# Patient Record
Sex: FEMALE | Race: WHITE | NOT HISPANIC OR LATINO | Employment: OTHER | URBAN - METROPOLITAN AREA
[De-identification: names, ages, dates, MRNs, and addresses within clinical notes are randomized per-mention and may not be internally consistent; named-entity substitution may affect disease eponyms.]

---

## 2017-01-03 ENCOUNTER — APPOINTMENT (OUTPATIENT)
Dept: GENERAL RADIOLOGY | Facility: HOSPITAL | Age: 82
End: 2017-01-03
Attending: FAMILY MEDICINE

## 2017-01-03 PROCEDURE — 71010 HC CHEST PA OR AP: CPT

## 2017-01-18 ENCOUNTER — OFFICE VISIT (OUTPATIENT)
Dept: FAMILY MEDICINE CLINIC | Facility: CLINIC | Age: 82
End: 2017-01-18

## 2017-01-18 VITALS
SYSTOLIC BLOOD PRESSURE: 158 MMHG | DIASTOLIC BLOOD PRESSURE: 60 MMHG | HEIGHT: 63 IN | WEIGHT: 130.2 LBS | BODY MASS INDEX: 23.07 KG/M2 | TEMPERATURE: 97 F

## 2017-01-18 DIAGNOSIS — J18.9 PNEUMONIA OF RIGHT LOWER LOBE DUE TO INFECTIOUS ORGANISM: Primary | ICD-10-CM

## 2017-01-18 DIAGNOSIS — I10 ESSENTIAL HYPERTENSION: ICD-10-CM

## 2017-01-18 PROCEDURE — 99213 OFFICE O/P EST LOW 20 MIN: CPT | Performed by: FAMILY MEDICINE

## 2017-01-18 RX ORDER — CHLORAL HYDRATE 500 MG
1000 CAPSULE ORAL
COMMUNITY
End: 2019-10-24 | Stop reason: HOSPADM

## 2017-01-18 RX ORDER — AMLODIPINE BESYLATE 10 MG/1
10 TABLET ORAL DAILY
Qty: 30 TABLET | Refills: 11 | Status: SHIPPED | OUTPATIENT
Start: 2017-01-18 | End: 2017-12-04 | Stop reason: SDUPTHER

## 2017-01-18 NOTE — PROGRESS NOTES
Chief Complaint and HPI    I have reviewed the patient's medical history in detail and updated the computerized patient record.    Subjective: Gita Wharton is a 81 y.o. female presents   here today for     Hospital follow up (went to [facility] with pneumonia from 12/30/16/-1/5/17) and Hypertension (meds ok)  feels good,breathing well    Review of Systems   Constitutional: Negative for chills, fatigue, fever and unexpected weight change.   HENT: Negative for ear pain, hearing loss, sinus pressure, sore throat and tinnitus.    Eyes: Negative for pain, discharge and redness.   Respiratory: Negative for cough, shortness of breath and wheezing.    Cardiovascular: Negative for chest pain, palpitations and leg swelling.   Gastrointestinal: Negative for abdominal pain, constipation, diarrhea and nausea.   Endocrine: Negative for cold intolerance and heat intolerance.   Genitourinary: Negative for difficulty urinating, flank pain and urgency.   Musculoskeletal: Negative for back pain, joint swelling and myalgias.   Skin: Negative for rash and wound.   Allergic/Immunologic: Negative for environmental allergies and food allergies.   Neurological: Negative for dizziness, seizures, numbness and headaches.   Hematological: Negative for adenopathy. Does not bruise/bleed easily.   Psychiatric/Behavioral: Negative for decreased concentration, dysphoric mood and sleep disturbance. The patient is not nervous/anxious.    All other systems reviewed and are negative.      Physical Exam   Constitutional: She is oriented to person, place, and time. She appears well-developed and well-nourished.   Cardiovascular: Normal rate, regular rhythm, normal heart sounds and intact distal pulses.    Pulmonary/Chest: Effort normal and breath sounds normal.   Neurological: She is alert and oriented to person, place, and time.   Nursing note and vitals reviewed.      Procedures    Assessment:   Diagnosis Plan   1. Pneumonia of right lower lobe due  to infectious organism     2. Essential hypertension         Plan:  No orders of the defined types were placed in this encounter.      Requested Prescriptions     Signed Prescriptions Disp Refills   • amLODIPine (NORVASC) 10 MG tablet 30 tablet 11     Sig: Take 1 tablet by mouth Daily.       All tests and consults since last visit reviewed with patient    Return in about 8 weeks (around 3/15/2017).

## 2017-01-18 NOTE — MR AVS SNAPSHOT
Gita Wharton   1/18/2017 12:15 PM   Office Visit    Dept Phone:  [phone]   Encounter #:  [id number]    Provider:  Alexis Wiggins MD   Department:  [facility]                Your Full Care Plan              Today's Medication Changes          These changes are accurate as of: 1/18/17  1:51 PM.  If you have any questions, ask your nurse or doctor.               Medication(s)that have changed:     amLODIPine 10 MG tablet   Commonly known as:  NORVASC   Take 1 tablet by mouth Daily.   What changed:  See the new instructions.   Changed by:  Alexis Wiggins MD         Stop taking medication(s)listed here:     cefdinir 300 MG capsule   Commonly known as:  OMNICEF   Stopped by:  Alexis Wiggins MD           diltiaZEM  MG 24 hr capsule   Commonly known as:  CARDIZEM CD   Stopped by:  Alexis Wiggins MD           guaiFENesin 600 MG 12 hr tablet   Commonly known as:  MUCINEX   Stopped by:  Alexis Wiggins MD                Where to Get Your Medications      You can get these medications from any pharmacy     Bring a paper prescription for each of these medications     amLODIPine 10 MG tablet                  Your Updated Medication List          This list is accurate as of: 1/18/17  1:51 PM.  Always use your most recent med list.                amLODIPine 10 MG tablet   Commonly known as:  NORVASC   Take 1 tablet by mouth Daily.       clopidogrel 75 MG tablet   Commonly known as:  PLAVIX   TAKE ONE TABLET BY MOUTH DAILY       fish oil 1000 MG capsule capsule       losartan-hydrochlorothiazide 100-12.5 MG per tablet   Commonly known as:  HYZAAR   TAKE ONE TABLET BY MOUTH EVERY MORNING       lovastatin 20 MG tablet   Commonly known as:  MEVACOR   TAKE ONE TABLET BY MOUTH DAILY       MULTI VITAMIN DAILY PO       potassium chloride 10 MEQ CR tablet   Commonly known as:  K-DUR               You Were Diagnosed With        Codes  Comments    Pneumonia of right lower lobe due to infectious organism    -  Primary ICD-10-CM: J18.9  ICD-9-CM: 483.8     Essential hypertension     ICD-10-CM: I10  ICD-9-CM: 401.9       Instructions     None    Patient Instructions History      Upcoming Appointments     Visit Type Date Time Department    HOSPITAL FOLLOW UP 2017 12:15 PM [facility]    FOLLOW UP 2017 11:00 AM [facility]    OFFICE VISIT 2017 11:00 AM [facility]      [employer] Signup     [facility] allows you to send messages to your doctor, view your test results, renew your prescriptions, schedule appointments, and more. To sign up, go to [employer] and click on the Sign Up Now link in the New User? box. Enter your [facility] Activation Code exactly as it appears below along with the last four digits of your Social Security Number and your Date of Birth () to complete the sign-up process. If you do not sign up before the expiration date, you must request a new code.    [facility] Activation Code: [email or web address]  Expires: 2017 10:38 AM    If you have questions, you can email [email or web address] or call [phone] to talk to our [facility] staff. Remember, [facility] is NOT to be used for urgent needs. For medical emergencies, dial 911.               Other Info from Your Visit           Your Appointments     2017 11:00 AM EDT   Follow Up with Alexis Wiggins MD   [facility] (--)    [address] [address] [phone]           Arrive 15 minutes prior to appointment.            May 08, 2017 11:00 AM EDT   Office Visit with Alexis Wiggins MD   [facility] (--)    [address] [place] [id number] [phone]           Arrive 15 minutes prior to appointment.              Allergies     No Known Allergies      Reason for Visit     Hospital follow up went  "to [facility] with pneumonia from 12/30/16/-1/5/17    Hypertension meds ok      Vital Signs     Blood Pressure Temperature Height Weight Body Mass Index Smoking Status    158/60 (BP Location: Left arm, Patient Position: Sitting) 97 °F (36.1 °C) (Oral) 63\" (160 cm) 130 lb 3.2 oz (59.1 kg) 23.06 kg/m2 Never Smoker      Problems and Diagnoses Noted     High blood pressure    Pneumonia of right lower lobe due to infectious organism        "

## 2017-01-30 RX ORDER — POTASSIUM CHLORIDE 750 MG/1
CAPSULE, EXTENDED RELEASE ORAL
Qty: 180 CAPSULE | Refills: 1 | Status: SHIPPED | OUTPATIENT
Start: 2017-01-30 | End: 2017-09-07

## 2017-02-13 RX ORDER — LOVASTATIN 20 MG/1
TABLET ORAL
Qty: 90 TABLET | Refills: 0 | Status: SHIPPED | OUTPATIENT
Start: 2017-02-13 | End: 2017-09-07 | Stop reason: SDUPTHER

## 2017-05-17 RX ORDER — LOSARTAN POTASSIUM AND HYDROCHLOROTHIAZIDE 12.5; 1 MG/1; MG/1
1 TABLET ORAL DAILY
Qty: 90 TABLET | Refills: 1 | Status: SHIPPED | OUTPATIENT
Start: 2017-05-17 | End: 2017-11-12 | Stop reason: SDUPTHER

## 2017-05-17 RX ORDER — LOVASTATIN 20 MG/1
TABLET ORAL
Qty: 90 TABLET | Refills: 0 | Status: SHIPPED | OUTPATIENT
Start: 2017-05-17 | End: 2017-09-07 | Stop reason: SDUPTHER

## 2017-05-31 RX ORDER — CLOPIDOGREL BISULFATE 75 MG/1
75 TABLET ORAL DAILY
Qty: 90 TABLET | Refills: 0 | Status: SHIPPED | OUTPATIENT
Start: 2017-05-31 | End: 2017-08-20 | Stop reason: SDUPTHER

## 2017-08-04 DIAGNOSIS — I10 ESSENTIAL HYPERTENSION: Primary | ICD-10-CM

## 2017-08-04 DIAGNOSIS — E78.5 HYPERLIPIDEMIA, UNSPECIFIED HYPERLIPIDEMIA TYPE: ICD-10-CM

## 2017-08-04 DIAGNOSIS — Z79.899 ENCOUNTER FOR LONG-TERM (CURRENT) USE OF MEDICATIONS: ICD-10-CM

## 2017-08-05 ENCOUNTER — RESULTS ENCOUNTER (OUTPATIENT)
Dept: FAMILY MEDICINE CLINIC | Facility: CLINIC | Age: 82
End: 2017-08-05

## 2017-08-05 DIAGNOSIS — I10 ESSENTIAL HYPERTENSION: ICD-10-CM

## 2017-08-05 DIAGNOSIS — E78.5 HYPERLIPIDEMIA, UNSPECIFIED HYPERLIPIDEMIA TYPE: ICD-10-CM

## 2017-08-05 DIAGNOSIS — Z79.899 ENCOUNTER FOR LONG-TERM (CURRENT) USE OF MEDICATIONS: ICD-10-CM

## 2017-08-06 LAB
ALBUMIN SERPL-MCNC: 4.4 G/DL (ref 3.5–4.7)
ALBUMIN/GLOB SERPL: 1.6 {RATIO} (ref 1.2–2.2)
ALP SERPL-CCNC: 77 IU/L (ref 39–117)
ALT SERPL-CCNC: 9 IU/L (ref 0–32)
AST SERPL-CCNC: 19 IU/L (ref 0–40)
BASOPHILS # BLD AUTO: 0 X10E3/UL (ref 0–0.2)
BASOPHILS NFR BLD AUTO: 0 %
BILIRUB SERPL-MCNC: 0.4 MG/DL (ref 0–1.2)
BUN SERPL-MCNC: 12 MG/DL (ref 8–27)
BUN/CREAT SERPL: 12 (ref 12–28)
CALCIUM SERPL-MCNC: 9.6 MG/DL (ref 8.7–10.3)
CHLORIDE SERPL-SCNC: 100 MMOL/L (ref 96–106)
CHOLEST SERPL-MCNC: 199 MG/DL (ref 100–199)
CO2 SERPL-SCNC: 23 MMOL/L (ref 18–29)
CREAT SERPL-MCNC: 1 MG/DL (ref 0.57–1)
EOSINOPHIL # BLD AUTO: 0.2 X10E3/UL (ref 0–0.4)
EOSINOPHIL NFR BLD AUTO: 3 %
ERYTHROCYTE [DISTWIDTH] IN BLOOD BY AUTOMATED COUNT: 14.8 % (ref 12.3–15.4)
FT4I SERPL CALC-MCNC: 2.5 (ref 1.2–4.9)
GLOBULIN SER CALC-MCNC: 2.8 G/DL (ref 1.5–4.5)
GLUCOSE SERPL-MCNC: 109 MG/DL (ref 65–99)
HCT VFR BLD AUTO: 41.2 % (ref 34–46.6)
HDLC SERPL-MCNC: 61 MG/DL
HGB BLD-MCNC: 13.4 G/DL (ref 11.1–15.9)
IMM GRANULOCYTES # BLD: 0 X10E3/UL (ref 0–0.1)
IMM GRANULOCYTES NFR BLD: 0 %
LDLC SERPL CALC-MCNC: 111 MG/DL (ref 0–99)
LDLC/HDLC SERPL: 1.8 RATIO UNITS (ref 0–3.2)
LYMPHOCYTES # BLD AUTO: 1.1 X10E3/UL (ref 0.7–3.1)
LYMPHOCYTES NFR BLD AUTO: 14 %
MCH RBC QN AUTO: 27.6 PG (ref 26.6–33)
MCHC RBC AUTO-ENTMCNC: 32.5 G/DL (ref 31.5–35.7)
MCV RBC AUTO: 85 FL (ref 79–97)
MONOCYTES # BLD AUTO: 0.8 X10E3/UL (ref 0.1–0.9)
MONOCYTES NFR BLD AUTO: 11 %
NEUTROPHILS # BLD AUTO: 5.5 X10E3/UL (ref 1.4–7)
NEUTROPHILS NFR BLD AUTO: 72 %
PLATELET # BLD AUTO: 193 X10E3/UL (ref 150–379)
POTASSIUM SERPL-SCNC: 5.4 MMOL/L (ref 3.5–5.2)
PROT SERPL-MCNC: 7.2 G/DL (ref 6–8.5)
RBC # BLD AUTO: 4.85 X10E6/UL (ref 3.77–5.28)
SODIUM SERPL-SCNC: 139 MMOL/L (ref 134–144)
T3RU NFR SERPL: 29 % (ref 24–39)
T4 SERPL-MCNC: 8.6 UG/DL (ref 4.5–12)
TRIGL SERPL-MCNC: 135 MG/DL (ref 0–149)
TSH SERPL DL<=0.005 MIU/L-ACNC: 2.69 UIU/ML (ref 0.45–4.5)
VLDLC SERPL CALC-MCNC: 27 MG/DL (ref 5–40)
WBC # BLD AUTO: 7.7 X10E3/UL (ref 3.4–10.8)

## 2017-08-21 RX ORDER — LOVASTATIN 20 MG/1
TABLET ORAL
Qty: 90 TABLET | Refills: 0 | Status: SHIPPED | OUTPATIENT
Start: 2017-08-21 | End: 2017-09-07 | Stop reason: SDUPTHER

## 2017-08-21 RX ORDER — CLOPIDOGREL BISULFATE 75 MG/1
TABLET ORAL
Qty: 90 TABLET | Refills: 0 | Status: SHIPPED | OUTPATIENT
Start: 2017-08-21 | End: 2017-11-22 | Stop reason: SDUPTHER

## 2017-09-07 ENCOUNTER — OFFICE VISIT (OUTPATIENT)
Dept: FAMILY MEDICINE CLINIC | Facility: CLINIC | Age: 82
End: 2017-09-07

## 2017-09-07 VITALS
OXYGEN SATURATION: 97 % | HEIGHT: 63 IN | HEART RATE: 103 BPM | TEMPERATURE: 98.4 F | DIASTOLIC BLOOD PRESSURE: 70 MMHG | SYSTOLIC BLOOD PRESSURE: 204 MMHG | BODY MASS INDEX: 25.23 KG/M2 | WEIGHT: 142.4 LBS

## 2017-09-07 DIAGNOSIS — I10 ESSENTIAL HYPERTENSION: Primary | ICD-10-CM

## 2017-09-07 DIAGNOSIS — J30.1 SEASONAL ALLERGIC RHINITIS DUE TO POLLEN: ICD-10-CM

## 2017-09-07 DIAGNOSIS — E78.2 MIXED HYPERLIPIDEMIA: ICD-10-CM

## 2017-09-07 PROCEDURE — 99213 OFFICE O/P EST LOW 20 MIN: CPT | Performed by: NURSE PRACTITIONER

## 2017-09-07 NOTE — PROGRESS NOTES
Subjective   Gita Wharton is a 81 y.o. female presents for routine follow up for hypertension. States has white coat syndrome and blood pressure is elevated today. Denies any chest pain, headaches, lower extremity edema. States blood pressure is always 140/80's or less at home.     Hypertension   This is a chronic problem. The current episode started more than 1 year ago. The problem is unchanged. The problem is uncontrolled. Pertinent negatives include no anxiety, blurred vision, chest pain, headaches, malaise/fatigue, neck pain, orthopnea, palpitations, peripheral edema, PND, shortness of breath or sweats. There are no associated agents to hypertension. Risk factors for coronary artery disease include dyslipidemia. Past treatments include calcium channel blockers and angiotensin blockers. The current treatment provides moderate improvement. There are no compliance problems.         The following portions of the patient's history were reviewed and updated as appropriate: allergies, current medications, past family history, past medical history, past social history, past surgical history and problem list.    Review of Systems   Constitutional: Negative.  Negative for malaise/fatigue.   HENT: Negative.    Eyes: Negative.  Negative for blurred vision.   Respiratory: Negative.  Negative for shortness of breath.    Cardiovascular: Negative.  Negative for chest pain, palpitations, orthopnea and PND.   Gastrointestinal: Negative.    Endocrine: Negative.    Genitourinary: Negative.    Musculoskeletal: Negative.  Negative for neck pain.   Skin: Negative.    Allergic/Immunologic: Negative.    Neurological: Negative.  Negative for headaches.   Hematological: Negative.    Psychiatric/Behavioral: Negative.        Objective   Physical Exam   Constitutional: She is oriented to person, place, and time. She appears well-developed and well-nourished.   HENT:   Head: Normocephalic and atraumatic.   Eyes: Pupils are equal,  round, and reactive to light.   Neck: Neck supple. No JVD present.   Cardiovascular: Normal rate, regular rhythm and normal heart sounds.  Exam reveals no gallop and no friction rub.    No murmur heard.  Pulmonary/Chest: Effort normal and breath sounds normal. No respiratory distress. She has no wheezes. She has no rales.   Abdominal: Soft. Bowel sounds are normal. She exhibits no distension. There is no tenderness.   Neurological: She is alert and oriented to person, place, and time.   Skin: Skin is warm and dry.   Psychiatric: She has a normal mood and affect.   Vitals reviewed.      Assessment/Plan   Gita was seen today for hypertension.    Diagnoses and all orders for this visit:    Essential hypertension    Seasonal allergic rhinitis due to pollen    Mixed hyperlipidemia

## 2017-09-18 RX ORDER — POTASSIUM CHLORIDE 750 MG/1
CAPSULE, EXTENDED RELEASE ORAL
Qty: 180 CAPSULE | Refills: 0 | Status: SHIPPED | OUTPATIENT
Start: 2017-09-18 | End: 2018-01-10 | Stop reason: SDUPTHER

## 2017-11-13 RX ORDER — LOVASTATIN 20 MG/1
TABLET ORAL
Qty: 90 TABLET | Refills: 0 | Status: SHIPPED | OUTPATIENT
Start: 2017-11-13 | End: 2018-02-15 | Stop reason: SDUPTHER

## 2017-11-13 RX ORDER — LOSARTAN POTASSIUM AND HYDROCHLOROTHIAZIDE 12.5; 1 MG/1; MG/1
TABLET ORAL
Qty: 90 TABLET | Refills: 0 | Status: SHIPPED | OUTPATIENT
Start: 2017-11-13 | End: 2018-02-12 | Stop reason: SDUPTHER

## 2017-11-22 RX ORDER — CLOPIDOGREL BISULFATE 75 MG/1
TABLET ORAL
Qty: 90 TABLET | Refills: 0 | Status: SHIPPED | OUTPATIENT
Start: 2017-11-22 | End: 2018-02-14 | Stop reason: SDUPTHER

## 2017-12-05 RX ORDER — AMLODIPINE BESYLATE 10 MG/1
TABLET ORAL
Qty: 90 TABLET | Refills: 10 | Status: SHIPPED | OUTPATIENT
Start: 2017-12-05 | End: 2018-04-10 | Stop reason: SDUPTHER

## 2018-01-10 RX ORDER — POTASSIUM CHLORIDE 750 MG/1
CAPSULE, EXTENDED RELEASE ORAL
Qty: 180 CAPSULE | Refills: 0 | Status: SHIPPED | OUTPATIENT
Start: 2018-01-10 | End: 2018-05-02 | Stop reason: SDUPTHER

## 2018-02-12 RX ORDER — LOSARTAN POTASSIUM AND HYDROCHLOROTHIAZIDE 12.5; 1 MG/1; MG/1
TABLET ORAL
Qty: 90 TABLET | Refills: 0 | Status: SHIPPED | OUTPATIENT
Start: 2018-02-12 | End: 2018-04-10 | Stop reason: SDUPTHER

## 2018-02-14 RX ORDER — CLOPIDOGREL BISULFATE 75 MG/1
TABLET ORAL
Qty: 90 TABLET | Refills: 0 | Status: SHIPPED | OUTPATIENT
Start: 2018-02-14 | End: 2018-04-10 | Stop reason: SDUPTHER

## 2018-02-15 RX ORDER — LOVASTATIN 20 MG/1
20 TABLET ORAL NIGHTLY
Qty: 90 TABLET | Refills: 0 | Status: SHIPPED | OUTPATIENT
Start: 2018-02-15 | End: 2018-04-10

## 2018-04-10 ENCOUNTER — OFFICE VISIT (OUTPATIENT)
Dept: INTERNAL MEDICINE | Facility: CLINIC | Age: 83
End: 2018-04-10

## 2018-04-10 VITALS
OXYGEN SATURATION: 98 % | WEIGHT: 148 LBS | HEART RATE: 86 BPM | TEMPERATURE: 97.1 F | HEIGHT: 63 IN | BODY MASS INDEX: 26.22 KG/M2 | SYSTOLIC BLOOD PRESSURE: 162 MMHG | DIASTOLIC BLOOD PRESSURE: 66 MMHG | RESPIRATION RATE: 16 BRPM

## 2018-04-10 DIAGNOSIS — E78.2 MIXED HYPERLIPIDEMIA: ICD-10-CM

## 2018-04-10 DIAGNOSIS — I10 ESSENTIAL HYPERTENSION: Primary | ICD-10-CM

## 2018-04-10 DIAGNOSIS — J30.1 ACUTE SEASONAL ALLERGIC RHINITIS DUE TO POLLEN: ICD-10-CM

## 2018-04-10 PROCEDURE — 99213 OFFICE O/P EST LOW 20 MIN: CPT | Performed by: FAMILY MEDICINE

## 2018-04-10 RX ORDER — CLOPIDOGREL BISULFATE 75 MG/1
75 TABLET ORAL DAILY
Qty: 90 TABLET | Refills: 3 | Status: SHIPPED | OUTPATIENT
Start: 2018-04-10 | End: 2018-07-14 | Stop reason: HOSPADM

## 2018-04-10 RX ORDER — LOSARTAN POTASSIUM AND HYDROCHLOROTHIAZIDE 12.5; 1 MG/1; MG/1
1 TABLET ORAL DAILY
Qty: 90 TABLET | Refills: 3 | Status: SHIPPED | OUTPATIENT
Start: 2018-04-10 | End: 2018-05-01

## 2018-04-10 RX ORDER — ATORVASTATIN CALCIUM 10 MG/1
10 TABLET, FILM COATED ORAL DAILY
Qty: 30 TABLET | Refills: 5 | Status: SHIPPED | OUTPATIENT
Start: 2018-04-10 | End: 2018-05-01

## 2018-04-10 RX ORDER — AZELASTINE 1 MG/ML
2 SPRAY, METERED NASAL 2 TIMES DAILY
Qty: 1 EACH | Refills: 12 | Status: SHIPPED | OUTPATIENT
Start: 2018-04-10 | End: 2019-06-27

## 2018-04-10 RX ORDER — AMLODIPINE BESYLATE 10 MG/1
10 TABLET ORAL DAILY
Qty: 90 TABLET | Refills: 3 | Status: SHIPPED | OUTPATIENT
Start: 2018-04-10 | End: 2019-07-11 | Stop reason: SDUPTHER

## 2018-04-10 NOTE — PROGRESS NOTES
CC:lipids,HTN    Subjective.../HPI  Patient present today with 1) Gita has a history of chronic hypertension and has been well controlled on current medications.  Patient reports has had hypertension for 17 years. She is tolerating medications without side effect. She reports no vision changes, headaches or lightheadedness. She is requesting refills of medications  2) Gita has a history of chronic hypertension and has been well controlled on current medications.  Patient reports has had hypertension for 17 years. She is tolerating medications without side effect. She reports no vision changes, headaches or lightheadedness. She is requesting refills of medications  I have reviewed the patient's medical history in detail and updated the computerized patient record.    Past Medical History:   Diagnosis Date   • Hyperlipidemia    • Hypertension    • Stroke        Past Surgical History:   Procedure Laterality Date   • HYSTERECTOMY     • JOINT REPLACEMENT     • THYROIDECTOMY         No family history on file.    Social History     Social History   • Marital status:      Spouse name: N/A   • Number of children: N/A   • Years of education: N/A     Occupational History   • Not on file.     Social History Main Topics   • Smoking status: Never Smoker   • Smokeless tobacco: Not on file   • Alcohol use Yes   • Drug use: No   • Sexual activity: Defer     Other Topics Concern   • Not on file     Social History Narrative   • No narrative on file         There is no immunization history on file for this patient.    Review of Systems:   Review of Systems   Constitutional: Negative.    HENT: Negative.    Eyes: Negative.    Respiratory: Negative.    Cardiovascular: Negative.    Gastrointestinal: Negative.    Endocrine: Negative.    Genitourinary: Negative.    Musculoskeletal: Negative.    Skin: Negative.    Allergic/Immunologic: Negative.    Neurological: Negative.    Hematological: Negative.    Psychiatric/Behavioral:  "Negative.          Physical Exam   Constitutional: She is oriented to person, place, and time. She appears well-developed and well-nourished.   Cardiovascular: Normal rate, regular rhythm, normal heart sounds and intact distal pulses.    Pulmonary/Chest: Effort normal and breath sounds normal.   Neurological: She is alert and oriented to person, place, and time.   Psychiatric: She has a normal mood and affect. Her behavior is normal.   Vitals reviewed.        Vital Signs     Vitals:    04/10/18 1305   BP: 162/66   BP Location: Left arm   Patient Position: Sitting   Cuff Size: Adult   Pulse: 86   Resp: 16   Temp: 97.1 °F (36.2 °C)   TempSrc: Tympanic   SpO2: 98%   Weight: 67.1 kg (148 lb)   Height: 160 cm (63\")          Results Review:      REVIEWED AND DISCUSSED CLINICAL RESULTS WITH PATIENT      Requested Prescriptions     Signed Prescriptions Disp Refills   • metoprolol tartrate (LOPRESSOR) 25 MG tablet 60 tablet 5     Sig: Take 1 tablet by mouth Every 12 (Twelve) Hours.   • atorvastatin (LIPITOR) 10 MG tablet 30 tablet 5     Sig: Take 1 tablet by mouth Daily.   • azelastine (ASTELIN) 0.1 % nasal spray 1 each 12     Si sprays into each nostril 2 (Two) Times a Day. Use in each nostril as directed   • losartan-hydrochlorothiazide (HYZAAR) 100-12.5 MG per tablet 90 tablet 3     Sig: Take 1 tablet by mouth Daily.   • amLODIPine (NORVASC) 10 MG tablet 90 tablet 3     Sig: Take 1 tablet by mouth Daily.   • clopidogrel (PLAVIX) 75 MG tablet 90 tablet 3     Sig: Take 1 tablet by mouth Daily.         Current Outpatient Prescriptions:   •  amLODIPine (NORVASC) 10 MG tablet, Take 1 tablet by mouth Daily., Disp: 90 tablet, Rfl: 3  •  clopidogrel (PLAVIX) 75 MG tablet, Take 1 tablet by mouth Daily., Disp: 90 tablet, Rfl: 3  •  losartan-hydrochlorothiazide (HYZAAR) 100-12.5 MG per tablet, Take 1 tablet by mouth Daily., Disp: 90 tablet, Rfl: 3  •  Multiple Vitamin (MULTI VITAMIN DAILY PO), Take  by mouth., Disp: , Rfl:   •  " Omega-3 Fatty Acids (FISH OIL) 1000 MG capsule capsule, Take 1,000 mg by mouth Daily With Breakfast., Disp: , Rfl:   •  potassium chloride (K-DUR) 10 MEQ CR tablet, Take 10 mEq by mouth 2 (Two) Times a Day., Disp: , Rfl:   •  potassium chloride (MICRO-K) 10 MEQ CR capsule, TAKE TWO CAPSULES BY MOUTH DAILY, Disp: 180 capsule, Rfl: 0  •  atorvastatin (LIPITOR) 10 MG tablet, Take 1 tablet by mouth Daily., Disp: 30 tablet, Rfl: 5  •  azelastine (ASTELIN) 0.1 % nasal spray, 2 sprays into each nostril 2 (Two) Times a Day. Use in each nostril as directed, Disp: 1 each, Rfl: 12  •  metoprolol tartrate (LOPRESSOR) 25 MG tablet, Take 1 tablet by mouth Every 12 (Twelve) Hours., Disp: 60 tablet, Rfl: 5    Procedures          Diagnoses and all orders for this visit:    Essential hypertension  -     metoprolol tartrate (LOPRESSOR) 25 MG tablet; Take 1 tablet by mouth Every 12 (Twelve) Hours.  -     losartan-hydrochlorothiazide (HYZAAR) 100-12.5 MG per tablet; Take 1 tablet by mouth Daily.  -     amLODIPine (NORVASC) 10 MG tablet; Take 1 tablet by mouth Daily.    Mixed hyperlipidemia  -     atorvastatin (LIPITOR) 10 MG tablet; Take 1 tablet by mouth Daily.  -     Comprehensive Metabolic Panel; Future  -     Lipid Panel With LDL / HDL Ratio; Future    Acute seasonal allergic rhinitis due to pollen  -     azelastine (ASTELIN) 0.1 % nasal spray; 2 sprays into each nostril 2 (Two) Times a Day. Use in each nostril as directed    Other orders  -     clopidogrel (PLAVIX) 75 MG tablet; Take 1 tablet by mouth Daily.         Return in about 6 weeks (around 5/22/2018) for Recheck.    Alexis Wiggins M.D  04/10/18  1:54 PM

## 2018-04-15 ENCOUNTER — RESULTS ENCOUNTER (OUTPATIENT)
Dept: INTERNAL MEDICINE | Facility: CLINIC | Age: 83
End: 2018-04-15

## 2018-04-15 DIAGNOSIS — E78.2 MIXED HYPERLIPIDEMIA: ICD-10-CM

## 2018-04-18 LAB
ALBUMIN SERPL-MCNC: 3.8 G/DL (ref 3.5–5.2)
ALBUMIN/GLOB SERPL: 1.5 G/DL
ALP SERPL-CCNC: 59 U/L (ref 39–117)
ALT SERPL-CCNC: 11 U/L (ref 1–33)
AST SERPL-CCNC: 15 U/L (ref 1–32)
BILIRUB SERPL-MCNC: 0.3 MG/DL (ref 0.1–1.2)
BUN SERPL-MCNC: 19 MG/DL (ref 8–23)
BUN/CREAT SERPL: 16.2 (ref 7–25)
CALCIUM SERPL-MCNC: 8.4 MG/DL (ref 8.6–10.5)
CHLORIDE SERPL-SCNC: 105 MMOL/L (ref 98–107)
CHOLEST SERPL-MCNC: 183 MG/DL (ref 0–200)
CO2 SERPL-SCNC: 21 MMOL/L (ref 22–29)
CREAT SERPL-MCNC: 1.17 MG/DL (ref 0.57–1)
GFR SERPLBLD CREATININE-BSD FMLA CKD-EPI: 44 ML/MIN/1.73
GFR SERPLBLD CREATININE-BSD FMLA CKD-EPI: 54 ML/MIN/1.73
GLOBULIN SER CALC-MCNC: 2.5 GM/DL
GLUCOSE SERPL-MCNC: 85 MG/DL (ref 65–99)
HDLC SERPL-MCNC: 41 MG/DL (ref 40–60)
LDLC SERPL CALC-MCNC: 114 MG/DL (ref 0–100)
LDLC/HDLC SERPL: 2.78 {RATIO}
POTASSIUM SERPL-SCNC: 5.6 MMOL/L (ref 3.5–5.2)
PROT SERPL-MCNC: 6.3 G/DL (ref 6–8.5)
SODIUM SERPL-SCNC: 139 MMOL/L (ref 136–145)
TRIGL SERPL-MCNC: 141 MG/DL (ref 0–150)
VLDLC SERPL CALC-MCNC: 28.2 MG/DL (ref 5–40)

## 2018-05-01 ENCOUNTER — OFFICE VISIT (OUTPATIENT)
Dept: INTERNAL MEDICINE | Facility: CLINIC | Age: 83
End: 2018-05-01

## 2018-05-01 VITALS
HEART RATE: 56 BPM | WEIGHT: 146.8 LBS | OXYGEN SATURATION: 95 % | HEIGHT: 63 IN | BODY MASS INDEX: 26.01 KG/M2 | TEMPERATURE: 98.4 F | SYSTOLIC BLOOD PRESSURE: 188 MMHG | DIASTOLIC BLOOD PRESSURE: 63 MMHG

## 2018-05-01 DIAGNOSIS — I10 ESSENTIAL HYPERTENSION: Primary | ICD-10-CM

## 2018-05-01 DIAGNOSIS — E78.2 MIXED HYPERLIPIDEMIA: ICD-10-CM

## 2018-05-01 PROCEDURE — 99213 OFFICE O/P EST LOW 20 MIN: CPT | Performed by: FAMILY MEDICINE

## 2018-05-01 RX ORDER — ALISKIREN 300 MG/1
300 TABLET, FILM COATED ORAL DAILY
Qty: 90 TABLET | Refills: 3 | Status: SHIPPED | OUTPATIENT
Start: 2018-05-01 | End: 2018-05-30

## 2018-05-01 RX ORDER — ATORVASTATIN CALCIUM 20 MG/1
20 TABLET, FILM COATED ORAL DAILY
Qty: 90 TABLET | Refills: 3 | Status: SHIPPED | OUTPATIENT
Start: 2018-05-01 | End: 2019-04-25 | Stop reason: SDUPTHER

## 2018-05-01 RX ORDER — INDAPAMIDE 2.5 MG/1
2.5 TABLET, FILM COATED ORAL EVERY MORNING
Qty: 30 TABLET | Refills: 5 | Status: SHIPPED | OUTPATIENT
Start: 2018-05-01 | End: 2019-04-17 | Stop reason: HOSPADM

## 2018-05-01 NOTE — PROGRESS NOTES
CC:1) HBP 2) lipids    Subjective.../HPI  Patient present today with1) .Gita has a history of chronic hypertension and has been well controlled on current medications.  Patient reports has had hypertension for 18 years. She is tolerating medications without side effect. She reports no vision changes, headaches or lightheadedness. She is requesting refills of medications  2) Gita has a history of chronic hyperlipidemia and has been well controlled on current medications.  Patient reports has had hyperlipidemia for 20 years. She is tolerating medications without side effect.  Hyperlipidemia labs will be reviewed today.      I have reviewed the patient's medical history in detail and updated the computerized patient record.    Past Medical History:   Diagnosis Date   • Hyperlipidemia    • Hypertension    • Stroke        Past Surgical History:   Procedure Laterality Date   • HYSTERECTOMY     • JOINT REPLACEMENT     • THYROIDECTOMY         No family history on file.    Social History     Social History   • Marital status:      Spouse name: N/A   • Number of children: N/A   • Years of education: N/A     Occupational History   • Not on file.     Social History Main Topics   • Smoking status: Never Smoker   • Smokeless tobacco: Not on file   • Alcohol use Yes   • Drug use: No   • Sexual activity: Defer     Other Topics Concern   • Not on file     Social History Narrative   • No narrative on file         There is no immunization history on file for this patient.    Review of Systems:   Review of Systems   Constitutional: Negative.    HENT: Negative.    Eyes: Negative.    Respiratory: Negative.    Cardiovascular: Negative.    Gastrointestinal: Negative.    Endocrine: Negative.    Genitourinary: Negative.    Musculoskeletal: Negative.    Skin: Negative.    Allergic/Immunologic: Negative.    Neurological: Negative.    Hematological: Negative.    Psychiatric/Behavioral: Negative.    All other systems reviewed and  "are negative.        Physical Exam   Constitutional: She is oriented to person, place, and time. She appears well-developed and well-nourished.   Cardiovascular: Normal rate, regular rhythm and normal heart sounds.    Pulmonary/Chest: Effort normal and breath sounds normal.   Musculoskeletal: She exhibits no edema.   Neurological: She is alert and oriented to person, place, and time.   Psychiatric: She has a normal mood and affect. Her behavior is normal.   Vitals reviewed.        Vital Signs     Vitals:    05/01/18 0948   BP: (!) 188/63   BP Location: Left arm   Patient Position: Sitting   Cuff Size: Adult   Pulse: 56   Temp: 98.4 °F (36.9 °C)   TempSrc: Oral   SpO2: 95%   Weight: 66.6 kg (146 lb 12.8 oz)   Height: 160 cm (62.99\")          Results Review:      REVIEWED AND DISCUSSED CLINICAL RESULTS WITH PATIENT      Requested Prescriptions      No prescriptions requested or ordered in this encounter         Current Outpatient Prescriptions:   •  amLODIPine (NORVASC) 10 MG tablet, Take 1 tablet by mouth Daily., Disp: 90 tablet, Rfl: 3  •  atorvastatin (LIPITOR) 10 MG tablet, Take 1 tablet by mouth Daily., Disp: 30 tablet, Rfl: 5  •  azelastine (ASTELIN) 0.1 % nasal spray, 2 sprays into each nostril 2 (Two) Times a Day. Use in each nostril as directed, Disp: 1 each, Rfl: 12  •  clopidogrel (PLAVIX) 75 MG tablet, Take 1 tablet by mouth Daily., Disp: 90 tablet, Rfl: 3  •  losartan-hydrochlorothiazide (HYZAAR) 100-12.5 MG per tablet, Take 1 tablet by mouth Daily., Disp: 90 tablet, Rfl: 3  •  metoprolol tartrate (LOPRESSOR) 25 MG tablet, Take 1 tablet by mouth Every 12 (Twelve) Hours., Disp: 60 tablet, Rfl: 5  •  Multiple Vitamin (MULTI VITAMIN DAILY PO), Take  by mouth., Disp: , Rfl:   •  Omega-3 Fatty Acids (FISH OIL) 1000 MG capsule capsule, Take 1,000 mg by mouth Daily With Breakfast., Disp: , Rfl:   •  potassium chloride (K-DUR) 10 MEQ CR tablet, Take 10 mEq by mouth 2 (Two) Times a Day., Disp: , Rfl:   •  " potassium chloride (MICRO-K) 10 MEQ CR capsule, TAKE TWO CAPSULES BY MOUTH DAILY, Disp: 180 capsule, Rfl: 0    Procedures          There are no diagnoses linked to this encounter.     No Follow-up on file.    Alexis Wiggins M.D  05/01/18  10:08 AM

## 2018-05-02 RX ORDER — POTASSIUM CHLORIDE 750 MG/1
CAPSULE, EXTENDED RELEASE ORAL
Qty: 180 CAPSULE | Refills: 0 | Status: SHIPPED | OUTPATIENT
Start: 2018-05-02 | End: 2018-07-12

## 2018-05-06 ENCOUNTER — RESULTS ENCOUNTER (OUTPATIENT)
Dept: INTERNAL MEDICINE | Facility: CLINIC | Age: 83
End: 2018-05-06

## 2018-05-06 DIAGNOSIS — E78.2 MIXED HYPERLIPIDEMIA: ICD-10-CM

## 2018-05-25 ENCOUNTER — TELEPHONE (OUTPATIENT)
Dept: INTERNAL MEDICINE | Facility: CLINIC | Age: 83
End: 2018-05-25

## 2018-05-30 RX ORDER — LOSARTAN POTASSIUM 100 MG/1
100 TABLET ORAL DAILY
Qty: 30 TABLET | Refills: 5 | Status: SHIPPED | OUTPATIENT
Start: 2018-05-30 | End: 2019-01-16 | Stop reason: SDUPTHER

## 2018-06-20 ENCOUNTER — LAB (OUTPATIENT)
Dept: INTERNAL MEDICINE | Facility: CLINIC | Age: 83
End: 2018-06-20

## 2018-06-20 LAB
ALBUMIN SERPL-MCNC: 3.8 G/DL (ref 3.5–5.2)
ALBUMIN/GLOB SERPL: 1.5 G/DL
ALP SERPL-CCNC: 78 U/L (ref 39–117)
ALT SERPL-CCNC: 10 U/L (ref 1–33)
AST SERPL-CCNC: 16 U/L (ref 1–32)
BILIRUB SERPL-MCNC: 0.4 MG/DL (ref 0.1–1.2)
BUN SERPL-MCNC: 8 MG/DL (ref 8–23)
BUN/CREAT SERPL: 6.2 (ref 7–25)
CALCIUM SERPL-MCNC: 8.6 MG/DL (ref 8.6–10.5)
CHLORIDE SERPL-SCNC: 105 MMOL/L (ref 98–107)
CHOLEST SERPL-MCNC: 149 MG/DL (ref 0–200)
CO2 SERPL-SCNC: 22.8 MMOL/L (ref 22–29)
CREAT SERPL-MCNC: 1.29 MG/DL (ref 0.57–1)
GFR SERPLBLD CREATININE-BSD FMLA CKD-EPI: 40 ML/MIN/1.73
GFR SERPLBLD CREATININE-BSD FMLA CKD-EPI: 48 ML/MIN/1.73
GLOBULIN SER CALC-MCNC: 2.5 GM/DL
GLUCOSE SERPL-MCNC: 98 MG/DL (ref 65–99)
HDLC SERPL-MCNC: 45 MG/DL (ref 40–60)
LDLC SERPL CALC-MCNC: 72 MG/DL (ref 0–100)
LDLC/HDLC SERPL: 1.6 {RATIO}
POTASSIUM SERPL-SCNC: 4.7 MMOL/L (ref 3.5–5.2)
PROT SERPL-MCNC: 6.3 G/DL (ref 6–8.5)
SODIUM SERPL-SCNC: 143 MMOL/L (ref 136–145)
TRIGL SERPL-MCNC: 159 MG/DL (ref 0–150)
VLDLC SERPL CALC-MCNC: 31.8 MG/DL (ref 5–40)

## 2018-06-26 ENCOUNTER — OFFICE VISIT (OUTPATIENT)
Dept: INTERNAL MEDICINE | Facility: CLINIC | Age: 83
End: 2018-06-26

## 2018-06-26 VITALS
DIASTOLIC BLOOD PRESSURE: 54 MMHG | HEART RATE: 70 BPM | OXYGEN SATURATION: 95 % | TEMPERATURE: 98.1 F | WEIGHT: 140 LBS | HEIGHT: 63 IN | BODY MASS INDEX: 24.8 KG/M2 | SYSTOLIC BLOOD PRESSURE: 170 MMHG

## 2018-06-26 DIAGNOSIS — E78.2 MIXED HYPERLIPIDEMIA: ICD-10-CM

## 2018-06-26 DIAGNOSIS — I10 ESSENTIAL HYPERTENSION: Primary | ICD-10-CM

## 2018-06-26 PROCEDURE — 99213 OFFICE O/P EST LOW 20 MIN: CPT | Performed by: FAMILY MEDICINE

## 2018-06-26 NOTE — PROGRESS NOTES
CC:lipids,HTN    Subjective.../HPI  Patient present today with1) HTN  Gita has a history of chronic hypertension and has been well controlled on current medications.  Patient reports has had hypertension for 20 years. She is tolerating medications without side effect. She reports no vision changes, headaches or lightheadedness. She is requesting refills of medications  2) lipids- Gita has a history of chronic hyperlipidemia and has been well controlled on current medications.  Patient reports has had hyperlipidemia for 8 months. She is tolerating medications without side effect.  Hyperlipidemia labs will be reviewed today.      I have reviewed the patient's medical history in detail and updated the computerized patient record.    Past Medical History:   Diagnosis Date   • Hyperlipidemia    • Hypertension    • Stroke        Past Surgical History:   Procedure Laterality Date   • HYSTERECTOMY     • JOINT REPLACEMENT     • THYROIDECTOMY         No family history on file.    Social History     Social History   • Marital status:      Spouse name: N/A   • Number of children: N/A   • Years of education: N/A     Occupational History   • Not on file.     Social History Main Topics   • Smoking status: Never Smoker   • Smokeless tobacco: Not on file   • Alcohol use Yes   • Drug use: No   • Sexual activity: Defer     Other Topics Concern   • Not on file     Social History Narrative   • No narrative on file         There is no immunization history on file for this patient.    Review of Systems:   Review of Systems   Constitutional: Negative.    HENT: Negative.    Eyes: Negative.    Respiratory: Negative.    Cardiovascular: Negative.    Gastrointestinal: Negative.    Endocrine: Negative.    Genitourinary: Negative.    Musculoskeletal: Negative.    Skin: Negative.    Allergic/Immunologic: Negative.    Neurological: Negative.    Hematological: Negative.    Psychiatric/Behavioral: Negative.          Physical Exam  "  Constitutional: She is oriented to person, place, and time. She appears well-developed and well-nourished.   Cardiovascular: Normal rate, regular rhythm and normal heart sounds.    Pulmonary/Chest: Effort normal and breath sounds normal.   Neurological: She is alert and oriented to person, place, and time.   Psychiatric: She has a normal mood and affect. Her behavior is normal.         Vital Signs     Vitals:    06/26/18 1232   BP: 170/54   BP Location: Right arm   Patient Position: Sitting   Cuff Size: Adult   Pulse: 70   Temp: 98.1 °F (36.7 °C)   TempSrc: Oral   SpO2: 95%   Weight: 63.5 kg (140 lb)   Height: 160 cm (62.99\")          Results Review:      REVIEWED AND DISCUSSED CLINICAL RESULTS WITH PATIENT      Requested Prescriptions      No prescriptions requested or ordered in this encounter         Current Outpatient Prescriptions:   •  amLODIPine (NORVASC) 10 MG tablet, Take 1 tablet by mouth Daily., Disp: 90 tablet, Rfl: 3  •  atorvastatin (LIPITOR) 20 MG tablet, Take 1 tablet by mouth Daily., Disp: 90 tablet, Rfl: 3  •  azelastine (ASTELIN) 0.1 % nasal spray, 2 sprays into each nostril 2 (Two) Times a Day. Use in each nostril as directed, Disp: 1 each, Rfl: 12  •  clopidogrel (PLAVIX) 75 MG tablet, Take 1 tablet by mouth Daily., Disp: 90 tablet, Rfl: 3  •  indapamide (LOZOL) 2.5 MG tablet, Take 1 tablet by mouth Every Morning., Disp: 30 tablet, Rfl: 5  •  losartan (COZAAR) 100 MG tablet, Take 1 tablet by mouth Daily., Disp: 30 tablet, Rfl: 5  •  metoprolol tartrate (LOPRESSOR) 25 MG tablet, Take 1 tablet by mouth Every 12 (Twelve) Hours., Disp: 60 tablet, Rfl: 5  •  Multiple Vitamin (MULTI VITAMIN DAILY PO), Take  by mouth., Disp: , Rfl:   •  Omega-3 Fatty Acids (FISH OIL) 1000 MG capsule capsule, Take 1,000 mg by mouth Daily With Breakfast., Disp: , Rfl:   •  potassium chloride (MICRO-K) 10 MEQ CR capsule, TAKE TWO CAPSULES BY MOUTH DAILY, Disp: 180 capsule, Rfl: 0    Procedures          Diagnoses and all " orders for this visit:    Essential hypertension    Mixed hyperlipidemia         Return in about 3 months (around 9/26/2018) for Recheck.    Alexis Wiggins M.D  06/26/18  1:03 PM

## 2018-07-12 ENCOUNTER — APPOINTMENT (OUTPATIENT)
Dept: GENERAL RADIOLOGY | Facility: HOSPITAL | Age: 83
End: 2018-07-12

## 2018-07-12 ENCOUNTER — APPOINTMENT (OUTPATIENT)
Dept: CT IMAGING | Facility: HOSPITAL | Age: 83
End: 2018-07-12

## 2018-07-12 ENCOUNTER — HOSPITAL ENCOUNTER (INPATIENT)
Facility: HOSPITAL | Age: 83
LOS: 2 days | Discharge: HOME | End: 2018-07-14
Attending: EMERGENCY MEDICINE | Admitting: INTERNAL MEDICINE

## 2018-07-12 DIAGNOSIS — S63.502A SPRAIN OF LEFT WRIST, INITIAL ENCOUNTER: ICD-10-CM

## 2018-07-12 DIAGNOSIS — S01.81XA LACERATION OF FOREHEAD, INITIAL ENCOUNTER: Primary | ICD-10-CM

## 2018-07-12 DIAGNOSIS — S06.5XAA SUBDURAL HEMATOMA (HCC): ICD-10-CM

## 2018-07-12 PROBLEM — S06.5X0A TRAUMATIC SUBDURAL HEMORRHAGE WITHOUT LOSS OF CONSCIOUSNESS (HCC): Status: ACTIVE | Noted: 2018-07-12
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
## 2018-07-12 LAB
ALBUMIN SERPL-MCNC: 4.6 G/DL (ref 3.5–5.2)
ALBUMIN/GLOB SERPL: 1.4 G/DL
ALP SERPL-CCNC: 99 U/L (ref 39–117)
ALT SERPL W P-5'-P-CCNC: 8 U/L (ref 1–33)
ANION GAP SERPL CALCULATED.3IONS-SCNC: 14.3 MMOL/L
AST SERPL-CCNC: 12 U/L (ref 1–32)
BASOPHILS # BLD AUTO: 0.01 10*3/MM3 (ref 0–0.2)
BASOPHILS NFR BLD AUTO: 0.1 % (ref 0–1.5)
BILIRUB SERPL-MCNC: 0.4 MG/DL (ref 0.1–1.2)
BUN BLD-MCNC: 13 MG/DL (ref 8–23)
BUN/CREAT SERPL: 11.4 (ref 7–25)
CALCIUM SPEC-SCNC: 8.8 MG/DL (ref 8.6–10.5)
CHLORIDE SERPL-SCNC: 98 MMOL/L (ref 98–107)
CO2 SERPL-SCNC: 19.7 MMOL/L (ref 22–29)
CREAT BLD-MCNC: 1.14 MG/DL (ref 0.57–1)
DEPRECATED RDW RBC AUTO: 43.1 FL (ref 37–54)
EOSINOPHIL # BLD AUTO: 0.04 10*3/MM3 (ref 0–0.7)
EOSINOPHIL NFR BLD AUTO: 0.3 % (ref 0.3–6.2)
ERYTHROCYTE [DISTWIDTH] IN BLOOD BY AUTOMATED COUNT: 13.7 % (ref 11.7–13)
GFR SERPL CREATININE-BSD FRML MDRD: 46 ML/MIN/1.73
GLOBULIN UR ELPH-MCNC: 3.3 GM/DL
GLUCOSE BLD-MCNC: 126 MG/DL (ref 65–99)
GLUCOSE BLDC GLUCOMTR-MCNC: 118 MG/DL (ref 70–130)
HCT VFR BLD AUTO: 44 % (ref 35.6–45.5)
HGB BLD-MCNC: 14.2 G/DL (ref 11.9–15.5)
IMM GRANULOCYTES # BLD: 0.03 10*3/MM3 (ref 0–0.03)
IMM GRANULOCYTES NFR BLD: 0.3 % (ref 0–0.5)
INR PPP: 0.95 (ref 0.9–1.1)
LYMPHOCYTES # BLD AUTO: 1.04 10*3/MM3 (ref 0.9–4.8)
LYMPHOCYTES NFR BLD AUTO: 8.8 % (ref 19.6–45.3)
MCH RBC QN AUTO: 28 PG (ref 26.9–32)
MCHC RBC AUTO-ENTMCNC: 32.3 G/DL (ref 32.4–36.3)
MCV RBC AUTO: 86.6 FL (ref 80.5–98.2)
MONOCYTES # BLD AUTO: 0.86 10*3/MM3 (ref 0.2–1.2)
MONOCYTES NFR BLD AUTO: 7.3 % (ref 5–12)
NEUTROPHILS # BLD AUTO: 9.82 10*3/MM3 (ref 1.9–8.1)
NEUTROPHILS NFR BLD AUTO: 83.2 % (ref 42.7–76)
PLATELET # BLD AUTO: 208 10*3/MM3 (ref 140–500)
PMV BLD AUTO: 12.4 FL (ref 6–12)
POTASSIUM BLD-SCNC: 4.3 MMOL/L (ref 3.5–5.2)
PROT SERPL-MCNC: 7.9 G/DL (ref 6–8.5)
PROTHROMBIN TIME: 12.5 SECONDS (ref 11.7–14.2)
RBC # BLD AUTO: 5.08 10*6/MM3 (ref 3.9–5.2)
SODIUM BLD-SCNC: 132 MMOL/L (ref 136–145)
WBC NRBC COR # BLD: 11.8 10*3/MM3 (ref 4.5–10.7)

## 2018-07-12 PROCEDURE — 90715 TDAP VACCINE 7 YRS/> IM: CPT | Performed by: EMERGENCY MEDICINE

## 2018-07-12 PROCEDURE — 70450 CT HEAD/BRAIN W/O DYE: CPT

## 2018-07-12 PROCEDURE — 85025 COMPLETE CBC W/AUTO DIFF WBC: CPT | Performed by: EMERGENCY MEDICINE

## 2018-07-12 PROCEDURE — 80053 COMPREHEN METABOLIC PANEL: CPT | Performed by: EMERGENCY MEDICINE

## 2018-07-12 PROCEDURE — 82962 GLUCOSE BLOOD TEST: CPT

## 2018-07-12 PROCEDURE — 25010000002 TDAP 5-2.5-18.5 LF-MCG/0.5 SUSPENSION: Performed by: EMERGENCY MEDICINE

## 2018-07-12 PROCEDURE — 84100 ASSAY OF PHOSPHORUS: CPT | Performed by: INTERNAL MEDICINE

## 2018-07-12 PROCEDURE — 99285 EMERGENCY DEPT VISIT HI MDM: CPT

## 2018-07-12 PROCEDURE — 90471 IMMUNIZATION ADMIN: CPT | Performed by: EMERGENCY MEDICINE

## 2018-07-12 PROCEDURE — 85610 PROTHROMBIN TIME: CPT | Performed by: EMERGENCY MEDICINE

## 2018-07-12 PROCEDURE — 99221 1ST HOSP IP/OBS SF/LOW 40: CPT | Performed by: NURSE PRACTITIONER

## 2018-07-12 PROCEDURE — 83735 ASSAY OF MAGNESIUM: CPT | Performed by: INTERNAL MEDICINE

## 2018-07-12 PROCEDURE — 0HQ1XZZ REPAIR FACE SKIN, EXTERNAL APPROACH: ICD-10-PCS | Performed by: NURSE PRACTITIONER

## 2018-07-12 PROCEDURE — 0HQEXZZ REPAIR LEFT LOWER ARM SKIN, EXTERNAL APPROACH: ICD-10-PCS | Performed by: EMERGENCY MEDICINE

## 2018-07-12 PROCEDURE — 73110 X-RAY EXAM OF WRIST: CPT

## 2018-07-12 RX ORDER — ACETAMINOPHEN 325 MG/1
650 TABLET ORAL EVERY 4 HOURS PRN
Status: DISCONTINUED | OUTPATIENT
Start: 2018-07-12 | End: 2018-07-14 | Stop reason: HOSPADM

## 2018-07-12 RX ORDER — LABETALOL HYDROCHLORIDE 5 MG/ML
10 INJECTION, SOLUTION INTRAVENOUS EVERY 4 HOURS PRN
Status: DISCONTINUED | OUTPATIENT
Start: 2018-07-12 | End: 2018-07-14 | Stop reason: HOSPADM

## 2018-07-12 RX ORDER — MULTIVITAMIN/IRON/FOLIC ACID 18MG-0.4MG
1 TABLET ORAL DAILY
COMMUNITY
End: 2021-02-08 | Stop reason: HOSPADM

## 2018-07-12 RX ORDER — IPRATROPIUM BROMIDE AND ALBUTEROL SULFATE 2.5; .5 MG/3ML; MG/3ML
3 SOLUTION RESPIRATORY (INHALATION) EVERY 6 HOURS PRN
Status: DISCONTINUED | OUTPATIENT
Start: 2018-07-12 | End: 2018-07-14 | Stop reason: HOSPADM

## 2018-07-12 RX ORDER — LABETALOL HYDROCHLORIDE 5 MG/ML
20 INJECTION, SOLUTION INTRAVENOUS ONCE
Status: COMPLETED | OUTPATIENT
Start: 2018-07-12 | End: 2018-07-12

## 2018-07-12 RX ORDER — SODIUM CHLORIDE 0.9 % (FLUSH) 0.9 %
1-10 SYRINGE (ML) INJECTION AS NEEDED
Status: DISCONTINUED | OUTPATIENT
Start: 2018-07-12 | End: 2018-07-14 | Stop reason: HOSPADM

## 2018-07-12 RX ORDER — POTASSIUM CHLORIDE 750 MG/1
20 TABLET, FILM COATED, EXTENDED RELEASE ORAL DAILY
COMMUNITY
End: 2018-07-28 | Stop reason: HOSPADM

## 2018-07-12 RX ORDER — ALUMINA, MAGNESIA, AND SIMETHICONE 2400; 2400; 240 MG/30ML; MG/30ML; MG/30ML
15 SUSPENSION ORAL EVERY 6 HOURS PRN
Status: DISCONTINUED | OUTPATIENT
Start: 2018-07-12 | End: 2018-07-14 | Stop reason: HOSPADM

## 2018-07-12 RX ORDER — LIDOCAINE HYDROCHLORIDE AND EPINEPHRINE 10; 10 MG/ML; UG/ML
10 INJECTION, SOLUTION INFILTRATION; PERINEURAL ONCE
Status: COMPLETED | OUTPATIENT
Start: 2018-07-12 | End: 2018-07-12

## 2018-07-12 RX ORDER — ONDANSETRON 2 MG/ML
4 INJECTION INTRAMUSCULAR; INTRAVENOUS EVERY 6 HOURS PRN
Status: DISCONTINUED | OUTPATIENT
Start: 2018-07-12 | End: 2018-07-14 | Stop reason: HOSPADM

## 2018-07-12 RX ADMIN — LABETALOL HYDROCHLORIDE 20 MG: 5 INJECTION, SOLUTION INTRAVENOUS at 16:38

## 2018-07-12 RX ADMIN — LABETALOL HYDROCHLORIDE 10 MG: 5 INJECTION INTRAVENOUS at 19:11

## 2018-07-12 RX ADMIN — LIDOCAINE HYDROCHLORIDE AND EPINEPHRINE 10 ML: 10; 10 INJECTION, SOLUTION INFILTRATION; PERINEURAL at 13:51

## 2018-07-12 RX ADMIN — TETANUS TOXOID, REDUCED DIPHTHERIA TOXOID AND ACELLULAR PERTUSSIS VACCINE, ADSORBED 0.5 ML: 5; 2.5; 8; 8; 2.5 SUSPENSION INTRAMUSCULAR at 15:20

## 2018-07-12 RX ADMIN — NICARDIPINE HYDROCHLORIDE 5 MG/HR: 2.5 INJECTION INTRAVENOUS at 21:00

## 2018-07-13 LAB
ANION GAP SERPL CALCULATED.3IONS-SCNC: 14.2 MMOL/L
BASOPHILS # BLD AUTO: 0.01 10*3/MM3 (ref 0–0.2)
BASOPHILS NFR BLD AUTO: 0.1 % (ref 0–1.5)
BUN BLD-MCNC: 11 MG/DL (ref 8–23)
BUN/CREAT SERPL: 11.3 (ref 7–25)
CALCIUM SPEC-SCNC: 8.8 MG/DL (ref 8.6–10.5)
CHLORIDE SERPL-SCNC: 104 MMOL/L (ref 98–107)
CO2 SERPL-SCNC: 19.8 MMOL/L (ref 22–29)
CREAT BLD-MCNC: 0.97 MG/DL (ref 0.57–1)
DEPRECATED RDW RBC AUTO: 43.7 FL (ref 37–54)
EOSINOPHIL # BLD AUTO: 0.06 10*3/MM3 (ref 0–0.7)
EOSINOPHIL NFR BLD AUTO: 0.8 % (ref 0.3–6.2)
ERYTHROCYTE [DISTWIDTH] IN BLOOD BY AUTOMATED COUNT: 13.7 % (ref 11.7–13)
GFR SERPL CREATININE-BSD FRML MDRD: 55 ML/MIN/1.73
GLUCOSE BLD-MCNC: 100 MG/DL (ref 65–99)
GLUCOSE BLDC GLUCOMTR-MCNC: 103 MG/DL (ref 70–130)
HCT VFR BLD AUTO: 39.4 % (ref 35.6–45.5)
HGB BLD-MCNC: 12.4 G/DL (ref 11.9–15.5)
IMM GRANULOCYTES # BLD: 0 10*3/MM3 (ref 0–0.03)
IMM GRANULOCYTES NFR BLD: 0 % (ref 0–0.5)
INR PPP: 1.09 (ref 0.9–1.1)
LYMPHOCYTES # BLD AUTO: 1.32 10*3/MM3 (ref 0.9–4.8)
LYMPHOCYTES NFR BLD AUTO: 17.2 % (ref 19.6–45.3)
MAGNESIUM SERPL-MCNC: 2 MG/DL (ref 1.6–2.4)
MCH RBC QN AUTO: 27.3 PG (ref 26.9–32)
MCHC RBC AUTO-ENTMCNC: 31.5 G/DL (ref 32.4–36.3)
MCV RBC AUTO: 86.8 FL (ref 80.5–98.2)
MONOCYTES # BLD AUTO: 0.91 10*3/MM3 (ref 0.2–1.2)
MONOCYTES NFR BLD AUTO: 11.8 % (ref 5–12)
NEUTROPHILS # BLD AUTO: 5.38 10*3/MM3 (ref 1.9–8.1)
NEUTROPHILS NFR BLD AUTO: 70.1 % (ref 42.7–76)
PHOSPHATE SERPL-MCNC: 3.8 MG/DL (ref 2.5–4.5)
PLATELET # BLD AUTO: 186 10*3/MM3 (ref 140–500)
PMV BLD AUTO: 12.2 FL (ref 6–12)
POTASSIUM BLD-SCNC: 4.1 MMOL/L (ref 3.5–5.2)
PROTHROMBIN TIME: 13.9 SECONDS (ref 11.7–14.2)
RBC # BLD AUTO: 4.54 10*6/MM3 (ref 3.9–5.2)
SODIUM BLD-SCNC: 138 MMOL/L (ref 136–145)
WBC NRBC COR # BLD: 7.68 10*3/MM3 (ref 4.5–10.7)

## 2018-07-13 PROCEDURE — 97110 THERAPEUTIC EXERCISES: CPT

## 2018-07-13 PROCEDURE — 80048 BASIC METABOLIC PNL TOTAL CA: CPT | Performed by: INTERNAL MEDICINE

## 2018-07-13 PROCEDURE — 85025 COMPLETE CBC W/AUTO DIFF WBC: CPT | Performed by: INTERNAL MEDICINE

## 2018-07-13 PROCEDURE — 82962 GLUCOSE BLOOD TEST: CPT

## 2018-07-13 PROCEDURE — 97161 PT EVAL LOW COMPLEX 20 MIN: CPT

## 2018-07-13 PROCEDURE — 85610 PROTHROMBIN TIME: CPT | Performed by: INTERNAL MEDICINE

## 2018-07-13 PROCEDURE — 99232 SBSQ HOSP IP/OBS MODERATE 35: CPT | Performed by: NEUROLOGICAL SURGERY

## 2018-07-13 PROCEDURE — 94799 UNLISTED PULMONARY SVC/PX: CPT

## 2018-07-13 RX ORDER — ATORVASTATIN CALCIUM 20 MG/1
20 TABLET, FILM COATED ORAL DAILY
Status: DISCONTINUED | OUTPATIENT
Start: 2018-07-13 | End: 2018-07-14 | Stop reason: HOSPADM

## 2018-07-13 RX ORDER — INDAPAMIDE 1.25 MG/1
2.5 TABLET, FILM COATED ORAL EVERY MORNING
Status: DISCONTINUED | OUTPATIENT
Start: 2018-07-13 | End: 2018-07-14 | Stop reason: HOSPADM

## 2018-07-13 RX ORDER — AMLODIPINE BESYLATE 10 MG/1
10 TABLET ORAL DAILY
Status: DISCONTINUED | OUTPATIENT
Start: 2018-07-13 | End: 2018-07-14 | Stop reason: HOSPADM

## 2018-07-13 RX ORDER — LOSARTAN POTASSIUM 100 MG/1
100 TABLET ORAL DAILY
Status: DISCONTINUED | OUTPATIENT
Start: 2018-07-13 | End: 2018-07-14 | Stop reason: HOSPADM

## 2018-07-13 RX ADMIN — LOSARTAN POTASSIUM 100 MG: 100 TABLET ORAL at 18:42

## 2018-07-13 RX ADMIN — AMLODIPINE BESYLATE 10 MG: 10 TABLET ORAL at 18:42

## 2018-07-13 RX ADMIN — INDAPAMIDE 2.5 MG: 1.25 TABLET, FILM COATED ORAL at 18:42

## 2018-07-13 RX ADMIN — ATORVASTATIN CALCIUM 20 MG: 20 TABLET, FILM COATED ORAL at 18:42

## 2018-07-13 RX ADMIN — LABETALOL HYDROCHLORIDE 10 MG: 5 INJECTION INTRAVENOUS at 09:05

## 2018-07-13 RX ADMIN — METOPROLOL TARTRATE 25 MG: 25 TABLET ORAL at 20:43

## 2018-07-14 VITALS
HEIGHT: 63 IN | BODY MASS INDEX: 24.8 KG/M2 | TEMPERATURE: 98.3 F | OXYGEN SATURATION: 95 % | DIASTOLIC BLOOD PRESSURE: 80 MMHG | RESPIRATION RATE: 16 BRPM | SYSTOLIC BLOOD PRESSURE: 168 MMHG | HEART RATE: 79 BPM | WEIGHT: 140 LBS

## 2018-07-14 LAB
ANION GAP SERPL CALCULATED.3IONS-SCNC: 14.8 MMOL/L
BASOPHILS # BLD AUTO: 0.01 10*3/MM3 (ref 0–0.2)
BASOPHILS NFR BLD AUTO: 0.1 % (ref 0–1.5)
BUN BLD-MCNC: 11 MG/DL (ref 8–23)
BUN/CREAT SERPL: 9.4 (ref 7–25)
CALCIUM SPEC-SCNC: 8.4 MG/DL (ref 8.6–10.5)
CHLORIDE SERPL-SCNC: 102 MMOL/L (ref 98–107)
CO2 SERPL-SCNC: 21.2 MMOL/L (ref 22–29)
CREAT BLD-MCNC: 1.17 MG/DL (ref 0.57–1)
DEPRECATED RDW RBC AUTO: 44 FL (ref 37–54)
EOSINOPHIL # BLD AUTO: 0.13 10*3/MM3 (ref 0–0.7)
EOSINOPHIL NFR BLD AUTO: 1.7 % (ref 0.3–6.2)
ERYTHROCYTE [DISTWIDTH] IN BLOOD BY AUTOMATED COUNT: 13.9 % (ref 11.7–13)
GFR SERPL CREATININE-BSD FRML MDRD: 44 ML/MIN/1.73
GLUCOSE BLD-MCNC: 108 MG/DL (ref 65–99)
GLUCOSE BLDC GLUCOMTR-MCNC: 103 MG/DL (ref 70–130)
GLUCOSE BLDC GLUCOMTR-MCNC: 117 MG/DL (ref 70–130)
GLUCOSE BLDC GLUCOMTR-MCNC: 96 MG/DL (ref 70–130)
HCT VFR BLD AUTO: 41.2 % (ref 35.6–45.5)
HGB BLD-MCNC: 13.5 G/DL (ref 11.9–15.5)
IMM GRANULOCYTES # BLD: 0.02 10*3/MM3 (ref 0–0.03)
IMM GRANULOCYTES NFR BLD: 0.3 % (ref 0–0.5)
INR PPP: 0.98 (ref 0.9–1.1)
LYMPHOCYTES # BLD AUTO: 1.5 10*3/MM3 (ref 0.9–4.8)
LYMPHOCYTES NFR BLD AUTO: 19.4 % (ref 19.6–45.3)
MAGNESIUM SERPL-MCNC: 2.1 MG/DL (ref 1.6–2.4)
MCH RBC QN AUTO: 28.2 PG (ref 26.9–32)
MCHC RBC AUTO-ENTMCNC: 32.8 G/DL (ref 32.4–36.3)
MCV RBC AUTO: 86.2 FL (ref 80.5–98.2)
MONOCYTES # BLD AUTO: 1.08 10*3/MM3 (ref 0.2–1.2)
MONOCYTES NFR BLD AUTO: 14 % (ref 5–12)
NEUTROPHILS # BLD AUTO: 5.01 10*3/MM3 (ref 1.9–8.1)
NEUTROPHILS NFR BLD AUTO: 64.8 % (ref 42.7–76)
PHOSPHATE SERPL-MCNC: 3.8 MG/DL (ref 2.5–4.5)
PLATELET # BLD AUTO: 192 10*3/MM3 (ref 140–500)
PMV BLD AUTO: 12.5 FL (ref 6–12)
POTASSIUM BLD-SCNC: 4.2 MMOL/L (ref 3.5–5.2)
PROTHROMBIN TIME: 12.8 SECONDS (ref 11.7–14.2)
RBC # BLD AUTO: 4.78 10*6/MM3 (ref 3.9–5.2)
SODIUM BLD-SCNC: 138 MMOL/L (ref 136–145)
WBC NRBC COR # BLD: 7.73 10*3/MM3 (ref 4.5–10.7)

## 2018-07-14 PROCEDURE — 84100 ASSAY OF PHOSPHORUS: CPT | Performed by: INTERNAL MEDICINE

## 2018-07-14 PROCEDURE — 83735 ASSAY OF MAGNESIUM: CPT | Performed by: INTERNAL MEDICINE

## 2018-07-14 PROCEDURE — 85025 COMPLETE CBC W/AUTO DIFF WBC: CPT | Performed by: INTERNAL MEDICINE

## 2018-07-14 PROCEDURE — 80048 BASIC METABOLIC PNL TOTAL CA: CPT | Performed by: INTERNAL MEDICINE

## 2018-07-14 PROCEDURE — 97110 THERAPEUTIC EXERCISES: CPT

## 2018-07-14 PROCEDURE — 85610 PROTHROMBIN TIME: CPT | Performed by: INTERNAL MEDICINE

## 2018-07-14 PROCEDURE — 82962 GLUCOSE BLOOD TEST: CPT

## 2018-07-14 RX ORDER — METOPROLOL TARTRATE 50 MG/1
50 TABLET, FILM COATED ORAL EVERY 12 HOURS SCHEDULED
Qty: 60 TABLET | Refills: 0 | Status: SHIPPED | OUTPATIENT
Start: 2018-07-14 | End: 2018-07-19

## 2018-07-14 RX ADMIN — ATORVASTATIN CALCIUM 20 MG: 20 TABLET, FILM COATED ORAL at 09:33

## 2018-07-14 RX ADMIN — INDAPAMIDE 2.5 MG: 1.25 TABLET, FILM COATED ORAL at 09:33

## 2018-07-14 RX ADMIN — METOPROLOL TARTRATE 25 MG: 25 TABLET ORAL at 09:33

## 2018-07-14 RX ADMIN — LOSARTAN POTASSIUM 100 MG: 100 TABLET ORAL at 09:33

## 2018-07-14 RX ADMIN — AMLODIPINE BESYLATE 10 MG: 10 TABLET ORAL at 09:33

## 2018-07-19 ENCOUNTER — OFFICE VISIT (OUTPATIENT)
Dept: INTERNAL MEDICINE | Facility: CLINIC | Age: 83
End: 2018-07-19

## 2018-07-19 VITALS
TEMPERATURE: 97.7 F | DIASTOLIC BLOOD PRESSURE: 62 MMHG | SYSTOLIC BLOOD PRESSURE: 162 MMHG | WEIGHT: 139 LBS | HEIGHT: 63 IN | OXYGEN SATURATION: 96 % | BODY MASS INDEX: 24.63 KG/M2 | HEART RATE: 56 BPM

## 2018-07-19 DIAGNOSIS — I10 ESSENTIAL HYPERTENSION: ICD-10-CM

## 2018-07-19 DIAGNOSIS — S01.81XA LACERATION OF FOREHEAD, INITIAL ENCOUNTER: Primary | ICD-10-CM

## 2018-07-19 PROCEDURE — 99213 OFFICE O/P EST LOW 20 MIN: CPT | Performed by: FAMILY MEDICINE

## 2018-07-19 NOTE — PROGRESS NOTES
CC:F/u hsp stay 2 days for subdural hematoma 2) HTN    Subjective.../HPI  Patient present today with1)f/u re a fall at home with R temple laceration-found with small subdural Plavix  2) Metoprolol incresed in hsp with confusion so back on 25mg bid  I have reviewed the patient's medical history in detail and updated the computerized patient record.    Past Medical History:   Diagnosis Date   • DVT (deep venous thrombosis) (CMS/[facility])    • Hyperlipidemia    • Hypertension    • Stroke (CMS/[facility])        Past Surgical History:   Procedure Laterality Date   • HIP SURGERY     • HYSTERECTOMY     • JOINT REPLACEMENT     • THYROIDECTOMY         No family history on file.    Social History     Social History   • Marital status:      Spouse name: N/A   • Number of children: N/A   • Years of education: N/A     Occupational History   • Not on file.     Social History Main Topics   • Smoking status: Former Smoker   • Smokeless tobacco: Not on file      Comment: quit 2001   • Alcohol use Yes   • Drug use: No   • Sexual activity: Defer     Other Topics Concern   • Not on file     Social History Narrative   • No narrative on file       Most Recent Immunizations   Administered Date(s) Administered   • Tdap 07/12/2018       Review of Systems:   Review of Systems   Constitutional: Negative.    HENT: Negative.    Eyes: Negative.    Respiratory: Negative.    Cardiovascular: Negative.    Gastrointestinal: Negative.    Endocrine: Negative.    Genitourinary: Negative.    Musculoskeletal: Negative.    Skin: Negative.    Allergic/Immunologic: Negative.    Neurological: Negative.    Hematological: Negative.    Psychiatric/Behavioral: Negative.          Physical Exam   Constitutional: She is oriented to person, place, and time. She appears well-developed and well-nourished.   Cardiovascular: Normal rate, regular rhythm and normal heart sounds.    Pulmonary/Chest: Effort normal and breath sounds normal.   Neurological: She is alert and  "oriented to person, place, and time.   Skin:   R temple with healing laceration   Psychiatric: She has a normal mood and affect.         Vital Signs     Vitals:    07/19/18 0826   BP: 162/62   Pulse: 56   Temp: 97.7 °F (36.5 °C)   TempSrc: Oral   SpO2: 96%   Weight: 63 kg (139 lb)   Height: 160 cm (62.99\")          Results Review:      REVIEWED AND DISCUSSED CLINICAL RESULTS WITH PATIENT      Requested Prescriptions     Signed Prescriptions Disp Refills   • metoprolol tartrate (LOPRESSOR) 25 MG tablet 180 tablet 3     Sig: Take 1 tablet by mouth Every 12 (Twelve) Hours.         Current Outpatient Prescriptions:   •  amLODIPine (NORVASC) 10 MG tablet, Take 1 tablet by mouth Daily., Disp: 90 tablet, Rfl: 3  •  atorvastatin (LIPITOR) 20 MG tablet, Take 1 tablet by mouth Daily., Disp: 90 tablet, Rfl: 3  •  azelastine (ASTELIN) 0.1 % nasal spray, 2 sprays into each nostril 2 (Two) Times a Day. Use in each nostril as directed (Patient taking differently: 2 sprays into each nostril 2 (Two) Times a Day As Needed. Use in each nostril as directed), Disp: 1 each, Rfl: 12  •  indapamide (LOZOL) 2.5 MG tablet, Take 1 tablet by mouth Every Morning., Disp: 30 tablet, Rfl: 5  •  losartan (COZAAR) 100 MG tablet, Take 1 tablet by mouth Daily., Disp: 30 tablet, Rfl: 5  •  metoprolol tartrate (LOPRESSOR) 25 MG tablet, Take 1 tablet by mouth Every 12 (Twelve) Hours., Disp: 180 tablet, Rfl: 3  •  Multiple Vitamins-Minerals (CENTRUM ADULTS) tablet, Take 1 tablet by mouth Daily., Disp: , Rfl:   •  Omega-3 Fatty Acids (FISH OIL) 1000 MG capsule capsule, Take 1,000 mg by mouth Daily With Breakfast., Disp: , Rfl:   •  potassium chloride (K-DUR) 10 MEQ CR tablet, Take 20 mEq by mouth Daily., Disp: , Rfl:     Procedures          Diagnoses and all orders for this visit:    Laceration of forehead, initial encounter    Essential hypertension    Other orders  -     metoprolol tartrate (LOPRESSOR) 25 MG tablet; Take 1 tablet by mouth Every 12 " (Twelve) Hours.         No Follow-up on file.  If further falls will stop Plavix. Restart Plavix 2 weeks  Alexis Wiggins M.D  07/19/18  8:42 PM

## 2018-07-23 ENCOUNTER — APPOINTMENT (OUTPATIENT)
Dept: CT IMAGING | Facility: HOSPITAL | Age: 83
End: 2018-07-23

## 2018-07-23 ENCOUNTER — HOSPITAL ENCOUNTER (EMERGENCY)
Facility: HOSPITAL | Age: 83
Discharge: HOME | End: 2018-07-23
Attending: EMERGENCY MEDICINE | Admitting: EMERGENCY MEDICINE

## 2018-07-23 ENCOUNTER — APPOINTMENT (OUTPATIENT)
Dept: GENERAL RADIOLOGY | Facility: HOSPITAL | Age: 83
End: 2018-07-23

## 2018-07-23 ENCOUNTER — TELEPHONE (OUTPATIENT)
Dept: NEUROSURGERY | Facility: CLINIC | Age: 83
End: 2018-07-23

## 2018-07-23 VITALS
TEMPERATURE: 97.5 F | DIASTOLIC BLOOD PRESSURE: 73 MMHG | HEIGHT: 63 IN | RESPIRATION RATE: 18 BRPM | WEIGHT: 139 LBS | SYSTOLIC BLOOD PRESSURE: 181 MMHG | OXYGEN SATURATION: 98 % | BODY MASS INDEX: 24.63 KG/M2 | HEART RATE: 88 BPM

## 2018-07-23 DIAGNOSIS — Z86.79 HISTORY OF SUBDURAL HEMATOMA: ICD-10-CM

## 2018-07-23 DIAGNOSIS — R91.1 LUNG NODULE: ICD-10-CM

## 2018-07-23 DIAGNOSIS — R41.82 ALTERED MENTAL STATUS, UNSPECIFIED ALTERED MENTAL STATUS TYPE: Primary | ICD-10-CM

## 2018-07-23 LAB
ALBUMIN SERPL-MCNC: 4 G/DL (ref 3.5–5.2)
ALBUMIN/GLOB SERPL: 1.1 G/DL
ALP SERPL-CCNC: 101 U/L (ref 39–117)
ALT SERPL W P-5'-P-CCNC: 10 U/L (ref 1–33)
ANION GAP SERPL CALCULATED.3IONS-SCNC: 14.1 MMOL/L
AST SERPL-CCNC: 15 U/L (ref 1–32)
BASOPHILS # BLD AUTO: 0.01 10*3/MM3 (ref 0–0.2)
BASOPHILS NFR BLD AUTO: 0.1 % (ref 0–1.5)
BILIRUB SERPL-MCNC: 0.3 MG/DL (ref 0.1–1.2)
BUN BLD-MCNC: 12 MG/DL (ref 8–23)
BUN/CREAT SERPL: 9.8 (ref 7–25)
CALCIUM SPEC-SCNC: 9.1 MG/DL (ref 8.6–10.5)
CHLORIDE SERPL-SCNC: 98 MMOL/L (ref 98–107)
CO2 SERPL-SCNC: 22.9 MMOL/L (ref 22–29)
CREAT BLD-MCNC: 1.22 MG/DL (ref 0.57–1)
DEPRECATED RDW RBC AUTO: 42 FL (ref 37–54)
EOSINOPHIL # BLD AUTO: 0.06 10*3/MM3 (ref 0–0.7)
EOSINOPHIL NFR BLD AUTO: 0.6 % (ref 0.3–6.2)
ERYTHROCYTE [DISTWIDTH] IN BLOOD BY AUTOMATED COUNT: 13.5 % (ref 11.7–13)
GFR SERPL CREATININE-BSD FRML MDRD: 42 ML/MIN/1.73
GLOBULIN UR ELPH-MCNC: 3.7 GM/DL
GLUCOSE BLD-MCNC: 105 MG/DL (ref 65–99)
HCT VFR BLD AUTO: 41.3 % (ref 35.6–45.5)
HGB BLD-MCNC: 13.2 G/DL (ref 11.9–15.5)
IMM GRANULOCYTES # BLD: 0.02 10*3/MM3 (ref 0–0.03)
IMM GRANULOCYTES NFR BLD: 0.2 % (ref 0–0.5)
LYMPHOCYTES # BLD AUTO: 1.1 10*3/MM3 (ref 0.9–4.8)
LYMPHOCYTES NFR BLD AUTO: 10.8 % (ref 19.6–45.3)
MCH RBC QN AUTO: 27.3 PG (ref 26.9–32)
MCHC RBC AUTO-ENTMCNC: 32 G/DL (ref 32.4–36.3)
MCV RBC AUTO: 85.5 FL (ref 80.5–98.2)
MONOCYTES # BLD AUTO: 0.74 10*3/MM3 (ref 0.2–1.2)
MONOCYTES NFR BLD AUTO: 7.3 % (ref 5–12)
NEUTROPHILS # BLD AUTO: 8.25 10*3/MM3 (ref 1.9–8.1)
NEUTROPHILS NFR BLD AUTO: 81 % (ref 42.7–76)
PLATELET # BLD AUTO: 231 10*3/MM3 (ref 140–500)
PMV BLD AUTO: 11.7 FL (ref 6–12)
POTASSIUM BLD-SCNC: 4.1 MMOL/L (ref 3.5–5.2)
PROT SERPL-MCNC: 7.7 G/DL (ref 6–8.5)
RBC # BLD AUTO: 4.83 10*6/MM3 (ref 3.9–5.2)
SODIUM BLD-SCNC: 135 MMOL/L (ref 136–145)
TROPONIN T SERPL-MCNC: <0.01 NG/ML (ref 0–0.03)
WBC NRBC COR # BLD: 10.18 10*3/MM3 (ref 4.5–10.7)

## 2018-07-23 PROCEDURE — 99284 EMERGENCY DEPT VISIT MOD MDM: CPT

## 2018-07-23 PROCEDURE — 85025 COMPLETE CBC W/AUTO DIFF WBC: CPT | Performed by: NURSE PRACTITIONER

## 2018-07-23 PROCEDURE — 93005 ELECTROCARDIOGRAM TRACING: CPT | Performed by: NURSE PRACTITIONER

## 2018-07-23 PROCEDURE — 71045 X-RAY EXAM CHEST 1 VIEW: CPT

## 2018-07-23 PROCEDURE — 80053 COMPREHEN METABOLIC PANEL: CPT | Performed by: NURSE PRACTITIONER

## 2018-07-23 PROCEDURE — 93010 ELECTROCARDIOGRAM REPORT: CPT | Performed by: INTERNAL MEDICINE

## 2018-07-23 PROCEDURE — 70450 CT HEAD/BRAIN W/O DYE: CPT

## 2018-07-23 PROCEDURE — 84484 ASSAY OF TROPONIN QUANT: CPT | Performed by: NURSE PRACTITIONER

## 2018-07-23 NOTE — TELEPHONE ENCOUNTER
Patient had a fall on 7/12 and was seen in the hospital for a brain bleed.Patient was seen by Sheree on 7/12 in the ER. Pt was then seen on 7/13 and 14t ivana Daley.  Dr Daley told her that she did not need t ofollow up if she was doing ok. He told her to call if she had any problems      Patient 's daughter(onBeka and POA) is calling because the pt started having issues on 7/17. She started being confused. Now her she is not only confused but she is having tingling in her hands and her legs below the knees tingling and numb.  Pt is extremely weak and cannot sleep.  Pt is also crying a lot.  She does not know if this is coming from the brain bleed.  Pt has been off her plavix since she has fallen.     Letty Grullon  [phone]   Please advise

## 2018-07-23 NOTE — TELEPHONE ENCOUNTER
ER visit 7/12 after fall, CT showed acute subdural. We did not plan to follow unless symptomatic.    Per daughter, patient very confused, worse since Wednesday, acting mean/emotional (not herself). Intermittently she has numbness in hands. They have concern there may be further bleeding. Please advise.

## 2018-07-24 ENCOUNTER — APPOINTMENT (OUTPATIENT)
Dept: CT IMAGING | Facility: HOSPITAL | Age: 83
End: 2018-07-24
Attending: HOSPITALIST

## 2018-07-24 ENCOUNTER — APPOINTMENT (OUTPATIENT)
Dept: CT IMAGING | Facility: HOSPITAL | Age: 83
End: 2018-07-24

## 2018-07-24 ENCOUNTER — HOSPITAL ENCOUNTER (INPATIENT)
Facility: HOSPITAL | Age: 83
LOS: 4 days | Discharge: NURSING, REHAB OR LONG-TERM CARE | End: 2018-07-28
Attending: EMERGENCY MEDICINE | Admitting: SURGERY

## 2018-07-24 ENCOUNTER — APPOINTMENT (OUTPATIENT)
Dept: MRI IMAGING | Facility: HOSPITAL | Age: 83
End: 2018-07-24
Attending: PSYCHIATRY & NEUROLOGY

## 2018-07-24 DIAGNOSIS — R55 NEAR SYNCOPE: Primary | ICD-10-CM

## 2018-07-24 DIAGNOSIS — R26.2 DIFFICULTY WALKING: ICD-10-CM

## 2018-07-24 PROBLEM — R20.0 LEFT SIDED NUMBNESS: Status: ACTIVE | Noted: 2018-07-24

## 2018-07-24 PROBLEM — R53.1 LEFT-SIDED WEAKNESS: Status: ACTIVE | Noted: 2018-07-24

## 2018-07-24 PROBLEM — R91.1 LUNG NODULE: Status: ACTIVE | Noted: 2018-07-24

## 2018-07-24 PROBLEM — R47.9 DIFFICULTY WITH SPEECH: Status: ACTIVE | Noted: 2018-07-24

## 2018-07-24 PROBLEM — I63.9 CVA (CEREBRAL VASCULAR ACCIDENT) (HCC): Status: ACTIVE | Noted: 2018-07-24

## 2018-07-24 PROBLEM — Z86.73 HISTORY OF STROKE: Status: ACTIVE | Noted: 2018-07-24

## 2018-07-24 LAB
GLUCOSE BLDC GLUCOMTR-MCNC: 90 MG/DL (ref 70–130)
GLUCOSE BLDC GLUCOMTR-MCNC: 95 MG/DL (ref 70–130)

## 2018-07-24 PROCEDURE — 70496 CT ANGIOGRAPHY HEAD: CPT

## 2018-07-24 PROCEDURE — 70498 CT ANGIOGRAPHY NECK: CPT

## 2018-07-24 PROCEDURE — 97110 THERAPEUTIC EXERCISES: CPT

## 2018-07-24 PROCEDURE — 99284 EMERGENCY DEPT VISIT MOD MDM: CPT

## 2018-07-24 PROCEDURE — 99285 EMERGENCY DEPT VISIT HI MDM: CPT

## 2018-07-24 PROCEDURE — 70551 MRI BRAIN STEM W/O DYE: CPT

## 2018-07-24 PROCEDURE — 70547 MR ANGIOGRAPHY NECK W/O DYE: CPT

## 2018-07-24 PROCEDURE — 99205 OFFICE O/P NEW HI 60 MIN: CPT | Performed by: PSYCHIATRY & NEUROLOGY

## 2018-07-24 PROCEDURE — 70544 MR ANGIOGRAPHY HEAD W/O DYE: CPT

## 2018-07-24 PROCEDURE — 82962 GLUCOSE BLOOD TEST: CPT

## 2018-07-24 PROCEDURE — 71250 CT THORAX DX C-: CPT

## 2018-07-24 PROCEDURE — 97162 PT EVAL MOD COMPLEX 30 MIN: CPT

## 2018-07-24 PROCEDURE — 0 IOPAMIDOL PER 1 ML: Performed by: HOSPITALIST

## 2018-07-24 RX ORDER — LOSARTAN POTASSIUM 100 MG/1
100 TABLET ORAL
Status: DISCONTINUED | OUTPATIENT
Start: 2018-07-24 | End: 2018-07-28 | Stop reason: HOSPADM

## 2018-07-24 RX ORDER — ONDANSETRON 4 MG/1
4 TABLET, ORALLY DISINTEGRATING ORAL EVERY 6 HOURS PRN
Status: DISCONTINUED | OUTPATIENT
Start: 2018-07-24 | End: 2018-07-28 | Stop reason: HOSPADM

## 2018-07-24 RX ORDER — AMLODIPINE BESYLATE 10 MG/1
10 TABLET ORAL
Status: DISCONTINUED | OUTPATIENT
Start: 2018-07-24 | End: 2018-07-28 | Stop reason: HOSPADM

## 2018-07-24 RX ORDER — SODIUM CHLORIDE 0.9 % (FLUSH) 0.9 %
1-10 SYRINGE (ML) INJECTION AS NEEDED
Status: DISCONTINUED | OUTPATIENT
Start: 2018-07-24 | End: 2018-07-28 | Stop reason: HOSPADM

## 2018-07-24 RX ORDER — ONDANSETRON 2 MG/ML
4 INJECTION INTRAMUSCULAR; INTRAVENOUS EVERY 6 HOURS PRN
Status: DISCONTINUED | OUTPATIENT
Start: 2018-07-24 | End: 2018-07-28 | Stop reason: HOSPADM

## 2018-07-24 RX ORDER — ONDANSETRON 4 MG/1
4 TABLET, FILM COATED ORAL EVERY 6 HOURS PRN
Status: DISCONTINUED | OUTPATIENT
Start: 2018-07-24 | End: 2018-07-28 | Stop reason: HOSPADM

## 2018-07-24 RX ORDER — ATORVASTATIN CALCIUM 20 MG/1
20 TABLET, FILM COATED ORAL DAILY
Status: DISCONTINUED | OUTPATIENT
Start: 2018-07-24 | End: 2018-07-25

## 2018-07-24 RX ORDER — ACETAMINOPHEN 325 MG/1
650 TABLET ORAL EVERY 6 HOURS PRN
Status: DISCONTINUED | OUTPATIENT
Start: 2018-07-24 | End: 2018-07-28 | Stop reason: HOSPADM

## 2018-07-24 RX ADMIN — ATORVASTATIN CALCIUM 20 MG: 20 TABLET, FILM COATED ORAL at 08:47

## 2018-07-24 RX ADMIN — IOPAMIDOL 95 ML: 755 INJECTION, SOLUTION INTRAVENOUS at 21:56

## 2018-07-24 RX ADMIN — AMLODIPINE BESYLATE 10 MG: 10 TABLET ORAL at 08:47

## 2018-07-24 RX ADMIN — METOPROLOL TARTRATE 25 MG: 25 TABLET ORAL at 08:47

## 2018-07-24 RX ADMIN — METOPROLOL TARTRATE 25 MG: 25 TABLET ORAL at 21:14

## 2018-07-24 RX ADMIN — LOSARTAN POTASSIUM 100 MG: 100 TABLET, FILM COATED ORAL at 08:47

## 2018-07-25 ENCOUNTER — APPOINTMENT (OUTPATIENT)
Dept: CARDIOLOGY | Facility: HOSPITAL | Age: 83
End: 2018-07-25
Attending: PSYCHIATRY & NEUROLOGY

## 2018-07-25 ENCOUNTER — APPOINTMENT (OUTPATIENT)
Dept: CT IMAGING | Facility: HOSPITAL | Age: 83
End: 2018-07-25

## 2018-07-25 PROBLEM — I63.511 ACUTE RIGHT MCA STROKE (HCC): Status: ACTIVE | Noted: 2018-07-24

## 2018-07-25 PROBLEM — R91.8 ABNORMAL CT SCAN, LUNG: Status: ACTIVE | Noted: 2018-07-25

## 2018-07-25 PROBLEM — I65.21 STENOSIS OF RIGHT INTERNAL CAROTID ARTERY: Status: ACTIVE | Noted: 2018-07-25

## 2018-07-25 LAB
APTT PPP: 28.6 SECONDS (ref 22.7–35.4)
BASOPHILS # BLD AUTO: 0.01 10*3/MM3 (ref 0–0.2)
BASOPHILS NFR BLD AUTO: 0.1 % (ref 0–1.5)
BH CV ECHO MEAS - ACS: 0.8 CM
BH CV ECHO MEAS - AI DEC SLOPE: 311.7 CM/SEC^2
BH CV ECHO MEAS - AI MAX PG: 57.5 MMHG
BH CV ECHO MEAS - AI MAX VEL: 379 CM/SEC
BH CV ECHO MEAS - AI P1/2T: 356.2 MSEC
BH CV ECHO MEAS - AO MAX PG: 39 MMHG
BH CV ECHO MEAS - AO MEAN PG (FULL): 21 MMHG
BH CV ECHO MEAS - AO MEAN PG: 23 MMHG
BH CV ECHO MEAS - AO ROOT AREA (BSA CORRECTED): 1.9
BH CV ECHO MEAS - AO ROOT AREA: 7.5 CM^2
BH CV ECHO MEAS - AO ROOT DIAM: 3.1 CM
BH CV ECHO MEAS - AO V2 MAX: 311 CM/SEC
BH CV ECHO MEAS - AO V2 MEAN: 223 CM/SEC
BH CV ECHO MEAS - AO V2 VTI: 76.2 CM
BH CV ECHO MEAS - AVA(I,A): 0.92 CM^2
BH CV ECHO MEAS - AVA(I,D): 0.92 CM^2
BH CV ECHO MEAS - BSA(HAYCOCK): 1.7 M^2
BH CV ECHO MEAS - BSA: 1.7 M^2
BH CV ECHO MEAS - BZI_BMI: 24.8 KILOGRAMS/M^2
BH CV ECHO MEAS - BZI_METRIC_HEIGHT: 160 CM
BH CV ECHO MEAS - BZI_METRIC_WEIGHT: 63.5 KG
BH CV ECHO MEAS - CONTRAST EF (2CH): 59.6 ML/M^2
BH CV ECHO MEAS - CONTRAST EF 4CH: 58.7 ML/M^2
BH CV ECHO MEAS - EDV(CUBED): 57.1 ML
BH CV ECHO MEAS - EDV(MOD-SP2): 52 ML
BH CV ECHO MEAS - EDV(MOD-SP4): 75 ML
BH CV ECHO MEAS - EDV(TEICH): 63.9 ML
BH CV ECHO MEAS - EF(CUBED): 61.5 %
BH CV ECHO MEAS - EF(MOD-BP): 58 %
BH CV ECHO MEAS - EF(MOD-SP2): 59.6 %
BH CV ECHO MEAS - EF(MOD-SP4): 58.7 %
BH CV ECHO MEAS - EF(TEICH): 53.8 %
BH CV ECHO MEAS - ESV(CUBED): 22 ML
BH CV ECHO MEAS - ESV(MOD-SP2): 21 ML
BH CV ECHO MEAS - ESV(MOD-SP4): 31 ML
BH CV ECHO MEAS - ESV(TEICH): 29.6 ML
BH CV ECHO MEAS - FS: 27.3 %
BH CV ECHO MEAS - IVS/LVPW: 1.2
BH CV ECHO MEAS - IVSD: 1.3 CM
BH CV ECHO MEAS - LAT PEAK E' VEL: 6 CM/SEC
BH CV ECHO MEAS - LV DIASTOLIC VOL/BSA (35-75): 45.1 ML/M^2
BH CV ECHO MEAS - LV MASS(C)D: 156.2 GRAMS
BH CV ECHO MEAS - LV MASS(C)DI: 94 GRAMS/M^2
BH CV ECHO MEAS - LV MEAN PG: 2 MMHG
BH CV ECHO MEAS - LV SYSTOLIC VOL/BSA (12-30): 18.7 ML/M^2
BH CV ECHO MEAS - LV V1 MAX: 87 CM/SEC
BH CV ECHO MEAS - LV V1 MEAN: 65.4 CM/SEC
BH CV ECHO MEAS - LV V1 VTI: 22.4 CM
BH CV ECHO MEAS - LVIDD: 3.9 CM
BH CV ECHO MEAS - LVIDS: 2.8 CM
BH CV ECHO MEAS - LVLD AP2: 6.9 CM
BH CV ECHO MEAS - LVLD AP4: 7.5 CM
BH CV ECHO MEAS - LVLS AP2: 5.9 CM
BH CV ECHO MEAS - LVLS AP4: 6.5 CM
BH CV ECHO MEAS - LVOT AREA (M): 3.1 CM^2
BH CV ECHO MEAS - LVOT AREA: 3.1 CM^2
BH CV ECHO MEAS - LVOT DIAM: 2 CM
BH CV ECHO MEAS - LVPWD: 1.1 CM
BH CV ECHO MEAS - MED PEAK E' VEL: 4 CM/SEC
BH CV ECHO MEAS - MR MAX PG: 109.7 MMHG
BH CV ECHO MEAS - MR MAX VEL: 523.7 CM/SEC
BH CV ECHO MEAS - MV A DUR: 0.16 SEC
BH CV ECHO MEAS - MV A MAX VEL: 108 CM/SEC
BH CV ECHO MEAS - MV DEC SLOPE: 641 CM/SEC^2
BH CV ECHO MEAS - MV DEC TIME: 0.21 SEC
BH CV ECHO MEAS - MV E MAX VEL: 57.8 CM/SEC
BH CV ECHO MEAS - MV E/A: 0.54
BH CV ECHO MEAS - MV MEAN PG: 3 MMHG
BH CV ECHO MEAS - MV P1/2T MAX VEL: 144 CM/SEC
BH CV ECHO MEAS - MV P1/2T: 65.8 MSEC
BH CV ECHO MEAS - MV V2 MEAN: 74.9 CM/SEC
BH CV ECHO MEAS - MV V2 VTI: 43.1 CM
BH CV ECHO MEAS - MVA P1/2T LCG: 1.5 CM^2
BH CV ECHO MEAS - MVA(P1/2T): 3.3 CM^2
BH CV ECHO MEAS - MVA(VTI): 1.6 CM^2
BH CV ECHO MEAS - PA ACC SLOPE: 0 CM/SEC^2
BH CV ECHO MEAS - PA ACC TIME: 0.11 SEC
BH CV ECHO MEAS - PA MAX PG: 3.1 MMHG
BH CV ECHO MEAS - PA PR(ACCEL): 30 MMHG
BH CV ECHO MEAS - PA V2 MAX: 87.7 CM/SEC
BH CV ECHO MEAS - PULM A REVS DUR: 0.14 SEC
BH CV ECHO MEAS - PULM A REVS VEL: 36.5 CM/SEC
BH CV ECHO MEAS - PULM DIAS VEL: 35 CM/SEC
BH CV ECHO MEAS - PULM S/D: 1.1
BH CV ECHO MEAS - PULM SYS VEL: 39 CM/SEC
BH CV ECHO MEAS - QP/QS: 0.57
BH CV ECHO MEAS - RV MEAN PG: 1 MMHG
BH CV ECHO MEAS - RV V1 MEAN: 45 CM/SEC
BH CV ECHO MEAS - RV V1 VTI: 12.7 CM
BH CV ECHO MEAS - RVOT AREA: 3.1 CM^2
BH CV ECHO MEAS - RVOT DIAM: 2 CM
BH CV ECHO MEAS - SI(AO): 346.1 ML/M^2
BH CV ECHO MEAS - SI(CUBED): 21.1 ML/M^2
BH CV ECHO MEAS - SI(LVOT): 42.3 ML/M^2
BH CV ECHO MEAS - SI(MOD-SP2): 18.7 ML/M^2
BH CV ECHO MEAS - SI(MOD-SP4): 26.5 ML/M^2
BH CV ECHO MEAS - SI(TEICH): 20.7 ML/M^2
BH CV ECHO MEAS - SV(AO): 575.1 ML
BH CV ECHO MEAS - SV(CUBED): 35.1 ML
BH CV ECHO MEAS - SV(LVOT): 70.4 ML
BH CV ECHO MEAS - SV(MOD-SP2): 31 ML
BH CV ECHO MEAS - SV(MOD-SP4): 44 ML
BH CV ECHO MEAS - SV(RVOT): 39.9 ML
BH CV ECHO MEAS - SV(TEICH): 34.4 ML
BH CV ECHO MEAS - TAPSE (>1.6): 1.8 CM2
BH CV ECHO MEASUREMENTS AVERAGE E/E' RATIO: 11.56
BH CV VAS BP RIGHT ARM: NORMAL MMHG
BH CV XLRA - TDI S': 12 CM/SEC
BH CV XLRA MEAS LEFT CCA RATIO VEL: 97.3 CM/SEC
BH CV XLRA MEAS LEFT DIST CCA EDV: 11.1 CM/SEC
BH CV XLRA MEAS LEFT DIST CCA PSV: 97.3 CM/SEC
BH CV XLRA MEAS LEFT DIST ICA EDV: -18.2 CM/SEC
BH CV XLRA MEAS LEFT DIST ICA PSV: -65.5 CM/SEC
BH CV XLRA MEAS LEFT ICA RATIO VEL: 140 CM/SEC
BH CV XLRA MEAS LEFT ICA/CCA RATIO: 1.4
BH CV XLRA MEAS LEFT MID ICA EDV: -13 CM/SEC
BH CV XLRA MEAS LEFT MID ICA PSV: -76.2 CM/SEC
BH CV XLRA MEAS LEFT PROX CCA EDV: 4.7 CM/SEC
BH CV XLRA MEAS LEFT PROX CCA PSV: 70.3 CM/SEC
BH CV XLRA MEAS LEFT PROX ECA PSV: -250 CM/SEC
BH CV XLRA MEAS LEFT PROX ICA EDV: 22 CM/SEC
BH CV XLRA MEAS LEFT PROX ICA PSV: 140 CM/SEC
BH CV XLRA MEAS LEFT PROX SCLA PSV: 281 CM/SEC
BH CV XLRA MEAS LEFT VERTEBRAL A EDV: 14.7 CM/SEC
BH CV XLRA MEAS LEFT VERTEBRAL A PSV: 87.4 CM/SEC
BH CV XLRA MEAS RIGHT CCA RATIO VEL: 62.9 CM/SEC
BH CV XLRA MEAS RIGHT DIST CCA EDV: 4.7 CM/SEC
BH CV XLRA MEAS RIGHT DIST CCA PSV: 62.9 CM/SEC
BH CV XLRA MEAS RIGHT DIST ICA EDV: -12.3 CM/SEC
BH CV XLRA MEAS RIGHT DIST ICA PSV: -55.1 CM/SEC
BH CV XLRA MEAS RIGHT ICA RATIO VEL: -681 CM/SEC
BH CV XLRA MEAS RIGHT ICA/CCA RATIO: -10.8
BH CV XLRA MEAS RIGHT MID ICA EDV: -14.1 CM/SEC
BH CV XLRA MEAS RIGHT MID ICA PSV: -56.3 CM/SEC
BH CV XLRA MEAS RIGHT PROX CCA EDV: 4.3 CM/SEC
BH CV XLRA MEAS RIGHT PROX CCA PSV: 69.9 CM/SEC
BH CV XLRA MEAS RIGHT PROX ECA PSV: -106 CM/SEC
BH CV XLRA MEAS RIGHT PROX ICA EDV: -238 CM/SEC
BH CV XLRA MEAS RIGHT PROX ICA PSV: -681 CM/SEC
BH CV XLRA MEAS RIGHT PROX SCLA PSV: 318 CM/SEC
BH CV XLRA MEAS RIGHT VERTEBRAL A EDV: 10.6 CM/SEC
BH CV XLRA MEAS RIGHT VERTEBRAL A PSV: 79.2 CM/SEC
CHOLEST SERPL-MCNC: 125 MG/DL (ref 0–200)
DEPRECATED RDW RBC AUTO: 42.2 FL (ref 37–54)
EOSINOPHIL # BLD AUTO: 0.06 10*3/MM3 (ref 0–0.7)
EOSINOPHIL NFR BLD AUTO: 0.6 % (ref 0.3–6.2)
ERYTHROCYTE [DISTWIDTH] IN BLOOD BY AUTOMATED COUNT: 13.7 % (ref 11.7–13)
GLUCOSE BLDC GLUCOMTR-MCNC: 129 MG/DL (ref 70–130)
GLUCOSE BLDC GLUCOMTR-MCNC: 93 MG/DL (ref 70–130)
HBA1C MFR BLD: 4.9 % (ref 4.8–5.6)
HCT VFR BLD AUTO: 39.5 % (ref 35.6–45.5)
HDLC SERPL-MCNC: 45 MG/DL (ref 40–60)
HGB BLD-MCNC: 12.9 G/DL (ref 11.9–15.5)
IMM GRANULOCYTES # BLD: 0.01 10*3/MM3 (ref 0–0.03)
IMM GRANULOCYTES NFR BLD: 0.1 % (ref 0–0.5)
INR PPP: 0.98 (ref 0.9–1.1)
LDLC SERPL CALC-MCNC: 60 MG/DL (ref 0–100)
LDLC/HDLC SERPL: 1.33 {RATIO}
LEFT ARM BP: NORMAL MMHG
LEFT ATRIUM VOLUME INDEX: 32 ML/M2
LV EF 2D ECHO EST: 58 %
LYMPHOCYTES # BLD AUTO: 1.15 10*3/MM3 (ref 0.9–4.8)
LYMPHOCYTES NFR BLD AUTO: 12.2 % (ref 19.6–45.3)
MAXIMAL PREDICTED HEART RATE: 139 BPM
MCH RBC QN AUTO: 27.7 PG (ref 26.9–32)
MCHC RBC AUTO-ENTMCNC: 32.7 G/DL (ref 32.4–36.3)
MCV RBC AUTO: 84.9 FL (ref 80.5–98.2)
MONOCYTES # BLD AUTO: 0.91 10*3/MM3 (ref 0.2–1.2)
MONOCYTES NFR BLD AUTO: 9.7 % (ref 5–12)
NEUTROPHILS # BLD AUTO: 7.28 10*3/MM3 (ref 1.9–8.1)
NEUTROPHILS NFR BLD AUTO: 77.4 % (ref 42.7–76)
PLATELET # BLD AUTO: 244 10*3/MM3 (ref 140–500)
PMV BLD AUTO: 11.5 FL (ref 6–12)
PROCALCITONIN SERPL-MCNC: 0.12 NG/ML (ref 0.1–0.25)
PROTHROMBIN TIME: 12.8 SECONDS (ref 11.7–14.2)
RBC # BLD AUTO: 4.65 10*6/MM3 (ref 3.9–5.2)
RIGHT ARM BP: NORMAL MMHG
STRESS TARGET HR: 118 BPM
TRIGL SERPL-MCNC: 101 MG/DL (ref 0–150)
VLDLC SERPL-MCNC: 20.2 MG/DL (ref 5–40)
WBC NRBC COR # BLD: 9.41 10*3/MM3 (ref 4.5–10.7)

## 2018-07-25 PROCEDURE — 85730 THROMBOPLASTIN TIME PARTIAL: CPT | Performed by: SURGERY

## 2018-07-25 PROCEDURE — 93306 TTE W/DOPPLER COMPLETE: CPT

## 2018-07-25 PROCEDURE — 25010000002 HEPARIN (PORCINE) PER 1000 UNITS: Performed by: SURGERY

## 2018-07-25 PROCEDURE — 84145 PROCALCITONIN (PCT): CPT | Performed by: HOSPITALIST

## 2018-07-25 PROCEDURE — 92610 EVALUATE SWALLOWING FUNCTION: CPT

## 2018-07-25 PROCEDURE — 70450 CT HEAD/BRAIN W/O DYE: CPT

## 2018-07-25 PROCEDURE — 97535 SELF CARE MNGMENT TRAINING: CPT | Performed by: OCCUPATIONAL THERAPIST

## 2018-07-25 PROCEDURE — 82962 GLUCOSE BLOOD TEST: CPT

## 2018-07-25 PROCEDURE — 94799 UNLISTED PULMONARY SVC/PX: CPT

## 2018-07-25 PROCEDURE — 93880 EXTRACRANIAL BILAT STUDY: CPT

## 2018-07-25 PROCEDURE — 83036 HEMOGLOBIN GLYCOSYLATED A1C: CPT | Performed by: PSYCHIATRY & NEUROLOGY

## 2018-07-25 PROCEDURE — 85610 PROTHROMBIN TIME: CPT | Performed by: SURGERY

## 2018-07-25 PROCEDURE — 80061 LIPID PANEL: CPT | Performed by: PSYCHIATRY & NEUROLOGY

## 2018-07-25 PROCEDURE — 85025 COMPLETE CBC W/AUTO DIFF WBC: CPT | Performed by: SURGERY

## 2018-07-25 PROCEDURE — 99233 SBSQ HOSP IP/OBS HIGH 50: CPT | Performed by: NURSE PRACTITIONER

## 2018-07-25 PROCEDURE — 97167 OT EVAL HIGH COMPLEX 60 MIN: CPT | Performed by: OCCUPATIONAL THERAPIST

## 2018-07-25 PROCEDURE — 97110 THERAPEUTIC EXERCISES: CPT

## 2018-07-25 PROCEDURE — 93306 TTE W/DOPPLER COMPLETE: CPT | Performed by: INTERNAL MEDICINE

## 2018-07-25 RX ORDER — ASPIRIN 81 MG/1
81 TABLET ORAL DAILY
Status: DISCONTINUED | OUTPATIENT
Start: 2018-07-25 | End: 2018-07-28 | Stop reason: HOSPADM

## 2018-07-25 RX ORDER — ATORVASTATIN CALCIUM 20 MG/1
40 TABLET, FILM COATED ORAL DAILY
Status: DISCONTINUED | OUTPATIENT
Start: 2018-07-25 | End: 2018-07-28 | Stop reason: HOSPADM

## 2018-07-25 RX ADMIN — ASPIRIN 81 MG: 81 TABLET, DELAYED RELEASE ORAL at 15:36

## 2018-07-25 RX ADMIN — HEPARIN SODIUM 12 UNITS/KG/HR: 10000 INJECTION, SOLUTION INTRAVENOUS at 18:36

## 2018-07-25 RX ADMIN — LOSARTAN POTASSIUM 100 MG: 100 TABLET, FILM COATED ORAL at 11:13

## 2018-07-25 RX ADMIN — AMLODIPINE BESYLATE 10 MG: 10 TABLET ORAL at 11:13

## 2018-07-25 RX ADMIN — ATORVASTATIN CALCIUM 20 MG: 20 TABLET, FILM COATED ORAL at 11:13

## 2018-07-25 RX ADMIN — SODIUM CHLORIDE 1000 ML: 9 INJECTION, SOLUTION INTRAVENOUS at 17:00

## 2018-07-25 RX ADMIN — METOPROLOL TARTRATE 25 MG: 25 TABLET ORAL at 20:25

## 2018-07-25 RX ADMIN — METOPROLOL TARTRATE 25 MG: 25 TABLET ORAL at 11:12

## 2018-07-26 ENCOUNTER — ANESTHESIA EVENT (OUTPATIENT)
Dept: PERIOP | Facility: HOSPITAL | Age: 83
End: 2018-07-26

## 2018-07-26 ENCOUNTER — ANESTHESIA (OUTPATIENT)
Dept: PERIOP | Facility: HOSPITAL | Age: 83
End: 2018-07-26

## 2018-07-26 ENCOUNTER — APPOINTMENT (OUTPATIENT)
Dept: CARDIOLOGY | Facility: HOSPITAL | Age: 83
End: 2018-07-26
Attending: SURGERY

## 2018-07-26 LAB
ALBUMIN SERPL-MCNC: 3.5 G/DL (ref 3.5–5.2)
ALBUMIN/GLOB SERPL: 1.2 G/DL
ALP SERPL-CCNC: 81 U/L (ref 39–117)
ALT SERPL W P-5'-P-CCNC: 8 U/L (ref 1–33)
ANION GAP SERPL CALCULATED.3IONS-SCNC: 18.8 MMOL/L
APTT PPP: 60.3 SECONDS (ref 22.7–35.4)
AST SERPL-CCNC: 12 U/L (ref 1–32)
BH CV XLRA MEAS RIGHT DIST CCA EDV: 3.17 CM/SEC
BH CV XLRA MEAS RIGHT DIST CCA PSV: 45.3 CM/SEC
BH CV XLRA MEAS RIGHT PROX ECA PSV: 124 CM/SEC
BH CV XLRA MEAS RIGHT PROX ICA EDV: 13.9 CM/SEC
BH CV XLRA MEAS RIGHT PROX ICA PSV: 53.6 CM/SEC
BILIRUB SERPL-MCNC: 0.2 MG/DL (ref 0.1–1.2)
BUN BLD-MCNC: 11 MG/DL (ref 8–23)
BUN/CREAT SERPL: 10.5 (ref 7–25)
CALCIUM SPEC-SCNC: 8 MG/DL (ref 8.6–10.5)
CHLORIDE SERPL-SCNC: 97 MMOL/L (ref 98–107)
CO2 SERPL-SCNC: 18.2 MMOL/L (ref 22–29)
CREAT BLD-MCNC: 1.05 MG/DL (ref 0.57–1)
GFR SERPL CREATININE-BSD FRML MDRD: 50 ML/MIN/1.73
GLOBULIN UR ELPH-MCNC: 2.9 GM/DL
GLUCOSE BLD-MCNC: 138 MG/DL (ref 65–99)
POTASSIUM BLD-SCNC: 4.1 MMOL/L (ref 3.5–5.2)
PROT SERPL-MCNC: 6.4 G/DL (ref 6–8.5)
SODIUM BLD-SCNC: 134 MMOL/L (ref 136–145)

## 2018-07-26 PROCEDURE — 25010000003 CEFAZOLIN IN DEXTROSE 2-4 GM/100ML-% SOLUTION: Performed by: SURGERY

## 2018-07-26 PROCEDURE — 25010000002 DEXAMETHASONE PER 1 MG: Performed by: ANESTHESIOLOGY

## 2018-07-26 PROCEDURE — 03CM0ZZ EXTIRPATION OF MATTER FROM RIGHT EXTERNAL CAROTID ARTERY, OPEN APPROACH: ICD-10-PCS | Performed by: SURGERY

## 2018-07-26 PROCEDURE — 03CK0ZZ EXTIRPATION OF MATTER FROM RIGHT INTERNAL CAROTID ARTERY, OPEN APPROACH: ICD-10-PCS | Performed by: SURGERY

## 2018-07-26 PROCEDURE — 93010 ELECTROCARDIOGRAM REPORT: CPT | Performed by: INTERNAL MEDICINE

## 2018-07-26 PROCEDURE — 25010000003 CEFAZOLIN PER 500 MG: Performed by: SURGERY

## 2018-07-26 PROCEDURE — 03UK0JZ SUPPLEMENT RIGHT INTERNAL CAROTID ARTERY WITH SYNTHETIC SUBSTITUTE, OPEN APPROACH: ICD-10-PCS | Performed by: SURGERY

## 2018-07-26 PROCEDURE — C1768 GRAFT, VASCULAR: HCPCS | Performed by: SURGERY

## 2018-07-26 PROCEDURE — 85347 COAGULATION TIME ACTIVATED: CPT

## 2018-07-26 PROCEDURE — 25010000002 HEPARIN (PORCINE) PER 1000 UNITS: Performed by: ANESTHESIOLOGY

## 2018-07-26 PROCEDURE — 85730 THROMBOPLASTIN TIME PARTIAL: CPT | Performed by: HOSPITALIST

## 2018-07-26 PROCEDURE — 25010000002 MIDAZOLAM PER 1 MG: Performed by: ANESTHESIOLOGY

## 2018-07-26 PROCEDURE — 25010000002 PHENYLEPHRINE PER 1 ML: Performed by: ANESTHESIOLOGY

## 2018-07-26 PROCEDURE — 80053 COMPREHEN METABOLIC PANEL: CPT | Performed by: SURGERY

## 2018-07-26 PROCEDURE — 25010000002 FENTANYL CITRATE (PF) 100 MCG/2ML SOLUTION: Performed by: ANESTHESIOLOGY

## 2018-07-26 PROCEDURE — 03CH0ZZ EXTIRPATION OF MATTER FROM RIGHT COMMON CAROTID ARTERY, OPEN APPROACH: ICD-10-PCS | Performed by: SURGERY

## 2018-07-26 PROCEDURE — 93882 EXTRACRANIAL UNI/LTD STUDY: CPT

## 2018-07-26 PROCEDURE — 93005 ELECTROCARDIOGRAM TRACING: CPT | Performed by: SURGERY

## 2018-07-26 PROCEDURE — 25010000002 ONDANSETRON PER 1 MG: Performed by: HOSPITALIST

## 2018-07-26 PROCEDURE — 25010000002 HEPARIN (PORCINE) PER 1000 UNITS: Performed by: SURGERY

## 2018-07-26 PROCEDURE — 25010000002 PROTAMINE SULFATE PER 10 MG: Performed by: ANESTHESIOLOGY

## 2018-07-26 PROCEDURE — 25010000002 PROPOFOL 10 MG/ML EMULSION: Performed by: ANESTHESIOLOGY

## 2018-07-26 DEVICE — VASCU-GUARD PERIPHERAL VASCULAR PATCH IS PREPARED FROM BOVINE PERICARDIUM WHICH IS CROSS-LINKED WITH GLUTARALDEHYDE. THE PERICARDIUM IS PROCURED FROM CATTLE ORIGINATING IN THE UNITED STATES. VASCU-GUARD PERIPHERAL VASCULAR PATCH IS CHEMICALLY STERILIZED USING ETHANOL AND PROPYLENE OXIDE. VASCU-GUARD PERIPHERAL VASCULAR PATCH HAS BEEN TREATED WITH 1 MOLAR SODIUM HYDROXIDE FOR A MINIMUM OF 60 MINUTES AT 20 - 25°C.  VASCU-GUARD PERIPHERAL VASCULAR PATCH IS PACKAGED IN A CONTAINER FILLED WITH STERILE, NON-PYROGENIC WATER CONTAINING PROPYLENE OXIDE. THE CONTENTS OF THE UNOPENED, UNDAMAGED CONTAINER ARE STERILE.
Type: IMPLANTABLE DEVICE | Site: CAROTID | Status: FUNCTIONAL
Brand: VASCU-GUARD PERIPHERAL VASCULAR PATCH

## 2018-07-26 RX ORDER — PROMETHAZINE HYDROCHLORIDE 25 MG/1
25 TABLET ORAL ONCE AS NEEDED
Status: DISCONTINUED | OUTPATIENT
Start: 2018-07-26 | End: 2018-07-26 | Stop reason: HOSPADM

## 2018-07-26 RX ORDER — EPHEDRINE SULFATE 50 MG/ML
5 INJECTION, SOLUTION INTRAVENOUS ONCE AS NEEDED
Status: DISCONTINUED | OUTPATIENT
Start: 2018-07-26 | End: 2018-07-26 | Stop reason: HOSPADM

## 2018-07-26 RX ORDER — FLUMAZENIL 0.1 MG/ML
0.2 INJECTION INTRAVENOUS AS NEEDED
Status: DISCONTINUED | OUTPATIENT
Start: 2018-07-26 | End: 2018-07-26 | Stop reason: HOSPADM

## 2018-07-26 RX ORDER — NALOXONE HCL 0.4 MG/ML
0.2 VIAL (ML) INJECTION AS NEEDED
Status: DISCONTINUED | OUTPATIENT
Start: 2018-07-26 | End: 2018-07-26 | Stop reason: HOSPADM

## 2018-07-26 RX ORDER — PROPOFOL 10 MG/ML
VIAL (ML) INTRAVENOUS AS NEEDED
Status: DISCONTINUED | OUTPATIENT
Start: 2018-07-26 | End: 2018-07-26 | Stop reason: SURG

## 2018-07-26 RX ORDER — SODIUM CHLORIDE, SODIUM LACTATE, POTASSIUM CHLORIDE, CALCIUM CHLORIDE 600; 310; 30; 20 MG/100ML; MG/100ML; MG/100ML; MG/100ML
50 INJECTION, SOLUTION INTRAVENOUS CONTINUOUS
Status: DISCONTINUED | OUTPATIENT
Start: 2018-07-26 | End: 2018-07-28 | Stop reason: HOSPADM

## 2018-07-26 RX ORDER — PROMETHAZINE HYDROCHLORIDE 25 MG/ML
12.5 INJECTION, SOLUTION INTRAMUSCULAR; INTRAVENOUS ONCE AS NEEDED
Status: DISCONTINUED | OUTPATIENT
Start: 2018-07-26 | End: 2018-07-26 | Stop reason: HOSPADM

## 2018-07-26 RX ORDER — INDAPAMIDE 2.5 MG/1
2.5 TABLET, FILM COATED ORAL EVERY MORNING
Status: DISCONTINUED | OUTPATIENT
Start: 2018-07-27 | End: 2018-07-28 | Stop reason: HOSPADM

## 2018-07-26 RX ORDER — HYDROCODONE BITARTRATE AND ACETAMINOPHEN 5; 325 MG/1; MG/1
1 TABLET ORAL EVERY 4 HOURS PRN
Status: DISCONTINUED | OUTPATIENT
Start: 2018-07-26 | End: 2018-07-28 | Stop reason: HOSPADM

## 2018-07-26 RX ORDER — PROMETHAZINE HYDROCHLORIDE 25 MG/1
25 SUPPOSITORY RECTAL ONCE AS NEEDED
Status: DISCONTINUED | OUTPATIENT
Start: 2018-07-26 | End: 2018-07-26 | Stop reason: HOSPADM

## 2018-07-26 RX ORDER — ONDANSETRON 2 MG/ML
4 INJECTION INTRAMUSCULAR; INTRAVENOUS ONCE AS NEEDED
Status: DISCONTINUED | OUTPATIENT
Start: 2018-07-26 | End: 2018-07-26 | Stop reason: HOSPADM

## 2018-07-26 RX ORDER — LIDOCAINE HYDROCHLORIDE 40 MG/ML
SOLUTION TOPICAL AS NEEDED
Status: DISCONTINUED | OUTPATIENT
Start: 2018-07-26 | End: 2018-07-26 | Stop reason: SURG

## 2018-07-26 RX ORDER — AMLODIPINE BESYLATE 10 MG/1
10 TABLET ORAL DAILY
Status: DISCONTINUED | OUTPATIENT
Start: 2018-07-26 | End: 2018-07-26

## 2018-07-26 RX ORDER — LIDOCAINE HYDROCHLORIDE 20 MG/ML
INJECTION, SOLUTION INFILTRATION; PERINEURAL AS NEEDED
Status: DISCONTINUED | OUTPATIENT
Start: 2018-07-26 | End: 2018-07-26 | Stop reason: SURG

## 2018-07-26 RX ORDER — HEPARIN SODIUM 1000 [USP'U]/ML
INJECTION, SOLUTION INTRAVENOUS; SUBCUTANEOUS AS NEEDED
Status: DISCONTINUED | OUTPATIENT
Start: 2018-07-26 | End: 2018-07-26 | Stop reason: SURG

## 2018-07-26 RX ORDER — POTASSIUM CHLORIDE 750 MG/1
20 CAPSULE, EXTENDED RELEASE ORAL DAILY
Status: DISCONTINUED | OUTPATIENT
Start: 2018-07-26 | End: 2018-07-27

## 2018-07-26 RX ORDER — LIDOCAINE HYDROCHLORIDE 10 MG/ML
0.5 INJECTION, SOLUTION EPIDURAL; INFILTRATION; INTRACAUDAL; PERINEURAL ONCE AS NEEDED
Status: DISCONTINUED | OUTPATIENT
Start: 2018-07-26 | End: 2018-07-26 | Stop reason: HOSPADM

## 2018-07-26 RX ORDER — MIDAZOLAM HYDROCHLORIDE 1 MG/ML
2 INJECTION INTRAMUSCULAR; INTRAVENOUS
Status: DISCONTINUED | OUTPATIENT
Start: 2018-07-26 | End: 2018-07-26 | Stop reason: HOSPADM

## 2018-07-26 RX ORDER — HYDROMORPHONE HYDROCHLORIDE 1 MG/ML
0.5 INJECTION, SOLUTION INTRAMUSCULAR; INTRAVENOUS; SUBCUTANEOUS
Status: DISCONTINUED | OUTPATIENT
Start: 2018-07-26 | End: 2018-07-26 | Stop reason: HOSPADM

## 2018-07-26 RX ORDER — MIDAZOLAM HYDROCHLORIDE 1 MG/ML
1 INJECTION INTRAMUSCULAR; INTRAVENOUS
Status: DISCONTINUED | OUTPATIENT
Start: 2018-07-26 | End: 2018-07-26 | Stop reason: HOSPADM

## 2018-07-26 RX ORDER — HYDROCODONE BITARTRATE AND ACETAMINOPHEN 7.5; 325 MG/1; MG/1
1 TABLET ORAL ONCE AS NEEDED
Status: DISCONTINUED | OUTPATIENT
Start: 2018-07-26 | End: 2018-07-26 | Stop reason: HOSPADM

## 2018-07-26 RX ORDER — OXYCODONE AND ACETAMINOPHEN 7.5; 325 MG/1; MG/1
1 TABLET ORAL ONCE AS NEEDED
Status: DISCONTINUED | OUTPATIENT
Start: 2018-07-26 | End: 2018-07-26 | Stop reason: HOSPADM

## 2018-07-26 RX ORDER — EPHEDRINE SULFATE 50 MG/ML
INJECTION, SOLUTION INTRAVENOUS AS NEEDED
Status: DISCONTINUED | OUTPATIENT
Start: 2018-07-26 | End: 2018-07-26 | Stop reason: SURG

## 2018-07-26 RX ORDER — LOSARTAN POTASSIUM 100 MG/1
100 TABLET ORAL DAILY
Status: DISCONTINUED | OUTPATIENT
Start: 2018-07-26 | End: 2018-07-26

## 2018-07-26 RX ORDER — FENTANYL CITRATE 50 UG/ML
50 INJECTION, SOLUTION INTRAMUSCULAR; INTRAVENOUS
Status: DISCONTINUED | OUTPATIENT
Start: 2018-07-26 | End: 2018-07-26 | Stop reason: HOSPADM

## 2018-07-26 RX ORDER — ROCURONIUM BROMIDE 10 MG/ML
INJECTION, SOLUTION INTRAVENOUS AS NEEDED
Status: DISCONTINUED | OUTPATIENT
Start: 2018-07-26 | End: 2018-07-26 | Stop reason: SURG

## 2018-07-26 RX ORDER — ATORVASTATIN CALCIUM 20 MG/1
20 TABLET, FILM COATED ORAL DAILY
Status: DISCONTINUED | OUTPATIENT
Start: 2018-07-26 | End: 2018-07-26

## 2018-07-26 RX ORDER — SODIUM CHLORIDE 0.9 % (FLUSH) 0.9 %
1-10 SYRINGE (ML) INJECTION AS NEEDED
Status: DISCONTINUED | OUTPATIENT
Start: 2018-07-26 | End: 2018-07-26 | Stop reason: HOSPADM

## 2018-07-26 RX ORDER — GLYCOPYRROLATE 0.2 MG/ML
INJECTION INTRAMUSCULAR; INTRAVENOUS AS NEEDED
Status: DISCONTINUED | OUTPATIENT
Start: 2018-07-26 | End: 2018-07-26 | Stop reason: SURG

## 2018-07-26 RX ORDER — DEXAMETHASONE SODIUM PHOSPHATE 10 MG/ML
INJECTION INTRAMUSCULAR; INTRAVENOUS AS NEEDED
Status: DISCONTINUED | OUTPATIENT
Start: 2018-07-26 | End: 2018-07-26 | Stop reason: SURG

## 2018-07-26 RX ORDER — FAMOTIDINE 10 MG/ML
20 INJECTION, SOLUTION INTRAVENOUS ONCE
Status: COMPLETED | OUTPATIENT
Start: 2018-07-26 | End: 2018-07-26

## 2018-07-26 RX ORDER — LABETALOL HYDROCHLORIDE 5 MG/ML
5 INJECTION, SOLUTION INTRAVENOUS
Status: DISCONTINUED | OUTPATIENT
Start: 2018-07-26 | End: 2018-07-26 | Stop reason: HOSPADM

## 2018-07-26 RX ORDER — PROMETHAZINE HYDROCHLORIDE 25 MG/1
12.5 TABLET ORAL ONCE AS NEEDED
Status: DISCONTINUED | OUTPATIENT
Start: 2018-07-26 | End: 2018-07-26 | Stop reason: HOSPADM

## 2018-07-26 RX ORDER — PROTAMINE SULFATE 10 MG/ML
INJECTION, SOLUTION INTRAVENOUS AS NEEDED
Status: DISCONTINUED | OUTPATIENT
Start: 2018-07-26 | End: 2018-07-26 | Stop reason: SURG

## 2018-07-26 RX ORDER — CEFAZOLIN SODIUM 2 G/100ML
2 INJECTION, SOLUTION INTRAVENOUS ONCE
Status: COMPLETED | OUTPATIENT
Start: 2018-07-26 | End: 2018-07-26

## 2018-07-26 RX ORDER — DIPHENHYDRAMINE HYDROCHLORIDE 50 MG/ML
12.5 INJECTION INTRAMUSCULAR; INTRAVENOUS
Status: DISCONTINUED | OUTPATIENT
Start: 2018-07-26 | End: 2018-07-26 | Stop reason: HOSPADM

## 2018-07-26 RX ORDER — SODIUM CHLORIDE, SODIUM LACTATE, POTASSIUM CHLORIDE, CALCIUM CHLORIDE 600; 310; 30; 20 MG/100ML; MG/100ML; MG/100ML; MG/100ML
9 INJECTION, SOLUTION INTRAVENOUS CONTINUOUS
Status: DISCONTINUED | OUTPATIENT
Start: 2018-07-26 | End: 2018-07-26

## 2018-07-26 RX ADMIN — LIDOCAINE HYDROCHLORIDE 50 MG: 20 INJECTION, SOLUTION INFILTRATION; PERINEURAL at 07:48

## 2018-07-26 RX ADMIN — SODIUM CHLORIDE, POTASSIUM CHLORIDE, SODIUM LACTATE AND CALCIUM CHLORIDE 50 ML/HR: 600; 310; 30; 20 INJECTION, SOLUTION INTRAVENOUS at 18:42

## 2018-07-26 RX ADMIN — EPHEDRINE SULFATE 20 MG: 50 INJECTION INTRAMUSCULAR; INTRAVENOUS; SUBCUTANEOUS at 08:03

## 2018-07-26 RX ADMIN — PHENYLEPHRINE HYDROCHLORIDE 50 MCG: 10 INJECTION INTRAVENOUS at 08:53

## 2018-07-26 RX ADMIN — DEXAMETHASONE SODIUM PHOSPHATE 8 MG: 10 INJECTION INTRAMUSCULAR; INTRAVENOUS at 08:30

## 2018-07-26 RX ADMIN — SODIUM CHLORIDE, POTASSIUM CHLORIDE, SODIUM LACTATE AND CALCIUM CHLORIDE: 600; 310; 30; 20 INJECTION, SOLUTION INTRAVENOUS at 07:37

## 2018-07-26 RX ADMIN — METOPROLOL TARTRATE 25 MG: 25 TABLET ORAL at 06:04

## 2018-07-26 RX ADMIN — SODIUM CHLORIDE, POTASSIUM CHLORIDE, SODIUM LACTATE AND CALCIUM CHLORIDE 9 ML/HR: 600; 310; 30; 20 INJECTION, SOLUTION INTRAVENOUS at 17:14

## 2018-07-26 RX ADMIN — AMLODIPINE BESYLATE 10 MG: 10 TABLET ORAL at 21:16

## 2018-07-26 RX ADMIN — GLYCOPYRROLATE 0.2 MG: 0.2 INJECTION INTRAMUSCULAR; INTRAVENOUS at 08:05

## 2018-07-26 RX ADMIN — FENTANYL CITRATE 50 MCG: 50 INJECTION INTRAMUSCULAR; INTRAVENOUS at 07:11

## 2018-07-26 RX ADMIN — FENTANYL CITRATE 50 MCG: 50 INJECTION INTRAMUSCULAR; INTRAVENOUS at 07:48

## 2018-07-26 RX ADMIN — EPHEDRINE SULFATE 10 MG: 50 INJECTION INTRAMUSCULAR; INTRAVENOUS; SUBCUTANEOUS at 07:58

## 2018-07-26 RX ADMIN — FENTANYL CITRATE 50 MCG: 50 INJECTION INTRAMUSCULAR; INTRAVENOUS at 08:43

## 2018-07-26 RX ADMIN — LOSARTAN POTASSIUM 100 MG: 100 TABLET, FILM COATED ORAL at 21:16

## 2018-07-26 RX ADMIN — FAMOTIDINE 20 MG: 10 INJECTION, SOLUTION INTRAVENOUS at 07:20

## 2018-07-26 RX ADMIN — POTASSIUM CHLORIDE 20 MEQ: 750 CAPSULE, EXTENDED RELEASE ORAL at 21:16

## 2018-07-26 RX ADMIN — ROCURONIUM BROMIDE 30 MG: 10 INJECTION INTRAVENOUS at 07:48

## 2018-07-26 RX ADMIN — HEPARIN SODIUM 2500 UNITS: 1000 INJECTION, SOLUTION INTRAVENOUS; SUBCUTANEOUS at 09:38

## 2018-07-26 RX ADMIN — PROTAMINE SULFATE 40 MG: 10 INJECTION, SOLUTION INTRAVENOUS at 10:13

## 2018-07-26 RX ADMIN — HEPARIN SODIUM 7500 UNITS: 1000 INJECTION, SOLUTION INTRAVENOUS; SUBCUTANEOUS at 08:46

## 2018-07-26 RX ADMIN — ONDANSETRON HYDROCHLORIDE 4 MG: 2 SOLUTION INTRAMUSCULAR; INTRAVENOUS at 10:15

## 2018-07-26 RX ADMIN — Medication 1 MG: at 07:11

## 2018-07-26 RX ADMIN — CEFAZOLIN SODIUM 2 G: 2 INJECTION, SOLUTION INTRAVENOUS at 07:48

## 2018-07-26 RX ADMIN — ROCURONIUM BROMIDE 10 MG: 10 INJECTION INTRAVENOUS at 09:26

## 2018-07-26 RX ADMIN — SUGAMMADEX 400 MG: 100 INJECTION, SOLUTION INTRAVENOUS at 10:15

## 2018-07-26 RX ADMIN — ROCURONIUM BROMIDE 10 MG: 10 INJECTION INTRAVENOUS at 08:41

## 2018-07-26 RX ADMIN — PROPOFOL 100 MG: 10 INJECTION, EMULSION INTRAVENOUS at 07:48

## 2018-07-26 RX ADMIN — LIDOCAINE HYDROCHLORIDE 1 EACH: 40 SOLUTION TOPICAL at 07:52

## 2018-07-26 RX ADMIN — METOPROLOL TARTRATE 25 MG: 25 TABLET ORAL at 21:16

## 2018-07-26 RX ADMIN — SODIUM CHLORIDE, POTASSIUM CHLORIDE, SODIUM LACTATE AND CALCIUM CHLORIDE 9 ML/HR: 600; 310; 30; 20 INJECTION, SOLUTION INTRAVENOUS at 07:23

## 2018-07-26 NOTE — ANESTHESIA PROCEDURE NOTES
Airway  Urgency: elective    Airway not difficult    General Information and Staff    Patient location during procedure: OR  Anesthesiologist: JAN BURNS    Indications and Patient Condition  Indications for airway management: airway protection    Preoxygenated: yes  MILS maintained throughout  Mask difficulty assessment: 1 - vent by mask    Final Airway Details  Final airway type: endotracheal airway      Successful airway: ETT  Cuffed: yes   Successful intubation technique: direct laryngoscopy  Endotracheal tube insertion site: oral  Blade: Michelle  Blade size: #3  ETT size: 7.0 mm  Cormack-Lehane Classification: grade I - full view of glottis  Placement verified by: chest auscultation and capnometry   Measured from: lips  ETT to lips (cm): 21  Number of attempts at approach: 1

## 2018-07-26 NOTE — ANESTHESIA PROCEDURE NOTES
Arterial Line    Patient location during procedure: holding area  Start time: 7/26/2018 7:13 AM  Stop Time:7/26/2018 7:15 AM       Line placed for hemodynamic monitoring.  Performed By   Anesthesiologist: JAN BURNS  Preanesthetic Checklist  Completed: patient identified, site marked, surgical consent, pre-op evaluation, timeout performed, IV checked, risks and benefits discussed and monitors and equipment checked  Arterial Line Prep   Sterile Tech: cap, gloves and mask  Prep: ChloraPrep  Patient monitoring: blood pressure monitoring, continuous pulse oximetry and EKG  Arterial Line Procedure   Laterality:left  Location:  radial artery  Catheter size: 20 G   Guidance: palpation technique  Number of attempts: 1  Successful placement: yes          Post Assessment   Dressing Type: biopatch applied, occlusive dressing applied, secured with tape and wrist guard applied.   Complications no  Circ/Move/Sens Assessment: normal and unchanged.   Patient Tolerance: patient tolerated the procedure well with no apparent complications

## 2018-07-26 NOTE — ANESTHESIA POSTPROCEDURE EVALUATION
"Patient: Gita Wharton    Procedure Summary     Date:  07/26/18 Room / Location:  [facility] OR  / [facility] MAIN OR    Anesthesia Start:  0737 Anesthesia Stop:  1048    Procedure:  RT CAROTID ENDARTERECTOMY (Right Neck) Diagnosis:      Surgeon:  Inna Villarreal MD Provider:  Ole Leigh MD    Anesthesia Type:  general ASA Status:  3          Anesthesia Type: general  Last vitals  BP   135/56 (07/26/18 1215)   Temp   36.8 °C (98.2 °F) (07/26/18 1044)   Pulse   56 (07/26/18 1215)   Resp   16 (07/26/18 1215)     SpO2   98 % (07/26/18 1215)     Post Anesthesia Care and Evaluation    Patient location during evaluation: bedside  Patient participation: complete - patient participated  Level of consciousness: awake  Pain score: 2  Pain management: adequate  Airway patency: patent  Anesthetic complications: No anesthetic complications  PONV Status: none  Cardiovascular status: acceptable  Respiratory status: acceptable  Hydration status: acceptable    Comments: /56   Pulse 56   Temp 36.8 °C (98.2 °F) (Oral)   Resp 16   Ht 160 cm (63\")   Wt 59.9 kg (132 lb 0.9 oz)   LMP  (LMP Unknown)   SpO2 98%   BMI 23.39 kg/m²         "

## 2018-07-26 NOTE — ANESTHESIA PREPROCEDURE EVALUATION
Anesthesia Evaluation     Patient summary reviewed and Nursing notes reviewed                Airway   Mallampati: II  TM distance: >3 FB  Neck ROM: full  No difficulty expected  Dental - normal exam   (+) edentulous    Pulmonary - normal exam   (+) a smoker Former,   Cardiovascular - normal exam    (+) hypertension, murmur, PVD, DVT, hyperlipidemia,  carotid artery disease right carotid    PE comment: Loud RUTHY at [facility]    Neuro/Psych  (+) CVA residual symptoms, numbness,       ROS Comment: Left weakness and speech affected  GI/Hepatic/Renal/Endo      Musculoskeletal     Abdominal  - normal exam   Substance History      OB/GYN          Other            Phys Exam Other: Large bump on right forehead after fall, denies LOC              Anesthesia Plan    ASA 4     general   (Art line)  intravenous induction   Anesthetic plan and risks discussed with patient.

## 2018-07-27 LAB
ACT BLD: 125 SECONDS (ref 82–152)
ACT BLD: 125 SECONDS (ref 82–152)
ACT BLD: 213 SECONDS (ref 82–152)
ACT BLD: 268 SECONDS (ref 82–152)
ACT BLD: 274 SECONDS (ref 82–152)
ANION GAP SERPL CALCULATED.3IONS-SCNC: 13.5 MMOL/L
APTT PPP: 31 SECONDS (ref 22.7–35.4)
BASOPHILS # BLD AUTO: 0 10*3/MM3 (ref 0–0.2)
BASOPHILS NFR BLD AUTO: 0 % (ref 0–1.5)
BUN BLD-MCNC: 10 MG/DL (ref 8–23)
BUN/CREAT SERPL: 9.8 (ref 7–25)
CALCIUM SPEC-SCNC: 8.6 MG/DL (ref 8.6–10.5)
CHLORIDE SERPL-SCNC: 101 MMOL/L (ref 98–107)
CO2 SERPL-SCNC: 23.5 MMOL/L (ref 22–29)
CREAT BLD-MCNC: 1.02 MG/DL (ref 0.57–1)
DEPRECATED RDW RBC AUTO: 42.8 FL (ref 37–54)
EOSINOPHIL # BLD AUTO: 0 10*3/MM3 (ref 0–0.7)
EOSINOPHIL NFR BLD AUTO: 0 % (ref 0.3–6.2)
ERYTHROCYTE [DISTWIDTH] IN BLOOD BY AUTOMATED COUNT: 13.6 % (ref 11.7–13)
GFR SERPL CREATININE-BSD FRML MDRD: 52 ML/MIN/1.73
GLUCOSE BLD-MCNC: 101 MG/DL (ref 65–99)
HCT VFR BLD AUTO: 34.6 % (ref 35.6–45.5)
HGB BLD-MCNC: 10.9 G/DL (ref 11.9–15.5)
IMM GRANULOCYTES # BLD: 0.04 10*3/MM3 (ref 0–0.03)
IMM GRANULOCYTES NFR BLD: 0.4 % (ref 0–0.5)
LYMPHOCYTES # BLD AUTO: 0.81 10*3/MM3 (ref 0.9–4.8)
LYMPHOCYTES NFR BLD AUTO: 7.3 % (ref 19.6–45.3)
MAGNESIUM SERPL-MCNC: 2 MG/DL (ref 1.6–2.4)
MCH RBC QN AUTO: 27.1 PG (ref 26.9–32)
MCHC RBC AUTO-ENTMCNC: 31.5 G/DL (ref 32.4–36.3)
MCV RBC AUTO: 86.1 FL (ref 80.5–98.2)
MONOCYTES # BLD AUTO: 1.22 10*3/MM3 (ref 0.2–1.2)
MONOCYTES NFR BLD AUTO: 11 % (ref 5–12)
NEUTROPHILS # BLD AUTO: 9.04 10*3/MM3 (ref 1.9–8.1)
NEUTROPHILS NFR BLD AUTO: 81.3 % (ref 42.7–76)
PLATELET # BLD AUTO: 214 10*3/MM3 (ref 140–500)
PMV BLD AUTO: 11.8 FL (ref 6–12)
POTASSIUM BLD-SCNC: 5.3 MMOL/L (ref 3.5–5.2)
RBC # BLD AUTO: 4.02 10*6/MM3 (ref 3.9–5.2)
SODIUM BLD-SCNC: 138 MMOL/L (ref 136–145)
WBC NRBC COR # BLD: 11.11 10*3/MM3 (ref 4.5–10.7)

## 2018-07-27 PROCEDURE — 85730 THROMBOPLASTIN TIME PARTIAL: CPT | Performed by: SURGERY

## 2018-07-27 PROCEDURE — 80048 BASIC METABOLIC PNL TOTAL CA: CPT | Performed by: HOSPITALIST

## 2018-07-27 PROCEDURE — 99232 SBSQ HOSP IP/OBS MODERATE 35: CPT | Performed by: NURSE PRACTITIONER

## 2018-07-27 PROCEDURE — 85025 COMPLETE CBC W/AUTO DIFF WBC: CPT | Performed by: SURGERY

## 2018-07-27 PROCEDURE — 97110 THERAPEUTIC EXERCISES: CPT

## 2018-07-27 PROCEDURE — 83735 ASSAY OF MAGNESIUM: CPT | Performed by: SURGERY

## 2018-07-27 RX ORDER — UREA 10 %
1250 LOTION (ML) TOPICAL DAILY PRN
Status: DISCONTINUED | OUTPATIENT
Start: 2018-07-27 | End: 2018-07-27 | Stop reason: CLARIF

## 2018-07-27 RX ORDER — LABETALOL HYDROCHLORIDE 5 MG/ML
10 INJECTION, SOLUTION INTRAVENOUS EVERY 6 HOURS PRN
Status: DISCONTINUED | OUTPATIENT
Start: 2018-07-27 | End: 2018-07-28 | Stop reason: HOSPADM

## 2018-07-27 RX ORDER — CALCIUM CARBONATE 200(500)MG
1 TABLET,CHEWABLE ORAL DAILY PRN
Status: DISCONTINUED | OUTPATIENT
Start: 2018-07-27 | End: 2018-07-28 | Stop reason: HOSPADM

## 2018-07-27 RX ADMIN — POTASSIUM CHLORIDE 20 MEQ: 750 CAPSULE, EXTENDED RELEASE ORAL at 09:56

## 2018-07-27 RX ADMIN — INDAPAMIDE 2.5 MG: 2.5 TABLET, FILM COATED ORAL at 10:00

## 2018-07-27 RX ADMIN — ASPIRIN 81 MG: 81 TABLET, DELAYED RELEASE ORAL at 09:55

## 2018-07-27 RX ADMIN — AMLODIPINE BESYLATE 10 MG: 10 TABLET ORAL at 09:56

## 2018-07-27 RX ADMIN — METOPROLOL TARTRATE 25 MG: 25 TABLET ORAL at 20:04

## 2018-07-27 RX ADMIN — ATORVASTATIN CALCIUM 40 MG: 20 TABLET, FILM COATED ORAL at 09:56

## 2018-07-27 RX ADMIN — LOSARTAN POTASSIUM 100 MG: 100 TABLET, FILM COATED ORAL at 09:56

## 2018-07-27 RX ADMIN — ACETAMINOPHEN 650 MG: 325 TABLET, FILM COATED ORAL at 05:35

## 2018-07-27 RX ADMIN — METOPROLOL TARTRATE 25 MG: 25 TABLET ORAL at 09:56

## 2018-07-27 RX ADMIN — ACETAMINOPHEN 650 MG: 325 TABLET, FILM COATED ORAL at 23:18

## 2018-07-28 ENCOUNTER — APPOINTMENT (OUTPATIENT)
Dept: CT IMAGING | Facility: HOSPITAL | Age: 83
End: 2018-07-28

## 2018-07-28 VITALS
HEIGHT: 63 IN | HEART RATE: 60 BPM | OXYGEN SATURATION: 97 % | TEMPERATURE: 98.8 F | SYSTOLIC BLOOD PRESSURE: 135 MMHG | WEIGHT: 132.06 LBS | DIASTOLIC BLOOD PRESSURE: 65 MMHG | RESPIRATION RATE: 16 BRPM | BODY MASS INDEX: 23.4 KG/M2

## 2018-07-28 LAB
ALBUMIN SERPL-MCNC: 3.2 G/DL (ref 3.5–5.2)
ALBUMIN/GLOB SERPL: 1.1 G/DL
ALP SERPL-CCNC: 72 U/L (ref 39–117)
ALT SERPL W P-5'-P-CCNC: 5 U/L (ref 1–33)
ANION GAP SERPL CALCULATED.3IONS-SCNC: 11.9 MMOL/L
APTT PPP: 29.2 SECONDS (ref 22.7–35.4)
AST SERPL-CCNC: 9 U/L (ref 1–32)
BASOPHILS # BLD AUTO: 0.01 10*3/MM3 (ref 0–0.2)
BASOPHILS NFR BLD AUTO: 0.1 % (ref 0–1.5)
BILIRUB SERPL-MCNC: 0.2 MG/DL (ref 0.1–1.2)
BUN BLD-MCNC: 10 MG/DL (ref 8–23)
BUN/CREAT SERPL: 9.6 (ref 7–25)
CALCIUM SPEC-SCNC: 7.8 MG/DL (ref 8.6–10.5)
CHLORIDE SERPL-SCNC: 102 MMOL/L (ref 98–107)
CO2 SERPL-SCNC: 23.1 MMOL/L (ref 22–29)
CREAT BLD-MCNC: 1.04 MG/DL (ref 0.57–1)
DEPRECATED RDW RBC AUTO: 45.7 FL (ref 37–54)
EOSINOPHIL # BLD AUTO: 0.08 10*3/MM3 (ref 0–0.7)
EOSINOPHIL NFR BLD AUTO: 0.9 % (ref 0.3–6.2)
ERYTHROCYTE [DISTWIDTH] IN BLOOD BY AUTOMATED COUNT: 14 % (ref 11.7–13)
GFR SERPL CREATININE-BSD FRML MDRD: 51 ML/MIN/1.73
GLOBULIN UR ELPH-MCNC: 3 GM/DL
GLUCOSE BLD-MCNC: 94 MG/DL (ref 65–99)
HCT VFR BLD AUTO: 36.6 % (ref 35.6–45.5)
HGB BLD-MCNC: 11.1 G/DL (ref 11.9–15.5)
IMM GRANULOCYTES # BLD: 0.02 10*3/MM3 (ref 0–0.03)
IMM GRANULOCYTES NFR BLD: 0.2 % (ref 0–0.5)
LYMPHOCYTES # BLD AUTO: 1.38 10*3/MM3 (ref 0.9–4.8)
LYMPHOCYTES NFR BLD AUTO: 15.3 % (ref 19.6–45.3)
MAGNESIUM SERPL-MCNC: 2 MG/DL (ref 1.6–2.4)
MCH RBC QN AUTO: 27.1 PG (ref 26.9–32)
MCHC RBC AUTO-ENTMCNC: 30.3 G/DL (ref 32.4–36.3)
MCV RBC AUTO: 89.3 FL (ref 80.5–98.2)
MONOCYTES # BLD AUTO: 1.23 10*3/MM3 (ref 0.2–1.2)
MONOCYTES NFR BLD AUTO: 13.6 % (ref 5–12)
NEUTROPHILS # BLD AUTO: 6.32 10*3/MM3 (ref 1.9–8.1)
NEUTROPHILS NFR BLD AUTO: 69.9 % (ref 42.7–76)
PLATELET # BLD AUTO: 197 10*3/MM3 (ref 140–500)
PMV BLD AUTO: 11.8 FL (ref 6–12)
POTASSIUM BLD-SCNC: 4.1 MMOL/L (ref 3.5–5.2)
PROT SERPL-MCNC: 6.2 G/DL (ref 6–8.5)
RBC # BLD AUTO: 4.1 10*6/MM3 (ref 3.9–5.2)
SODIUM BLD-SCNC: 137 MMOL/L (ref 136–145)
WBC NRBC COR # BLD: 9.04 10*3/MM3 (ref 4.5–10.7)

## 2018-07-28 PROCEDURE — 85025 COMPLETE CBC W/AUTO DIFF WBC: CPT | Performed by: SURGERY

## 2018-07-28 PROCEDURE — 80053 COMPREHEN METABOLIC PANEL: CPT | Performed by: SURGERY

## 2018-07-28 PROCEDURE — 97110 THERAPEUTIC EXERCISES: CPT

## 2018-07-28 PROCEDURE — 71250 CT THORAX DX C-: CPT

## 2018-07-28 PROCEDURE — 83735 ASSAY OF MAGNESIUM: CPT | Performed by: SURGERY

## 2018-07-28 PROCEDURE — 85730 THROMBOPLASTIN TIME PARTIAL: CPT | Performed by: SURGERY

## 2018-07-28 RX ORDER — HYDROCODONE BITARTRATE AND ACETAMINOPHEN 5; 325 MG/1; MG/1
1 TABLET ORAL EVERY 4 HOURS PRN
Qty: 15 TABLET | Refills: 0 | Status: SHIPPED | OUTPATIENT
Start: 2018-07-28 | End: 2018-08-07 | Stop reason: HOSPADM

## 2018-07-28 RX ORDER — ASPIRIN 81 MG/1
81 TABLET ORAL DAILY
Start: 2018-07-29

## 2018-07-28 RX ADMIN — ASPIRIN 81 MG: 81 TABLET, DELAYED RELEASE ORAL at 08:05

## 2018-07-28 RX ADMIN — INDAPAMIDE 2.5 MG: 2.5 TABLET, FILM COATED ORAL at 08:05

## 2018-07-28 RX ADMIN — ATORVASTATIN CALCIUM 40 MG: 20 TABLET, FILM COATED ORAL at 08:05

## 2018-07-28 RX ADMIN — AMLODIPINE BESYLATE 10 MG: 10 TABLET ORAL at 08:05

## 2018-07-28 RX ADMIN — METOPROLOL TARTRATE 25 MG: 25 TABLET ORAL at 08:05

## 2018-07-28 RX ADMIN — LOSARTAN POTASSIUM 100 MG: 100 TABLET, FILM COATED ORAL at 08:05

## 2018-07-29 ENCOUNTER — DOCUMENTATION (OUTPATIENT)
Dept: SURGERY | Facility: HOSPITAL | Age: 83
End: 2018-07-29

## 2018-08-05 ENCOUNTER — HOSPITAL ENCOUNTER (OUTPATIENT)
Facility: HOSPITAL | Age: 83
Discharge: HOME HEALTH | End: 2018-08-07
Attending: EMERGENCY MEDICINE | Admitting: HOSPITALIST

## 2018-08-05 DIAGNOSIS — Z74.09 IMPAIRED FUNCTIONAL MOBILITY, BALANCE, GAIT, AND ENDURANCE: ICD-10-CM

## 2018-08-05 DIAGNOSIS — I10 ESSENTIAL HYPERTENSION: ICD-10-CM

## 2018-08-05 DIAGNOSIS — K64.9 HEMORRHOIDS, UNSPECIFIED HEMORRHOID TYPE: ICD-10-CM

## 2018-08-05 DIAGNOSIS — R53.83 FATIGUE, UNSPECIFIED TYPE: ICD-10-CM

## 2018-08-05 DIAGNOSIS — K92.1 HEMATOCHEZIA: Primary | ICD-10-CM

## 2018-08-05 PROBLEM — E87.1 HYPONATREMIA: Status: ACTIVE | Noted: 2018-08-05

## 2018-08-05 PROBLEM — D64.9 ANEMIA: Status: ACTIVE | Noted: 2018-08-05

## 2018-08-05 PROBLEM — R53.1 WEAKNESS: Status: ACTIVE | Noted: 2018-08-05

## 2018-08-05 LAB
ABO GROUP BLD: NORMAL
ALBUMIN SERPL-MCNC: 3.8 G/DL (ref 3.5–5.2)
ALBUMIN SERPL-MCNC: 4 G/DL (ref 3.5–5.2)
ALBUMIN/GLOB SERPL: 1.1 G/DL
ALBUMIN/GLOB SERPL: 1.2 G/DL
ALP SERPL-CCNC: 91 U/L (ref 39–117)
ALP SERPL-CCNC: 92 U/L (ref 39–117)
ALT SERPL W P-5'-P-CCNC: 9 U/L (ref 1–33)
ALT SERPL W P-5'-P-CCNC: 9 U/L (ref 1–33)
ANION GAP SERPL CALCULATED.3IONS-SCNC: 14.7 MMOL/L
ANION GAP SERPL CALCULATED.3IONS-SCNC: 15.3 MMOL/L
AST SERPL-CCNC: 10 U/L (ref 1–32)
AST SERPL-CCNC: 11 U/L (ref 1–32)
BASOPHILS # BLD AUTO: 0.01 10*3/MM3 (ref 0–0.2)
BASOPHILS NFR BLD AUTO: 0.1 % (ref 0–1.5)
BILIRUB SERPL-MCNC: 0.4 MG/DL (ref 0.1–1.2)
BILIRUB SERPL-MCNC: 0.4 MG/DL (ref 0.1–1.2)
BLD GP AB SCN SERPL QL: NEGATIVE
BUN BLD-MCNC: 12 MG/DL (ref 8–23)
BUN BLD-MCNC: 13 MG/DL (ref 8–23)
BUN/CREAT SERPL: 11.4 (ref 7–25)
BUN/CREAT SERPL: 11.8 (ref 7–25)
CALCIUM SPEC-SCNC: 9.4 MG/DL (ref 8.6–10.5)
CALCIUM SPEC-SCNC: 9.5 MG/DL (ref 8.6–10.5)
CHLORIDE SERPL-SCNC: 91 MMOL/L (ref 98–107)
CHLORIDE SERPL-SCNC: 92 MMOL/L (ref 98–107)
CO2 SERPL-SCNC: 24.7 MMOL/L (ref 22–29)
CO2 SERPL-SCNC: 26.3 MMOL/L (ref 22–29)
CREAT BLD-MCNC: 1.05 MG/DL (ref 0.57–1)
CREAT BLD-MCNC: 1.1 MG/DL (ref 0.57–1)
DEPRECATED RDW RBC AUTO: 40.7 FL (ref 37–54)
DEPRECATED RDW RBC AUTO: 42.7 FL (ref 37–54)
EOSINOPHIL # BLD AUTO: 0.06 10*3/MM3 (ref 0–0.7)
EOSINOPHIL NFR BLD AUTO: 0.6 % (ref 0.3–6.2)
ERYTHROCYTE [DISTWIDTH] IN BLOOD BY AUTOMATED COUNT: 13.3 % (ref 11.7–13)
ERYTHROCYTE [DISTWIDTH] IN BLOOD BY AUTOMATED COUNT: 13.6 % (ref 11.7–13)
GFR SERPL CREATININE-BSD FRML MDRD: 48 ML/MIN/1.73
GFR SERPL CREATININE-BSD FRML MDRD: 50 ML/MIN/1.73
GLOBULIN UR ELPH-MCNC: 3.4 GM/DL
GLOBULIN UR ELPH-MCNC: 3.6 GM/DL
GLUCOSE BLD-MCNC: 117 MG/DL (ref 65–99)
GLUCOSE BLD-MCNC: 127 MG/DL (ref 65–99)
HCT VFR BLD AUTO: 36.1 % (ref 35.6–45.5)
HCT VFR BLD AUTO: 37 % (ref 35.6–45.5)
HCT VFR BLD AUTO: 38.6 % (ref 35.6–45.5)
HGB BLD-MCNC: 11.8 G/DL (ref 11.9–15.5)
HGB BLD-MCNC: 11.8 G/DL (ref 11.9–15.5)
HGB BLD-MCNC: 12.5 G/DL (ref 11.9–15.5)
HOLD SPECIMEN: NORMAL
HOLD SPECIMEN: NORMAL
IMM GRANULOCYTES # BLD: 0.05 10*3/MM3 (ref 0–0.03)
IMM GRANULOCYTES NFR BLD: 0.5 % (ref 0–0.5)
INR PPP: 1.02 (ref 0.9–1.1)
INR PPP: 1.05 (ref 0.9–1.1)
LYMPHOCYTES # BLD AUTO: 1.23 10*3/MM3 (ref 0.9–4.8)
LYMPHOCYTES NFR BLD AUTO: 12.3 % (ref 19.6–45.3)
MCH RBC QN AUTO: 27.1 PG (ref 26.9–32)
MCH RBC QN AUTO: 27.3 PG (ref 26.9–32)
MCHC RBC AUTO-ENTMCNC: 31.9 G/DL (ref 32.4–36.3)
MCHC RBC AUTO-ENTMCNC: 32.7 G/DL (ref 32.4–36.3)
MCV RBC AUTO: 83 FL (ref 80.5–98.2)
MCV RBC AUTO: 85.5 FL (ref 80.5–98.2)
MONOCYTES # BLD AUTO: 1.06 10*3/MM3 (ref 0.2–1.2)
MONOCYTES NFR BLD AUTO: 10.6 % (ref 5–12)
NEUTROPHILS # BLD AUTO: 7.6 10*3/MM3 (ref 1.9–8.1)
NEUTROPHILS NFR BLD AUTO: 76.4 % (ref 42.7–76)
PLATELET # BLD AUTO: 237 10*3/MM3 (ref 140–500)
PLATELET # BLD AUTO: 250 10*3/MM3 (ref 140–500)
PMV BLD AUTO: 12.1 FL (ref 6–12)
PMV BLD AUTO: 13.1 FL (ref 6–12)
POTASSIUM BLD-SCNC: 3.9 MMOL/L (ref 3.5–5.2)
POTASSIUM BLD-SCNC: 4.2 MMOL/L (ref 3.5–5.2)
PROT SERPL-MCNC: 7.4 G/DL (ref 6–8.5)
PROT SERPL-MCNC: 7.4 G/DL (ref 6–8.5)
PROTHROMBIN TIME: 13.2 SECONDS (ref 11.7–14.2)
PROTHROMBIN TIME: 13.5 SECONDS (ref 11.7–14.2)
RBC # BLD AUTO: 4.33 10*6/MM3 (ref 3.9–5.2)
RBC # BLD AUTO: 4.35 10*6/MM3 (ref 3.9–5.2)
RH BLD: POSITIVE
SODIUM BLD-SCNC: 131 MMOL/L (ref 136–145)
SODIUM BLD-SCNC: 133 MMOL/L (ref 136–145)
SODIUM UR-SCNC: 71 MMOL/L
T&S EXPIRATION DATE: NORMAL
URATE SERPL-MCNC: 8.5 MG/DL (ref 2.4–5.7)
WBC NRBC COR # BLD: 10.4 10*3/MM3 (ref 4.5–10.7)
WBC NRBC COR # BLD: 9.96 10*3/MM3 (ref 4.5–10.7)
WHOLE BLOOD HOLD SPECIMEN: NORMAL
WHOLE BLOOD HOLD SPECIMEN: NORMAL

## 2018-08-05 PROCEDURE — 93010 ELECTROCARDIOGRAM REPORT: CPT | Performed by: INTERNAL MEDICINE

## 2018-08-05 PROCEDURE — 96360 HYDRATION IV INFUSION INIT: CPT

## 2018-08-05 PROCEDURE — 86900 BLOOD TYPING SEROLOGIC ABO: CPT | Performed by: EMERGENCY MEDICINE

## 2018-08-05 PROCEDURE — 85025 COMPLETE CBC W/AUTO DIFF WBC: CPT | Performed by: EMERGENCY MEDICINE

## 2018-08-05 PROCEDURE — 99204 OFFICE O/P NEW MOD 45 MIN: CPT | Performed by: INTERNAL MEDICINE

## 2018-08-05 PROCEDURE — G0378 HOSPITAL OBSERVATION PER HR: HCPCS

## 2018-08-05 PROCEDURE — 85018 HEMOGLOBIN: CPT | Performed by: HOSPITALIST

## 2018-08-05 PROCEDURE — 85610 PROTHROMBIN TIME: CPT | Performed by: EMERGENCY MEDICINE

## 2018-08-05 PROCEDURE — 80053 COMPREHEN METABOLIC PANEL: CPT | Performed by: INTERNAL MEDICINE

## 2018-08-05 PROCEDURE — 86901 BLOOD TYPING SEROLOGIC RH(D): CPT | Performed by: EMERGENCY MEDICINE

## 2018-08-05 PROCEDURE — 99284 EMERGENCY DEPT VISIT MOD MDM: CPT

## 2018-08-05 PROCEDURE — 85027 COMPLETE CBC AUTOMATED: CPT | Performed by: INTERNAL MEDICINE

## 2018-08-05 PROCEDURE — 85610 PROTHROMBIN TIME: CPT | Performed by: INTERNAL MEDICINE

## 2018-08-05 PROCEDURE — 86850 RBC ANTIBODY SCREEN: CPT | Performed by: EMERGENCY MEDICINE

## 2018-08-05 PROCEDURE — 93005 ELECTROCARDIOGRAM TRACING: CPT | Performed by: EMERGENCY MEDICINE

## 2018-08-05 PROCEDURE — 84550 ASSAY OF BLOOD/URIC ACID: CPT | Performed by: HOSPITALIST

## 2018-08-05 PROCEDURE — 85014 HEMATOCRIT: CPT | Performed by: HOSPITALIST

## 2018-08-05 PROCEDURE — 80053 COMPREHEN METABOLIC PANEL: CPT | Performed by: EMERGENCY MEDICINE

## 2018-08-05 PROCEDURE — 96361 HYDRATE IV INFUSION ADD-ON: CPT

## 2018-08-05 PROCEDURE — 84300 ASSAY OF URINE SODIUM: CPT | Performed by: HOSPITALIST

## 2018-08-05 RX ORDER — SODIUM CHLORIDE 9 MG/ML
100 INJECTION, SOLUTION INTRAVENOUS CONTINUOUS
Status: ACTIVE | OUTPATIENT
Start: 2018-08-05 | End: 2018-08-06

## 2018-08-05 RX ORDER — POLYETHYLENE GLYCOL 3350 17 G/17G
238 POWDER, FOR SOLUTION ORAL ONCE
Status: DISCONTINUED | OUTPATIENT
Start: 2018-08-06 | End: 2018-08-06

## 2018-08-05 RX ORDER — BISACODYL 5 MG/1
5 TABLET, DELAYED RELEASE ORAL DAILY PRN
Status: DISCONTINUED | OUTPATIENT
Start: 2018-08-05 | End: 2018-08-07 | Stop reason: HOSPADM

## 2018-08-05 RX ORDER — SODIUM CHLORIDE 0.9 % (FLUSH) 0.9 %
10 SYRINGE (ML) INJECTION AS NEEDED
Status: DISCONTINUED | OUTPATIENT
Start: 2018-08-05 | End: 2018-08-07 | Stop reason: HOSPADM

## 2018-08-05 RX ORDER — CLOPIDOGREL BISULFATE 75 MG/1
75 TABLET ORAL DAILY
COMMUNITY
Start: 2018-08-08 | End: 2019-04-17 | Stop reason: HOSPADM

## 2018-08-05 RX ORDER — PROMETHAZINE HYDROCHLORIDE 25 MG/ML
12.5 INJECTION, SOLUTION INTRAMUSCULAR; INTRAVENOUS EVERY 6 HOURS PRN
Status: DISCONTINUED | OUTPATIENT
Start: 2018-08-05 | End: 2018-08-07 | Stop reason: HOSPADM

## 2018-08-05 RX ORDER — BISACODYL 5 MG/1
20 TABLET, DELAYED RELEASE ORAL ONCE
Status: COMPLETED | OUTPATIENT
Start: 2018-08-06 | End: 2018-08-06

## 2018-08-05 RX ORDER — AMLODIPINE BESYLATE 10 MG/1
10 TABLET ORAL DAILY
Status: DISCONTINUED | OUTPATIENT
Start: 2018-08-05 | End: 2018-08-07 | Stop reason: HOSPADM

## 2018-08-05 RX ORDER — SENNA AND DOCUSATE SODIUM 50; 8.6 MG/1; MG/1
2 TABLET, FILM COATED ORAL NIGHTLY
Status: DISCONTINUED | OUTPATIENT
Start: 2018-08-05 | End: 2018-08-07 | Stop reason: HOSPADM

## 2018-08-05 RX ORDER — LOSARTAN POTASSIUM 100 MG/1
100 TABLET ORAL DAILY
Status: DISCONTINUED | OUTPATIENT
Start: 2018-08-05 | End: 2018-08-07 | Stop reason: HOSPADM

## 2018-08-05 RX ORDER — ATORVASTATIN CALCIUM 20 MG/1
20 TABLET, FILM COATED ORAL DAILY
Status: DISCONTINUED | OUTPATIENT
Start: 2018-08-05 | End: 2018-08-07 | Stop reason: HOSPADM

## 2018-08-05 RX ORDER — SODIUM CHLORIDE 0.9 % (FLUSH) 0.9 %
1-10 SYRINGE (ML) INJECTION AS NEEDED
Status: DISCONTINUED | OUTPATIENT
Start: 2018-08-05 | End: 2018-08-07 | Stop reason: HOSPADM

## 2018-08-05 RX ORDER — ACETAMINOPHEN 325 MG/1
650 TABLET ORAL EVERY 4 HOURS PRN
Status: DISCONTINUED | OUTPATIENT
Start: 2018-08-05 | End: 2018-08-07 | Stop reason: HOSPADM

## 2018-08-05 RX ORDER — ASPIRIN 81 MG/1
81 TABLET ORAL DAILY
Status: DISCONTINUED | OUTPATIENT
Start: 2018-08-05 | End: 2018-08-07 | Stop reason: HOSPADM

## 2018-08-05 RX ORDER — PROMETHAZINE HYDROCHLORIDE 12.5 MG/1
12.5 SUPPOSITORY RECTAL EVERY 6 HOURS PRN
Status: DISCONTINUED | OUTPATIENT
Start: 2018-08-05 | End: 2018-08-07 | Stop reason: HOSPADM

## 2018-08-05 RX ORDER — HYDROCODONE BITARTRATE AND ACETAMINOPHEN 5; 325 MG/1; MG/1
1 TABLET ORAL EVERY 4 HOURS PRN
Status: DISCONTINUED | OUTPATIENT
Start: 2018-08-05 | End: 2018-08-07 | Stop reason: HOSPADM

## 2018-08-05 RX ORDER — FERROUS SULFATE 325(65) MG
325 TABLET ORAL
COMMUNITY
End: 2019-06-27

## 2018-08-05 RX ORDER — PROMETHAZINE HYDROCHLORIDE 25 MG/1
12.5 TABLET ORAL EVERY 6 HOURS PRN
Status: DISCONTINUED | OUTPATIENT
Start: 2018-08-05 | End: 2018-08-07 | Stop reason: HOSPADM

## 2018-08-05 RX ORDER — INDAPAMIDE 2.5 MG/1
2.5 TABLET, FILM COATED ORAL EVERY MORNING
Status: DISCONTINUED | OUTPATIENT
Start: 2018-08-06 | End: 2018-08-07 | Stop reason: HOSPADM

## 2018-08-05 RX ORDER — BISACODYL 10 MG
10 SUPPOSITORY, RECTAL RECTAL DAILY PRN
Status: DISCONTINUED | OUTPATIENT
Start: 2018-08-05 | End: 2018-08-07 | Stop reason: HOSPADM

## 2018-08-05 RX ADMIN — SODIUM CHLORIDE 100 ML/HR: 9 INJECTION, SOLUTION INTRAVENOUS at 21:38

## 2018-08-05 RX ADMIN — ATORVASTATIN CALCIUM 20 MG: 20 TABLET, FILM COATED ORAL at 21:34

## 2018-08-05 RX ADMIN — DOCUSATE SODIUM -SENNOSIDES 2 TABLET: 50; 8.6 TABLET, COATED ORAL at 21:35

## 2018-08-05 RX ADMIN — METOPROLOL TARTRATE 25 MG: 25 TABLET ORAL at 15:46

## 2018-08-05 RX ADMIN — METOPROLOL TARTRATE 25 MG: 25 TABLET ORAL at 21:34

## 2018-08-05 RX ADMIN — POLYETHYLENE GLYCOL 3350 17 G: 17 POWDER, FOR SOLUTION ORAL at 15:52

## 2018-08-05 RX ADMIN — ASPIRIN 81 MG: 81 TABLET, DELAYED RELEASE ORAL at 15:46

## 2018-08-05 RX ADMIN — LOSARTAN POTASSIUM 100 MG: 100 TABLET, FILM COATED ORAL at 15:46

## 2018-08-05 RX ADMIN — AMLODIPINE BESYLATE 10 MG: 10 TABLET ORAL at 15:46

## 2018-08-05 NOTE — PROGRESS NOTES
Discharge Planning Assessment  [facility]     Patient Name: Gita Wharton  MRN: [id number]  Today's Date: 8/5/2018    Admit Date: 8/5/2018          Discharge Needs Assessment     Row Name 08/05/18 1042       Living Environment    Lives With alone    Current Living Arrangements home/apartment/condo    Primary Care Provided by self    Provides Primary Care For no one    Family Caregiver if Needed child(elena), adult    Quality of Family Relationships involved;supportive    Able to Return to Prior Arrangements yes       Resource/Environmental Concerns    Resource/Environmental Concerns none    Transportation Concerns car, none       Transition Planning    Patient/Family Anticipates Transition to home with help/services    Patient/Family Anticipated Services at Transition home health care   caretenders    Transportation Anticipated car, drives self       Discharge Needs Assessment    Readmission Within the Last 30 Days current reason for admission unrelated to previous admission    Concerns to be Addressed no discharge needs identified    Equipment Needed After Discharge cane, straight;walker, standard            Discharge Plan    No documentation.       Destination     No service coordination in this encounter.      Durable Medical Equipment     No service coordination in this encounter.      Dialysis/Infusion     No service coordination in this encounter.      Home Medical Care     No service coordination in this encounter.      Social Care     No service coordination in this encounter.                Demographic Summary    No documentation.           Functional Status    No documentation.           Psychosocial    No documentation.           Abuse/Neglect    No documentation.           Legal    No documentation.           Substance Abuse    No documentation.           Patient Forms    No documentation.         Paloma Le RN

## 2018-08-05 NOTE — H&P
"    Name: Gita Wharton ADMIT: 2018   : 1935  PCP: Alexis Wiggins MD    MRN: [id number] LOS: 0 days   AGE/SEX: 82 y.o. female  ROOM: 80/     Chief Complaint   Patient presents with   • Rectal Bleeding     rectal bleeding since yesterday s/p carotid endarterectomy 10 days ago       Subjective   Patient is a 82 y.o. female who presents to [facility] with the above chief complaint.  She is well known to me from previous admission just 1 week ago.  She was discharged to rehab and was doing well the first few days but on wed or Thursday she started to \"wear down\".  Increased fatigue and lethargic at times.  She had become constipated and been straining to have BM's on Thursday and Friday AM.  After her BM on Friday AM she noticed some BRB on the toilet paper and in the toilet.  The nurse at the rehab unit evaluated her and felt like there wasn't much to worry about and reassured them.  She was then DC'd home.  She continued to have scant bleeding throughout the day sat and again this AM.  Given the bleeding and her progressive weakness and fatigue she came back to the ER.        History of Present Illness    Past Medical History:   Diagnosis Date   • DVT (deep venous thrombosis) (CMS/HCC)    • Hyperlipidemia    • Hypertension    • Stroke (CMS/HCC)      Past Surgical History:   Procedure Laterality Date   • CAROTID ENDARTERECTOMY Right 2018    Procedure: RT CAROTID ENDARTERECTOMY;  Surgeon: Inna Villarreal MD;  Location: [facility];  Service: Vascular   • HIP SURGERY     • HYSTERECTOMY     • JOINT REPLACEMENT     • THYROIDECTOMY       History reviewed. No pertinent family history.  Social History   Substance Use Topics   • Smoking status: Former Smoker   • Smokeless tobacco: Not on file      Comment: quit    • Alcohol use Yes     Prescriptions Prior to Admission   Medication Sig Dispense Refill Last Dose   • amLODIPine (NORVASC) 10 MG tablet Take 1 tablet by mouth " Daily. 90 tablet 3 Taking   • aspirin 81 MG EC tablet Take 1 tablet by mouth Daily.      • atorvastatin (LIPITOR) 20 MG tablet Take 1 tablet by mouth Daily. (Patient taking differently: Take 20 mg by mouth Every Night.) 90 tablet 3 Taking   • azelastine (ASTELIN) 0.1 % nasal spray 2 sprays into each nostril 2 (Two) Times a Day. Use in each nostril as directed (Patient taking differently: 2 sprays into each nostril 2 (Two) Times a Day As Needed. Use in each nostril as directed) 1 each 12 Taking   • Cyanocobalamin (B-12) 1000 MCG/ML kit Inject  as directed 1 (One) Time Per Week. Wednesday      • ferrous sulfate 325 (65 FE) MG tablet Take 325 mg by mouth Daily With Breakfast.      • indapamide (LOZOL) 2.5 MG tablet Take 1 tablet by mouth Every Morning. 30 tablet 5 Taking   • losartan (COZAAR) 100 MG tablet Take 1 tablet by mouth Daily. 30 tablet 5 Taking   • metoprolol tartrate (LOPRESSOR) 25 MG tablet Take 1 tablet by mouth Every 12 (Twelve) Hours. 180 tablet 3 7/26/2018 at Unknown time   • Multiple Vitamins-Minerals (CENTRUM ADULTS) tablet Take 1 tablet by mouth Daily.   Taking   • [START ON 8/8/2018] clopidogrel (PLAVIX) 75 MG tablet Take 75 mg by mouth Daily. Due to start 8/8/18      • HYDROcodone-acetaminophen (NORCO) 5-325 MG per tablet Take 1 tablet by mouth Every 4 (Four) Hours As Needed for Moderate Pain  for up to 8 days. 15 tablet 0    • Omega-3 Fatty Acids (FISH OIL) 1000 MG capsule capsule Take 1,000 mg by mouth Daily With Breakfast.   Taking     Allergies:  Tekturna [aliskiren]    Review of Systems   Constitutional: Positive for fatigue. Negative for chills and fever.   HENT: Negative for congestion, nosebleeds and sore throat.    Eyes: Negative for photophobia, redness and visual disturbance.   Respiratory: Negative for cough, chest tightness and shortness of breath.    Gastrointestinal: Positive for anal bleeding. Negative for abdominal distention, abdominal pain, diarrhea, nausea and rectal pain.    Endocrine: Negative for polydipsia, polyphagia and polyuria.   Genitourinary: Negative for difficulty urinating, dysuria and hematuria.   Musculoskeletal: Negative for back pain, myalgias and neck stiffness.   Skin: Negative for pallor and rash.   Allergic/Immunologic: Negative for environmental allergies and immunocompromised state.   Neurological: Negative for dizziness, weakness and headaches.   Hematological: Negative for adenopathy. Does not bruise/bleed easily.   Psychiatric/Behavioral: Negative for agitation, behavioral problems and confusion.        Objective    Vital Signs  Temp:  [98.1 °F (36.7 °C)-98.2 °F (36.8 °C)] 98.2 °F (36.8 °C)  Heart Rate:  [67-83] 72  Resp:  [16] 16  BP: (162-184)/(59-64) 162/62  SpO2:  [99 %-100 %] 99 %  on   ;   Device (Oxygen Therapy): room air  Body mass index is 23.91 kg/m².    Physical Exam   Constitutional: She is oriented to person, place, and time. She appears well-developed and well-nourished.   HENT:   Left lower lip lesion   Eyes: Conjunctivae and EOM are normal.   Cardiovascular: Normal rate and regular rhythm.    Murmur heard.  Pulmonary/Chest: Effort normal and breath sounds normal.   Abdominal: Soft. Bowel sounds are normal.   Neurological: She is alert and oriented to person, place, and time. No cranial nerve deficit.   Skin: Skin is warm and dry. Capillary refill takes less than 2 seconds.   Psychiatric: She has a normal mood and affect. Her behavior is normal.   Vitals reviewed.      Results Review:   I reviewed the patient's new clinical results.    Results from last 7 days  Lab Units 08/05/18  0852   WBC 10*3/mm3 9.96   HEMOGLOBIN g/dL 11.8*   PLATELETS 10*3/mm3 237     Results from last 7 days  Lab Units 08/05/18  0852   SODIUM mmol/L 131*   POTASSIUM mmol/L 3.9   CHLORIDE mmol/L 91*   CO2 mmol/L 24.7   BUN mg/dL 13   CREATININE mg/dL 1.10*   GLUCOSE mg/dL 117*   ALBUMIN g/dL 4.00   BILIRUBIN mg/dL 0.4   ALK PHOS U/L 92   AST (SGOT) U/L 10   ALT (SGPT) U/L  9   Estimated Creatinine Clearance: 38.8 mL/min (A) (by C-G formula based on SCr of 1.1 mg/dL (H)).  Results from last 7 days  Lab Units 08/05/18  0852   INR  1.02         Invalid input(s): LDLCALC    No orders to display     Assessment/Plan     Active Problems:    Essential hypertension    Hematochezia    Hemorrhoids    Anemia    Hyponatremia    Weakness      Assessment & Plan  I think this is likely a hemorrhoidal bleed.  The fact that it is painless rectal bleeding with scant amounts and no significant drop in the H&H over 7 days is very reassuring.   Will follow up H&H this evening and start her on a bowel regiment.  Gi has been consulted as well last c-scope was 6 years ago with only one polyp done by Dr Waite.  Will request records.  Im not really sure how to explain her decline over the middle to end of last week.  Her sodium is down and she was constipated both of which could have set her back some.  Will assess her volume status further and hydrate if fluid are supported by findings.  The area on her lip looks like she may have developed a cold sore will treat supportively    I discussed the patients findings and my recommendations with patient, family and nursing staff.          Kolton Larkin MD  [facility]  08/05/18  1:56 PM

## 2018-08-05 NOTE — PLAN OF CARE
Problem: Fall Risk (Adult)  Goal: Identify Related Risk Factors and Signs and Symptoms  Outcome: Outcome(s) achieved Date Met: 08/05/18 08/05/18 1532   Fall Risk (Adult)   Related Risk Factors (Fall Risk) slippery/uneven surfaces;environment unfamiliar;gait/mobility problems;history of falls   Signs and Symptoms (Fall Risk) presence of risk factors

## 2018-08-05 NOTE — PLAN OF CARE
Problem: Fall Risk (Adult)  Goal: Absence of Fall  Outcome: Ongoing (interventions implemented as appropriate)      Problem: Patient Care Overview  Goal: Plan of Care Review  Outcome: Ongoing (interventions implemented as appropriate)   08/05/18 8236   Coping/Psychosocial   Plan of Care Reviewed With patient;family   Plan of Care Review   Progress no change   OTHER   Outcome Summary Pt admitted to [facility] with rectal bleed. Labs ordered, GI consulted, urine sent. Pt has no c/o pain or nausea. Since her arrival, she has urinated several times and has had one bloody stool. Family at [facility]. Will monitor.

## 2018-08-05 NOTE — ED NOTES
Request to [facility] lab results made, pt states she was told yesterday prior to discharge she was anemic and would need to take Iron, unsure of what hgb was.     Russ Ordaz RN  08/05/18 3367

## 2018-08-05 NOTE — CONSULTS
[facility], [place], operated by [facility]  Initial Inpatient Consult Note    Referring Provider: TONO    Reason for Consultation: Rectal bleeding    Subjective     History of present illness:    82 y.o. female , not previously known to our service, that we are asked to see for rectal bleeding that began yesterday.  The patient was recently hospitalized for a carotid endarterectomy.  She was discharged and*home for rehabilitation.  Yesterday she was actually planning on going home but had a bowel movement and passed bright red blood with her bowel movement.  The blood was on the floor and mixed with the stool in the toilet.  She does report that about 2 days prior she had had to strain significantly to pass a bowel movement.  She is otherwise not felt constipated.  She's had regular bowel movements.    She was previously on Plavix but is been off of this since July 12 when it was discontinued after a fall where she hit her head.  She has a history of polyps and her last colonoscopy was 6 years ago, performed by Dr. Solitario Waite.  She does report that she had a fairly significant GI bleed many years ago when she was put on warfarin.  She was hospitalized in the intensive care unit and underwent both EGD and colonoscopy as well as outpatient capsule study.  The source of the bleeding was never well-defined.    She has been feeling tired and run down.  She's had a minor decrease in her hemoglobin over the past week.  Hemoglobin is 11.8 this morning and was 12.9 on July 25.  However on recheck it is 12.5.    She denies abdominal pain or nausea.  She's had no recent fevers or chills.    Past Medical History:  Past Medical History:   Diagnosis Date   • DVT (deep venous thrombosis) (CMS/HCC)    • Hyperlipidemia    • Hypertension    • Stroke (CMS/HCC)      Past Surgical History:  Past Surgical History:   Procedure Laterality Date   • CAROTID ENDARTERECTOMY Right 7/26/2018    Procedure: RT CAROTID ENDARTERECTOMY;  Surgeon: Inna  ELISA Villarreal MD;  Location: [facility] MAIN OR;  Service: Vascular   • HIP SURGERY     • HYSTERECTOMY     • JOINT REPLACEMENT     • THYROIDECTOMY        Social History:   Social History   Substance Use Topics   • Smoking status: Former Smoker   • Smokeless tobacco: Not on file      Comment: quit 2001   • Alcohol use Yes      Family History:  History reviewed. No pertinent family history.    Home Meds:  Prescriptions Prior to Admission   Medication Sig Dispense Refill Last Dose   • amLODIPine (NORVASC) 10 MG tablet Take 1 tablet by mouth Daily. 90 tablet 3 Taking   • aspirin 81 MG EC tablet Take 1 tablet by mouth Daily.      • atorvastatin (LIPITOR) 20 MG tablet Take 1 tablet by mouth Daily. (Patient taking differently: Take 20 mg by mouth Every Night.) 90 tablet 3 Taking   • azelastine (ASTELIN) 0.1 % nasal spray 2 sprays into each nostril 2 (Two) Times a Day. Use in each nostril as directed (Patient taking differently: 2 sprays into each nostril 2 (Two) Times a Day As Needed. Use in each nostril as directed) 1 each 12 Taking   • Cyanocobalamin (B-12) 1000 MCG/ML kit Inject  as directed 1 (One) Time Per Week. Wednesday      • ferrous sulfate 325 (65 FE) MG tablet Take 325 mg by mouth Daily With Breakfast.      • indapamide (LOZOL) 2.5 MG tablet Take 1 tablet by mouth Every Morning. 30 tablet 5 Taking   • losartan (COZAAR) 100 MG tablet Take 1 tablet by mouth Daily. 30 tablet 5 Taking   • metoprolol tartrate (LOPRESSOR) 25 MG tablet Take 1 tablet by mouth Every 12 (Twelve) Hours. 180 tablet 3 7/26/2018 at Unknown time   • Multiple Vitamins-Minerals (CENTRUM ADULTS) tablet Take 1 tablet by mouth Daily.   Taking   • [START ON 8/8/2018] clopidogrel (PLAVIX) 75 MG tablet Take 75 mg by mouth Daily. Due to start 8/8/18      • HYDROcodone-acetaminophen (NORCO) 5-325 MG per tablet Take 1 tablet by mouth Every 4 (Four) Hours As Needed for Moderate Pain  for up to 8 days. 15 tablet 0    • Omega-3 Fatty Acids (FISH OIL) 1000 MG  capsule capsule Take 1,000 mg by mouth Daily With Breakfast.   Taking     Current Meds:     amLODIPine 10 mg Oral Daily   aspirin 81 mg Oral Daily   atorvastatin 20 mg Oral Daily   [START ON 8/6/2018] indapamide 2.5 mg Oral QAM   losartan 100 mg Oral Daily   metoprolol tartrate 25 mg Oral Q12H   polyethylene glycol 17 g Oral Daily   sennosides-docusate sodium 2 tablet Oral Nightly     Allergies:  Allergies   Allergen Reactions   • Tekturna [Aliskiren] Diarrhea     Review of Systems  Pertinent items are noted in HPI, all other systems reviewed and negative     Objective     Vital Signs  Temp:  [97.7 °F (36.5 °C)-98.2 °F (36.8 °C)] 97.7 °F (36.5 °C)  Heart Rate:  [59-83] 59  Resp:  [16] 16  BP: (143-184)/(59-64) 143/59  Physical Exam:  General Appearance:    Alert, cooperative, in no acute distress   Head:    Normocephalic, without obvious abnormality, atraumatic   Eyes:            Lids and lashes normal, conjunctivae and sclerae normal, no   icterus   Throat:   No oral lesions, no thrush, oral mucosa moist   Neck:   Healing incision right neck    Lungs:     Clear to auscultation,respirations regular, even and                   unlabored    Heart:    Regular rhythm and rate, systolic ejection murmur    Chest Wall:    No abnormalities observed   Abdomen:     Normal bowel sounds, no masses, no organomegaly, soft        non-tender, non-distended, no guarding, no rebound                 tenderness   Rectal:     Deferred   Extremities:   no edema, no cyanosis, no redness   Skin: Bruising noted over right temporal area        Psychiatric:  Judgement and insight: normal   Orientation to person place and time: normal   Mood and affect: normal   Results Review:   I reviewed the patient's new clinical results.      Results from last 7 days  Lab Units 08/05/18  1638 08/05/18  0852   WBC 10*3/mm3  --  9.96   HEMOGLOBIN g/dL 12.5 11.8*   HEMATOCRIT % 38.6 36.1   PLATELETS 10*3/mm3  --  237       Results from last 7 days  Lab Units  08/05/18  0852   SODIUM mmol/L 131*   POTASSIUM mmol/L 3.9   CHLORIDE mmol/L 91*   CO2 mmol/L 24.7   BUN mg/dL 13   CREATININE mg/dL 1.10*   CALCIUM mg/dL 9.5   BILIRUBIN mg/dL 0.4   ALK PHOS U/L 92   ALT (SGPT) U/L 9   AST (SGOT) U/L 10   GLUCOSE mg/dL 117*       Results from last 7 days  Lab Units 08/05/18  0852   INR  1.02     No results found for: LIPASE    Radiology:  No orders to display       Assessment/Plan   Patient Active Problem List   Diagnosis   • Pneumonia of right lower lobe due to infectious organism (CMS/HCC)   • Essential hypertension   • Mixed hyperlipidemia   • Acute allergic rhinitis due to pollen   • recent traumatic SDH no LOC   • Fall (on) (from) other stairs and steps, initial encounter   • Laceration of forehead   • Left sided numbness   • Left-sided weakness   • Difficulty with speech   • Lung nodule   • History of stroke   • Acute right MCA stroke (CMS/HCC)   • Abnormal CT scan, lung   • Stenosis of right internal carotid artery   • Hematochezia   • Hemorrhoids   • Anemia   • Hyponatremia   • Weakness     Assessment-  1.  Rectal bleeding  2.  Anemia  3.  Recent carotid endarterectomy-plans to resume Plavix on 8/8  4.  History of polyps    Plan-  I agree with Dr. Larkin that this is likely a hemorrhoidal bleed.  We discussed options of treating this conservatively and doing a colonoscopy once she is adequately recovered from recent events versus doing an inpatient colonoscopy.  Patient is certain that she wants to have an inpatient colonoscopy and figure this out so that she can move on.  She has eaten today so we will prep her tomorrow and do a colonoscopy on Tuesday.  In the interim we will watch her hemoglobin.    I discussed the patients findings and my recommendations with patient and family.    Chandni Mejía MD

## 2018-08-05 NOTE — ED PROVIDER NOTES
EMERGENCY DEPARTMENT ENCOUNTER    Room Number:  26/26  Date seen:  8/5/2018  Time seen: 9:02 AM  PCP: Alexis Wiggins MD  Historian: Pt      HPI:  Chief Complaint: Rectal Bleeding  A complete HPI/ROS/PMH/PSH/SH/FH are unobtainable due to: n/a  Context: Gita Wharton is a 82 y.o. female who presents to the ED c/o recurrent bright red rectal bleeding that began yesterday morning. Pt reports seeing blood in toilet after BM. Pt c/o weakness. Pt denies abd pain, CP, SOA, h/o liver disease. Pt denies regular alcohol use. Pt is on ASA. Pt was h/o recent CVA with no residual deficits. Pt had carotid endarterectomy     Quality: bright red rectal bleeding  Intensity/Severity: moderate  Duration: since yesterday morning  Timing: recurrent  Progression: worsening  Associated Symptoms: weakness    PAST MEDICAL HISTORY  Active Ambulatory Problems     Diagnosis Date Noted   • Pneumonia of right lower lobe due to infectious organism (CMS/HCC) 12/30/2016   • Essential hypertension 01/18/2017   • Mixed hyperlipidemia 09/07/2017   • Acute allergic rhinitis due to pollen 04/10/2018   • recent traumatic SDH no LOC 07/12/2018   • Fall (on) (from) other stairs and steps, initial encounter 07/12/2018   • Laceration of forehead 07/12/2018   • Left sided numbness 07/24/2018   • Left-sided weakness 07/24/2018   • Difficulty with speech 07/24/2018   • Lung nodule 07/24/2018   • History of stroke 07/24/2018   • Acute right MCA stroke (CMS/HCC) 07/24/2018   • Abnormal CT scan, lung 07/25/2018   • Stenosis of right internal carotid artery 07/25/2018     Resolved Ambulatory Problems     Diagnosis Date Noted   • No Resolved Ambulatory Problems     Past Medical History:   Diagnosis Date   • DVT (deep venous thrombosis) (CMS/HCC)    • Hyperlipidemia    • Hypertension    • Stroke (CMS/HCC)          PAST SURGICAL HISTORY  Past Surgical History:   Procedure Laterality Date   • CAROTID ENDARTERECTOMY Right 7/26/2018    Procedure: RT CAROTID  ENDARTERECTOMY;  Surgeon: Inna Villarreal MD;  Location: [facility] OR;  Service: Vascular   • HIP SURGERY     • HYSTERECTOMY     • JOINT REPLACEMENT     • THYROIDECTOMY           FAMILY HISTORY  History reviewed. No pertinent family history.      SOCIAL HISTORY  Social History     Social History   • Marital status:      Spouse name: N/A   • Number of children: N/A   • Years of education: N/A     Occupational History   • Not on file.     Social History Main Topics   • Smoking status: Former Smoker   • Smokeless tobacco: Not on file      Comment: quit 2001   • Alcohol use Yes   • Drug use: No   • Sexual activity: Defer     Other Topics Concern   • Not on file     Social History Narrative   • No narrative on file         ALLERGIES  Tekturna [aliskiren]        REVIEW OF SYSTEMS  Review of Systems   Constitutional: Negative for fever.   HENT: Negative for sore throat.    Respiratory: Negative for shortness of breath.    Cardiovascular: Negative for chest pain.   Gastrointestinal: Negative for abdominal pain.        Bright red rectal bleeding   Endocrine: Negative for polyuria.   Genitourinary: Negative for dysuria.   Musculoskeletal: Negative for neck pain.   Skin: Negative for rash.   Neurological: Positive for weakness. Negative for headaches.   All other systems reviewed and are negative.           PHYSICAL EXAM  ED Triage Vitals   Temp Heart Rate Resp BP SpO2   08/05/18 0835 08/05/18 0835 08/05/18 0835 08/05/18 0847 08/05/18 0835   98.1 °F (36.7 °C) 83 16 (!) 184/64 100 %      Temp src Heart Rate Source Patient Position BP Location FiO2 (%)   08/05/18 0835 -- -- -- --   Tympanic             GENERAL: not distressed  HENT: nares patent  EYES: no scleral icterus, conjunctiva pallor  CV: regular rhythm, regular rate, systolic murmur at heart base  RESPIRATORY: normal effort, CTAB  ABDOMEN/GI: soft, nontender, red blood at anus, Hemoccult positive, hemorrhoids  MUSCULOSKELETAL: no deformity  NEURO: alert,  KATHIA GARCIA  SKIN: warm, dry, well healing carotid incision to R neck    Vital signs and nursing notes reviewed.          LAB RESULTS  Recent Results (from the past 24 hour(s))   Light Blue Top    Collection Time: 08/05/18  8:52 AM   Result Value Ref Range    Extra Tube hold for add-on    Green Top (Gel)    Collection Time: 08/05/18  8:52 AM   Result Value Ref Range    Extra Tube Hold for add-ons.    Lavender Top    Collection Time: 08/05/18  8:52 AM   Result Value Ref Range    Extra Tube hold for add-on    Gold Top - SST    Collection Time: 08/05/18  8:52 AM   Result Value Ref Range    Extra Tube Hold for add-ons.    Comprehensive Metabolic Panel    Collection Time: 08/05/18  8:52 AM   Result Value Ref Range    Glucose 117 (H) 65 - 99 mg/dL    BUN 13 8 - 23 mg/dL    Creatinine 1.10 (H) 0.57 - 1.00 mg/dL    Sodium 131 (L) 136 - 145 mmol/L    Potassium 3.9 3.5 - 5.2 mmol/L    Chloride 91 (L) 98 - 107 mmol/L    CO2 24.7 22.0 - 29.0 mmol/L    Calcium 9.5 8.6 - 10.5 mg/dL    Total Protein 7.4 6.0 - 8.5 g/dL    Albumin 4.00 3.50 - 5.20 g/dL    ALT (SGPT) 9 1 - 33 U/L    AST (SGOT) 10 1 - 32 U/L    Alkaline Phosphatase 92 39 - 117 U/L    Total Bilirubin 0.4 0.1 - 1.2 mg/dL    eGFR Non African Amer 48 (L) >60 mL/min/1.73    Globulin 3.4 gm/dL    A/G Ratio 1.2 g/dL    BUN/Creatinine Ratio 11.8 7.0 - 25.0    Anion Gap 15.3 mmol/L   Protime-INR    Collection Time: 08/05/18  8:52 AM   Result Value Ref Range    Protime 13.2 11.7 - 14.2 Seconds    INR 1.02 0.90 - 1.10   CBC Auto Differential    Collection Time: 08/05/18  8:52 AM   Result Value Ref Range    WBC 9.96 4.50 - 10.70 10*3/mm3    RBC 4.35 3.90 - 5.20 10*6/mm3    Hemoglobin 11.8 (L) 11.9 - 15.5 g/dL    Hematocrit 36.1 35.6 - 45.5 %    MCV 83.0 80.5 - 98.2 fL    MCH 27.1 26.9 - 32.0 pg    MCHC 32.7 32.4 - 36.3 g/dL    RDW 13.3 (H) 11.7 - 13.0 %    RDW-SD 40.7 37.0 - 54.0 fl    MPV 12.1 (H) 6.0 - 12.0 fL    Platelets 237 140 - 500 10*3/mm3    Neutrophil % 76.4 (H) 42.7 - 76.0  %    Lymphocyte % 12.3 (L) 19.6 - 45.3 %    Monocyte % 10.6 5.0 - 12.0 %    Eosinophil % 0.6 0.3 - 6.2 %    Basophil % 0.1 0.0 - 1.5 %    Immature Grans % 0.5 0.0 - 0.5 %    Neutrophils, Absolute 7.60 1.90 - 8.10 10*3/mm3    Lymphocytes, Absolute 1.23 0.90 - 4.80 10*3/mm3    Monocytes, Absolute 1.06 0.20 - 1.20 10*3/mm3    Eosinophils, Absolute 0.06 0.00 - 0.70 10*3/mm3    Basophils, Absolute 0.01 0.00 - 0.20 10*3/mm3    Immature Grans, Absolute 0.05 (H) 0.00 - 0.03 10*3/mm3   Type & Screen    Collection Time: 08/05/18  9:11 AM   Result Value Ref Range    ABO Type O     RH type Positive     Antibody Screen Negative     T&S Expiration Date 8/8/2018 11:59:59 PM        Ordered the above labs and reviewed the results.          PROCEDURES  Procedures        EKG:           EKG time: 8:48 am  Rhythm/Rate: sinus 76  P waves and AR: normal  QRS, axis: normal   ST and T waves: TWI in VII     Interpreted Contemporaneously by me, independently viewed  unchanged compared to prior 7/26/18              MEDICATIONS GIVEN IN ER  Medications   sodium chloride 0.9 % flush 10 mL (not administered)                   PROGRESS AND CONSULTS   9:02 AM  Labs ordered for further evaluation.     9:29 AM  2nd IV ordered.     10:14 AM  Rechecked pt who is resting in NAD. Informed pt of lab results. Discussed plan to admit. Pt understands and agrees with the plan, all questions answered. Consult placed to [facility].    10:21 AM  Discussed pt case with Dr. Larkin, [facility], who agrees to admit.       MEDICAL DECISION MAKING      MDM  Number of Diagnoses or Management Options  Fatigue, unspecified type:   Hematochezia:   Diagnosis management comments: Pt with hematochezia. Hgb is stable and technically higher than last hemoglobin but this is not a reliable test to detect acute bleeding as patients bleed whole blood. She gives a reliable history of repeated episodes of bright red blood. Given her age, objectively confirmed rectal bleeding, I believe that she  warrants admission to ensure clinical stability.        Amount and/or Complexity of Data Reviewed  Clinical lab tests: reviewed and ordered (Hemoglobin-11.8  INR-1.02)  Tests in the medicine section of CPT®: reviewed and ordered (See EKG note.)  Decide to obtain previous medical records or to obtain history from someone other than the patient: yes  Review and summarize past medical records: yes  Discuss the patient with other providers: yes ( Dr. Larkin, [facility])               DIAGNOSIS  Final diagnoses:   Hematochezia   Fatigue, unspecified type         DISPOSITION  ADMISSION    Discussed treatment plan and reason for admission with pt/family and admitting physician.  Pt/family voiced understanding of the plan for admission for further testing/treatment as needed.         Latest Documented Vital Signs:  As of 10:32 AM  BP- (!) 184/64 HR- 69 Temp- 98.1 °F (36.7 °C) (Tympanic) O2 sat- 100%        --  Documentation assistance provided by willie Barragan for Dr. Shine MD.  Information recorded by the scribe was done at my direction and has been verified and validated by me.     Sailaja Barragan  08/05/18 1033       Farrukh Riojas II, MD  08/06/18 1059

## 2018-08-06 LAB
ANION GAP SERPL CALCULATED.3IONS-SCNC: 15 MMOL/L
BASOPHILS # BLD AUTO: 0 10*3/MM3 (ref 0–0.2)
BASOPHILS NFR BLD AUTO: 0 % (ref 0–1.5)
BUN BLD-MCNC: 13 MG/DL (ref 8–23)
BUN/CREAT SERPL: 11.3 (ref 7–25)
CALCIUM SPEC-SCNC: 8.6 MG/DL (ref 8.6–10.5)
CHLORIDE SERPL-SCNC: 95 MMOL/L (ref 98–107)
CO2 SERPL-SCNC: 23 MMOL/L (ref 22–29)
CREAT BLD-MCNC: 1.15 MG/DL (ref 0.57–1)
CREAT UR-MCNC: 22.6 MG/DL
DEPRECATED RDW RBC AUTO: 43.1 FL (ref 37–54)
EOSINOPHIL # BLD AUTO: 0.08 10*3/MM3 (ref 0–0.7)
EOSINOPHIL NFR BLD AUTO: 0.7 % (ref 0.3–6.2)
EOSINOPHIL SPEC QL MICRO: 0 % EOS/100 CELLS (ref 0–0)
ERYTHROCYTE [DISTWIDTH] IN BLOOD BY AUTOMATED COUNT: 13.6 % (ref 11.7–13)
GFR SERPL CREATININE-BSD FRML MDRD: 45 ML/MIN/1.73
GLUCOSE BLD-MCNC: 99 MG/DL (ref 65–99)
HCT VFR BLD AUTO: 35.5 % (ref 35.6–45.5)
HGB BLD-MCNC: 11 G/DL (ref 11.9–15.5)
IMM GRANULOCYTES # BLD: 0.03 10*3/MM3 (ref 0–0.03)
IMM GRANULOCYTES NFR BLD: 0.3 % (ref 0–0.5)
LYMPHOCYTES # BLD AUTO: 1.82 10*3/MM3 (ref 0.9–4.8)
LYMPHOCYTES NFR BLD AUTO: 16.1 % (ref 19.6–45.3)
MAGNESIUM SERPL-MCNC: 2 MG/DL (ref 1.6–2.4)
MCH RBC QN AUTO: 26.8 PG (ref 26.9–32)
MCHC RBC AUTO-ENTMCNC: 31 G/DL (ref 32.4–36.3)
MCV RBC AUTO: 86.4 FL (ref 80.5–98.2)
MONOCYTES # BLD AUTO: 1.12 10*3/MM3 (ref 0.2–1.2)
MONOCYTES NFR BLD AUTO: 9.9 % (ref 5–12)
NEUTROPHILS # BLD AUTO: 8.23 10*3/MM3 (ref 1.9–8.1)
NEUTROPHILS NFR BLD AUTO: 73 % (ref 42.7–76)
OSMOLALITY UR: 172 MOSM/KG
PHOSPHATE SERPL-MCNC: 4.5 MG/DL (ref 2.5–4.5)
PLATELET # BLD AUTO: 254 10*3/MM3 (ref 140–500)
PMV BLD AUTO: 12.5 FL (ref 6–12)
POTASSIUM BLD-SCNC: 4.3 MMOL/L (ref 3.5–5.2)
RBC # BLD AUTO: 4.11 10*6/MM3 (ref 3.9–5.2)
SODIUM BLD-SCNC: 133 MMOL/L (ref 136–145)
SODIUM UR-SCNC: 60 MMOL/L
URATE SERPL-MCNC: 8 MG/DL (ref 2.4–5.7)
UUN 24H UR-MCNC: 122 MG/DL
WBC NRBC COR # BLD: 11.28 10*3/MM3 (ref 4.5–10.7)

## 2018-08-06 PROCEDURE — G0378 HOSPITAL OBSERVATION PER HR: HCPCS

## 2018-08-06 PROCEDURE — 97162 PT EVAL MOD COMPLEX 30 MIN: CPT

## 2018-08-06 PROCEDURE — 84100 ASSAY OF PHOSPHORUS: CPT | Performed by: HOSPITALIST

## 2018-08-06 PROCEDURE — 99214 OFFICE O/P EST MOD 30 MIN: CPT | Performed by: INTERNAL MEDICINE

## 2018-08-06 PROCEDURE — G8978 MOBILITY CURRENT STATUS: HCPCS

## 2018-08-06 PROCEDURE — 80048 BASIC METABOLIC PNL TOTAL CA: CPT | Performed by: HOSPITALIST

## 2018-08-06 PROCEDURE — 84300 ASSAY OF URINE SODIUM: CPT | Performed by: HOSPITALIST

## 2018-08-06 PROCEDURE — 87205 SMEAR GRAM STAIN: CPT | Performed by: HOSPITALIST

## 2018-08-06 PROCEDURE — 85025 COMPLETE CBC W/AUTO DIFF WBC: CPT | Performed by: INTERNAL MEDICINE

## 2018-08-06 PROCEDURE — 84540 ASSAY OF URINE/UREA-N: CPT | Performed by: HOSPITALIST

## 2018-08-06 PROCEDURE — 96361 HYDRATE IV INFUSION ADD-ON: CPT

## 2018-08-06 PROCEDURE — 84550 ASSAY OF BLOOD/URIC ACID: CPT | Performed by: HOSPITALIST

## 2018-08-06 PROCEDURE — 83735 ASSAY OF MAGNESIUM: CPT | Performed by: HOSPITALIST

## 2018-08-06 PROCEDURE — 83935 ASSAY OF URINE OSMOLALITY: CPT | Performed by: HOSPITALIST

## 2018-08-06 PROCEDURE — 97110 THERAPEUTIC EXERCISES: CPT

## 2018-08-06 PROCEDURE — G8979 MOBILITY GOAL STATUS: HCPCS

## 2018-08-06 PROCEDURE — 82570 ASSAY OF URINE CREATININE: CPT | Performed by: HOSPITALIST

## 2018-08-06 RX ORDER — POLYETHYLENE GLYCOL 3350, SODIUM CHLORIDE, POTASSIUM CHLORIDE, SODIUM BICARBONATE, AND SODIUM SULFATE 240; 5.84; 2.98; 6.72; 22.72 G/4L; G/4L; G/4L; G/4L; G/4L
4000 POWDER, FOR SOLUTION ORAL ONCE
Status: COMPLETED | OUTPATIENT
Start: 2018-08-06 | End: 2018-08-06

## 2018-08-06 RX ADMIN — ATORVASTATIN CALCIUM 20 MG: 20 TABLET, FILM COATED ORAL at 20:28

## 2018-08-06 RX ADMIN — INDAPAMIDE 2.5 MG: 2.5 TABLET, FILM COATED ORAL at 06:44

## 2018-08-06 RX ADMIN — METOPROLOL TARTRATE 25 MG: 25 TABLET ORAL at 08:37

## 2018-08-06 RX ADMIN — AMLODIPINE BESYLATE 10 MG: 10 TABLET ORAL at 08:37

## 2018-08-06 RX ADMIN — ASPIRIN 81 MG: 81 TABLET, DELAYED RELEASE ORAL at 08:37

## 2018-08-06 RX ADMIN — SODIUM CHLORIDE 100 ML/HR: 9 INJECTION, SOLUTION INTRAVENOUS at 17:02

## 2018-08-06 RX ADMIN — POLYETHYLENE GLYCOL 3350, SODIUM CHLORIDE, POTASSIUM CHLORIDE, SODIUM BICARBONATE, AND SODIUM SULFATE 4000 ML: 240; 5.84; 2.98; 6.72; 22.72 POWDER, FOR SOLUTION ORAL at 15:06

## 2018-08-06 RX ADMIN — BISACODYL 20 MG: 5 TABLET, COATED ORAL at 16:31

## 2018-08-06 RX ADMIN — DOCUSATE SODIUM -SENNOSIDES 2 TABLET: 50; 8.6 TABLET, COATED ORAL at 20:27

## 2018-08-06 RX ADMIN — LOSARTAN POTASSIUM 100 MG: 100 TABLET, FILM COATED ORAL at 08:37

## 2018-08-06 RX ADMIN — METOPROLOL TARTRATE 25 MG: 25 TABLET ORAL at 20:28

## 2018-08-06 NOTE — PROGRESS NOTES
[facility]  Inpatient Progress Note    Reason for Follow Up:  Hematochezia    Subjective     Interval History:   No further rectal bleeding.  H/H stable    Current Facility-Administered Medications:   •  acetaminophen (TYLENOL) tablet 650 mg, 650 mg, Oral, Q4H PRN, Kolton Larkin MD  •  amLODIPine (NORVASC) tablet 10 mg, 10 mg, Oral, Daily, Kolton Larkin MD, 10 mg at 08/06/18 0837  •  aspirin EC tablet 81 mg, 81 mg, Oral, Daily, Kolton Larkin MD, 81 mg at 08/06/18 0837  •  atorvastatin (LIPITOR) tablet 20 mg, 20 mg, Oral, Daily, Kolton Larkin MD, 20 mg at 08/05/18 2134  •  bisacodyl (DULCOLAX) EC tablet 20 mg, 20 mg, Oral, Once, Chandni Mejía MD  •  bisacodyl (DULCOLAX) EC tablet 5 mg, 5 mg, Oral, Daily PRN, Kolton Larkin MD  •  bisacodyl (DULCOLAX) suppository 10 mg, 10 mg, Rectal, Daily PRN, Kolton Larkin MD  •  HYDROcodone-acetaminophen (NORCO) 5-325 MG per tablet 1 tablet, 1 tablet, Oral, Q4H PRN, Kolton Larkin MD  •  indapamide (LOZOL) tablet 2.5 mg, 2.5 mg, Oral, QAM, Kolton Larkin MD, 2.5 mg at 08/06/18 0644  •  losartan (COZAAR) tablet 100 mg, 100 mg, Oral, Daily, Kolton Larkin MD, 100 mg at 08/06/18 0837  •  metoprolol tartrate (LOPRESSOR) tablet 25 mg, 25 mg, Oral, Q12H, Kolton Larkin MD, 25 mg at 08/06/18 0837  •  polyethylene glycol (MIRALAX) powder 238 g, 238 g, Oral, Once, Chandni Mejía MD  •  polyethylene glycol 3350 powder (packet), 17 g, Oral, Daily, Kolton Larkin MD, 17 g at 08/05/18 1552  •  promethazine (PHENERGAN) tablet 12.5 mg, 12.5 mg, Oral, Q6H PRN **OR** promethazine (PHENERGAN) injection 12.5 mg, 12.5 mg, Intramuscular, Q6H PRN **OR** promethazine (PHENERGAN) injection 12.5 mg, 12.5 mg, Intravenous, Q6H PRN **OR** promethazine (PHENERGAN) suppository 12.5 mg, 12.5 mg, Rectal, Q6H PRN, Kolton Larkin MD  •  sennosides-docusate sodium (SENOKOT-S) 8.6-50 MG tablet 2 tablet, 2  tablet, Oral, Nightly, Kolton Larkin MD, 2 tablet at 08/05/18 2135  •  sodium chloride 0.9 % flush 1-10 mL, 1-10 mL, Intravenous, PRN, Kolton Larkin MD  •  Insert peripheral IV, , , Once **AND** sodium chloride 0.9 % flush 10 mL, 10 mL, Intravenous, PRN, Farrukh Riojas II, MD  •  sodium chloride 0.9 % infusion, 100 mL/hr, Intravenous, Continuous, Kolton Larkin MD, Last Rate: 100 mL/hr at 08/06/18 1345, 100 mL/hr at 08/06/18 1345  Review of Systems:    The following systems were reviewed and negative;  constitution and gastrointestinal    Objective     Vital Signs  Temp:  [97.4 °F (36.3 °C)-97.7 °F (36.5 °C)] 97.7 °F (36.5 °C)  Heart Rate:  [54-77] 54  Resp:  [16] 16  BP: (125-163)/(53-70) 141/53  Body mass index is 21.82 kg/m².    Intake/Output Summary (Last 24 hours) at 08/06/18 1407  Last data filed at 08/06/18 1120   Gross per 24 hour   Intake             2117 ml   Output              950 ml   Net             1167 ml     I/O this shift:  In: 620 [P.O.:620]  Out: 450 [Urine:450]     Physical Exam:   General: patient awake, alert and cooperative   Skin: warm and dry, not jaundiced   Cardiovascular: regular rhythm and rate, no murmurs auscultated   Pulm: clear to auscultation bilaterally, regular and unlabored   Abdomen: soft, nontender, nondistended; normal bowel sounds   Rectal: deferred   Extremities: no rash or edema   Psychiatric: Normal mood and behavior; memory intact     Results Review:     I reviewed the patient's new clinical results.      Results from last 7 days  Lab Units 08/06/18  0331 08/05/18  1638 08/05/18  0852   WBC 10*3/mm3 11.28*  --  10.40  9.96   HEMOGLOBIN g/dL 11.0* 12.5 11.8*  11.8*   HEMATOCRIT % 35.5* 38.6 37.0  36.1   PLATELETS 10*3/mm3 254  --  250  237       Results from last 7 days  Lab Units 08/06/18  0331 08/05/18  1823 08/05/18  0852   SODIUM mmol/L 133* 133* 131*   POTASSIUM mmol/L 4.3 4.2 3.9   CHLORIDE mmol/L 95* 92* 91*   CO2 mmol/L 23.0 26.3 24.7    BUN mg/dL 13 12 13   CREATININE mg/dL 1.15* 1.05* 1.10*   CALCIUM mg/dL 8.6 9.4 9.5   BILIRUBIN mg/dL  --  0.4 0.4   ALK PHOS U/L  --  91 92   ALT (SGPT) U/L  --  9 9   AST (SGOT) U/L  --  11 10   GLUCOSE mg/dL 99 127* 117*       Results from last 7 days  Lab Units 08/05/18  1823 08/05/18  0852   INR  1.05 1.02     No results found for: LIPASE    Assessment/Plan   Assessment:   1.  Rectal bleeding  2.  Anemia  3.  Recent carotid endarterectomy-plans to resume Plavix on 8/8  4.  History of polyps     Plan:   Colonoscopy tomorrow for evaluation of hematochezia  Clear liquid diet  Prep orders in    I discussed the patients findings and my recommendations with patient.         Ken Tidwell M.D.  [facility]  [address] [place] [address]  Office: [phone]

## 2018-08-06 NOTE — PROGRESS NOTES
Discharge Planning Assessment  [facility]     Patient Name: Gita Wharton  MRN: [id number]  Today's Date: 8/6/2018    Admit Date: 8/5/2018          Discharge Needs Assessment    No documentation.             Discharge Plan     Row Name 08/06/18 1434       Plan    Plan Comments Carelamberto was scheduled to start seeing the patient at [facility]. Tesha is following with [facility]. KORINA Gonzalez RN, CCP.        Destination     No service coordination in this encounter.      Durable Medical Equipment     No service coordination in this encounter.      Dialysis/Infusion     No service coordination in this encounter.      Home Medical Care     Service Request Status Selected Specialties Address Phone Number Fax Number    [facility] Pending - Request Sent N/A [address], [address], [place] [address] [phone]      Social Care     No service coordination in this encounter.                Demographic Summary    No documentation.           Functional Status    No documentation.           Psychosocial    No documentation.           Abuse/Neglect    No documentation.           Legal    No documentation.           Substance Abuse    No documentation.           Patient Forms    No documentation.         Haydee Gonzalez RN

## 2018-08-06 NOTE — PLAN OF CARE
Problem: Patient Care Overview  Goal: Plan of Care Review   08/06/18 3056   Coping/Psychosocial   Plan of Care Reviewed With patient   OTHER   Outcome Summary Patient is a pleasant 82 y.o. female who presents with impaired functional mobility and balance following admission from home for recent bleeding. Patient is independent with all mobility as baseline and has access to a RW. Recent hospitalizaton for carotid endarterectomy leading to SNF upon DC- was living at home prior to this admission. Today, all mobility performed with SV-SBA. Slightly unsteady without an AD today with ambulation- recommended patient to use RW upon DC to ensure safety. May benefit from skilled PT services acutely to address deficits as able and improve level of independence prior to returning home.

## 2018-08-06 NOTE — THERAPY EVALUATION
Acute Care - Physical Therapy Initial Evaluation  [facility]     Patient Name: Gita Wharton  : 1935  MRN: [id number]  Today's Date: 2018      Date of Referral to PT: 18  Referring Physician: Dr. Larkin      Admit Date: 2018    Visit Dx:     ICD-10-CM ICD-9-CM   1. Hematochezia K92.1 578.1   2. Fatigue, unspecified type R53.83 780.79   3. Impaired functional mobility, balance, gait, and endurance Z74.09 V49.89     Patient Active Problem List   Diagnosis   • Pneumonia of right lower lobe due to infectious organism (CMS/HCC)   • Essential hypertension   • Mixed hyperlipidemia   • Acute allergic rhinitis due to pollen   • recent traumatic SDH no LOC   • Fall (on) (from) other stairs and steps, initial encounter   • Laceration of forehead   • Left sided numbness   • Left-sided weakness   • Difficulty with speech   • Lung nodule   • History of stroke   • Acute right MCA stroke (CMS/HCC)   • Abnormal CT scan, lung   • Stenosis of right internal carotid artery   • Hematochezia   • Hemorrhoids   • Anemia   • Hyponatremia   • Weakness     Past Medical History:   Diagnosis Date   • DVT (deep venous thrombosis) (CMS/HCC)    • Hyperlipidemia    • Hypertension    • Stroke (CMS/HCC)      Past Surgical History:   Procedure Laterality Date   • CAROTID ENDARTERECTOMY Right 2018    Procedure: RT CAROTID ENDARTERECTOMY;  Surgeon: Inna Villarreal MD;  Location: [facility];  Service: Vascular   • HIP SURGERY     • HYSTERECTOMY     • JOINT REPLACEMENT     • THYROIDECTOMY          PT ASSESSMENT (last 12 hours)      Physical Therapy Evaluation     Row Name 18 1541          PT Evaluation Time/Intention    Subjective Information complains of;fatigue  -MA     Document Type evaluation  -MA     Mode of Treatment physical therapy  -MA     Patient Effort good  -MA     Symptoms Noted During/After Treatment none  -MA     Comment Unsteady with gait  -MA     Row Name 18 1549          General  Information    Patient Profile Reviewed? yes  -MA     Referring Physician Dr. Larkin  -MA     Patient Observations alert;cooperative;agree to therapy  -MA     General Observations of Patient Supine in bed with HOB elevated, no acute distress noted at rest, exit alarm on  -MA     Prior Level of Function independent:;all household mobility   recently DC'd from SNF  -MA     Equipment Currently Used at Home walker, rolling;rollator  -MA     Pertinent History of Current Functional Problem Admission from home for rectal bleeding. Recent hospitalization for carotid endarterectomy leading to going to SNF for continued rehab. Was home PTA.  -MA     Existing Precautions/Restrictions fall   R neck incision  -MA     Limitations/Impairments safety/cognitive  -MA     Risks Reviewed patient:  -MA     Benefits Reviewed patient:  -MA     Barriers to Rehab none identified  -MA     Row Name 08/06/18 1541          Home Main Entrance    Number of Stairs, Main Entrance three  -MA     Row Name 08/06/18 1541          Cognitive Assessment/Intervention- PT/OT    Orientation Status (Cognition) oriented x 4  -MA     Follows Commands (Cognition) WFL  -MA     Safety Deficit (Cognitive) mild deficit;judgment;impulsivity;safety precautions follow-through/compliance  -MA     Personal Safety Interventions fall prevention program maintained;gait belt;nonskid shoes/slippers when out of bed  -MA     Row Name 08/06/18 1541          Safety Issues, Functional Mobility    Safety Issues Affecting Function (Mobility) positioning of assistive device;judgment;insight into deficits/self awareness;sequencing abilities  -MA     Impairments Affecting Function (Mobility) strength;endurance/activity tolerance;balance  -MA     Row Name 08/06/18 1541          Bed Mobility Assessment/Treatment    Bed Mobility Assessment/Treatment supine-sit;sit-supine  -MA     Supine-Sit [place] (Bed Mobility) supervision  -MA     Sit-Supine [place] (Bed Mobility) not  tested  -MA     [facility] Name 08/06/18 1541          Transfer Assessment/Treatment    Transfer Assessment/Treatment sit-stand transfer;stand-sit transfer;toilet transfer  -MA     Sit-Stand [place] (Transfers) stand by assist;contact guard;verbal cues  -MA     Stand-Sit [place] (Transfers) stand by assist;contact guard;verbal cues  -MA     Leslie Level (Toilet Transfer) contact guard;verbal cues;nonverbal cues (demo/gesture)  -MA     Assistive Device (Toilet Transfer) grab bars/safety frame  -MA     Row Name 08/06/18 1541          Toilet Transfer    Type (Toilet Transfer) sit-stand;stand-sit  -MA     [facility] Name 08/06/18 1541          Gait/Stairs Assessment/Training    [place]lie Level (Gait) stand by assist;verbal cues  -MA     Assistive Device (Gait) --   no AD  -MA     Distance in Feet (Gait) 200  -MA     Pattern (Gait) step-to  -MA     Deviations/Abnormal Patterns (Gait) ataxic;eliceo decreased  -MA     Bilateral Gait Deviations forward flexed posture  -MA     Comment (Gait/Stairs) Unsteady with gait at times- encouraged patient to use RW upon DC- tended to keep arms in front of her for balance.  -MA     [facility] Name 08/06/18 1541          General ROM    GENERAL ROM COMMENTS B LE WFL  -[facility] Name 08/06/18 1541          General Assessment (Manual Muscle Testing)    Comment, General Manual Muscle Testing (MMT) Assessment B LEs grossly 4+/5  -MA     [facility] Name 08/06/18 1541          Motor Assessment/Intervention    Additional Documentation Balance (Group)  -[facility] Name 08/06/18 1541          Balance    Balance static sitting balance;static standing balance  -MA     Row Name 08/06/18 1541          Static Sitting Balance    Level of [place] (Unsupported Sitting, Static Balance) supervision  -MA     Sitting Position (Unsupported Sitting, Static Balance) sitting on edge of bed  -MA     Level of [place] (Supported Sitting, Static Balance) supervision  -MA     Sitting Position (Supported Sitting,  Static Balance) sitting on edge of bed  -MA     Row Name 08/06/18 1541          Static Standing Balance    Level of [place] (Supported Standing, Static Balance) standby assist  -MA     Time Able to Maintain Position (Supported Standing, Static Balance) more than 5 minutes  -MA     Row Name 08/06/18 1541          Sensory Assessment/Intervention    Sensory General Assessment no sensation deficits identified  -[facility] Name 08/06/18 1541          Vision Assessment/Intervention    Visual Impairment/Limitations WFL  -[facility] Name 08/06/18 1541          Pain Assessment    Additional Documentation --   No report of pain  -MA     Row Name             Wound 08/06/18 0924 Right neck surgical;incision    Wound - Properties Group Date first assessed: 08/06/18  -MB Time first assessed: 0924  -MB Side: Right  -MB Location: neck  -MB Type: surgical;incision  -MB    Row Name 08/06/18 1541          Plan of Care Review    Plan of Care Reviewed With patient  -[facility] Name 08/06/18 1541          Physical Therapy Clinical Impression    Date of Referral to PT 08/06/18  -MA     PT Diagnosis (PT Clinical Impression) impaired functional mobility and balance  -MA     Patient/Family Goals Statement (PT Clinical Impression) Return home upon DC  -MA     Criteria for Skilled Interventions Met (PT Clinical Impression) yes;treatment indicated  -MA     Pathology/Pathophysiology Noted (Describe Specifically for Each System) musculoskeletal  -MA     Impairments Found (describe specific impairments) aerobic capacity/endurance;ergonomics and body mechanics;gait, locomotion, and balance  -MA     Rehab Potential (PT Clinical Summary) good, to achieve stated therapy goals  -MA     Care Plan Review (PT) patient/other agree to care plan  -[facility] Name 08/06/18 1541          Vital Signs    O2 Delivery Pre Treatment room air  -[facility] Name 08/06/18 1541          Physical Therapy Goals    Transfer Goal Selection (PT) transfer, PT goal 1  -MA      Gait Training Goal Selection (PT) gait training, PT goal 1  -MA     Stairs Goal Selection (PT) stairs, PT goal 1  -MA     Additional Documentation Stairs Goal Selection (PT) (Row)  -MA     Row Name 08/06/18 1541          Transfer Goal 1 (PT)    Activity/Assistive Device (Transfer Goal 1, PT) transfers, all  -MA     [place] Level/Cues Needed (Transfer Goal 1, PT) supervision required  -MA     Time Frame (Transfer Goal 1, PT) 1 week  -MA     Progress/Outcome (Transfer Goal 1, PT) goal ongoing  -MA     Row Name 08/06/18 1541          Gait Training Goal 1 (PT)    Activity/Assistive Device (Gait Training Goal 1, PT) gait (walking locomotion)  -MA     [place] Level (Gait Training Goal 1, PT) supervision required  -MA     Distance (Gait Goal 1, PT) 400  -MA     Time Frame (Gait Training Goal 1, PT) 1 week  -MA     Progress/Outcome (Gait Training Goal 1, PT) goal ongoing  -MA     Row Name 08/06/18 1541          Stairs Goal 1 (PT)    Activity/Assistive Device (Stairs Goal 1, PT) stairs, all skills  -MA     [place] Level/Cues Needed (Stairs Goal 1, PT) contact guard assist  -MA     Number of Stairs (Stairs Goal 1, PT) 4  -MA     Time Frame (Stairs Goal 1, PT) 1 week  -MA     Progress/Outcome (Stairs Goal 1, PT) goal ongoing  -MA     Row Name 08/06/18 1541          Positioning and Restraints    Pre-Treatment Position in bed  -MA     Post Treatment Position chair  -MA     In Chair notified nsg;sitting;call light within reach;encouraged to call for assist;exit alarm on;legs elevated  -MA     Row Name 08/06/18 1541          Living Environment    Home Accessibility stairs to enter home  -MA       User Key  (r) = Recorded By, (t) = Taken By, (c) = Cosigned By    Initials Name Provider Type    Aldair Bunch, RN Registered Nurse    Elva Brown, PT Physical Therapist          Physical Therapy Education     Title: PT OT SLP Therapies (Active)     Topic: Physical Therapy (Active)     Point: Mobility  training (Active)    Learning Progress Summary     Learner Status Readiness Method Response Comment Documented by    Patient Active Acceptance E NR  MA 08/06/18 1552          Point: Home exercise program (Active)    Learning Progress Summary     Learner Status Readiness Method Response Comment Documented by    Patient Active Acceptance E NR  MA 08/06/18 1552          Point: Body mechanics (Active)    Learning Progress Summary     Learner Status Readiness Method Response Comment Documented by    Patient Active Acceptance E NR  MA 08/06/18 1552          Point: Precautions (Active)    Learning Progress Summary     Learner Status Readiness Method Response Comment Documented by    Patient Active Acceptance E NR  MA 08/06/18 1552                      User Key     Initials Effective Dates Name Provider Type Discipline    MA 04/03/18 -  Elva Magallon, PT Physical Therapist PT                PT Recommendation and Plan  Anticipated Discharge Disposition (PT): home with assist, home with home health  Planned Therapy Interventions (PT Eval): balance training, bed mobility training, gait training, home exercise program, patient/family education, postural re-education, stair training, strengthening, transfer training  Therapy Frequency (PT Clinical Impression): 3 times/wk  Outcome Summary/Treatment Plan (PT)  Anticipated Discharge Disposition (PT): home with assist, home with home health  Plan of Care Reviewed With: patient  Outcome Summary: Patient is a pleasant 82 y.o. female who presents with impaired functional mobility and balance following admission from home for recent bleeding. Recent hospitalizaton for carotid endarterectomy leading to SNF upon DC- was living at home PTA. Today, all mobility performed with SV-SBA. Slightly unsteady without an AD today with ambulation- reoommended patient to use RW upon DC to ensure safety. May benefit from skilled PT services acutely to address deficits as able and improve level of  independence prior to returning home.          Outcome Measures     Row Name 08/06/18 1500             How much help from another person do you currently need...    Turning from your back to your side while in flat bed without using bedrails? 4  -MA      Moving from lying on back to sitting on the side of a flat bed without bedrails? 4  -MA      Moving to and from a bed to a chair (including a wheelchair)? 4  -MA      Standing up from a chair using your arms (e.g., wheelchair, bedside chair)? 4  -MA      Climbing 3-5 steps with a railing? 3  -MA      To walk in hospital room? 3  -MA      AM-PAC 6 Clicks Score 22  -MA         Functional Assessment    Outcome Measure Options AM-PAC 6 Clicks Basic Mobility (PT)  -MA        User Key  (r) = Recorded By, (t) = Taken By, (c) = Cosigned By    Initials Name Provider Type    Elva Brown, PT Physical Therapist           Time Calculation:         PT Charges     Row Name 08/06/18 1540             Time Calculation    Start Time 1516  -MA      Stop Time 1540  -MA      Time Calculation (min) 24 min  -MA      PT Received On 08/06/18  -MA      PT - Next Appointment 08/08/18  -MA      PT Goal Re-Cert Due Date 08/13/18  -MA         Time Calculation- PT    Total Timed Code Minutes- PT 20 minute(s)  -MA        User Key  (r) = Recorded By, (t) = Taken By, (c) = Cosigned By    Initials Name Provider Type    Elva Brown, PT Physical Therapist        Therapy Suggested Charges     Code   Minutes Charges    None           Therapy Charges for Today     Code Description Service Date Service Provider Modifiers Qty    28476390763  PT EVAL MOD COMPLEXITY 2 8/6/2018 Elva Magallon, PT GP 1    [id number]  PT THER PROC EA 15 MIN 8/6/2018 Elva Magallon, PT GP 1          PT G-Codes  Outcome Measure Options: AM-PAC 6 Clicks Basic Mobility (PT)      Elva Magallon PT  8/6/2018

## 2018-08-06 NOTE — PLAN OF CARE
Problem: Fall Risk (Adult)  Goal: Absence of Fall  Outcome: Ongoing (interventions implemented as appropriate)      Problem: Patient Care Overview  Goal: Plan of Care Review  Outcome: Ongoing (interventions implemented as appropriate)   08/06/18 0348   Coping/Psychosocial   Plan of Care Reviewed With patient   Plan of Care Review   Progress no change   OTHER   Outcome Summary Pt a/o x 4, ambulates to BRP with assist x 1. unsteady gait. states utilizes walker at home but not often. may need to increase use. minimal to no c/o pain. Clear liquid diet today and NPO after midnight starting 08/07/2018 0000. voiding with blood minimal BRB in potty hat. pt c/o increased flatus. NS running at 100ml/hr through RFA IV. educated on proposed plan. will continue to monitor bowels     Goal: Individualization and Mutuality  Outcome: Ongoing (interventions implemented as appropriate)

## 2018-08-06 NOTE — DISCHARGE PLACEMENT REQUEST
"Gita Stephenson P (82 y.o. Female)     Date of Birth Social Security Number Address Home Phone MRN    1935  [id number] [id number] [phone] [id number]    [place] Marital Status          Mormonism        Admission Date Admission Type Admitting Provider Attending Provider Department, Room/Bed    8/5/18 Emergency Kolton Larkin MD Richards, Stephen J, MD [address], P480/1    Discharge Date Discharge Disposition Discharge Destination                       Attending Provider:  Kolton Larkin MD    Allergies:  Tekturna [Aliskiren]    Isolation:  None   Infection:  None   Code Status:  CPR    Ht:  160 cm (63\")   Wt:  55.9 kg (123 lb 3.2 oz)    Admission Cmt:  None   Principal Problem:  None                Active Insurance as of 8/5/2018     Primary Coverage     Payor Plan Insurance Group Employer/Plan Group    MEDICARE MEDICARE A & B      Payor Plan Address Payor Plan Phone Number Effective From Effective To    [address] [phone] 12/1/2001     [facility] [id number]       Subscriber Name Subscriber Birth Date Member ID       GITA STEPHENSON P 1935 [id number]           Secondary Coverage     Payor Plan Insurance Group Employer/Plan Group    [facility] SUPP [id number]     Payor Plan Address Payor Plan Phone Number Effective From Effective To    [address]  12/1/2016     [facility] [id number]       Subscriber Name Subscriber Birth Date Member ID       GITA STEPHENSON P 1935 [id number]                 Emergency Contacts      (Rel.) Home Phone Work Phone Mobile Phone    Marce Grullon (Daughter) [phone] -- --    Akiko,Greg (Son) [phone] -- --              "

## 2018-08-07 ENCOUNTER — ANESTHESIA (OUTPATIENT)
Dept: GASTROENTEROLOGY | Facility: HOSPITAL | Age: 83
End: 2018-08-07

## 2018-08-07 ENCOUNTER — ANESTHESIA EVENT (OUTPATIENT)
Dept: GASTROENTEROLOGY | Facility: HOSPITAL | Age: 83
End: 2018-08-07

## 2018-08-07 VITALS
DIASTOLIC BLOOD PRESSURE: 64 MMHG | WEIGHT: 130.5 LBS | HEART RATE: 74 BPM | BODY MASS INDEX: 23.12 KG/M2 | TEMPERATURE: 97.3 F | OXYGEN SATURATION: 98 % | RESPIRATION RATE: 16 BRPM | HEIGHT: 63 IN | SYSTOLIC BLOOD PRESSURE: 156 MMHG

## 2018-08-07 LAB
ALBUMIN SERPL-MCNC: 3.6 G/DL (ref 3.5–5.2)
ANION GAP SERPL CALCULATED.3IONS-SCNC: 17.2 MMOL/L
BASOPHILS # BLD AUTO: 0.01 10*3/MM3 (ref 0–0.2)
BASOPHILS NFR BLD AUTO: 0.1 % (ref 0–1.5)
BUN BLD-MCNC: 7 MG/DL (ref 8–23)
BUN/CREAT SERPL: 8.1 (ref 7–25)
CALCIUM SPEC-SCNC: 8.1 MG/DL (ref 8.6–10.5)
CHLORIDE SERPL-SCNC: 96 MMOL/L (ref 98–107)
CO2 SERPL-SCNC: 21.8 MMOL/L (ref 22–29)
CREAT BLD-MCNC: 0.86 MG/DL (ref 0.57–1)
DEPRECATED RDW RBC AUTO: 41.9 FL (ref 37–54)
EOSINOPHIL # BLD AUTO: 0.07 10*3/MM3 (ref 0–0.7)
EOSINOPHIL NFR BLD AUTO: 0.7 % (ref 0.3–6.2)
ERYTHROCYTE [DISTWIDTH] IN BLOOD BY AUTOMATED COUNT: 13.3 % (ref 11.7–13)
GFR SERPL CREATININE-BSD FRML MDRD: 63 ML/MIN/1.73
GLUCOSE BLD-MCNC: 113 MG/DL (ref 65–99)
HCT VFR BLD AUTO: 37.8 % (ref 35.6–45.5)
HGB BLD-MCNC: 11.7 G/DL (ref 11.9–15.5)
IMM GRANULOCYTES # BLD: 0.02 10*3/MM3 (ref 0–0.03)
IMM GRANULOCYTES NFR BLD: 0.2 % (ref 0–0.5)
LYMPHOCYTES # BLD AUTO: 1.46 10*3/MM3 (ref 0.9–4.8)
LYMPHOCYTES NFR BLD AUTO: 14.4 % (ref 19.6–45.3)
MCH RBC QN AUTO: 26.5 PG (ref 26.9–32)
MCHC RBC AUTO-ENTMCNC: 31 G/DL (ref 32.4–36.3)
MCV RBC AUTO: 85.7 FL (ref 80.5–98.2)
MONOCYTES # BLD AUTO: 1.15 10*3/MM3 (ref 0.2–1.2)
MONOCYTES NFR BLD AUTO: 11.3 % (ref 5–12)
NEUTROPHILS # BLD AUTO: 7.45 10*3/MM3 (ref 1.9–8.1)
NEUTROPHILS NFR BLD AUTO: 73.3 % (ref 42.7–76)
PHOSPHATE SERPL-MCNC: 3 MG/DL (ref 2.5–4.5)
PLATELET # BLD AUTO: 250 10*3/MM3 (ref 140–500)
PMV BLD AUTO: 12.5 FL (ref 6–12)
POTASSIUM BLD-SCNC: 3.8 MMOL/L (ref 3.5–5.2)
RBC # BLD AUTO: 4.41 10*6/MM3 (ref 3.9–5.2)
SODIUM BLD-SCNC: 135 MMOL/L (ref 136–145)
WBC NRBC COR # BLD: 10.16 10*3/MM3 (ref 4.5–10.7)

## 2018-08-07 PROCEDURE — 85025 COMPLETE CBC W/AUTO DIFF WBC: CPT | Performed by: HOSPITALIST

## 2018-08-07 PROCEDURE — 80069 RENAL FUNCTION PANEL: CPT | Performed by: HOSPITALIST

## 2018-08-07 PROCEDURE — G0378 HOSPITAL OBSERVATION PER HR: HCPCS

## 2018-08-07 PROCEDURE — 88305 TISSUE EXAM BY PATHOLOGIST: CPT | Performed by: INTERNAL MEDICINE

## 2018-08-07 PROCEDURE — 45385 COLONOSCOPY W/LESION REMOVAL: CPT | Performed by: INTERNAL MEDICINE

## 2018-08-07 PROCEDURE — 25010000002 PROPOFOL 10 MG/ML EMULSION: Performed by: ANESTHESIOLOGY

## 2018-08-07 RX ORDER — HYDROCORTISONE ACETATE 25 MG/1
25 SUPPOSITORY RECTAL 2 TIMES DAILY PRN
Qty: 10 SUPPOSITORY | Refills: 0 | Status: SHIPPED | OUTPATIENT
Start: 2018-08-07 | End: 2019-07-15

## 2018-08-07 RX ORDER — PROPOFOL 10 MG/ML
VIAL (ML) INTRAVENOUS AS NEEDED
Status: DISCONTINUED | OUTPATIENT
Start: 2018-08-07 | End: 2018-08-07 | Stop reason: SURG

## 2018-08-07 RX ORDER — PROPOFOL 10 MG/ML
VIAL (ML) INTRAVENOUS CONTINUOUS PRN
Status: DISCONTINUED | OUTPATIENT
Start: 2018-08-07 | End: 2018-08-07 | Stop reason: SURG

## 2018-08-07 RX ORDER — SODIUM CHLORIDE 0.9 % (FLUSH) 0.9 %
3 SYRINGE (ML) INJECTION AS NEEDED
Status: DISCONTINUED | OUTPATIENT
Start: 2018-08-07 | End: 2018-08-07

## 2018-08-07 RX ORDER — PROPOFOL 10 MG/ML
VIAL (ML) INTRAVENOUS AS NEEDED
Status: DISCONTINUED | OUTPATIENT
Start: 2018-08-07 | End: 2018-08-07

## 2018-08-07 RX ORDER — LIDOCAINE HYDROCHLORIDE 20 MG/ML
INJECTION, SOLUTION INFILTRATION; PERINEURAL AS NEEDED
Status: DISCONTINUED | OUTPATIENT
Start: 2018-08-07 | End: 2018-08-07 | Stop reason: SURG

## 2018-08-07 RX ORDER — SODIUM CHLORIDE, SODIUM LACTATE, POTASSIUM CHLORIDE, CALCIUM CHLORIDE 600; 310; 30; 20 MG/100ML; MG/100ML; MG/100ML; MG/100ML
1000 INJECTION, SOLUTION INTRAVENOUS CONTINUOUS
Status: DISCONTINUED | OUTPATIENT
Start: 2018-08-07 | End: 2018-08-07

## 2018-08-07 RX ADMIN — PROPOFOL 80 MG: 10 INJECTION, EMULSION INTRAVENOUS at 11:39

## 2018-08-07 RX ADMIN — AMLODIPINE BESYLATE 10 MG: 10 TABLET ORAL at 09:20

## 2018-08-07 RX ADMIN — SODIUM CHLORIDE, POTASSIUM CHLORIDE, SODIUM LACTATE AND CALCIUM CHLORIDE 1000 ML: 600; 310; 30; 20 INJECTION, SOLUTION INTRAVENOUS at 10:44

## 2018-08-07 RX ADMIN — LOSARTAN POTASSIUM 100 MG: 100 TABLET, FILM COATED ORAL at 09:20

## 2018-08-07 RX ADMIN — EPHEDRINE SULFATE 10 MG: 50 INJECTION INTRAMUSCULAR; INTRAVENOUS; SUBCUTANEOUS at 11:50

## 2018-08-07 RX ADMIN — PROPOFOL 100 MCG/KG/MIN: 10 INJECTION, EMULSION INTRAVENOUS at 11:39

## 2018-08-07 RX ADMIN — METOPROLOL TARTRATE 25 MG: 25 TABLET ORAL at 09:20

## 2018-08-07 RX ADMIN — LIDOCAINE HYDROCHLORIDE 60 MG: 20 INJECTION, SOLUTION INFILTRATION; PERINEURAL at 11:39

## 2018-08-07 RX ADMIN — ASPIRIN 81 MG: 81 TABLET, DELAYED RELEASE ORAL at 09:20

## 2018-08-07 NOTE — PROGRESS NOTES
Continued Stay Note  [facility]     Patient Name: Gita Wharton  MRN: [id number]  Today's Date: 8/7/2018    Admit Date: 8/5/2018          Discharge Plan     Row Name 08/07/18 1449       Plan    Plan Home with [facility]    Plan Comments Plans are home with [facility] and [facility]/ [facility] is following and notified of DC orders.........................Angelina Muller RN              Discharge Codes    No documentation.       Expected Discharge Date and Time     Expected Discharge Date Expected Discharge Time    Aug 7, 2018             Angelina Muller RN

## 2018-08-07 NOTE — PLAN OF CARE
Problem: Fall Risk (Adult)  Goal: Absence of Fall  Outcome: Ongoing (interventions implemented as appropriate)      Problem: Patient Care Overview  Goal: Plan of Care Review  Outcome: Ongoing (interventions implemented as appropriate)   08/06/18 1801   Coping/Psychosocial   Plan of Care Reviewed With patient   Plan of Care Review   Progress improving   OTHER   Outcome Summary Patient up to bathroom frequently. Pt tolerating clear liquid diet and understands she's to be NPO at midnight. Bowel prep initiated in plans for colonoscopy, and patient was encouraged to drink Golytely as tolerated. Will continue to monitor.       Goal: Individualization and Mutuality  Outcome: Ongoing (interventions implemented as appropriate)    Goal: Discharge Needs Assessment  Outcome: Ongoing (interventions implemented as appropriate)    Goal: Interprofessional Rounds/Family Conf  Outcome: Ongoing (interventions implemented as appropriate)

## 2018-08-07 NOTE — DISCHARGE SUMMARY
Date of Admission: 8/5/2018  Date of Discharge:  8/7/2018    PCP: Alexis Wiggins MD      DISCHARGE DIAGNOSIS  Active Problems:    Essential hypertension    Hematochezia    Hemorrhoids    Anemia    Hyponatremia    Weakness      SECONDARY DIAGNOSES  Past Medical History:   Diagnosis Date   • DVT (deep venous thrombosis) (CMS/[facility])    • Hyperlipidemia    • Hypertension    • Stroke (CMS/[facility])        CONSULTS   Consults     Date and Time Order Name Status Description    8/5/2018 1248 Inpatient Gastroenterology Consult Completed     8/5/2018 1010 [facility] (on-call MD unless specified) Completed     7/27/2018 1052 Inpatient Pulmonology Consult Completed     7/24/2018 1521 Inpatient Vascular Surgery Consult Completed     7/24/2018 0402 Inpatient Neurology Consult Completed     7/24/2018 0232 [facility] (on-call MD unless specified) Completed     7/12/2018 1535 Pulmonology (on-call MD unless specified) Completed           PROCEDURES PERFORMED  Colonoscopy    HOSPITAL COURSE  Patient is a 82 y.o. female presented to [facility] complaining of Rectal bleeding.  Please see the admitting history and physical for further details.  She was admitted to the hospital with presumed hemorrhoidal bleed.  Her hemoglobin remained stable and required no transfusions over the course of her stay.  She was seen and evaluated by gastroenterology who recommended endoscopic evaluation.  She was taken for colonoscopy and found to have a few polyps removed and was found to have internal hemorrhoids and external skin tags.  There is no sign of active bleeding.  Her bleeding stopped she's been having bowel movements with no issues.  She was somewhat weak and fatigued on admission and was found to be somewhat dehydrated.  She was hydrated with IV fluids for 24 hours and today she feels much better.  She's been up ambulating without issues she still using a walker but does feel like she is capable of going home with home health today.  She  "denies any other new issues or complaints at discharge and is agreeable with discharge home today.      CONDITION ON DISCHARGE  Stable.      VITAL SIGNS  /64 (BP Location: Right arm, Patient Position: Lying)   Pulse 74   Temp 97.3 °F (36.3 °C) (Oral)   Resp 16   Ht 160 cm (63\")   Wt 59.2 kg (130 lb 8 oz)   LMP  (LMP Unknown)   SpO2 98%   BMI 23.12 kg/m²   Objective:  General Appearance:  Comfortable.    Vital signs: (most recent): Blood pressure 156/64, pulse 74, temperature 97.3 °F (36.3 °C), temperature source Oral, resp. rate 16, height 160 cm (63\"), weight 59.2 kg (130 lb 8 oz), SpO2 98 %, not currently breastfeeding.  Vital signs are normal.  No fever.    Output: Producing urine and producing stool.    HEENT: Normal HEENT exam.    Lungs:  Normal effort.  Breath sounds clear to auscultation.    Heart: Normal rate.  Regular rhythm.  S1 normal and S2 normal.    Abdomen: Abdomen is soft.  Bowel sounds are normal.   There is no abdominal tenderness.                 DISCHARGE DIS[place]ITION   [place] [facility]      [place]CHARGE MEDICATIONS     Discharge Medications      New Medications      Instructions Start Date   hydrocortisone 25 MG suppository  Commonly known as:  ANUSOL-HC   25 mg, Rectal, 2 Times Daily PRN         Changes to Medications      Instructions Start Date   atorvastatin 20 MG tablet  Commonly known as:  LIPITOR  What changed:  when to take this   20 mg, Oral, Daily      azelastine 0.1 % nasal spray  Commonly known as:  ASTELIN  What changed:  · when to take this  · reasons to take this  · additional instructions   2 sprays, Nasal, 2 Times Daily, Use in each nostril as directed         Continue These Medications      Instructions Start Date   amLODIPine 10 MG tablet  Commonly known as:  NORVASC   10 mg, Oral, Daily      aspirin 81 MG EC tablet   81 mg, Oral, Daily      B-12 1000 MCG/ML kit   Injection, Weekly, Wednesday       CENTRUM ADULTS tablet   1 tablet, Oral, Daily      clopidogrel 75 " MG tablet  Commonly known as:  PLAVIX   75 mg, Oral, Daily, Due to start 8/8/18       ferrous sulfate 325 (65 FE) MG tablet   325 mg, Oral, Daily With Breakfast      fish oil 1000 MG capsule capsule   1,000 mg, Oral, Daily With Breakfast      indapamide 2.5 MG tablet  Commonly known as:  LOZOL   2.5 mg, Oral, Every Morning      losartan 100 MG tablet  Commonly known as:  COZAAR   100 mg, Oral, Daily      metoprolol tartrate 25 MG tablet  Commonly known as:  LOPRESSOR   25 mg, Oral, Every 12 Hours Scheduled         Stop These Medications    HYDROcodone-acetaminophen 5-325 MG per tablet  Commonly known as:  NORCO             Activity Instructions     Activity as Tolerated         Future Appointments  Date Time Provider Department Center   8/21/2018 3:30 PM Alexis Wiggins MD [email or web address] None   10/4/2018 1:00 PM Alexis Wiggins MD [id number] None     Additional Instructions for the Follow-ups that You Need to Schedule     Discharge Follow-up with PCP    As directed      Currently Documented PCP:  Alexis Wiggins MD  PCP Phone Number:  [phone]    Follow Up Details:  2-4 weeks         Referral to Home Health    As directed      Face to Face Visit Date:  8/7/2018    Follow-up Provider for Plan of Care?:  I treated the patient in an acute care facility and will not continue treatment after discharge.    Follow-up Provider:  ALEXIS WIGGINS [[id number]]    Reason/Clinical Findings:  Recent stroke and carotid endarterectomy as well as hemorrhoidal bleed still rather weak and fatigued with ambulation    Describe mobility limitations that make leaving home difficult:  Doesn't drive    Nursing/Therapeutic Services Requested:  Skilled Nursing    Skilled nursing orders:  Medication education    Frequency:  1 Week 1           Follow-up Information     Alexis Wiggins MD .    Specialty:  Family Medicine  Why:  2-4 weeks  Contact information:  [address] [place] [address]  [phone]                    TEST  RESULTS PENDING AT DISCHARGE   Order Current Status    Tissue Pathology Exam In process             Kolton Larkin MD  [facility]  08/07/18  2:31 PM      Time: greater than 30 minutes.

## 2018-08-07 NOTE — PROGRESS NOTES
Continued Stay Note  [facility]     Patient Name: Gita Wharton  MRN: [id number]  Today's Date: 8/7/2018    Admit Date: 8/5/2018          Discharge Plan     Row Name 08/07/18 1544       Plan    Plan Home with [facility]    Patient/Family in Agreement with Plan yes    Plan Comments Patient states she plans home and to continue home health with [facility].  Her dtr will transport her at DC and she is aware of DC orders and will arriving soon.  Denies other needs.........................Angelina Muller RN    Row Name 08/07/18 1449       Plan    Plan Home with [facility]    Plan Comments Plans are home with [facility] and Mc/ [facility] is following and notified of DC orders.........................Angelina Muller RN              Discharge Codes    No documentation.       Expected Discharge Date and Time     Expected Discharge Date Expected Discharge Time    Aug 7, 2018             Angelina Muller RN

## 2018-08-07 NOTE — ANESTHESIA PREPROCEDURE EVALUATION
Anesthesia Evaluation                  Airway   Mallampati: II  TM distance: >3 FB  Neck ROM: full  No difficulty expected  Dental    (+) upper dentures and lower dentures    Pulmonary - normal exam   (+) pneumonia ,   Cardiovascular - normal exam    ECG reviewed  Patient on routine beta blocker    (+) hypertension, PVD, DVT, hyperlipidemia,  carotid artery disease      Neuro/Psych  (+) CVA, weakness, numbness,     GI/Hepatic/Renal/Endo      Musculoskeletal     Abdominal    Substance History      OB/GYN          Other                        Anesthesia Plan    ASA 4     MAC     intravenous induction   Anesthetic plan and risks discussed with patient.

## 2018-08-07 NOTE — ANESTHESIA POSTPROCEDURE EVALUATION
Patient: Gita Wharton    Procedure Summary     Date:  08/07/18 Room / Location:  [facility] ENDOSCOPY 10 /  [facility]    Anesthesia Start:  1136 Anesthesia Stop:  1205    Procedure:  COLONOSCOPY to cecum and TI with cold snare polypectomies (N/A ) Diagnosis:       Hematochezia      (Hematochezia [K92.1])    Surgeon:  Ken Tidwell MD Provider:  Dolly Pitts MD    Anesthesia Type:  MAC ASA Status:  4          Anesthesia Type: MAC  Last vitals  BP   178/66 (08/07/18 1035)   Temp   36.9 °C (98.5 °F) (08/07/18 1035)   Pulse   77 (08/07/18 1035)   Resp   16 (08/07/18 0624)     SpO2   98 % (08/07/18 1035)     Post Anesthesia Care and Evaluation    Patient location during evaluation: bedside  Patient participation: complete - patient participated  Level of consciousness: awake  Pain management: adequate  Airway patency: patent  Anesthetic complications: No anesthetic complications    Cardiovascular status: acceptable  Respiratory status: acceptable  Hydration status: acceptable

## 2018-08-07 NOTE — PLAN OF CARE
Problem: Fall Risk (Adult)  Goal: Absence of Fall  Outcome: Ongoing (interventions implemented as appropriate)      Problem: Patient Care Overview  Goal: Plan of Care Review  Outcome: Ongoing (interventions implemented as appropriate)   08/07/18 0416   Coping/Psychosocial   Plan of Care Reviewed With patient   Plan of Care Review   Progress no change   OTHER   Outcome Summary Pt having frequent BMs. Pt still drinking bowel prep for colonoscopy, stool is yellow/clear, however, still contains many particles. Pt NPO at midnight apart from [place]. VSS. Will continue to monitor per protocol.      Goal: Individualization and Mutuality  Outcome: Ongoing (interventions implemented as appropriate)    Goal: Discharge Needs Assessment  Outcome: Ongoing (interventions implemented as appropriate)    Goal: Interprofessional Rounds/Family Conf  Outcome: Ongoing (interventions implemented as appropriate)

## 2018-08-08 LAB
CYTO UR: NORMAL
LAB AP CASE REPORT: NORMAL
PATH REPORT.FINAL DX SPEC: NORMAL
PATH REPORT.GROSS SPEC: NORMAL

## 2018-08-08 NOTE — PROGRESS NOTES
Case Management Discharge Note    Final Note: Discharged to home on 8/7/18 @ 14:29. KORINA Gonzalez RN, CCP    Destination     No service has been selected for the patient.      Durable Medical Equipment     No service has been selected for the patient.      Dialysis/Infusion     No service has been selected for the patient.      Home Medical Care - Selection Complete     Service Request Status Selected Specialties Address Phone Number Fax Number    [facility] [address] [address] [phone]      Social Care     No service has been selected for the patient.             Final Discharge Disposition Code: 06 - home with home health care

## 2018-08-10 ENCOUNTER — TELEPHONE (OUTPATIENT)
Dept: NEUROLOGY | Facility: CLINIC | Age: 83
End: 2018-08-10

## 2018-08-10 NOTE — TELEPHONE ENCOUNTER
----- Message from Moisés Freire MD sent at 8/10/2018 10:30 AM EDT -----  I'm happy to see her, she was a stroke patient who got carotid surgery.    ThanksBrian  ----- Message -----  From: Sheree Turner MA  Sent: 8/10/2018   9:58 AM  To: MD Dr. Tani Serrano,    Patient's daughter called to set up a 2 month hospital f/u with you. I looked in your notes, but do not see where a f/u was needed. Do I need to schedule her with you?     Thanks,  Sheree

## 2018-08-10 NOTE — TELEPHONE ENCOUNTER
I s/w pt daughter and scheduled her f/u with Dr. Freire on 9/12/18 at 8:30am. Mailed packet today as well.

## 2018-08-16 ENCOUNTER — OFFICE VISIT (OUTPATIENT)
Dept: INTERNAL MEDICINE | Facility: CLINIC | Age: 83
End: 2018-08-16

## 2018-08-16 VITALS
SYSTOLIC BLOOD PRESSURE: 194 MMHG | HEART RATE: 70 BPM | TEMPERATURE: 97.4 F | WEIGHT: 134 LBS | BODY MASS INDEX: 23.74 KG/M2 | HEIGHT: 63 IN | DIASTOLIC BLOOD PRESSURE: 69 MMHG

## 2018-08-16 DIAGNOSIS — D50.0 IRON DEFICIENCY ANEMIA DUE TO CHRONIC BLOOD LOSS: Primary | ICD-10-CM

## 2018-08-16 DIAGNOSIS — E78.2 MIXED HYPERLIPIDEMIA: ICD-10-CM

## 2018-08-16 DIAGNOSIS — R53.1 LEFT-SIDED WEAKNESS: ICD-10-CM

## 2018-08-16 DIAGNOSIS — I63.511 ACUTE RIGHT MCA STROKE (HCC): ICD-10-CM

## 2018-08-16 LAB
BASOPHILS # BLD AUTO: 0 10*3/MM3 (ref 0–0.2)
BASOPHILS NFR BLD AUTO: 0 % (ref 0–1.5)
EOSINOPHIL # BLD AUTO: 0.06 10*3/MM3 (ref 0–0.7)
EOSINOPHIL NFR BLD AUTO: 0.8 % (ref 0.3–6.2)
ERYTHROCYTE [DISTWIDTH] IN BLOOD BY AUTOMATED COUNT: 13.9 % (ref 11.7–13)
HCT VFR BLD AUTO: 38.6 % (ref 35.6–45.5)
HGB BLD-MCNC: 11.9 G/DL (ref 11.9–15.5)
IMM GRANULOCYTES # BLD: 0.02 10*3/MM3 (ref 0–0.03)
IMM GRANULOCYTES NFR BLD: 0.3 % (ref 0–0.5)
LYMPHOCYTES # BLD AUTO: 1.15 10*3/MM3 (ref 0.9–4.8)
LYMPHOCYTES NFR BLD AUTO: 15 % (ref 19.6–45.3)
MCH RBC QN AUTO: 26.6 PG (ref 26.9–32)
MCHC RBC AUTO-ENTMCNC: 30.8 G/DL (ref 32.4–36.3)
MCV RBC AUTO: 86.4 FL (ref 80.5–98.2)
MONOCYTES # BLD AUTO: 0.57 10*3/MM3 (ref 0.2–1.2)
MONOCYTES NFR BLD AUTO: 7.4 % (ref 5–12)
NEUTROPHILS # BLD AUTO: 5.88 10*3/MM3 (ref 1.9–8.1)
NEUTROPHILS NFR BLD AUTO: 76.5 % (ref 42.7–76)
PLATELET # BLD AUTO: 279 10*3/MM3 (ref 140–500)
RBC # BLD AUTO: 4.47 10*6/MM3 (ref 3.9–5.2)
WBC # BLD AUTO: 7.68 10*3/MM3 (ref 4.5–10.7)

## 2018-08-16 PROCEDURE — 99213 OFFICE O/P EST LOW 20 MIN: CPT | Performed by: FAMILY MEDICINE

## 2018-08-16 NOTE — PROGRESS NOTES
CC:l LEG EDEMA,HYPERLIPIDEMIA,b-12 DEFICIENCY.CVA     Subjective.../HPI  Patient present today with1) EDEMA ON SIDE OF cva- 2) r CAROTIS ENDARTERECTOMY3) HYPERLIPIDEMIA 4) htn  I have reviewed the patient's medical history in detail and updated the computerized patient record.    Past Medical History:   Diagnosis Date   • DVT (deep venous thrombosis) (CMS/HCC)    • Hyperlipidemia    • Hypertension    • Stroke (CMS/HCC)        Past Surgical History:   Procedure Laterality Date   • CAROTID ENDARTERECTOMY Right 7/26/2018    Procedure: RT CAROTID ENDARTERECTOMY;  Surgeon: Inna Villarreal MD;  Location: [facility] MAIN OR;  Service: Vascular   • COLONOSCOPY N/A 8/7/2018    Procedure: COLONOSCOPY to cecum and TI with cold snare polypectomies;  Surgeon: Ken Tidwell MD;  Location: [facility] ENDOSCOPY;  Service: Gastroenterology   • HIP SURGERY     • HYSTERECTOMY     • JOINT REPLACEMENT     • THYROIDECTOMY         No family history on file.    Social History     Social History   • Marital status:      Spouse name: N/A   • Number of children: N/A   • Years of education: N/A     Occupational History   • Not on file.     Social History Main Topics   • Smoking status: Former Smoker   • Smokeless tobacco: Not on file      Comment: quit 2001   • Alcohol use Yes   • Drug use: No   • Sexual activity: Defer     Other Topics Concern   • Not on file     Social History Narrative   • No narrative on file       Most Recent Immunizations   Administered Date(s) Administered   • Tdap 07/12/2018       Review of Systems:   Review of Systems   Constitutional: Negative.    HENT: Negative.    Eyes: Negative.    Respiratory: Negative.    Cardiovascular: Negative.    Gastrointestinal: Negative.    Endocrine: Negative.    Genitourinary: Negative.    Musculoskeletal: Negative.    Skin: Negative.    Allergic/Immunologic: Negative.    Neurological: Negative.    Hematological: Negative.    Psychiatric/Behavioral: Negative.   "        Physical Exam   Constitutional: She is oriented to person, place, and time. She appears well-developed and well-nourished.   Cardiovascular: Normal rate and regular rhythm.    Pulmonary/Chest: Effort normal and breath sounds normal.   Neurological: She is alert and oriented to person, place, and time.   Psychiatric: She has a normal mood and affect.   Vitals reviewed.        Vital Signs     Vitals:    08/16/18 1135   BP: (!) 194/69   Pulse: 70   Temp: 97.4 °F (36.3 °C)   TempSrc: Oral   Weight: 60.8 kg (134 lb)   Height: 160 cm (62.99\")          Results Review:      REVIEWED AND DISCUSSED CLINICAL RESULTS WITH PATIENT      Requested Prescriptions      No prescriptions requested or ordered in this encounter         Current Outpatient Prescriptions:   •  aspirin 81 MG EC tablet, Take 1 tablet by mouth Daily., Disp: , Rfl:   •  atorvastatin (LIPITOR) 20 MG tablet, Take 1 tablet by mouth Daily. (Patient taking differently: Take 20 mg by mouth Every Night.), Disp: 90 tablet, Rfl: 3  •  azelastine (ASTELIN) 0.1 % nasal spray, 2 sprays into each nostril 2 (Two) Times a Day. Use in each nostril as directed (Patient taking differently: 2 sprays into each nostril 2 (Two) Times a Day As Needed. Use in each nostril as directed), Disp: 1 each, Rfl: 12  •  Cyanocobalamin (B-12) 1000 MCG/ML kit, Inject  as directed 1 (One) Time Per Week. Wednesday, Disp: , Rfl:   •  ferrous sulfate 325 (65 FE) MG tablet, Take 325 mg by mouth Daily With Breakfast., Disp: , Rfl:   •  hydrocortisone (ANUSOL-HC) 25 MG suppository, Insert 1 suppository into the rectum 2 (Two) Times a Day As Needed for Hemorrhoids., Disp: 10 suppository, Rfl: 0  •  indapamide (LOZOL) 2.5 MG tablet, Take 1 tablet by mouth Every Morning., Disp: 30 tablet, Rfl: 5  •  losartan (COZAAR) 100 MG tablet, Take 1 tablet by mouth Daily., Disp: 30 tablet, Rfl: 5  •  metoprolol tartrate (LOPRESSOR) 25 MG tablet, Take 1 tablet by mouth Every 12 (Twelve) Hours., Disp: 180 " tablet, Rfl: 3  •  Multiple Vitamins-Minerals (CENTRUM ADULTS) tablet, Take 1 tablet by mouth Daily., Disp: , Rfl:   •  Omega-3 Fatty Acids (FISH OIL) 1000 MG capsule capsule, Take 1,000 mg by mouth Daily With Breakfast., Disp: , Rfl:   •  amLODIPine (NORVASC) 10 MG tablet, Take 1 tablet by mouth Daily., Disp: 90 tablet, Rfl: 3  •  clopidogrel (PLAVIX) 75 MG tablet, Take 75 mg by mouth Daily. Due to start 8/8/18, Disp: , Rfl:     Procedures          Diagnoses and all orders for this visit:    Iron deficiency anemia due to chronic blood loss  -     CBC & Differential    Mixed hyperlipidemia    Left-sided weakness    Acute right MCA stroke (CMS/HCC)         No Follow-up on file.  Ct [id number] NANI Wiggins M.D  08/16/18  12:07 PM

## 2018-08-20 NOTE — PROGRESS NOTES
The polyp(s) biopsies showed adenomatous change. This is not cancerous but is considered potentially precancerous. Follow-up colonoscopy in 3 years is advised.  This will need to be discussed in more detail as that interval approaches.  She should f/u with Dr NARAYANAN or MT in 4-6 weeks for hospital f/u.

## 2018-08-23 ENCOUNTER — TELEPHONE (OUTPATIENT)
Dept: INTERNAL MEDICINE | Facility: CLINIC | Age: 83
End: 2018-08-23

## 2018-08-27 ENCOUNTER — TELEPHONE (OUTPATIENT)
Dept: GASTROENTEROLOGY | Facility: CLINIC | Age: 83
End: 2018-08-27

## 2018-08-27 NOTE — TELEPHONE ENCOUNTER
----- Message from Ken Tidwell MD sent at 8/20/2018 12:29 PM EDT -----  The polyp(s) biopsies showed adenomatous change. This is not cancerous but is considered potentially precancerous. Follow-up colonoscopy in 3 years is advised.  This will need to be discussed in more detail as that interval approaches.  She should f/u with Dr NARAYANAN or MT in 4-6 weeks for hospital f/u.

## 2018-08-28 NOTE — TELEPHONE ENCOUNTER
Pt returned call per a staff message.     Called pt back. Advised from the note form Dr Tidwell. She verb understanding and will call back to make an appt for follow-up.     Health Maintenance updated to reflect C/S due 8/2021.

## 2018-09-12 ENCOUNTER — OFFICE VISIT (OUTPATIENT)
Dept: NEUROLOGY | Facility: CLINIC | Age: 83
End: 2018-09-12

## 2018-09-12 VITALS
DIASTOLIC BLOOD PRESSURE: 68 MMHG | HEIGHT: 63 IN | BODY MASS INDEX: 23.39 KG/M2 | HEART RATE: 70 BPM | OXYGEN SATURATION: 98 % | SYSTOLIC BLOOD PRESSURE: 158 MMHG | WEIGHT: 132 LBS

## 2018-09-12 DIAGNOSIS — I63.311 CEREBROVASCULAR ACCIDENT (CVA) DUE TO THROMBOSIS OF RIGHT MIDDLE CEREBRAL ARTERY (HCC): Primary | ICD-10-CM

## 2018-09-12 PROCEDURE — 99213 OFFICE O/P EST LOW 20 MIN: CPT | Performed by: PSYCHIATRY & NEUROLOGY

## 2018-09-12 NOTE — PROGRESS NOTES
"DOS: 2018  NAME: Gita Wharton   : 1935  PCP: Alexis Wiggins MD  Chief Complaint   Patient presents with   • Stroke       Chief complaint: Stroke  Subjective: 82-year-old female seen by me in the hospital for right MCA territory stroke.  She had critical right ICA stenosis and underwent a right carotid endarterectomy with no complications.  She is subsequently done well with gradual improvement of her left-sided weakness.  She reports some difficulty with walking and he sees a cane.  Family members report some issues with memory including repetitive questions and difficulty with some ADLs.  She still living at home independently but her daughters check on her daily.  They're concerned about her driving.  She is taking aspirin and statin for secondary prevention.  She saw vascular recently and a carotid ultrasound showed improvement of her left ICA stenosis which was graded at 50-60% previously.  She checks her blood pressure daily at home and it usually runs 130s over 80s    Objective: (12)  Vital signs: /68 (BP Location: Left arm, Patient Position: Sitting, Cuff Size: Adult)   Pulse 70   Ht 160 cm (62.99\")   Wt 59.9 kg (132 lb)   LMP  (LMP Unknown)   SpO2 98%   BMI 23.39 kg/m²    Gen: NAD, vitals reviewed  MS: oriented x3, recent/remote memory intact, normal attention/concentration, language intact, no neglect.  CN: visual acuity grossly normal, PERRL, EOMI, no facial droop, no dysarthria  Motor: 5/5 throughout upper and lower extremities, normal tone  Sensory: intact to light touch all 4 ext.    Review of Systems   Constitutional: Positive for unexpected weight change. Negative for activity change, appetite change, chills, diaphoresis, fatigue and fever.   HENT: Negative for drooling, hearing loss, tinnitus, trouble swallowing and voice change.    Eyes: Negative for photophobia, pain and visual disturbance.   Respiratory: Negative for chest tightness and shortness of breath.  "   Cardiovascular: Positive for leg swelling. Negative for chest pain and palpitations.   Gastrointestinal: Negative for blood in stool and vomiting.   Endocrine: Negative for cold intolerance and heat intolerance.   Genitourinary: Negative for difficulty urinating.   Musculoskeletal: Negative for gait problem, neck pain and neck stiffness.   Skin: Negative for rash.   Neurological: Positive for weakness and light-headedness. Negative for dizziness, tremors, seizures, syncope, facial asymmetry, speech difficulty, numbness and headaches.   Hematological: Does not bruise/bleed easily.   Psychiatric/Behavioral: Positive for agitation, confusion and decreased concentration. Negative for behavioral problems, hallucinations, sleep disturbance and suicidal ideas. The patient is nervous/anxious.          Laboratory results:  Lab Results   Component Value Date    GLUCOSE 113 (H) 08/07/2018    CALCIUM 8.1 (L) 08/07/2018     (L) 08/07/2018    K 3.8 08/07/2018    CO2 21.8 (L) 08/07/2018    CL 96 (L) 08/07/2018    BUN 7 (L) 08/07/2018    CREATININE 0.86 08/07/2018    EGFRIFAFRI 48 (L) 06/20/2018    EGFRIFNONA 63 08/07/2018    BCR 8.1 08/07/2018    ANIONGAP 17.2 08/07/2018     Lab Results   Component Value Date    WBC 10.16 08/07/2018    HGB 11.9 08/16/2018    HCT 38.6 08/16/2018    MCV 86.4 08/16/2018     08/16/2018     Lab Results   Component Value Date    CHOL 125 07/25/2018     Lab Results   Component Value Date    HDL 45 07/25/2018    HDL 45 06/20/2018    HDL 41 04/18/2018     Lab Results   Component Value Date    LDL 60 07/25/2018    LDL 72 06/20/2018     (H) 04/18/2018     Lab Results   Component Value Date    TRIG 101 07/25/2018    TRIG 159 (H) 06/20/2018    TRIG 141 04/18/2018     @[email or web address]@     Review of labs: LDL was 60    Workup to date:  MRI brain right MCA stroke  MRA head and neck critical right ICA stenosis, now status post surgery  2-D echo negative  Recent carotid ultrasound, improvement of left  ICA stenosis    Impression:  1.  Right middle cerebral artery stroke due to large vessel disease  2.  Right carotid stenosis status post carotid endarterectomy  3.  Left carotid stenosis asymptomatic  4.  Hyperlipidemia, well controlled  5.  Hypertension well controlled    Comment: 82-year-old female status post right CEA for symptomatically stenosis with right MCA stroke.  She is improving and nearly back to her baseline after her stroke however she does have some memory impairment reported by her daughters.  I recommended against driving because her clinical history and imaging findings are consistent with a diagnosis of vascular dementia; I'm concerned about her reaction speed.  I recommended a formal driving evaluation before she returns to driving.  Plan is to continue to continue aspirin and statin for secondary stroke prevention.  She's following vascular for left carotid stenosis.    Plan:  1.  Continue aspirin 81 and low-dose statin (LDL 60)  2.  Goal BP less than 130/80  3.  Following vascular for left carotid stenosis, asymptomatic    Okay to follow up with me as needed

## 2018-11-05 ENCOUNTER — TELEPHONE (OUTPATIENT)
Dept: INTERNAL MEDICINE | Facility: CLINIC | Age: 83
End: 2018-11-05

## 2018-11-05 NOTE — TELEPHONE ENCOUNTER
Patient called for lab appointment on 11/19/2018. Please order labs patient wants to make sure there is a vitamon b 12 lab done to check level

## 2018-11-06 DIAGNOSIS — R53.83 FATIGUE, UNSPECIFIED TYPE: Primary | ICD-10-CM

## 2018-11-06 DIAGNOSIS — E78.2 MIXED HYPERLIPIDEMIA: ICD-10-CM

## 2018-11-11 ENCOUNTER — RESULTS ENCOUNTER (OUTPATIENT)
Dept: INTERNAL MEDICINE | Facility: CLINIC | Age: 83
End: 2018-11-11

## 2018-11-11 DIAGNOSIS — R53.83 FATIGUE, UNSPECIFIED TYPE: ICD-10-CM

## 2018-11-11 DIAGNOSIS — E78.2 MIXED HYPERLIPIDEMIA: ICD-10-CM

## 2018-11-12 ENCOUNTER — TELEPHONE (OUTPATIENT)
Dept: NEUROLOGY | Facility: CLINIC | Age: 83
End: 2018-11-12

## 2018-11-12 NOTE — TELEPHONE ENCOUNTER
----- Message from Moisés Freire MD sent at 11/12/2018  7:09 AM EST -----  Contact: daughter ar smith [phone]  Yes that's fine.  Brian  ----- Message -----  From: Sheree Turner MA  Sent: 11/8/2018   9:56 AM  To: Moisés Freire MD    Do you want to see her at next clinic date 11/21/18?     ----- Message -----  From: Tina Rios  Sent: 11/8/2018   9:16 AM  To: Sheree Turner MA    Pt's mom needs appt with dr freire she is having major memory issues in the last 2. 5 weeks she has fallen 3 times. They believe something is going on with her. Please advise

## 2018-11-12 NOTE — TELEPHONE ENCOUNTER
I s/w pt daughter and she stated pt has been doing better the past couple of days. She would like to wait on an appointment right now. She stated her niece has been helping with patient as well and they have noticed some improvement. I gave her my name and number in case she decides she would like an appt.

## 2018-11-20 LAB
ALBUMIN SERPL-MCNC: 3.3 G/DL (ref 3.5–5.2)
ALBUMIN/GLOB SERPL: 1 G/DL
ALP SERPL-CCNC: 133 U/L (ref 39–117)
ALT SERPL-CCNC: 10 U/L (ref 1–33)
AST SERPL-CCNC: 20 U/L (ref 1–32)
BASOPHILS # BLD AUTO: 0.01 10*3/MM3 (ref 0–0.2)
BASOPHILS NFR BLD AUTO: 0.1 % (ref 0–1.5)
BILIRUB SERPL-MCNC: 0.5 MG/DL (ref 0.1–1.2)
BUN SERPL-MCNC: 13 MG/DL (ref 8–23)
BUN/CREAT SERPL: 9.4 (ref 7–25)
CALCIUM SERPL-MCNC: 7.1 MG/DL (ref 8.6–10.5)
CHLORIDE SERPL-SCNC: 95 MMOL/L (ref 98–107)
CHOLEST SERPL-MCNC: 138 MG/DL (ref 0–200)
CO2 SERPL-SCNC: 25.9 MMOL/L (ref 22–29)
CREAT SERPL-MCNC: 1.39 MG/DL (ref 0.57–1)
EOSINOPHIL # BLD AUTO: 0 10*3/MM3 (ref 0–0.7)
EOSINOPHIL NFR BLD AUTO: 0 % (ref 0.3–6.2)
ERYTHROCYTE [DISTWIDTH] IN BLOOD BY AUTOMATED COUNT: 15.3 % (ref 11.7–13)
FOLATE SERPL-MCNC: 5.88 NG/ML (ref 4.78–24.2)
GLOBULIN SER CALC-MCNC: 3.3 GM/DL
GLUCOSE SERPL-MCNC: 95 MG/DL (ref 65–99)
HCT VFR BLD AUTO: 33.7 % (ref 35.6–45.5)
HDLC SERPL-MCNC: 64 MG/DL (ref 40–60)
HGB BLD-MCNC: 10.6 G/DL (ref 11.9–15.5)
IMM GRANULOCYTES # BLD: 0.02 10*3/MM3 (ref 0–0.03)
IMM GRANULOCYTES NFR BLD: 0.2 % (ref 0–0.5)
LDLC SERPL CALC-MCNC: 57 MG/DL (ref 0–100)
LDLC/HDLC SERPL: 0.88 {RATIO}
LYMPHOCYTES # BLD AUTO: 1.21 10*3/MM3 (ref 0.9–4.8)
LYMPHOCYTES NFR BLD AUTO: 14.7 % (ref 19.6–45.3)
MCH RBC QN AUTO: 25.9 PG (ref 26.9–32)
MCHC RBC AUTO-ENTMCNC: 31.5 G/DL (ref 32.4–36.3)
MCV RBC AUTO: 82.2 FL (ref 80.5–98.2)
MONOCYTES # BLD AUTO: 0.86 10*3/MM3 (ref 0.2–1.2)
MONOCYTES NFR BLD AUTO: 10.5 % (ref 5–12)
NEUTROPHILS # BLD AUTO: 6.13 10*3/MM3 (ref 1.9–8.1)
NEUTROPHILS NFR BLD AUTO: 74.7 % (ref 42.7–76)
PLATELET # BLD AUTO: 207 10*3/MM3 (ref 140–500)
POTASSIUM SERPL-SCNC: 3.7 MMOL/L (ref 3.5–5.2)
PROT SERPL-MCNC: 6.6 G/DL (ref 6–8.5)
RBC # BLD AUTO: 4.1 10*6/MM3 (ref 3.9–5.2)
SODIUM SERPL-SCNC: 136 MMOL/L (ref 136–145)
TRIGL SERPL-MCNC: 87 MG/DL (ref 0–150)
VIT B12 SERPL-MCNC: 697 PG/ML (ref 211–946)
VLDLC SERPL CALC-MCNC: 17.4 MG/DL (ref 5–40)
WBC # BLD AUTO: 8.21 10*3/MM3 (ref 4.5–10.7)

## 2018-11-27 ENCOUNTER — OFFICE VISIT (OUTPATIENT)
Dept: INTERNAL MEDICINE | Facility: CLINIC | Age: 83
End: 2018-11-27

## 2018-11-27 VITALS
TEMPERATURE: 98.1 F | HEART RATE: 91 BPM | SYSTOLIC BLOOD PRESSURE: 158 MMHG | OXYGEN SATURATION: 98 % | BODY MASS INDEX: 21.69 KG/M2 | DIASTOLIC BLOOD PRESSURE: 69 MMHG | WEIGHT: 122.4 LBS | HEIGHT: 63 IN

## 2018-11-27 DIAGNOSIS — I10 ESSENTIAL HYPERTENSION: Primary | ICD-10-CM

## 2018-11-27 PROCEDURE — 99213 OFFICE O/P EST LOW 20 MIN: CPT | Performed by: FAMILY MEDICINE

## 2018-11-27 NOTE — PROGRESS NOTES
CC:htn    Subjective.../HPI  Patient present today Rosendo Pelayo has a history of chronic hypertension and has been well controlled on current medications.  Patient reports has had hypertension for 6 years. She is tolerating medications without side effect. She reports no vision changes, headaches or lightheadedness. She is requesting refills of medications      I have reviewed the patient's medical history in detail and updated the computerized patient record.    Past Medical History:   Diagnosis Date   • DVT (deep venous thrombosis) (CMS/[facility])    • Hyperlipidemia    • Hypertension    • Stroke (CMS/[facility])        Past Surgical History:   Procedure Laterality Date   • HIP SURGERY     • HYSTERECTOMY     • JOINT REPLACEMENT     • THYROIDECTOMY         Family History   Problem Relation Age of Onset   • Cancer Mother    • Dementia Father    • Hypertension Father    • Hypertension Daughter    • Cancer Daughter        Social History     Socioeconomic History   • Marital status:      Spouse name: Not on file   • Number of children: Not on file   • Years of education: Not on file   • Highest education level: Not on file   Social Needs   • Financial resource strain: Not on file   • Food insecurity - worry: Not on file   • Food insecurity - inability: Not on file   • Transportation needs - medical: Not on file   • Transportation needs - non-medical: Not on file   Occupational History   • Not on file   Tobacco Use   • Smoking status: Former Smoker   • Tobacco comment: quit 2001   Substance and Sexual Activity   • Alcohol use: Yes   • Drug use: No   • Sexual activity: Defer   Other Topics Concern   • Not on file   Social History Narrative   • Not on file       Most Recent Immunizations   Administered Date(s) Administered   • Tdap 07/12/2018       Review of Systems:   Review of Systems   All other systems reviewed and are negative.        Physical Exam   Constitutional: She is oriented to person, place, and time.  "  Cardiovascular: Normal rate, regular rhythm and normal heart sounds.   Pulmonary/Chest: Effort normal and breath sounds normal.   Musculoskeletal: She exhibits edema.   Neurological: She is alert and oriented to person, place, and time.   Psychiatric: She has a normal mood and affect. Her behavior is normal.   Vitals reviewed.        Vital Signs     Vitals:    11/27/18 1320   BP: 158/69   BP Location: Left arm   Patient Position: Sitting   Cuff Size: Small Adult   Pulse: 91   Temp: 98.1 °F (36.7 °C)   TempSrc: Oral   SpO2: 98%   Weight: 55.5 kg (122 lb 6.4 oz)   Height: 160 cm (62.99\")          Results Review:      REVIEWED AND DISCUSSED CLINICAL RESULTS WITH PATIENT      Requested Prescriptions      No prescriptions requested or ordered in this encounter         Current Outpatient Medications:   •  amLODIPine (NORVASC) 10 MG tablet, Take 1 tablet by mouth Daily., Disp: 90 tablet, Rfl: 3  •  aspirin 81 MG EC tablet, Take 1 tablet by mouth Daily., Disp: , Rfl:   •  atorvastatin (LIPITOR) 20 MG tablet, Take 1 tablet by mouth Daily. (Patient taking differently: Take 20 mg by mouth Every Night.), Disp: 90 tablet, Rfl: 3  •  azelastine (ASTELIN) 0.1 % nasal spray, 2 sprays into each nostril 2 (Two) Times a Day. Use in each nostril as directed (Patient taking differently: 2 sprays into each nostril 2 (Two) Times a Day As Needed. Use in each nostril as directed), Disp: 1 each, Rfl: 12  •  clopidogrel (PLAVIX) 75 MG tablet, Take 75 mg by mouth Daily. Due to start 8/8/18, Disp: , Rfl:   •  Cyanocobalamin (B-12) 1000 MCG/ML kit, Inject  as directed 1 (One) Time Per Week. Wednesday, Disp: , Rfl:   •  ferrous sulfate 325 (65 FE) MG tablet, Take 325 mg by mouth Daily With Breakfast., Disp: , Rfl:   •  hydrocortisone (ANUSOL-HC) 25 MG suppository, Insert 1 suppository into the rectum 2 (Two) Times a Day As Needed for Hemorrhoids., Disp: 10 suppository, Rfl: 0  •  indapamide (LOZOL) 2.5 MG tablet, Take 1 tablet by mouth Every " Morning., Disp: 30 tablet, Rfl: 5  •  losartan (COZAAR) 100 MG tablet, Take 1 tablet by mouth Daily., Disp: 30 tablet, Rfl: 5  •  metoprolol tartrate (LOPRESSOR) 25 MG tablet, Take 1 tablet by mouth Every 12 (Twelve) Hours., Disp: 180 tablet, Rfl: 3  •  Multiple Vitamins-Minerals (CENTRUM ADULTS) tablet, Take 1 tablet by mouth Daily., Disp: , Rfl:   •  Omega-3 Fatty Acids (FISH OIL) 1000 MG capsule capsule, Take 1,000 mg by mouth Daily With Breakfast., Disp: , Rfl:     Procedures          Diagnoses and all orders for this visit:    Essential hypertension  -     Basic Metabolic Panel; Future       Stop Lozol  Return in about 3 months (around 2/27/2019) for Recheck.    Alexis Wiggins M.D  11/27/18  1:58 PM

## 2018-12-02 ENCOUNTER — RESULTS ENCOUNTER (OUTPATIENT)
Dept: INTERNAL MEDICINE | Facility: CLINIC | Age: 83
End: 2018-12-02

## 2018-12-02 DIAGNOSIS — I10 ESSENTIAL HYPERTENSION: ICD-10-CM

## 2019-01-16 RX ORDER — LOSARTAN POTASSIUM 100 MG/1
TABLET ORAL
Qty: 90 TABLET | Refills: 4 | Status: SHIPPED | OUTPATIENT
Start: 2019-01-16 | End: 2019-06-27

## 2019-02-27 DIAGNOSIS — E78.2 MIXED HYPERLIPIDEMIA: Primary | ICD-10-CM

## 2019-02-27 DIAGNOSIS — R53.83 FATIGUE, UNSPECIFIED TYPE: ICD-10-CM

## 2019-02-28 ENCOUNTER — LAB (OUTPATIENT)
Dept: INTERNAL MEDICINE | Facility: CLINIC | Age: 84
End: 2019-02-28

## 2019-02-28 DIAGNOSIS — R53.83 FATIGUE, UNSPECIFIED TYPE: ICD-10-CM

## 2019-02-28 DIAGNOSIS — E78.2 MIXED HYPERLIPIDEMIA: ICD-10-CM

## 2019-02-28 LAB
ALBUMIN SERPL-MCNC: 3.5 G/DL (ref 3.5–5.2)
ALBUMIN/GLOB SERPL: 1.2 G/DL
ALP SERPL-CCNC: 98 U/L (ref 39–117)
ALT SERPL-CCNC: 13 U/L (ref 1–33)
AST SERPL-CCNC: 16 U/L (ref 1–32)
BASOPHILS # BLD AUTO: 0.02 10*3/MM3 (ref 0–0.2)
BASOPHILS NFR BLD AUTO: 0.2 % (ref 0–1.5)
BILIRUB SERPL-MCNC: 0.3 MG/DL (ref 0.1–1.2)
BUN SERPL-MCNC: 9 MG/DL (ref 8–23)
BUN/CREAT SERPL: 8.4 (ref 7–25)
CALCIUM SERPL-MCNC: 8.3 MG/DL (ref 8.6–10.5)
CHLORIDE SERPL-SCNC: 104 MMOL/L (ref 98–107)
CHOLEST SERPL-MCNC: 144 MG/DL (ref 0–200)
CO2 SERPL-SCNC: 23.1 MMOL/L (ref 22–29)
CREAT SERPL-MCNC: 1.07 MG/DL (ref 0.57–1)
EOSINOPHIL # BLD AUTO: 0 10*3/MM3 (ref 0–0.4)
EOSINOPHIL NFR BLD AUTO: 0 % (ref 0.3–6.2)
ERYTHROCYTE [DISTWIDTH] IN BLOOD BY AUTOMATED COUNT: 13.6 % (ref 12.3–15.4)
FOLATE SERPL-MCNC: 19.7 NG/ML (ref 4.78–24.2)
GLOBULIN SER CALC-MCNC: 3 GM/DL
GLUCOSE SERPL-MCNC: 90 MG/DL (ref 65–99)
HCT VFR BLD AUTO: 41.2 % (ref 34–46.6)
HDLC SERPL-MCNC: 62 MG/DL (ref 40–60)
HGB BLD-MCNC: 12.4 G/DL (ref 12–15.9)
IMM GRANULOCYTES # BLD AUTO: 0.04 10*3/MM3 (ref 0–0.05)
IMM GRANULOCYTES NFR BLD AUTO: 0.4 % (ref 0–0.5)
LDLC SERPL CALC-MCNC: 68 MG/DL (ref 0–100)
LDLC/HDLC SERPL: 1.09 {RATIO}
LYMPHOCYTES # BLD AUTO: 1.48 10*3/MM3 (ref 0.7–3.1)
LYMPHOCYTES NFR BLD AUTO: 16.4 % (ref 19.6–45.3)
MCH RBC QN AUTO: 25.3 PG (ref 26.6–33)
MCHC RBC AUTO-ENTMCNC: 30.1 G/DL (ref 31.5–35.7)
MCV RBC AUTO: 84.1 FL (ref 79–97)
MONOCYTES # BLD AUTO: 0.75 10*3/MM3 (ref 0.1–0.9)
MONOCYTES NFR BLD AUTO: 8.3 % (ref 5–12)
NEUTROPHILS # BLD AUTO: 6.76 10*3/MM3 (ref 1.4–7)
NEUTROPHILS NFR BLD AUTO: 74.7 % (ref 42.7–76)
NRBC BLD AUTO-RTO: 0 /100 WBC (ref 0–0)
PLATELET # BLD AUTO: 238 10*3/MM3 (ref 140–450)
POTASSIUM SERPL-SCNC: 3.7 MMOL/L (ref 3.5–5.2)
PROT SERPL-MCNC: 6.5 G/DL (ref 6–8.5)
RBC # BLD AUTO: 4.9 10*6/MM3 (ref 3.77–5.28)
SODIUM SERPL-SCNC: 139 MMOL/L (ref 136–145)
TRIGL SERPL-MCNC: 72 MG/DL (ref 0–150)
VIT B12 SERPL-MCNC: 355 PG/ML (ref 211–946)
VLDLC SERPL CALC-MCNC: 14.4 MG/DL (ref 5–40)
WBC # BLD AUTO: 9.05 10*3/MM3 (ref 3.4–10.8)

## 2019-03-05 ENCOUNTER — OFFICE VISIT (OUTPATIENT)
Dept: INTERNAL MEDICINE | Facility: CLINIC | Age: 84
End: 2019-03-05

## 2019-03-05 VITALS
DIASTOLIC BLOOD PRESSURE: 74 MMHG | OXYGEN SATURATION: 100 % | SYSTOLIC BLOOD PRESSURE: 194 MMHG | HEIGHT: 63 IN | BODY MASS INDEX: 21.44 KG/M2 | WEIGHT: 121 LBS | HEART RATE: 71 BPM | TEMPERATURE: 97.2 F

## 2019-03-05 DIAGNOSIS — I10 ESSENTIAL HYPERTENSION: Primary | ICD-10-CM

## 2019-03-05 PROBLEM — S60.10XA SUBUNGUAL HEMATOMA OF DIGIT OF HAND: Status: ACTIVE | Noted: 2019-03-05

## 2019-03-05 PROCEDURE — 99213 OFFICE O/P EST LOW 20 MIN: CPT | Performed by: FAMILY MEDICINE

## 2019-03-05 NOTE — PROGRESS NOTES
CC:htn, ,L 4th finger growth    Subjective.../HPI  Patient present today with1) HTN perfect at home Gita has a history of chronic hypertension and has been well controlled on current medications.  Patient reports has had hypertension for 31 years. She is tolerating medications without side effect. She reports no vision changes, headaches or lightheadedness. She is requesting refills of medications  2) L 4th finger nail discoloratio  8-9 mons    I have reviewed the patient's medical history in detail and updated the computerized patient record.    Past Medical History:   Diagnosis Date   • DVT (deep venous thrombosis) (CMS/[facility])    • Hyperlipidemia    • Hypertension    • Stroke (CMS/[facility])        Past Surgical History:   Procedure Laterality Date   • CAROTID ENDARTERECTOMY Right 7/26/2018    Procedure: RT CAROTID ENDARTERECTOMY;  Surgeon: Inna Villarreal MD;  Location: [facility] MAIN OR;  Service: Vascular   • COLONOSCOPY N/A 8/7/2018    Procedure: COLONOSCOPY to cecum and TI with cold snare polypectomies;  Surgeon: Ken Tidwell MD;  Location: [facility] ENDOSCOPY;  Service: Gastroenterology   • HIP SURGERY     • HYSTERECTOMY     • JOINT REPLACEMENT     • THYROIDECTOMY         Family History   Problem Relation Age of Onset   • Cancer Mother    • Dementia Father    • Hypertension Father    • Hypertension Daughter    • Cancer Daughter        Social History     Socioeconomic History   • Marital status:      Spouse name: Not on file   • Number of children: Not on file   • Years of education: Not on file   • Highest education level: Not on file   Social Needs   • Financial resource strain: Not on file   • Food insecurity - worry: Not on file   • Food insecurity - inability: Not on file   • Transportation needs - medical: Not on file   • Transportation needs - non-medical: Not on file   Occupational History   • Not on file   Tobacco Use   • Smoking status: Former Smoker   • Tobacco comment: quit 2001  "  Substance and Sexual Activity   • Alcohol use: Yes   • Drug use: No   • Sexual activity: Defer   Other Topics Concern   • Not on file   Social History Narrative   • Not on file       Most Recent Immunizations   Administered Date(s) Administered   • Tdap 07/12/2018       Review of Systems:   Review of Systems   Constitutional: Negative.    HENT: Negative.    Eyes: Negative.    Respiratory: Negative.    Cardiovascular: Negative.    Gastrointestinal: Negative.    Endocrine: Negative.    Genitourinary: Negative.    Musculoskeletal: Negative.    Skin: Negative.    Neurological: Negative.    Hematological: Negative.    Psychiatric/Behavioral: Negative.          Physical Exam   Constitutional: She appears well-developed and well-nourished.   Cardiovascular: Normal rate, regular rhythm and normal heart sounds.   Pulmonary/Chest: Effort normal and breath sounds normal.   Skin:   L 4th finger with nsubungual hematoma   Psychiatric: She has a normal mood and affect. Her behavior is normal.   Vitals reviewed.        Vital Signs     Vitals:    03/05/19 1132   BP: (!) 194/74   BP Location: Left arm   Patient Position: Sitting   Cuff Size: Small Adult   Pulse: 71   Temp: 97.2 °F (36.2 °C)   TempSrc: Oral   SpO2: 100%   Weight: 54.9 kg (121 lb)   Height: 160 cm (62.99\")          Results Review:      REVIEWED AND DISCUSSED CLINICAL RESULTS WITH PATIENT      Requested Prescriptions      No prescriptions requested or ordered in this encounter         Current Outpatient Medications:   •  amLODIPine (NORVASC) 10 MG tablet, Take 1 tablet by mouth Daily., Disp: 90 tablet, Rfl: 3  •  aspirin 81 MG EC tablet, Take 1 tablet by mouth Daily., Disp: , Rfl:   •  atorvastatin (LIPITOR) 20 MG tablet, Take 1 tablet by mouth Daily. (Patient taking differently: Take 20 mg by mouth Every Night.), Disp: 90 tablet, Rfl: 3  •  azelastine (ASTELIN) 0.1 % nasal spray, 2 sprays into each nostril 2 (Two) Times a Day. Use in each nostril as directed " (Patient taking differently: 2 sprays into each nostril 2 (Two) Times a Day As Needed. Use in each nostril as directed), Disp: 1 each, Rfl: 12  •  clopidogrel (PLAVIX) 75 MG tablet, Take 75 mg by mouth Daily. Due to start 8/8/18, Disp: , Rfl:   •  Cyanocobalamin (B-12) 1000 MCG/ML kit, Inject  as directed 1 (One) Time Per Week. Wednesday, Disp: , Rfl:   •  ferrous sulfate 325 (65 FE) MG tablet, Take 325 mg by mouth Daily With Breakfast., Disp: , Rfl:   •  hydrocortisone (ANUSOL-HC) 25 MG suppository, Insert 1 suppository into the rectum 2 (Two) Times a Day As Needed for Hemorrhoids., Disp: 10 suppository, Rfl: 0  •  indapamide (LOZOL) 2.5 MG tablet, Take 1 tablet by mouth Every Morning., Disp: 30 tablet, Rfl: 5  •  losartan (COZAAR) 100 MG tablet, TAKE ONE TABLET BY MOUTH DAILY, Disp: 90 tablet, Rfl: 4  •  metoprolol tartrate (LOPRESSOR) 25 MG tablet, Take 1 tablet by mouth Every 12 (Twelve) Hours., Disp: 180 tablet, Rfl: 3  •  Multiple Vitamins-Minerals (CENTRUM ADULTS) tablet, Take 1 tablet by mouth Daily., Disp: , Rfl:   •  Omega-3 Fatty Acids (FISH OIL) 1000 MG capsule capsule, Take 1,000 mg by mouth Daily With Breakfast., Disp: , Rfl:     Procedures          Diagnoses and all orders for this visit:    Essential hypertension        There are no Patient Instructions on file for this visit.     Return in about 6 months (around 9/5/2019).    Alexis Wiggins M.D  03/05/19  12:41 PM

## 2019-04-15 ENCOUNTER — EPISODE CHANGES (OUTPATIENT)
Dept: CASE MANAGEMENT | Facility: OTHER | Age: 84
End: 2019-04-15

## 2019-04-15 ENCOUNTER — HOSPITAL ENCOUNTER (INPATIENT)
Facility: HOSPITAL | Age: 84
LOS: 1 days | Discharge: HOME | End: 2019-04-17
Attending: EMERGENCY MEDICINE | Admitting: HOSPITALIST

## 2019-04-15 ENCOUNTER — APPOINTMENT (OUTPATIENT)
Dept: CT IMAGING | Facility: HOSPITAL | Age: 84
End: 2019-04-15

## 2019-04-15 DIAGNOSIS — R11.2 NON-INTRACTABLE VOMITING WITH NAUSEA, UNSPECIFIED VOMITING TYPE: ICD-10-CM

## 2019-04-15 DIAGNOSIS — N12 PYELONEPHRITIS: Primary | ICD-10-CM

## 2019-04-15 DIAGNOSIS — N17.9 AKI (ACUTE KIDNEY INJURY) (HCC): ICD-10-CM

## 2019-04-15 DIAGNOSIS — R26.81 UNSTEADINESS ON FEET: ICD-10-CM

## 2019-04-15 PROBLEM — S00.83XA FACIAL CONTUSION: Status: ACTIVE | Noted: 2019-04-15

## 2019-04-15 PROBLEM — N39.0 ACUTE UTI (URINARY TRACT INFECTION): Status: ACTIVE | Noted: 2019-04-15

## 2019-04-15 PROBLEM — E87.6 HYPOKALEMIA: Status: ACTIVE | Noted: 2019-04-15

## 2019-04-15 PROBLEM — E83.42 HYPOMAGNESEMIA: Status: ACTIVE | Noted: 2019-04-15

## 2019-04-15 LAB
ALBUMIN SERPL-MCNC: 3.1 G/DL (ref 3.5–5.2)
ALBUMIN/GLOB SERPL: 0.9 G/DL
ALP SERPL-CCNC: 96 U/L (ref 39–117)
ALT SERPL W P-5'-P-CCNC: 15 U/L (ref 1–33)
ANION GAP SERPL CALCULATED.3IONS-SCNC: 16.5 MMOL/L
AST SERPL-CCNC: 19 U/L (ref 1–32)
BACTERIA UR QL AUTO: ABNORMAL /HPF
BASOPHILS # BLD AUTO: 0.02 10*3/MM3 (ref 0–0.2)
BASOPHILS NFR BLD AUTO: 0.1 % (ref 0–1.5)
BILIRUB SERPL-MCNC: 0.7 MG/DL (ref 0.2–1.2)
BILIRUB UR QL STRIP: NEGATIVE
BUN BLD-MCNC: 14 MG/DL (ref 8–23)
BUN/CREAT SERPL: 8.9 (ref 7–25)
CALCIUM SPEC-SCNC: 8.1 MG/DL (ref 8.6–10.5)
CHLORIDE SERPL-SCNC: 100 MMOL/L (ref 98–107)
CLARITY UR: ABNORMAL
CO2 SERPL-SCNC: 19.5 MMOL/L (ref 22–29)
COLOR UR: YELLOW
CREAT BLD-MCNC: 1.58 MG/DL (ref 0.57–1)
DEPRECATED RDW RBC AUTO: 43.4 FL (ref 37–54)
EOSINOPHIL # BLD AUTO: 0 10*3/MM3 (ref 0–0.4)
EOSINOPHIL NFR BLD AUTO: 0 % (ref 0.3–6.2)
ERYTHROCYTE [DISTWIDTH] IN BLOOD BY AUTOMATED COUNT: 14.6 % (ref 12.3–15.4)
GFR SERPL CREATININE-BSD FRML MDRD: 31 ML/MIN/1.73
GLOBULIN UR ELPH-MCNC: 3.5 GM/DL
GLUCOSE BLD-MCNC: 103 MG/DL (ref 65–99)
GLUCOSE UR STRIP-MCNC: NEGATIVE MG/DL
HCT VFR BLD AUTO: 36.4 % (ref 34–46.6)
HGB BLD-MCNC: 11.4 G/DL (ref 12–15.9)
HGB UR QL STRIP.AUTO: ABNORMAL
HYALINE CASTS UR QL AUTO: ABNORMAL /LPF
IMM GRANULOCYTES # BLD AUTO: 0.15 10*3/MM3 (ref 0–0.05)
IMM GRANULOCYTES NFR BLD AUTO: 0.8 % (ref 0–0.5)
KETONES UR QL STRIP: NEGATIVE
LEUKOCYTE ESTERASE UR QL STRIP.AUTO: ABNORMAL
LYMPHOCYTES # BLD AUTO: 0.36 10*3/MM3 (ref 0.7–3.1)
LYMPHOCYTES NFR BLD AUTO: 1.9 % (ref 19.6–45.3)
MAGNESIUM SERPL-MCNC: 1.4 MG/DL (ref 1.6–2.4)
MCH RBC QN AUTO: 25.4 PG (ref 26.6–33)
MCHC RBC AUTO-ENTMCNC: 31.3 G/DL (ref 31.5–35.7)
MCV RBC AUTO: 81.1 FL (ref 79–97)
MONOCYTES # BLD AUTO: 0.6 10*3/MM3 (ref 0.1–0.9)
MONOCYTES NFR BLD AUTO: 3.2 % (ref 5–12)
NEUTROPHILS # BLD AUTO: 17.79 10*3/MM3 (ref 1.4–7)
NEUTROPHILS NFR BLD AUTO: 94 % (ref 42.7–76)
NITRITE UR QL STRIP: POSITIVE
NRBC BLD AUTO-RTO: 0 /100 WBC (ref 0–0)
PH UR STRIP.AUTO: 6 [PH] (ref 5–8)
PLATELET # BLD AUTO: 220 10*3/MM3 (ref 140–450)
PMV BLD AUTO: 12.6 FL (ref 6–12)
POTASSIUM BLD-SCNC: 3.4 MMOL/L (ref 3.5–5.2)
PROT SERPL-MCNC: 6.6 G/DL (ref 6–8.5)
PROT UR QL STRIP: ABNORMAL
RBC # BLD AUTO: 4.49 10*6/MM3 (ref 3.77–5.28)
RBC # UR: ABNORMAL /HPF
REF LAB TEST METHOD: ABNORMAL
SODIUM BLD-SCNC: 136 MMOL/L (ref 136–145)
SP GR UR STRIP: 1.01 (ref 1–1.03)
SQUAMOUS #/AREA URNS HPF: ABNORMAL /HPF
UROBILINOGEN UR QL STRIP: ABNORMAL
WBC NRBC COR # BLD: 18.92 10*3/MM3 (ref 3.4–10.8)
WBC UR QL AUTO: ABNORMAL /HPF

## 2019-04-15 PROCEDURE — 25010000002 MAGNESIUM SULFATE 2 GM/50ML SOLUTION: Performed by: NURSE PRACTITIONER

## 2019-04-15 PROCEDURE — G0378 HOSPITAL OBSERVATION PER HR: HCPCS

## 2019-04-15 PROCEDURE — 80053 COMPREHEN METABOLIC PANEL: CPT | Performed by: EMERGENCY MEDICINE

## 2019-04-15 PROCEDURE — 99284 EMERGENCY DEPT VISIT MOD MDM: CPT

## 2019-04-15 PROCEDURE — 87088 URINE BACTERIA CULTURE: CPT | Performed by: EMERGENCY MEDICINE

## 2019-04-15 PROCEDURE — 70450 CT HEAD/BRAIN W/O DYE: CPT

## 2019-04-15 PROCEDURE — 83735 ASSAY OF MAGNESIUM: CPT | Performed by: EMERGENCY MEDICINE

## 2019-04-15 PROCEDURE — 25010000002 ONDANSETRON PER 1 MG: Performed by: EMERGENCY MEDICINE

## 2019-04-15 PROCEDURE — 87086 URINE CULTURE/COLONY COUNT: CPT | Performed by: EMERGENCY MEDICINE

## 2019-04-15 PROCEDURE — 25010000002 CEFTRIAXONE PER 250 MG: Performed by: EMERGENCY MEDICINE

## 2019-04-15 PROCEDURE — 87186 SC STD MICRODIL/AGAR DIL: CPT | Performed by: EMERGENCY MEDICINE

## 2019-04-15 PROCEDURE — 85025 COMPLETE CBC W/AUTO DIFF WBC: CPT | Performed by: EMERGENCY MEDICINE

## 2019-04-15 PROCEDURE — 81001 URINALYSIS AUTO W/SCOPE: CPT | Performed by: EMERGENCY MEDICINE

## 2019-04-15 RX ORDER — MAGNESIUM SULFATE HEPTAHYDRATE 40 MG/ML
2 INJECTION, SOLUTION INTRAVENOUS AS NEEDED
Status: DISCONTINUED | OUTPATIENT
Start: 2019-04-15 | End: 2019-04-17 | Stop reason: HOSPADM

## 2019-04-15 RX ORDER — ONDANSETRON 2 MG/ML
4 INJECTION INTRAMUSCULAR; INTRAVENOUS EVERY 4 HOURS PRN
Status: DISCONTINUED | OUTPATIENT
Start: 2019-04-15 | End: 2019-04-17 | Stop reason: HOSPADM

## 2019-04-15 RX ORDER — CEFTRIAXONE SODIUM 1 G/50ML
1 INJECTION, SOLUTION INTRAVENOUS ONCE
Status: COMPLETED | OUTPATIENT
Start: 2019-04-15 | End: 2019-04-15

## 2019-04-15 RX ORDER — NITROGLYCERIN 0.4 MG/1
0.4 TABLET SUBLINGUAL
Status: DISCONTINUED | OUTPATIENT
Start: 2019-04-15 | End: 2019-04-17 | Stop reason: HOSPADM

## 2019-04-15 RX ORDER — ONDANSETRON 2 MG/ML
4 INJECTION INTRAMUSCULAR; INTRAVENOUS ONCE
Status: COMPLETED | OUTPATIENT
Start: 2019-04-15 | End: 2019-04-15

## 2019-04-15 RX ORDER — SODIUM CHLORIDE 9 MG/ML
100 INJECTION, SOLUTION INTRAVENOUS CONTINUOUS
Status: DISCONTINUED | OUTPATIENT
Start: 2019-04-15 | End: 2019-04-16

## 2019-04-15 RX ORDER — POTASSIUM CHLORIDE 750 MG/1
40 CAPSULE, EXTENDED RELEASE ORAL AS NEEDED
Status: DISCONTINUED | OUTPATIENT
Start: 2019-04-15 | End: 2019-04-17 | Stop reason: HOSPADM

## 2019-04-15 RX ORDER — MAGNESIUM SULFATE HEPTAHYDRATE 40 MG/ML
4 INJECTION, SOLUTION INTRAVENOUS AS NEEDED
Status: DISCONTINUED | OUTPATIENT
Start: 2019-04-15 | End: 2019-04-17 | Stop reason: HOSPADM

## 2019-04-15 RX ORDER — CLINDAMYCIN PHOSPHATE 900 MG/50ML
900 INJECTION INTRAVENOUS ONCE
Status: DISCONTINUED | OUTPATIENT
Start: 2019-04-15 | End: 2019-04-15

## 2019-04-15 RX ORDER — SODIUM CHLORIDE 0.9 % (FLUSH) 0.9 %
3 SYRINGE (ML) INJECTION EVERY 12 HOURS SCHEDULED
Status: DISCONTINUED | OUTPATIENT
Start: 2019-04-15 | End: 2019-04-17 | Stop reason: HOSPADM

## 2019-04-15 RX ORDER — CEFTRIAXONE SODIUM 1 G/50ML
1 INJECTION, SOLUTION INTRAVENOUS EVERY 24 HOURS
Status: DISCONTINUED | OUTPATIENT
Start: 2019-04-16 | End: 2019-04-17 | Stop reason: HOSPADM

## 2019-04-15 RX ORDER — ACETAMINOPHEN 325 MG/1
650 TABLET ORAL EVERY 6 HOURS PRN
Status: DISCONTINUED | OUTPATIENT
Start: 2019-04-15 | End: 2019-04-17 | Stop reason: HOSPADM

## 2019-04-15 RX ORDER — SODIUM CHLORIDE 0.9 % (FLUSH) 0.9 %
3-10 SYRINGE (ML) INJECTION AS NEEDED
Status: DISCONTINUED | OUTPATIENT
Start: 2019-04-15 | End: 2019-04-17 | Stop reason: HOSPADM

## 2019-04-15 RX ORDER — POTASSIUM CHLORIDE 1.5 G/1.77G
40 POWDER, FOR SOLUTION ORAL AS NEEDED
Status: DISCONTINUED | OUTPATIENT
Start: 2019-04-15 | End: 2019-04-17 | Stop reason: HOSPADM

## 2019-04-15 RX ADMIN — SODIUM CHLORIDE 100 ML/HR: 9 INJECTION, SOLUTION INTRAVENOUS at 16:14

## 2019-04-15 RX ADMIN — SODIUM CHLORIDE, PRESERVATIVE FREE 3 ML: 5 INJECTION INTRAVENOUS at 20:05

## 2019-04-15 RX ADMIN — MAGNESIUM SULFATE HEPTAHYDRATE 2 G: 40 INJECTION, SOLUTION INTRAVENOUS at 17:55

## 2019-04-15 RX ADMIN — POTASSIUM CHLORIDE 40 MEQ: 750 CAPSULE, EXTENDED RELEASE ORAL at 20:04

## 2019-04-15 RX ADMIN — ACETAMINOPHEN 650 MG: 325 TABLET, FILM COATED ORAL at 23:49

## 2019-04-15 RX ADMIN — POTASSIUM CHLORIDE 40 MEQ: 750 CAPSULE, EXTENDED RELEASE ORAL at 16:20

## 2019-04-15 RX ADMIN — ONDANSETRON 4 MG: 2 INJECTION INTRAMUSCULAR; INTRAVENOUS at 10:47

## 2019-04-15 RX ADMIN — SODIUM CHLORIDE, PRESERVATIVE FREE 3 ML: 5 INJECTION INTRAVENOUS at 16:15

## 2019-04-15 RX ADMIN — MAGNESIUM SULFATE HEPTAHYDRATE 2 G: 40 INJECTION, SOLUTION INTRAVENOUS at 21:49

## 2019-04-15 RX ADMIN — CEFTRIAXONE SODIUM 1 G: 1 INJECTION, SOLUTION INTRAVENOUS at 12:25

## 2019-04-15 RX ADMIN — SODIUM CHLORIDE 1000 ML: 9 INJECTION, SOLUTION INTRAVENOUS at 10:50

## 2019-04-15 RX ADMIN — MAGNESIUM SULFATE HEPTAHYDRATE 2 G: 40 INJECTION, SOLUTION INTRAVENOUS at 20:05

## 2019-04-15 NOTE — H&P
Patient Name:  Gita Wharton  YOB: 1935  MRN:  [id number]  Admit Date:  4/15/2019  Patient Care Team:  Alexis Wiggins MD as PCP - General  Alexis Wiggins MD as PCP - Family Medicine  Alexis Wiggins MD as PCP - Claims Attributed  Renny Foley RN as Care Coordinator ([facility])      Subjective   History Present Illness     Chief Complaint   Patient presents with   • Urinary Frequency     with burning.  vomiting started this am   • Fall     last wednesday - was not seen at that time       Ms. Wharton is a 83 y.o. former smoker with a history of DVT, HTN, CVA, subdural hematoma, HLD that presents to [facility] complaining of burning with urination onset 6 days ago.  She has had suprapubic pain, frequency, and burning with urination that has become progressively worse over the last few days.  This morning she developed nausea with nonbloody emesis, decreased appetite, and overall feeling of being unwell.  She denies known fever or chills.  She denies diarrhea, chest pain, cough, shortness of breath, syncope, diaphoresis, neck pain or stiffness, or rash.  Last week she tripped on a rug on her way into the building to play card games and her hit her face.  She denies loss of consciousness, seizure activity or syncope.  She finished her card game and did not seek further treatment. Patient is on Plavix. Patient has a previously noted murmur with echocardiogram 2018 noted below without significant valvular disease.        History of Present Illness  Review of Systems   Constitutional: Negative for appetite change and unexpected weight change.   HENT: Negative for congestion and trouble swallowing.         Facial ecchymosis   Eyes: Negative for visual disturbance.   Respiratory: Negative for cough, choking, chest tightness, shortness of breath and stridor.    Cardiovascular: Negative for chest pain, palpitations and leg swelling.   Gastrointestinal: Positive  for nausea and vomiting. Negative for abdominal distention, abdominal pain, blood in stool, constipation and diarrhea.   Endocrine: Negative for polydipsia, polyphagia and polyuria.   Genitourinary: Positive for dysuria, frequency and pelvic pain. Negative for difficulty urinating, flank pain, hematuria, vaginal bleeding, vaginal discharge and vaginal pain.   Musculoskeletal: Negative for back pain, neck pain and neck stiffness.   Skin: Negative for color change.   Neurological: Negative for dizziness, seizures, syncope, weakness, light-headedness and headaches.   Hematological: Bruises/bleeds easily.   Psychiatric/Behavioral: Negative.  Negative for confusion.        Personal History     Past Medical History:   Diagnosis Date   • DVT (deep venous thrombosis) (CMS/HCC)    • Hyperlipidemia    • Hypertension    • Stroke (CMS/HCC)    • Subdural hematoma (CMS/HCC)      Past Surgical History:   Procedure Laterality Date   • CAROTID ENDARTERECTOMY Right 7/26/2018    Procedure: RT CAROTID ENDARTERECTOMY;  Surgeon: Inna Villarreal MD;  Location: [facility] MAIN OR;  Service: Vascular   • COLONOSCOPY N/A 8/7/2018    Procedure: COLONOSCOPY to cecum and TI with cold snare polypectomies;  Surgeon: Ken Tidwell MD;  Location: [facility] ENDOSCOPY;  Service: Gastroenterology   • HIP SURGERY     • HYSTERECTOMY     • JOINT REPLACEMENT     • THYROIDECTOMY       Family History   Problem Relation Age of Onset   • Cancer Mother    • Dementia Father    • Hypertension Father    • Hypertension Daughter    • Cancer Daughter      Social History     Tobacco Use   • Smoking status: Former Smoker   • Tobacco comment: quit 2001   Substance Use Topics   • Alcohol use: Yes   • Drug use: No     Medications Prior to Admission   Medication Sig Dispense Refill Last Dose   • amLODIPine (NORVASC) 10 MG tablet Take 1 tablet by mouth Daily. 90 tablet 3 Taking   • aspirin 81 MG EC tablet Take 1 tablet by mouth Daily.   Taking   • atorvastatin  (LIPITOR) 20 MG tablet Take 1 tablet by mouth Daily. (Patient taking differently: Take 20 mg by mouth Every Night.) 90 tablet 3 Taking   • azelastine (ASTELIN) 0.1 % nasal spray 2 sprays into each nostril 2 (Two) Times a Day. Use in each nostril as directed (Patient taking differently: 2 sprays into each nostril 2 (Two) Times a Day As Needed. Use in each nostril as directed) 1 each 12 Taking   • clopidogrel (PLAVIX) 75 MG tablet Take 75 mg by mouth Daily. Due to start 8/8/18   Taking   • Cyanocobalamin (B-12) 1000 MCG/ML kit Inject  as directed 1 (One) Time Per Week. Wednesday   Taking   • ferrous sulfate 325 (65 FE) MG tablet Take 325 mg by mouth Daily With Breakfast.   Taking   • hydrocortisone (ANUSOL-HC) 25 MG suppository Insert 1 suppository into the rectum 2 (Two) Times a Day As Needed for Hemorrhoids. 10 suppository 0 Taking   • indapamide (LOZOL) 2.5 MG tablet Take 1 tablet by mouth Every Morning. 30 tablet 5 Taking   • losartan (COZAAR) 100 MG tablet TAKE ONE TABLET BY MOUTH DAILY 90 tablet 4 Taking   • metoprolol tartrate (LOPRESSOR) 25 MG tablet Take 1 tablet by mouth Every 12 (Twelve) Hours. 180 tablet 3 Taking   • Multiple Vitamins-Minerals (CENTRUM ADULTS) tablet Take 1 tablet by mouth Daily.   Taking   • Omega-3 Fatty Acids (FISH OIL) 1000 MG capsule capsule Take 1,000 mg by mouth Daily With Breakfast.   Taking     Allergies:    Allergies   Allergen Reactions   • Sulfa Antibiotics Unknown (See Comments)     .   • Tekturna [Aliskiren] Diarrhea       Objective    Objective     Vital Signs  Temp:  [97.6 °F (36.4 °C)-98.3 °F (36.8 °C)] 98.3 °F (36.8 °C)  Heart Rate:  [90-93] 91  Resp:  [16-18] 18  BP: (118-142)/(50-58) 142/53  SpO2:  [94 %-96 %] 96 %  on   ;   Device (Oxygen Therapy): room air  Body mass index is 19.53 kg/m².    Physical Exam   Constitutional: She is oriented to person, place, and time. She appears well-developed and well-nourished.   HENT:   Head: Normocephalic and atraumatic.   hematoma  Right forehead with extensive ecchymosis of the forehead/ periorbital bilaterally    Eyes: Pupils are equal, round, and reactive to light.   Cardiovascular: Normal rate, regular rhythm and intact distal pulses.   Murmur heard.  Pulmonary/Chest: Effort normal and breath sounds normal. No respiratory distress.   Abdominal: Soft. Bowel sounds are normal. She exhibits no shifting dullness, no distension, no pulsatile liver, no fluid wave, no abdominal bruit, no ascites, no pulsatile midline mass and no mass. There is no hepatosplenomegaly. There is tenderness in the suprapubic area. There is no rigidity, no guarding and no CVA tenderness. No hernia.   Musculoskeletal: Normal range of motion. She exhibits no edema, tenderness or deformity.   Neurological: She is alert and oriented to person, place, and time. No cranial nerve deficit.   Skin: Skin is warm and dry. Capillary refill takes less than 2 seconds.   Psychiatric: She has a normal mood and affect. Her behavior is normal. Thought content normal.   Nursing note and vitals reviewed.      Results Review:  I reviewed the patient's new clinical results.  I reviewed the patient's new imaging results and agree with the interpretation.  I reviewed the patient's other test results and agree with the interpretatio   Discussed with ED provider.    Lab Results (last 24 hours)     Procedure Component Value Units Date/Time    CBC & Differential [[id number]] Collected:  04/15/19 1046    Specimen:  Blood Updated:  04/15/19 1112    Narrative:       The following orders were created for panel order CBC & Differential.  Procedure                               Abnormality         Status                     ---------                               -----------         ------                     CBC Auto Differential[[id number]]        Abnormal            Final result                 Please view results for these tests on the individual orders.    Comprehensive Metabolic Panel [[id number]]   (Abnormal) Collected:  04/15/19 1046    Specimen:  Blood Updated:  04/15/19 1122     Glucose 103 mg/dL      BUN 14 mg/dL      Creatinine 1.58 mg/dL      Sodium 136 mmol/L      Potassium 3.4 mmol/L      Chloride 100 mmol/L      CO2 19.5 mmol/L      Calcium 8.1 mg/dL      Total Protein 6.6 g/dL      Albumin 3.10 g/dL      ALT (SGPT) 15 U/L      AST (SGOT) 19 U/L      Alkaline Phosphatase 96 U/L      Total Bilirubin 0.7 mg/dL      eGFR Non African Amer 31 mL/min/1.73      Globulin 3.5 gm/dL      A/G Ratio 0.9 g/dL      BUN/Creatinine Ratio 8.9     Anion Gap 16.5 mmol/L     Narrative:       GFR Normal >60  Chronic Kidney Disease <60  Kidney Failure <15    Magnesium [[id number]]  (Abnormal) Collected:  04/15/19 1046    Specimen:  Blood Updated:  04/15/19 1122     Magnesium 1.4 mg/dL     CBC Auto Differential [[id number]]  (Abnormal) Collected:  04/15/19 1046    Specimen:  Blood Updated:  04/15/19 1112     WBC 18.92 10*3/mm3      RBC 4.49 10*6/mm3      Hemoglobin 11.4 g/dL      Hematocrit 36.4 %      MCV 81.1 fL      MCH 25.4 pg      MCHC 31.3 g/dL      RDW 14.6 %      RDW-SD 43.4 fl      MPV 12.6 fL      Platelets 220 10*3/mm3      Neutrophil % 94.0 %      Lymphocyte % 1.9 %      Monocyte % 3.2 %      Eosinophil % 0.0 %      Basophil % 0.1 %      Immature Grans % 0.8 %      Neutrophils, Absolute 17.79 10*3/mm3      Lymphocytes, Absolute 0.36 10*3/mm3      Monocytes, Absolute 0.60 10*3/mm3      Eosinophils, Absolute 0.00 10*3/mm3      Basophils, Absolute 0.02 10*3/mm3      Immature Grans, Absolute 0.15 10*3/mm3      nRBC 0.0 /100 WBC     Urinalysis With Microscopic If Indicated (No Culture) - Urine, Clean Catch [[id number]]  (Abnormal) Collected:  04/15/19 1148    Specimen:  Urine, Clean Catch Updated:  04/15/19 1158     Color, UA Yellow     Appearance, UA Cloudy     pH, UA 6.0     Specific Gravity, UA 1.011     Glucose, UA Negative     Ketones, UA Negative     Bilirubin, UA Negative     Blood, UA Small (1+)     Protein, UA  >=300 mg/dL (3+)     Leuk Esterase, UA Moderate (2+)     Nitrite, UA Positive     Urobilinogen, UA 0.2 E.U./dL    Urinalysis, Microscopic Only - Urine, Clean Catch [[id number]]  (Abnormal) Collected:  04/15/19 1148    Specimen:  Urine, Clean Catch Updated:  04/15/19 1158     RBC, UA 13-20 /HPF      WBC, UA Too Numerous to Count /HPF      Bacteria, UA 4+ /HPF      Squamous Epithelial Cells, UA 0-2 /HPF      Hyaline Casts, UA 3-6 /LPF      Methodology Automated Microscopy    Urine Culture - Urine, Urine, Clean Catch [[id number]] Collected:  04/15/19 1148    Specimen:  Urine, Clean Catch Updated:  04/15/19 1311          Imaging Results (last 24 hours)     Procedure Component Value Units Date/Time    CT Head Without Contrast [[id number]] Collected:  04/15/19 1224     Updated:  04/15/19 1224    Narrative:       CT HEAD WITHOUT CONTRAST     HISTORY: Head injury, headache.     COMPARISON: CT head 07/25/2018.     FINDINGS: A small scalp hematoma is noted in the frontal region  anteriorly and to the right. There is no evidence of a calvarial  fracture.     There is age-appropriate atrophy. There is mild small vessel ischemic  disease. Moderate vascular calcification is noted. There is no evidence  of intracranial hemorrhage, hydrocephalus or of abnormal extra-axial  fluid. No focal area of decreased attenuation to suggest acute  infarction is identified. Moderate vascular calcification is noted.       Impression:       1. No evidence of fracture or of intracranial hemorrhage. The thin  subdural which was present on 07/25/2018 has resolved with no evidence  of residual hygroma.  2. Small scalp hematoma in the right frontal region.   3. Mild small vessel ischemic disease and age-appropriate atrophy.  Further evaluation could be performed with MRI examination of brain as  indicated.           Radiation dose reduction techniques were utilized, including automated  exposure control and exposure modulation based on body size.                 Results for orders placed during the hospital encounter of 07/24/18   Adult Transthoracic Echo Complete W/ Cont if Necessary Per Protocol (With Agitated Saline)    Narrative · Left ventricular wall thickness is consistent with mild concentric   hypertrophy.  · Left ventricular systolic function is normal. Estimated EF = 58%.  · Left ventricular diastolic dysfunction (grade I) consistent with   impaired relaxation.  · Moderate to severe aortic valve stenosis is present.  · Aortic valve maximum pressure gradient is 39 mmHg.  · Aortic valve mean pressure gradient is 23 mmHg.  · Aortic valve area is 0.92 cm2.  · There is moderate calcification of the aortic valve mainly affecting the   non, left and right coronary cusp(s).  · Mild aortic valve regurgitation is present.  · No evidence of a patent foramen ovale. No evidence of an atrial septal   defect present. . Saline test results are negative.          No orders to display        Assessment/Plan     Active Hospital Problems    Diagnosis POA   • **Acute UTI (urinary tract infection) [N39.0] Yes   • RENO (acute kidney injury) (CMS/HCC) [N17.9] Yes   • Hypokalemia [E87.6] Yes   • Hypomagnesemia [E83.42] Yes   • Facial contusion [S00.83XA] Yes   • Stenosis of right internal carotid artery [I65.21] Yes   • History of right MCA stroke [Z86.73] Not Applicable   • Essential hypertension [I10] Yes     Acute UTI  -Admit to telemetry   -Rocephin daily   -Urine culture pending  -Supportive treatment with antiemetics and IVF    Acute kidney injury   -Patient has acute renal failure, with rise in creatinine to 1.58 from previous baseline of 1.07.  - check BMP daily   - Avoid NSAIDS/nephrotoxic agents and renally dose medications  - Monitor Ins/Outs, volume status, and electrolytes  - Given suspected perenal azotemia, will give IVF for volume expansion NS 100cc/hr     Mild hypokalemia and hypomagnesia/ Metabolic acidosis   -secondary to decreased PO intake   -Replace  electrolytes and monitor daily  - Acidosis should correct with IVF and UTI treatment     Facial injury   - CT head without acute process. No acute neurologic changes   -PT therapy   -Falls precautions     · I discussed the patients findings and my recommendations with patient and family.    VTE Prophylaxis - SCDs .  Code Status - Full code.       CYNTHIA Desir  [facility]  04/15/19  2:59 PM

## 2019-04-15 NOTE — ED PROVIDER NOTES
EMERGENCY DEPARTMENT ENCOUNTER    CHIEF COMPLAINT  Chief Complaint: urinary frequency  History given by: patient  History limited by: nothing  Room Number: 07/07  PMD: Alexis Wiggins MD      HPI:  Pt is a 83 y.o. female who presents complaining of urinary frequency that began 6 days ago and has been progressively worsening since then.  Pt suffered a fall 6 days ago while walking into her [employer] card game. She did not come to the hospital at that time because she does not take blood thinners and did have any symptoms other than bruising.  Yesterday, she developed nausea and vomiting and was concerned that she has a brain bleed. Pt has a h/x of brain bleed from a fall several months ago over the summer. Pt denies HA, focal weakness, and numbness. She has no other complaints at this time.     Duration:  6 days  Onset: gradual  Timing: constant  Location: n/a  Radiation: n/a  Quality: frequency  Intensity/Severity: moderate  Progression: worsening  Associated Symptoms: fall, N/V, bruising on face  Aggravating Factors: none  Alleviating Factors: none  Previous Episodes: none  Treatment before arrival: none    PAST MEDICAL HISTORY  Active Ambulatory Problems     Diagnosis Date Noted   • Pneumonia of right lower lobe due to infectious organism (CMS/HCC) 12/30/2016   • Essential hypertension 01/18/2017   • Mixed hyperlipidemia 09/07/2017   • Acute allergic rhinitis due to pollen 04/10/2018   • recent traumatic SDH no LOC 07/12/2018   • Fall (on) (from) other stairs and steps, initial encounter 07/12/2018   • Laceration of forehead 07/12/2018   • Left sided numbness 07/24/2018   • Left-sided weakness 07/24/2018   • Difficulty with speech 07/24/2018   • Lung nodule 07/24/2018   • History of right MCA stroke 07/24/2018   • Acute right MCA stroke (CMS/HCC) 07/24/2018   • Abnormal CT scan, lung 07/25/2018   • Stenosis of right internal carotid artery 07/25/2018   • Hematochezia 08/05/2018   • Hemorrhoids 08/05/2018   •  Anemia 08/05/2018   • Hyponatremia 08/05/2018   • Weakness 08/05/2018   • Subungual hematoma of digit of hand 03/05/2019     Resolved Ambulatory Problems     Diagnosis Date Noted   • No Resolved Ambulatory Problems     Past Medical History:   Diagnosis Date   • DVT (deep venous thrombosis) (CMS/HCC)    • Hyperlipidemia    • Hypertension    • Stroke (CMS/HCC)    • Subdural hematoma (CMS/HCC)        PAST SURGICAL HISTORY  Past Surgical History:   Procedure Laterality Date   • CAROTID ENDARTERECTOMY Right 7/26/2018    Procedure: RT CAROTID ENDARTERECTOMY;  Surgeon: Inna Villarreal MD;  Location: [facility] MAIN OR;  Service: Vascular   • COLONOSCOPY N/A 8/7/2018    Procedure: COLONOSCOPY to cecum and TI with cold snare polypectomies;  Surgeon: Ken Tidwell MD;  Location: [facility] ENDOSCOPY;  Service: Gastroenterology   • HIP SURGERY     • HYSTERECTOMY     • JOINT REPLACEMENT     • THYROIDECTOMY         FAMILY HISTORY  Family History   Problem Relation Age of Onset   • Cancer Mother    • Dementia Father    • Hypertension Father    • Hypertension Daughter    • Cancer Daughter        SOCIAL HISTORY  Social History     Socioeconomic History   • Marital status:      Spouse name: Not on file   • Number of children: Not on file   • Years of education: Not on file   • Highest education level: Not on file   Tobacco Use   • Smoking status: Former Smoker   • Tobacco comment: quit 2001   Substance and Sexual Activity   • Alcohol use: Yes   • Drug use: No   • Sexual activity: Defer       ALLERGIES  Sulfa antibiotics and Tekturna [aliskiren]    REVIEW OF SYSTEMS  Review of Systems   Constitutional: Negative for fever.   HENT: Negative for sore throat.         Bruising on face   Eyes: Negative.    Respiratory: Negative for cough and shortness of breath.    Cardiovascular: Negative for chest pain.   Gastrointestinal: Positive for nausea and vomiting. Negative for abdominal pain and diarrhea.   Genitourinary: Positive  for frequency. Negative for dysuria.   Musculoskeletal: Negative for neck pain.   Skin: Negative for rash.   Neurological: Negative for weakness, numbness and headaches.   Hematological: Negative.    Psychiatric/Behavioral: Negative.    All other systems reviewed and are negative.      PHYSICAL EXAM  ED Triage Vitals [04/15/19 1017]   Temp Heart Rate Resp BP SpO2   97.6 °F (36.4 °C) 90 16 118/58 96 %      Temp src Heart Rate Source Patient Position BP Location FiO2 (%)   Tympanic Monitor -- -- --       Physical Exam   Constitutional: She is oriented to person, place, and time and well-developed, well-nourished, and in no distress. No distress.   HENT:   Right Ear: Tympanic membrane normal. No hemotympanum.   Left Ear: Tympanic membrane normal. No hemotympanum.   Mouth/Throat: Mucous membranes are normal.   Right forehead hematoma with extensive ecchymosis of the forehead and mid-face bilaterally.   Eyes: EOM are normal. Right conjunctiva is not injected. Right conjunctiva has no hemorrhage. Left conjunctiva is not injected. Left conjunctiva has no hemorrhage.   Pupils are 4mm bilaterally. No hyphema..   Cardiovascular: Normal rate and regular rhythm. Exam reveals no gallop and no friction rub.   No murmur heard.  Pulmonary/Chest: Effort normal. No respiratory distress. She has no wheezes. She has no rhonchi. She has no rales.   Breath sounds are symmetric.   Abdominal: Soft. She exhibits no distension. There is no tenderness. There is no guarding.   Musculoskeletal: Normal range of motion. She exhibits no deformity.   Neurological: She is alert and oriented to person, place, and time.   moving all extremities, no focal deficits   Skin: Skin is warm and dry.   Psychiatric:   Calm, cooperative       LAB RESULTS  Lab Results (last 24 hours)     Procedure Component Value Units Date/Time    CBC & Differential [[id number]] Collected:  04/15/19 1046    Specimen:  Blood Updated:  04/15/19 1112    Narrative:       The  following orders were created for panel order CBC & Differential.  Procedure                               Abnormality         Status                     ---------                               -----------         ------                     CBC Auto Differential[[id number]]        Abnormal            Final result                 Please view results for these tests on the individual orders.    Comprehensive Metabolic Panel [[id number]]  (Abnormal) Collected:  04/15/19 1046    Specimen:  Blood Updated:  04/15/19 1122     Glucose 103 mg/dL      BUN 14 mg/dL      Creatinine 1.58 mg/dL      Sodium 136 mmol/L      Potassium 3.4 mmol/L      Chloride 100 mmol/L      CO2 19.5 mmol/L      Calcium 8.1 mg/dL      Total Protein 6.6 g/dL      Albumin 3.10 g/dL      ALT (SGPT) 15 U/L      AST (SGOT) 19 U/L      Alkaline Phosphatase 96 U/L      Total Bilirubin 0.7 mg/dL      eGFR Non African Amer 31 mL/min/1.73      Globulin 3.5 gm/dL      A/G Ratio 0.9 g/dL      BUN/Creatinine Ratio 8.9     Anion Gap 16.5 mmol/L     Narrative:       GFR Normal >60  Chronic Kidney Disease <60  Kidney Failure <15    Magnesium [[id number]]  (Abnormal) Collected:  04/15/19 1046    Specimen:  Blood Updated:  04/15/19 1122     Magnesium 1.4 mg/dL     CBC Auto Differential [[id number]]  (Abnormal) Collected:  04/15/19 1046    Specimen:  Blood Updated:  04/15/19 1112     WBC 18.92 10*3/mm3      RBC 4.49 10*6/mm3      Hemoglobin 11.4 g/dL      Hematocrit 36.4 %      MCV 81.1 fL      MCH 25.4 pg      MCHC 31.3 g/dL      RDW 14.6 %      RDW-SD 43.4 fl      MPV 12.6 fL      Platelets 220 10*3/mm3      Neutrophil % 94.0 %      Lymphocyte % 1.9 %      Monocyte % 3.2 %      Eosinophil % 0.0 %      Basophil % 0.1 %      Immature Grans % 0.8 %      Neutrophils, Absolute 17.79 10*3/mm3      Lymphocytes, Absolute 0.36 10*3/mm3      Monocytes, Absolute 0.60 10*3/mm3      Eosinophils, Absolute 0.00 10*3/mm3      Basophils, Absolute 0.02 10*3/mm3      Immature Grans,  Absolute 0.15 10*3/mm3      nRBC 0.0 /100 WBC     Urinalysis With Microscopic If Indicated (No Culture) - Urine, Clean Catch [[id number]]  (Abnormal) Collected:  04/15/19 1148    Specimen:  Urine, Clean Catch Updated:  04/15/19 1158     Color, UA Yellow     Appearance, UA Cloudy     pH, UA 6.0     Specific Gravity, UA 1.011     Glucose, UA Negative     Ketones, UA Negative     Bilirubin, UA Negative     Blood, UA Small (1+)     Protein, UA >=300 mg/dL (3+)     Leuk Esterase, UA Moderate (2+)     Nitrite, UA Positive     Urobilinogen, UA 0.2 E.U./dL    Urinalysis, Microscopic Only - Urine, Clean Catch [[id number]]  (Abnormal) Collected:  04/15/19 1148    Specimen:  Urine, Clean Catch Updated:  04/15/19 1158     RBC, UA 13-20 /HPF      WBC, UA Too Numerous to Count /HPF      Bacteria, UA 4+ /HPF      Squamous Epithelial Cells, UA 0-2 /HPF      Hyaline Casts, UA 3-6 /LPF      Methodology Automated Microscopy    Urine Culture - Urine, Urine, Clean Catch [[id number]] Collected:  04/15/19 1148    Specimen:  Urine, Clean Catch Updated:  04/15/19 1311          I ordered the above labs and reviewed the results    RADIOLOGY  CT Head Without Contrast   Preliminary Result   1. No evidence of fracture or of intracranial hemorrhage. The thin   subdural which was present on 07/25/2018 has resolved with no evidence   of residual hygroma.   2. Small scalp hematoma in the right frontal region.    3. Mild small vessel ischemic disease and age-appropriate atrophy.   Further evaluation could be performed with MRI examination of brain as   indicated.               Radiation dose reduction techniques were utilized, including automated   exposure control and exposure modulation based on body size.                   I ordered the above noted radiological studies. Interpreted by radiologist.  Reviewed by me in PACS.       PROCEDURES  Procedures      PROGRESS AND CONSULTS       1025  Ordered labs for further evaluation. Ordered zofran for nausea  and IV fluids for hydration.     1036  Ordered CT head for further evaluation.     1214  Ordered rocephin to treat UTI.    1253  Placed call to [facility] for admission.     1302  Rechecked Pt who is resting comfortably. Informed her that her CT head was unremarkable but her UA indicates that she has a UTI. Discussed plans to admit pt for pyelonephritis. Pt understands and agrees to all plans. All questions answered.     1319  Discussed Pt's case with Kaylin (APRN with [facility]) who agrees to admit Pt to Dr. Ortiz. Pt will go to a telemetry bed.     MEDICAL DECISION MAKING  Results were reviewed/discussed with the patient and they were also made aware of online access. Pt also made aware that some labs, such as cultures, will not be resulted during ER visit and follow up with PMD is necessary.     MDM  Number of Diagnoses or Management Options  RENO (acute kidney injury) (CMS/[facility]):   Non-intractable vomiting with nausea, unspecified vomiting type:   Pyelonephritis:   Diagnosis management comments: Patient with UTI and RENO with nausea/vomiting, concerning for pyelonephritis. Started on IV Rocephin. No other acute concerning findings on imaging or labs. Patient to be hospitalized to [facility]. Patient and family updated on the course and plan and need for hospitalization.        Amount and/or Complexity of Data Reviewed  Clinical lab tests: ordered and reviewed (Bacteria UA=4+, nitrite UA=positive, creatinine=1.58, WBC=18.92)  Tests in the radiology section of CPT®: ordered and reviewed (CT head shows nothing acute.)  Decide to obtain previous medical records or to obtain history from someone other than the patient: yes  Discuss the patient with other providers: yes           DIAGNOSIS  Final diagnoses:   Pyelonephritis   Non-intractable vomiting with nausea, unspecified vomiting type   RENO (acute kidney injury) (CMS/[facility])       DISPOSITION  ADMISSION    Discussed treatment plan and reason for admission with pt/family and admitting  physician.  Pt/family voiced understanding of the plan for admission for further testing/treatment as needed.       Latest Documented Vital Signs:  As of 1:32 PM  BP- 142/50 HR- 93 Temp- 97.6 °F (36.4 °C) (Tympanic) O2 sat- 94%    --  Documentation assistance provided by willie Valdes for Dr. Macias.  Information recorded by the scribe was done at my direction and has been verified and validated by me.          Jennifer Valdes  04/15/19 [id number]       Vaughn Macias MD  04/15/19 [id number]

## 2019-04-15 NOTE — ED NOTES
"Nursing report ED to floor  Gita Wharton  83 y.o.  female    HPI (triage note):   Chief Complaint   Patient presents with   • Urinary Frequency     with burning.  vomiting started this am   • Fall     last wednesday - was not seen at that time       Admitting doctor:   Solomon Ortiz MD    Admitting diagnosis:   There were no encounter diagnoses.    Code status:   Current Code Status     Date Active Code Status Order ID Comments User Context       Prior          Allergies:   Sulfa antibiotics and Tekturna [aliskiren]    Weight:       04/15/19  1017   Weight: 54.9 kg (121 lb)       Most recent vitals:   Vitals:    04/15/19 1017 04/15/19 1302   BP: 118/58 142/50   Pulse: 90 93   Resp: 16 16   Temp: 97.6 °F (36.4 °C)    TempSrc: Tympanic    SpO2: 96% 94%   Weight: 54.9 kg (121 lb)    Height: 167.6 cm (66\")        Active LDAs/IV Access:   Lines, Drains & Airways    Active LDAs     Name:   Placement date:   Placement time:   Site:   Days:    Peripheral IV 04/15/19 1040 Right Antecubital   04/15/19    1040    Antecubital   less than 1                Labs (abnormal labs have a star):   Labs Reviewed   COMPREHENSIVE METABOLIC PANEL - Abnormal; Notable for the following components:       Result Value    Glucose 103 (*)     Creatinine 1.58 (*)     Potassium 3.4 (*)     CO2 19.5 (*)     Calcium 8.1 (*)     Albumin 3.10 (*)     eGFR Non  Amer 31 (*)     All other components within normal limits    Narrative:     GFR Normal >60  Chronic Kidney Disease <60  Kidney Failure <15   URINALYSIS W/ MICROSCOPIC IF INDICATED (NO CULTURE) - Abnormal; Notable for the following components:    Appearance, UA Cloudy (*)     Blood, UA Small (1+) (*)     Protein, UA >=300 mg/dL (3+) (*)     Leuk Esterase, UA Moderate (2+) (*)     Nitrite, UA Positive (*)     All other components within normal limits   MAGNESIUM - Abnormal; Notable for the following components:    Magnesium 1.4 (*)     All other components within normal limits "   CBC WITH AUTO DIFFERENTIAL - Abnormal; Notable for the following components:    WBC 18.92 (*)     Hemoglobin 11.4 (*)     MCH 25.4 (*)     MCHC 31.3 (*)     MPV 12.6 (*)     Neutrophil % 94.0 (*)     Lymphocyte % 1.9 (*)     Monocyte % 3.2 (*)     Eosinophil % 0.0 (*)     Immature Grans % 0.8 (*)     Neutrophils, Absolute 17.79 (*)     Lymphocytes, Absolute 0.36 (*)     Immature Grans, Absolute 0.15 (*)     All other components within normal limits   URINALYSIS, MICROSCOPIC ONLY - Abnormal; Notable for the following components:    RBC, UA 13-20 (*)     WBC, UA Too Numerous to Count (*)     Bacteria, UA 4+ (*)     All other components within normal limits   URINE CULTURE   CBC AND DIFFERENTIAL    Narrative:     The following orders were created for panel order CBC & Differential.  Procedure                               Abnormality         Status                     ---------                               -----------         ------                     CBC Auto Differential[[id number]]        Abnormal            Final result                 Please view results for these tests on the individual orders.       EKG:   No orders to display       Meds given in ED:   Medications   ondansetron (ZOFRAN) injection 4 mg (4 mg Intravenous Given 4/15/19 1047)   sodium chloride 0.9 % bolus 1,000 mL (0 mL Intravenous Stopped 4/15/19 1314)   cefTRIAXone (ROCEPHIN) IVPB 1 g (0 g Intravenous Stopped 4/15/19 1314)       Imaging results:  Ct Head Without Contrast    Result Date: 4/15/2019  1. No evidence of fracture or of intracranial hemorrhage. The thin subdural which was present on 07/25/2018 has resolved with no evidence of residual hygroma. 2. Small scalp hematoma in the right frontal region. 3. Mild small vessel ischemic disease and age-appropriate atrophy. Further evaluation could be performed with MRI examination of brain as indicated.    Radiation dose reduction techniques were utilized, including automated exposure control and  exposure modulation based on body size.         Ambulatory status:   - up with assit    Social issues:   Social History     Socioeconomic History   • Marital status:      Spouse name: Not on file   • Number of children: Not on file   • Years of education: Not on file   • Highest education level: Not on file   Tobacco Use   • Smoking status: Former Smoker   • Tobacco comment: quit 2001   Substance and Sexual Activity   • Alcohol use: Yes   • Drug use: No   • Sexual activity: Defer        Chandni Quiroga RN  04/15/19 4575

## 2019-04-16 PROBLEM — N12 PYELONEPHRITIS: Status: ACTIVE | Noted: 2019-04-16

## 2019-04-16 LAB
ANION GAP SERPL CALCULATED.3IONS-SCNC: 13.2 MMOL/L
BUN BLD-MCNC: 15 MG/DL (ref 8–23)
BUN/CREAT SERPL: 11.5 (ref 7–25)
CALCIUM SPEC-SCNC: 7.6 MG/DL (ref 8.6–10.5)
CHLORIDE SERPL-SCNC: 104 MMOL/L (ref 98–107)
CO2 SERPL-SCNC: 18.8 MMOL/L (ref 22–29)
CREAT BLD-MCNC: 1.31 MG/DL (ref 0.57–1)
DEPRECATED RDW RBC AUTO: 45.7 FL (ref 37–54)
ERYTHROCYTE [DISTWIDTH] IN BLOOD BY AUTOMATED COUNT: 15 % (ref 12.3–15.4)
GFR SERPL CREATININE-BSD FRML MDRD: 39 ML/MIN/1.73
GLUCOSE BLD-MCNC: 90 MG/DL (ref 65–99)
HCT VFR BLD AUTO: 36.7 % (ref 34–46.6)
HGB BLD-MCNC: 11.1 G/DL (ref 12–15.9)
MAGNESIUM SERPL-MCNC: 3.2 MG/DL (ref 1.6–2.4)
MCH RBC QN AUTO: 25.4 PG (ref 26.6–33)
MCHC RBC AUTO-ENTMCNC: 30.2 G/DL (ref 31.5–35.7)
MCV RBC AUTO: 84 FL (ref 79–97)
PLATELET # BLD AUTO: 169 10*3/MM3 (ref 140–450)
PMV BLD AUTO: 13 FL (ref 6–12)
POTASSIUM BLD-SCNC: 4.7 MMOL/L (ref 3.5–5.2)
RBC # BLD AUTO: 4.37 10*6/MM3 (ref 3.77–5.28)
SODIUM BLD-SCNC: 136 MMOL/L (ref 136–145)
WBC NRBC COR # BLD: 17.96 10*3/MM3 (ref 3.4–10.8)

## 2019-04-16 PROCEDURE — 80048 BASIC METABOLIC PNL TOTAL CA: CPT | Performed by: NURSE PRACTITIONER

## 2019-04-16 PROCEDURE — 36415 COLL VENOUS BLD VENIPUNCTURE: CPT | Performed by: NURSE PRACTITIONER

## 2019-04-16 PROCEDURE — 25010000002 CEFTRIAXONE PER 250 MG: Performed by: NURSE PRACTITIONER

## 2019-04-16 PROCEDURE — 83735 ASSAY OF MAGNESIUM: CPT | Performed by: HOSPITALIST

## 2019-04-16 PROCEDURE — 85027 COMPLETE CBC AUTOMATED: CPT | Performed by: NURSE PRACTITIONER

## 2019-04-16 PROCEDURE — 97162 PT EVAL MOD COMPLEX 30 MIN: CPT

## 2019-04-16 RX ORDER — LOSARTAN POTASSIUM 100 MG/1
100 TABLET ORAL DAILY
Status: DISCONTINUED | OUTPATIENT
Start: 2019-04-17 | End: 2019-04-16

## 2019-04-16 RX ORDER — AMLODIPINE BESYLATE 10 MG/1
10 TABLET ORAL
Status: DISCONTINUED | OUTPATIENT
Start: 2019-04-16 | End: 2019-04-17 | Stop reason: HOSPADM

## 2019-04-16 RX ORDER — SODIUM CHLORIDE, SODIUM LACTATE, POTASSIUM CHLORIDE, CALCIUM CHLORIDE 600; 310; 30; 20 MG/100ML; MG/100ML; MG/100ML; MG/100ML
75 INJECTION, SOLUTION INTRAVENOUS CONTINUOUS
Status: DISCONTINUED | OUTPATIENT
Start: 2019-04-16 | End: 2019-04-17 | Stop reason: HOSPADM

## 2019-04-16 RX ORDER — INDAPAMIDE 2.5 MG/1
2.5 TABLET, FILM COATED ORAL DAILY
Status: DISCONTINUED | OUTPATIENT
Start: 2019-04-16 | End: 2019-04-16

## 2019-04-16 RX ORDER — LOSARTAN POTASSIUM 100 MG/1
100 TABLET ORAL
Status: DISCONTINUED | OUTPATIENT
Start: 2019-04-16 | End: 2019-04-17 | Stop reason: HOSPADM

## 2019-04-16 RX ORDER — ASPIRIN 81 MG/1
81 TABLET ORAL DAILY
Status: DISCONTINUED | OUTPATIENT
Start: 2019-04-17 | End: 2019-04-17 | Stop reason: HOSPADM

## 2019-04-16 RX ORDER — AMLODIPINE BESYLATE 10 MG/1
10 TABLET ORAL DAILY
Status: DISCONTINUED | OUTPATIENT
Start: 2019-04-17 | End: 2019-04-16

## 2019-04-16 RX ORDER — ATORVASTATIN CALCIUM 20 MG/1
20 TABLET, FILM COATED ORAL DAILY
Status: DISCONTINUED | OUTPATIENT
Start: 2019-04-17 | End: 2019-04-17 | Stop reason: HOSPADM

## 2019-04-16 RX ADMIN — SODIUM CHLORIDE, POTASSIUM CHLORIDE, SODIUM LACTATE AND CALCIUM CHLORIDE 75 ML/HR: 600; 310; 30; 20 INJECTION, SOLUTION INTRAVENOUS at 21:15

## 2019-04-16 RX ADMIN — METOPROLOL TARTRATE 25 MG: 25 TABLET ORAL at 20:26

## 2019-04-16 RX ADMIN — SODIUM CHLORIDE 100 ML/HR: 9 INJECTION, SOLUTION INTRAVENOUS at 20:26

## 2019-04-16 RX ADMIN — SODIUM CHLORIDE 100 ML/HR: 9 INJECTION, SOLUTION INTRAVENOUS at 02:35

## 2019-04-16 RX ADMIN — CEFTRIAXONE SODIUM 1 G: 1 INJECTION, SOLUTION INTRAVENOUS at 09:49

## 2019-04-16 RX ADMIN — SODIUM CHLORIDE, PRESERVATIVE FREE 3 ML: 5 INJECTION INTRAVENOUS at 09:50

## 2019-04-16 RX ADMIN — SODIUM CHLORIDE 100 ML/HR: 9 INJECTION, SOLUTION INTRAVENOUS at 11:33

## 2019-04-16 RX ADMIN — SODIUM CHLORIDE, PRESERVATIVE FREE 3 ML: 5 INJECTION INTRAVENOUS at 20:27

## 2019-04-16 RX ADMIN — AMLODIPINE BESYLATE 10 MG: 10 TABLET ORAL at 20:26

## 2019-04-16 RX ADMIN — LOSARTAN POTASSIUM 100 MG: 100 TABLET, FILM COATED ORAL at 20:25

## 2019-04-16 NOTE — THERAPY EVALUATION
Acute Care - Physical Therapy Initial Evaluation  [facility]     Patient Name: Gita Wharton  : 1935  MRN: [id number]  Today's Date: 2019   Onset of Illness/Injury or Date of Surgery: 04/15/19     Referring Physician: Diana      Admit Date: 4/15/2019    Visit Dx:     ICD-10-CM ICD-9-CM   1. Pyelonephritis N12 590.80   2. Non-intractable vomiting with nausea, unspecified vomiting type R11.2 787.01   3. RENO (acute kidney injury) (CMS/HCC) N17.9 584.9   4. Unsteadiness on feet R26.81 781.2     Patient Active Problem List   Diagnosis   • Pneumonia of right lower lobe due to infectious organism (CMS/HCC)   • Essential hypertension   • Mixed hyperlipidemia   • Acute allergic rhinitis due to pollen   • recent traumatic SDH no LOC   • Fall (on) (from) other stairs and steps, initial encounter   • Laceration of forehead   • Left sided numbness   • Left-sided weakness   • Difficulty with speech   • Lung nodule   • History of right MCA stroke   • Acute right MCA stroke (CMS/HCC)   • Abnormal CT scan, lung   • Stenosis of right internal carotid artery   • Hematochezia   • Hemorrhoids   • Anemia   • Hyponatremia   • Weakness   • Subungual hematoma of digit of hand   • RENO (acute kidney injury) (CMS/HCC)   • Acute UTI (urinary tract infection)   • Hypokalemia   • Hypomagnesemia   • Facial contusion     Past Medical History:   Diagnosis Date   • DVT (deep venous thrombosis) (CMS/HCC)    • Hyperlipidemia    • Hypertension    • Stroke (CMS/HCC)    • Subdural hematoma (CMS/HCC)      Past Surgical History:   Procedure Laterality Date   • CAROTID ENDARTERECTOMY Right 2018    Procedure: RT CAROTID ENDARTERECTOMY;  Surgeon: Inna Villarreal MD;  Location: [facility] MAIN OR;  Service: Vascular   • COLONOSCOPY N/A 2018    Procedure: COLONOSCOPY to cecum and TI with cold snare polypectomies;  Surgeon: Ken Tidwell MD;  Location: [facility] ENDOSCOPY;  Service: Gastroenterology   • HIP SURGERY     •  HYSTERECTOMY     • JOINT REPLACEMENT     • THYROIDECTOMY          PT ASSESSMENT (last 12 hours)      Physical Therapy Evaluation     Row Name 04/16/19 0845          PT Evaluation Time/Intention    Subjective Information  no complaints  -EE     Document Type  evaluation  -EE     Mode of Treatment  physical therapy;individual therapy  -EE     Patient Effort  good  -EE     Symptoms Noted During/After Treatment  none  -EE     Row Name 04/16/19 0845          General Information    Onset of Illness/Injury or Date of Surgery  04/15/19  -EE     Referring Physician  Diana  -EE     Patient Observations  alert;cooperative;agree to therapy  -EE     Patient/Family Observations  Pt supine in bed in no acute distress. Bruising noted along facial area.  -EE     Prior Level of Function  independent:;all household mobility;community mobility  -EE     Equipment Currently Used at Home  cane, straight  -EE     Pertinent History of Current Functional Problem  fall, acute UTI; prior hx of CVA  -EE     Existing Precautions/Restrictions  fall  -EE     Barriers to Rehab  none identified  -EE     Row Name 04/16/19 0845          Relationship/Environment    Lives With  alone  -EE     Row Name 04/16/19 0845          Resource/Environmental Concerns    Current Living Arrangements  home/apartment/condo  -EE     Row Name 04/16/19 0845          Home Main Entrance    Number of Stairs, Main Entrance  three  -EE     Row Name 04/16/19 0845          Cognitive Assessment/Interventions    Additional Documentation  Cognitive Assessment/Intervention (Group)  -EE     Row Name 04/16/19 0845          Cognitive Assessment/Intervention- PT/OT    Orientation Status (Cognition)  oriented x 4  -EE     Follows Commands (Cognition)  WNL  -EE     Safety Deficit (Cognitive)  mild deficit  -EE     Personal Safety Interventions  fall prevention program maintained;gait belt;muscle strengthening facilitated;nonskid shoes/slippers when out of bed;supervised activity  -EE      Row Name 04/16/19 0845          Safety Issues, Functional Mobility    Impairments Affecting Function (Mobility)  balance  -EE     Row Name 04/16/19 0845          Bed Mobility Assessment/Treatment    Bed Mobility Assessment/Treatment  supine-sit  -EE     Supine-Sit [place] (Bed Mobility)  conditional independence  -EE     Assistive Device (Bed Mobility)  bed rails  -EE     Row Name 04/16/19 0845          Transfer Assessment/Treatment    Transfer Assessment/Treatment  sit-stand transfer;stand-sit transfer  -EE     Sit-Stand [place] (Transfers)  contact guard  -EE     Stand-Sit [place] (Transfers)  contact guard  -EE     Row Name 04/16/19 0845          Gait/Stairs Assessment/Training    [place] Level (Gait)  contact guard;minimum assist (75% patient effort);verbal cues  -EE     Assistive Device (Gait)  -- R UE HHA to simulate cane  -EE     Distance in Feet (Gait)  150  -EE     Pattern (Gait)  step-through  -EE     Deviations/Abnormal Patterns (Gait)  base of support, wide;eliceo decreased;stride length decreased  -EE     Bilateral Gait Deviations  weight shift ability decreased  -EE     Comment (Gait/Stairs)  increased lateral sway during ambulation  -EE     Row Name 04/16/19 0845          General ROM    GENERAL ROM COMMENTS  B LE AROM WFL  -EE     Row Name 04/16/19 0845          MMT (Manual Muscle Testing)    General MMT Comments  B LEs grossly 4-/5  -EE     [facility] Name 04/16/19 0845          Motor Assessment/Intervention    Additional Documentation  Balance (Group);Balance Interventions (Group);Therapeutic Exercise Interventions (Group)  -EE     Row Name 04/16/19 0845          Therapeutic Exercise    Lower Extremity Range of Motion (Therapeutic Exercise)  hip abduction/adduction, bilateral;hip flexion/extension, bilateral heel raises  -EE     Exercise Type (Therapeutic Exercise)  AROM (active range of motion)  -EE     Position (Therapeutic Exercise)  standing B UE support at sink  -EE     Sets/Reps  (Therapeutic Exercise)  1/10  -EE     Row Name 04/16/19 0845          Balance    Balance  dynamic balance activity  -EE     Row Name 04/16/19 0845          Dynamic Balance Activity    Therapeutic Training Performed (Dynamic Balance)  ambulate backward;side stepping 2 x 10'  -EE     Support Needed for Balance (Dynamic Balance Training)  balances without upper extremity support;minimal external support for balance, 75% patient effort  -EE     Row Name 04/16/19 0845          Pain Assessment    Additional Documentation  Pain Scale: Numbers Pre/Post-Treatment (Group)  -EE     Row Name 04/16/19 0845          Pain Scale: Numbers Pre/Post-Treatment    Pain Scale: Numbers, Pretreatment  0/10 - no pain  -EE     Pain Scale: Numbers, Post-Treatment  0/10 - no pain  -EE     Row Name 04/16/19 0845          Plan of Care Review    Plan of Care Reviewed With  patient  -EE     Row Name 04/16/19 0845          Physical Therapy Clinical Impression    Patient/Family Goals Statement (PT Clinical Impression)  Go home  -EE     Criteria for Skilled Interventions Met (PT Clinical Impression)  yes;treatment indicated  -EE     Pathology/Pathophysiology Noted (Describe Specifically for Each System)  musculoskeletal;neuromuscular  -EE     Impairments Found (describe specific impairments)  gait, locomotion, and balance  -EE     Rehab Potential (PT Clinical Summary)  good, to achieve stated therapy goals  -EE     Row Name 04/16/19 0845          Physical Therapy Goals    Transfer Goal Selection (PT)  transfer, PT goal 1  -EE     Gait Training Goal Selection (PT)  gait training, PT goal 1  -EE     Stairs Goal Selection (PT)  stairs, PT goal 1  -EE     Additional Documentation  Stairs Goal Selection (PT) (Row)  -EE     Row Name 04/16/19 0845          Transfer Goal 1 (PT)    Activity/Assistive Device (Transfer Goal 1, PT)  transfers, all  -EE     [place] Level/Cues Needed (Transfer Goal 1, PT)  supervision required  -EE     Time Frame (Transfer  Goal 1, PT)  1 week  -EE     Progress/Outcome (Transfer Goal 1, PT)  goal ongoing  -EE     Row Name 04/16/19 0845          Gait Training Goal 1 (PT)    Activity/Assistive Device (Gait Training Goal 1, PT)  gait (walking locomotion);cane, straight  -EE     [place] Level (Gait Training Goal 1, PT)  standby assist  -EE     Distance (Gait Goal 1, PT)  300  -EE     Row Name 04/16/19 0845          Stairs Goal 1 (PT)    Activity/Assistive Device (Stairs Goal 1, PT)  stairs, all skills  -EE     [place] Level/Cues Needed (Stairs Goal 1, PT)  contact guard assist  -EE     Number of Stairs (Stairs Goal 1, PT)  4  -EE     Time Frame (Stairs Goal 1, PT)  1 week  -EE     Progress/Outcome (Stairs Goal 1, PT)  goal ongoing  -EE     Row Name 04/16/19 0845          Positioning and Restraints    Pre-Treatment Position  in bed  -EE     Post Treatment Position  chair  -EE     In Chair  sitting;call light within reach;encouraged to call for assist;with family/caregiver;with nsg  -EE     Row Name 04/16/19 0845          Living Environment    Home Accessibility  stairs to enter home  -EE       User Key  (r) = Recorded By, (t) = Taken By, (c) = Cosigned By    Initials Name Provider Type    EE Molly Edwards PT Physical Therapist        Physical Therapy Education     Title: PT OT SLP Therapies (In Progress)     Topic: Physical Therapy (In Progress)     Point: Mobility training (Done)     Learning Progress Summary           Patient Acceptance, E,TB, VU,NR by EE at 4/16/2019  9:00 AM                   Point: Home exercise program (Done)     Learning Progress Summary           Patient Acceptance, E,TB, VU,NR by EE at 4/16/2019  9:00 AM                   Point: Body mechanics (Done)     Learning Progress Summary           Patient Acceptance, E,TB, VU,NR by EE at 4/16/2019  9:00 AM                               User Key     Initials Effective Dates Name Provider Type Discipline    EE 04/03/18 -  Molly Edwards PT Physical Therapist PT               PT Recommendation and Plan  Anticipated Discharge Disposition (PT): home with assist, home with OP services  Planned Therapy Interventions (PT Eval): balance training, bed mobility training, gait training, home exercise program, patient/family education, stair training, strengthening, stretching, transfer training  Therapy Frequency (PT Clinical Impression): daily  Outcome Summary/Treatment Plan (PT)  Anticipated Discharge Disposition (PT): home with assist, home with OP services  Plan of Care Reviewed With: patient  Outcome Summary: Pt is an 84 yo female who presents from home with recent fall, acute UTI, prior hx of CVA. Upon exam, pt demonstrates impaired balance and decreased endurance from baseline. Pt was independent with straight cane prior to admission; currently requiring CGA to min A for safety. Pt would benefit from continued PT to address impairments and increase safety and independence prior to DC home. May also benefit from OP PT for high level balance training.   Outcome Measures     Row Name 04/16/19 0900             How much help from another person do you currently need...    Turning from your back to your side while in flat bed without using bedrails?  4  -EE      Moving from lying on back to sitting on the side of a flat bed without bedrails?  4  -EE      Moving to and from a bed to a chair (including a wheelchair)?  3  -EE      Standing up from a chair using your arms (e.g., wheelchair, bedside chair)?  3  -EE      Climbing 3-5 steps with a railing?  3  -EE      To walk in hospital room?  3  -EE      AM-PAC 6 Clicks Score  20  -EE         Functional Assessment    Outcome Measure Options  AM-PAC 6 Clicks Basic Mobility (PT)  -EE        User Key  (r) = Recorded By, (t) = Taken By, (c) = Cosigned By    Initials Name Provider Type    Molly Barker, PT Physical Therapist         Time Calculation:   PT Charges     Row Name 04/16/19 0902             Time Calculation    Start Time  0845  -EE       Stop Time  0854  -EE      Time Calculation (min)  9 min  -EE      PT Received On  04/16/19  -EE      PT - Next Appointment  04/17/19  -EE      PT Goal Re-Cert Due Date  04/23/19  -EE        User Key  (r) = Recorded By, (t) = Taken By, (c) = Cosigned By    Initials Name Provider Type    EE Molly Edwards, PT Physical Therapist        Therapy Charges for Today     Code Description Service Date Service Provider Modifiers Qty    13296634912 HC PT EVAL MOD COMPLEXITY 2 4/16/2019 Molly Edwards, PT GP 1          PT G-Codes  Outcome Measure Options: AM-PAC 6 Clicks Basic Mobility (PT)  AM-PAC 6 Clicks Score: 20      Molly Edwards PT  4/16/2019

## 2019-04-16 NOTE — PLAN OF CARE
Problem: Patient Care Overview  Goal: Plan of Care Review   04/16/19 0900   Coping/Psychosocial   Plan of Care Reviewed With patient   OTHER   Outcome Summary Pt is an 82 yo female who presents from home with recent fall, acute UTI, prior hx of CVA. Upon exam, pt demonstrates impaired balance and decreased endurance from baseline. Pt was independent with straight cane prior to admission; currently requiring CGA to min A for safety. Pt would benefit from continued PT to address impairments and increase safety and independence prior to DC home. May also benefit from OP PT for high level balance training.

## 2019-04-16 NOTE — PROGRESS NOTES
"Discharge Planning Assessment  [facility]     Patient Name: Gita Wharton  MRN: [id number]  Today's Date: 4/16/2019    Admit Date: 4/15/2019    Discharge Needs Assessment     Row Name 04/16/19 1129       Living Environment    Lives With  alone    Current Living Arrangements  home/apartment/condo    Primary Care Provided by  self    Provides Primary Care For  no one    Family Caregiver if Needed  child(elena), adult    Family Caregiver Names  States her daughter and sons can help her if needed.    Quality of Family Relationships  helpful    Able to Return to Prior Arrangements  yes       Resource/Environmental Concerns    Resource/Environmental Concerns  none       Transition Planning    Patient/Family Anticipates Transition to  home    Patient/Family Anticipated Services at Transition  none    Transportation Anticipated  family or friend will provide       Discharge Needs Assessment    Readmission Within the Last 30 Days  no previous admission in last 30 days    Concerns to be Addressed  denies needs/concerns at this time    Equipment Currently Used at Home  cane, straight;grab bar    Anticipated Changes Related to Illness  none        Discharge Plan     Row Name 04/16/19 7552       Plan    Plan  Return home     Patient/Family in Agreement with Plan  yes    Plan Comments  Spoke with patient at bedside.  Patient lives alone, is IADL, uses a cane, has grab bars in the BR.  She states she has 3 steps to enter her house in the back and has not been down to her basement for \"5 years.\"  Patient has used HH in the past and has been to [facility].  EDUARDO signed 4/16 - patient verbalizes understanding of observation - written on white board.  Patient plans to return home at DC and does not anticipate any DC needs.  She states her daughter Marce Grullon [phone] ([facility]) or son Hardik Wharton [phone] can assist her if neede.  CCP will follow.  BHumeniuk RN               Demographic " Summary     Row Name 04/16/19 1442       General Information    Admission Type  observation    Arrived From  home    Referral Source  admission list    Reason for Consult  discharge planning    Preferred Language  English     Used During This Interaction  no        Functional Status     Row Name 04/16/19 1448       Functional Status    Usual Activity Tolerance  moderate    Current Activity Tolerance  moderate       Functional Status, IADL    Medications  independent    Meal Preparation  independent    Housekeeping  independent    Laundry  independent    Shopping  independent       Mental Status    General Appearance WDL  WDL       Mental Status Summary    Recent Changes in Mental Status/Cognitive Functioning  no changes                Becky S. Humeniuk, RN

## 2019-04-16 NOTE — PLAN OF CARE
Problem: Fall Risk (Adult)  Goal: Identify Related Risk Factors and Signs and Symptoms  Outcome: Outcome(s) achieved Date Met: 04/16/19    Goal: Absence of Fall  Outcome: Ongoing (interventions implemented as appropriate)      Problem: Urinary Tract Infection (Adult)  Goal: Signs and Symptoms of Listed Potential Problems Will be Absent, Minimized or Managed (Urinary Tract Infection)  Outcome: Ongoing (interventions implemented as appropriate)      Problem: Patient Care Overview  Goal: Plan of Care Review  Outcome: Ongoing (interventions implemented as appropriate)   04/16/19 3286   Coping/Psychosocial   Plan of Care Reviewed With patient   Plan of Care Review   Progress improving   OTHER   Outcome Summary Patient had no c/o pain or nausea through the night. Up with stand-by assist to bathroom. 2 runs of mag given per protocol and 1 more dose of PO potassium given per protocol, recheck this morning. Low grade fever, treated with Tylenol and now normal. VSS. IVF continued. Encourage bed mobility and ambulation. Will continue to monitor.     Goal: Discharge Needs Assessment  Outcome: Ongoing (interventions implemented as appropriate)

## 2019-04-17 VITALS
WEIGHT: 124 LBS | TEMPERATURE: 98.4 F | RESPIRATION RATE: 18 BRPM | DIASTOLIC BLOOD PRESSURE: 60 MMHG | HEIGHT: 66 IN | SYSTOLIC BLOOD PRESSURE: 149 MMHG | BODY MASS INDEX: 19.93 KG/M2 | OXYGEN SATURATION: 95 % | HEART RATE: 79 BPM

## 2019-04-17 LAB
ANION GAP SERPL CALCULATED.3IONS-SCNC: 10.7 MMOL/L
BACTERIA SPEC AEROBE CULT: ABNORMAL
BUN BLD-MCNC: 11 MG/DL (ref 8–23)
BUN/CREAT SERPL: 11.1 (ref 7–25)
CALCIUM SPEC-SCNC: 7.4 MG/DL (ref 8.6–10.5)
CHLORIDE SERPL-SCNC: 102 MMOL/L (ref 98–107)
CO2 SERPL-SCNC: 19.3 MMOL/L (ref 22–29)
CREAT BLD-MCNC: 0.99 MG/DL (ref 0.57–1)
DEPRECATED RDW RBC AUTO: 44.7 FL (ref 37–54)
ERYTHROCYTE [DISTWIDTH] IN BLOOD BY AUTOMATED COUNT: 15 % (ref 12.3–15.4)
GFR SERPL CREATININE-BSD FRML MDRD: 54 ML/MIN/1.73
GLUCOSE BLD-MCNC: 91 MG/DL (ref 65–99)
HCT VFR BLD AUTO: 33.8 % (ref 34–46.6)
HGB BLD-MCNC: 10.4 G/DL (ref 12–15.9)
MCH RBC QN AUTO: 25.1 PG (ref 26.6–33)
MCHC RBC AUTO-ENTMCNC: 30.8 G/DL (ref 31.5–35.7)
MCV RBC AUTO: 81.4 FL (ref 79–97)
PLATELET # BLD AUTO: 151 10*3/MM3 (ref 140–450)
PMV BLD AUTO: 12.8 FL (ref 6–12)
POTASSIUM BLD-SCNC: 3.8 MMOL/L (ref 3.5–5.2)
RBC # BLD AUTO: 4.15 10*6/MM3 (ref 3.77–5.28)
SODIUM BLD-SCNC: 132 MMOL/L (ref 136–145)
WBC NRBC COR # BLD: 10.73 10*3/MM3 (ref 3.4–10.8)

## 2019-04-17 PROCEDURE — 85027 COMPLETE CBC AUTOMATED: CPT | Performed by: HOSPITALIST

## 2019-04-17 PROCEDURE — 80048 BASIC METABOLIC PNL TOTAL CA: CPT | Performed by: HOSPITALIST

## 2019-04-17 PROCEDURE — 97110 THERAPEUTIC EXERCISES: CPT

## 2019-04-17 PROCEDURE — 25010000002 CEFTRIAXONE PER 250 MG: Performed by: NURSE PRACTITIONER

## 2019-04-17 RX ORDER — CEPHALEXIN 500 MG/1
500 CAPSULE ORAL 2 TIMES DAILY
Qty: 6 CAPSULE | Refills: 0 | Status: SHIPPED | OUTPATIENT
Start: 2019-04-17 | End: 2019-04-20

## 2019-04-17 RX ADMIN — LOSARTAN POTASSIUM 100 MG: 100 TABLET, FILM COATED ORAL at 08:46

## 2019-04-17 RX ADMIN — METOPROLOL TARTRATE 25 MG: 25 TABLET ORAL at 08:45

## 2019-04-17 RX ADMIN — ASPIRIN 81 MG: 81 TABLET, DELAYED RELEASE ORAL at 08:46

## 2019-04-17 RX ADMIN — CEFTRIAXONE SODIUM 1 G: 1 INJECTION, SOLUTION INTRAVENOUS at 08:46

## 2019-04-17 RX ADMIN — SODIUM CHLORIDE, POTASSIUM CHLORIDE, SODIUM LACTATE AND CALCIUM CHLORIDE 75 ML/HR: 600; 310; 30; 20 INJECTION, SOLUTION INTRAVENOUS at 09:19

## 2019-04-17 RX ADMIN — ATORVASTATIN CALCIUM 20 MG: 20 TABLET, FILM COATED ORAL at 08:46

## 2019-04-17 RX ADMIN — AMLODIPINE BESYLATE 10 MG: 10 TABLET ORAL at 08:46

## 2019-04-17 RX ADMIN — SODIUM CHLORIDE, PRESERVATIVE FREE 3 ML: 5 INJECTION INTRAVENOUS at 12:16

## 2019-04-17 NOTE — DISCHARGE SUMMARY
[facility]    Date of Discharge:  4/17/2019    PCP: Alexis Wiggins MD    Discharge Diagnosis:   Active Hospital Problems    Diagnosis  POA   • **Acute UTI (urinary tract infection) [N39.0]  Yes   • Pyelonephritis [N12]  Yes   • RENO (acute kidney injury) (CMS/HCC) [N17.9]  Yes   • Hypokalemia [E87.6]  Yes   • Hypomagnesemia [E83.42]  Yes   • Facial contusion [S00.83XA]  Yes   • Stenosis of right internal carotid artery [I65.21]  Yes   • History of right MCA stroke [Z86.73]  Not Applicable   • Essential hypertension [I10]  Yes      Resolved Hospital Problems   No resolved problems to display.     Hospital Course  Please see history and physical for details. Patient is a 83 y.o. female presented to the hospital with urinary frequency and dysuria.  She was diagnosed with a urinary tract infection.  She was started on ceftriaxone and his 3 days worth.  Urine culture grew E. coli susceptible to most antibiotics.  She will be switched to Keflex p.o. for a few more days.  Leukocytosis has resolved and her dysuria has resolved.  She also had acute kidney injury that has resolved.  Indapamide was held and I will have her hold that for a few more days or until she follows up with the primary care physician.  Blood pressure is acceptable.    She had a recent mechanical fall (tripped on a rug).  CT of her head showed no evidence of fracture or intracranial hemorrhage.  There is a small scalp hematoma right frontal region.    I discussed the patient's findings and my recommendations with patient and nursing staff.    Condition on Discharge: Improved.  Patient would like to go home today.    Temp:  [98 °F (36.7 °C)-98.8 °F (37.1 °C)] 98.4 °F (36.9 °C)  Heart Rate:  [] 79  Resp:  [16-18] 18  BP: (140-191)/(56-66) 149/60  Body mass index is 20.01 kg/m².    Physical Exam   Constitutional: She is oriented to person, place, and time. No distress.   HENT:   Right  forehead hematoma.  Facial ecchymosis improving   Cardiovascular: Normal rate and regular rhythm.   Pulmonary/Chest: Effort normal and breath sounds normal. No respiratory distress.   Abdominal: Soft. Bowel sounds are normal. There is no tenderness. There is no rebound and no guarding.   Musculoskeletal: She exhibits no edema.   Neurological: She is alert and oriented to person, place, and time.   Skin: Skin is warm and dry.   Psychiatric: She has a normal mood and affect. Her behavior is normal.        Discharge Medications      New Medications      Instructions Start Date   cephalexin 500 MG capsule  Commonly known as:  KEFLEX   500 mg, Oral, 2 Times Daily         Changes to Medications      Instructions Start Date   azelastine 0.1 % nasal spray  Commonly known as:  ASTELIN  What changed:    · when to take this  · reasons to take this  · additional instructions   2 sprays, Nasal, 2 Times Daily, Use in each nostril as directed      metoprolol tartrate 25 MG tablet  Commonly known as:  LOPRESSOR  What changed:  when to take this   25 mg, Oral, Every 12 Hours Scheduled         Continue These Medications      Instructions Start Date   amLODIPine 10 MG tablet  Commonly known as:  NORVASC   10 mg, Oral, Daily      aspirin 81 MG EC tablet   81 mg, Oral, Daily      atorvastatin 20 MG tablet  Commonly known as:  LIPITOR   20 mg, Oral, Daily      B-12 1000 MCG/ML kit   Injection, Weekly, Wednesday       CENTRUM ADULTS tablet   1 tablet, Oral, Daily      ferrous sulfate 325 (65 FE) MG tablet   325 mg, Oral, Daily With Breakfast      fish oil 1000 MG capsule capsule   1,000 mg, Oral, Daily With Breakfast      hydrocortisone 25 MG suppository  Commonly known as:  ANUSOL-HC   25 mg, Rectal, 2 Times Daily PRN      losartan 100 MG tablet  Commonly known as:  COZAAR   TAKE ONE TABLET BY MOUTH DAILY         Stop These Medications    clopidogrel 75 MG tablet  Commonly known as:  PLAVIX     indapamide 2.5 MG tablet  Commonly known  as:  LOZOL             Follow-up Information     Alexis Wiggins MD Follow up in 1 week(s).    Specialty:  Family Medicine  Contact information:  [address] [address]  [phone]                  [facility] Israel Ortiz MD  04/17/19  4:15 PM    Discharge time spent greater than 30 minutes.

## 2019-04-17 NOTE — DISCHARGE SUMMARY
[facility]    Date of Discharge:  4/17/2019    PCP: Alexis Wiggins MD    Discharge Diagnosis:   Active Hospital Problems    Diagnosis  POA   • **Acute UTI (urinary tract infection) [N39.0]  Yes   • Pyelonephritis [N12]  Yes   • RENO (acute kidney injury) (CMS/HCC) [N17.9]  Yes   • Hypokalemia [E87.6]  Yes   • Hypomagnesemia [E83.42]  Yes   • Facial contusion [S00.83XA]  Yes   • Stenosis of right internal carotid artery [I65.21]  Yes   • History of right MCA stroke [Z86.73]  Not Applicable   • Essential hypertension [I10]  Yes      Resolved Hospital Problems   No resolved problems to display.     Procedures Performed       Consults     Date and Time Order Name Status Description    4/15/2019 1253 [facility] (on-call MD unless specified) Completed         Hospital Course  Please see history and physical for details. Patient is a 83 y.o. female     Dysuria frequency n/v RENO UTI pyelo  Rochephin fluids     e.coli UTI rocephin  Edited by: Solomon Ortiz MD at 4/17/2019 0907    I discussed the patient's findings and my recommendations with {discussed with:[id number]::\"patient\"}.    Condition on Discharge: Improved.     Temp:  [98 °F (36.7 °C)-98.8 °F (37.1 °C)] 98.4 °F (36.9 °C)  Heart Rate:  [] 79  Resp:  [16-18] 18  BP: (140-191)/(56-66) 149/60  Body mass index is 20.01 kg/m².    Physical Exam     Discharge Medications      New Medications      Instructions Start Date   cephalexin 500 MG capsule  Commonly known as:  KEFLEX   500 mg, Oral, 2 Times Daily         Changes to Medications      Instructions Start Date   azelastine 0.1 % nasal spray  Commonly known as:  ASTELIN  What changed:    · when to take this  · reasons to take this  · additional instructions   2 sprays, Nasal, 2 Times Daily, Use in each nostril as directed      metoprolol tartrate 25 MG tablet  Commonly known as:  LOPRESSOR  What changed:  when to take this   25 mg, Oral, Every 12 Hours Scheduled   "       Continue These Medications      Instructions Start Date   amLODIPine 10 MG tablet  Commonly known as:  NORVASC   10 mg, Oral, Daily      aspirin 81 MG EC tablet   81 mg, Oral, Daily      atorvastatin 20 MG tablet  Commonly known as:  LIPITOR   20 mg, Oral, Daily      B-12 1000 MCG/ML kit   Injection, Weekly, Wednesday       CENTRUM ADULTS tablet   1 tablet, Oral, Daily      ferrous sulfate 325 (65 FE) MG tablet   325 mg, Oral, Daily With Breakfast      fish oil 1000 MG capsule capsule   1,000 mg, Oral, Daily With Breakfast      hydrocortisone 25 MG suppository  Commonly known as:  ANUSOL-HC   25 mg, Rectal, 2 Times Daily PRN      losartan 100 MG tablet  Commonly known as:  COZAAR   TAKE ONE TABLET BY MOUTH DAILY         Stop These Medications    clopidogrel 75 MG tablet  Commonly known as:  PLAVIX     indapamide 2.5 MG tablet  Commonly known as:  LOZOL             Follow-up Information     Alexis Wiggins MD Follow up in 1 week(s).    Specialty:  Family Medicine  Contact information:  [facility] [place] [id number] [phone]                  [facility] Israel Ortiz MD  04/17/19  4:09 PM    Discharge time spent greater than 30 minutes.

## 2019-04-17 NOTE — PLAN OF CARE
Problem: Patient Care Overview  Goal: Plan of Care Review  Outcome: Ongoing (interventions implemented as appropriate)   04/17/19 7070   Coping/Psychosocial   Plan of Care Reviewed With patient   Plan of Care Review   Progress improving   OTHER   Outcome Summary Pt increasing with strength and balance with use of STC and improved activity tolerance and ability to do steps with SBA/CGA

## 2019-04-17 NOTE — PROGRESS NOTES
[facility]               ASSOCIATES     LOS: 0 days     Name: Gita Wharton  Age: 83 y.o.  Sex: female  :  1935  MRN: [id number]         Primary Care Physician: Alexis Wiggins MD    Diet Regular; Cardiac    Subjective   she feels much better. less polyuria.    Review of Systems   Respiratory: Negative for shortness of breath.    Cardiovascular: Negative for chest pain.     Objective   Temp:  [97.8 °F (36.6 °C)-101.1 °F (38.4 °C)] 98 °F (36.7 °C)  Heart Rate:  [] 104  Resp:  [16] 16  BP: (125-191)/(48-66) 173/65  SpO2:  [85 %-98 %] 98 %  on  Flow (L/min):  [1.5] 1.5;   Device (Oxygen Therapy): room air  Body mass index is 20.25 kg/m².    Physical Exam   Constitutional: She is oriented to person, place, and time. No distress.   Cardiovascular: Normal rate and regular rhythm.   Pulmonary/Chest: Effort normal and breath sounds normal. No respiratory distress.   Abdominal: Soft. Bowel sounds are normal. There is no tenderness. There is no rebound and no guarding.   Musculoskeletal: She exhibits no edema.   Neurological: She is alert and oriented to person, place, and time.   Skin: Skin is warm and dry.   Psychiatric: She has a normal mood and affect. Her behavior is normal.     Reviewed medications and new clinical results    amLODIPine 10 mg Oral Q24H   ceftriaxone 1 g Intravenous Q24H   losartan 100 mg Oral Q24H   metoprolol tartrate 25 mg Oral Q12H   sodium chloride 3 mL Intravenous Q12H     sodium chloride 100 mL/hr Last Rate: 100 mL/hr (19)     Results from last 7 days   Lab Units 19  0601 04/15/19  1046   WBC 10*3/mm3 17.96* 18.92*   HEMOGLOBIN g/dL 11.1* 11.4*   PLATELETS 10*3/mm3 169 220     Results from last 7 days   Lab Units 19  0600 04/15/19  1046   SODIUM mmol/L 136 136   POTASSIUM mmol/L 4.7 3.4*   CHLORIDE mmol/L 104 100   CO2 mmol/L 18.8* 19.5*   BUN mg/dL 15 14   CREATININE mg/dL 1.31* 1.58*   CALCIUM mg/dL 7.6* 8.1*   GLUCOSE  mg/dL 90 103*     Lab Results   Component Value Date    ANIONGAP 13.2 04/16/2019     Estimated Creatinine Clearance: 29.7 mL/min (A) (by C-G formula based on SCr of 1.31 mg/dL (H)).    Assessment/Plan   Active Hospital Problems    Diagnosis  POA   • **Acute UTI (urinary tract infection) [N39.0]  Yes   • RENO (acute kidney injury) (CMS/HCC) [N17.9]  Yes   • Hypokalemia [E87.6]  Yes   • Hypomagnesemia [E83.42]  Yes   • Facial contusion [S00.83XA]  Yes   • Stenosis of right internal carotid artery [I65.21]  Yes   • History of right MCA stroke [Z86.73]  Not Applicable   • Essential hypertension [I10]  Yes      Resolved Hospital Problems   No resolved problems to display.     83 y.o. female recent mechanical fall and dysuria, frequency admitted with sepsis and UTI and RENO    · continue antibiotics await sensitivities. urine culture >100k e. coli  · continue to monitor leukocytosis  · renal function improved  · HTN resume home medications  · disposition to be determined  · discussed with patient and nursing staff.    Solomon Ortiz MD   04/16/19  8:41 PM

## 2019-04-17 NOTE — THERAPY TREATMENT NOTE
Acute Care - Physical Therapy Treatment Note  [facility]     Patient Name: Gita Wharton  : 1935  MRN: [id number]  Today's Date: 2019  Onset of Illness/Injury or Date of Surgery: 04/15/19     Referring Physician: Diana    Admit Date: 4/15/2019    Visit Dx:    ICD-10-CM ICD-9-CM   1. Pyelonephritis N12 590.80   2. Non-intractable vomiting with nausea, unspecified vomiting type R11.2 787.01   3. RENO (acute kidney injury) (CMS/HCC) N17.9 584.9   4. Unsteadiness on feet R26.81 781.2     Patient Active Problem List   Diagnosis   • Pneumonia of right lower lobe due to infectious organism (CMS/HCC)   • Essential hypertension   • Mixed hyperlipidemia   • Acute allergic rhinitis due to pollen   • recent traumatic SDH no LOC   • Fall (on) (from) other stairs and steps, initial encounter   • Laceration of forehead   • Left sided numbness   • Left-sided weakness   • Difficulty with speech   • Lung nodule   • History of right MCA stroke   • Acute right MCA stroke (CMS/HCC)   • Abnormal CT scan, lung   • Stenosis of right internal carotid artery   • Hematochezia   • Hemorrhoids   • Anemia   • Hyponatremia   • Weakness   • Subungual hematoma of digit of hand   • RENO (acute kidney injury) (CMS/HCC)   • Acute UTI (urinary tract infection)   • Hypokalemia   • Hypomagnesemia   • Facial contusion   • Pyelonephritis       Therapy Treatment    Rehabilitation Treatment Summary     Row Name 19             Treatment Time/Intention    Discipline  physical therapy assistant  -CW      Document Type  therapy note (daily note)  -CW      Subjective Information  no complaints  -CW      Mode of Treatment  physical therapy  -CW      Therapy Frequency (PT Clinical Impression)  daily  -CW      Patient Effort  good  -CW      Existing Precautions/Restrictions  fall  -CW      Recorded by [CW] Keaton Nair, PTA 19 0920      Row Name 19             Vital Signs    O2 Delivery Pre Treatment  room air   -CW      Recorded by [CW] Keaton Nair P, PTA 04/17/19 0940      Row Name 04/17/19 0900             Cognitive Assessment/Intervention- PT/OT    Orientation Status (Cognition)  oriented x 4  -CW      Follows Commands (Cognition)  WNL  -CW      Safety Deficit (Cognitive)  awareness of need for assistance;insight into deficits/self awareness;safety precautions follow-through/compliance  -CW      Personal Safety Interventions  fall prevention program maintained;gait belt;muscle strengthening facilitated;nonskid shoes/slippers when out of bed  -CW      Recorded by [CW] Keaton Nair P, PTA 04/17/19 0940      Row Name 04/17/19 0900             Safety Issues, Functional Mobility    Impairments Affecting Function (Mobility)  balance  -CW      Recorded by [CW] Keaton Niar, PTA 04/17/19 0940      Row Name 04/17/19 0900             Bed Mobility Assessment/Treatment    Bed Mobility Assessment/Treatment  sit-supine  -CW      Sit-Supine Dixon (Bed Mobility)  conditional independence  -CW      Recorded by [CW] Keaton Nair, PTA 04/17/19 0940      Row Name 04/17/19 0900             Transfer Assessment/Treatment    Transfer Assessment/Treatment  sit-stand transfer;stand-sit transfer  -CW      Recorded by [CW] Keaton Nair P, PTA 04/17/19 0940      Row Name 04/17/19 0900             Sit-Stand Transfer    Sit-Stand Dixon (Transfers)  supervision  -CW      Assistive Device (Sit-Stand Transfers)  cane, straight  -CW      Recorded by [CW] Keaton Nair, PTA 04/17/19 0940      Row Name 04/17/19 0900             Stand-Sit Transfer    Stand-Sit Dixon (Transfers)  supervision  -CW      Assistive Device (Stand-Sit Transfers)  cane, straight  -CW      Recorded by [CW] Keaton Nair, PTA 04/17/19 0940      Row Name 04/17/19 0900             Gait/Stairs Assessment/Training    Dixon Level (Gait)  stand by assist;contact guard  -CW      Assistive Device (Gait)  cane, straight   -CW      Distance in Feet (Gait)  180  -CW      Pattern (Gait)  step-through  -CW      Deviations/Abnormal Patterns (Gait)  base of support, wide;eliceo decreased;stride length decreased  -CW      Handrail Location (Stairs)  both sides  -CW      Number of Steps (Stairs)  3  -CW      Ascending Technique (Stairs)  step-to-step  -CW      Descending Technique (Stairs)  step-to-step  -CW      Stairs, Impairments  impaired balance  -CW      Recorded by [CW] Keaton Nair, PTA 04/17/19 0940      Row Name 04/17/19 0900             Therapeutic Exercise    Lower Extremity (Therapeutic Exercise)  gluteal sets;LAQ (long arc quad), bilateral;marching while seated  -CW      Lower Extremity Range of Motion (Therapeutic Exercise)  hip abduction/adduction, bilateral;ankle dorsiflexion/plantar flexion, bilateral  -CW      Exercise Type (Therapeutic Exercise)  AROM (active range of motion)  -CW      Position (Therapeutic Exercise)  seated  -CW      Sets/Reps (Therapeutic Exercise)  10  -CW      Recorded by [CW] Keaton Nair, PTA 04/17/19 0940      Row Name 04/17/19 0900             Dynamic Balance Activity    Therapeutic Training Performed (Dynamic Balance)  ambulate backward;side stepping  -CW      Support Needed for Balance (Dynamic Balance Training)  balances without upper extremity support;minimal external support for balance, 75% patient effort  -CW      Recorded by [CW] Keaton Nair, PTA 04/17/19 0940      Row Name 04/17/19 0900             Positioning and Restraints    Pre-Treatment Position  in bed  -CW      Post Treatment Position  bed  -CW      In Bed  notified nsg;supine;call light within reach;encouraged to call for assist;exit alarm on;with family/caregiver  -CW      Recorded by [CW] Keaton Nair, PTA 04/17/19 0940      Row Name 04/17/19 0900             Outcome Summary/Treatment Plan (PT)    Anticipated Discharge Disposition (PT)  home with assist;home with OP services  -CW      Recorded by  [CW] Keaton Nair, LANG 04/17/19 0940        User Key  (r) = Recorded By, (t) = Taken By, (c) = Cosigned By    Initials Name Effective Dates Discipline     Keaton Nair PTA 03/07/18 -  PT                   Physical Therapy Education     Title: PT OT SLP Therapies (Done)     Topic: Physical Therapy (Done)     Point: Mobility training (Done)     Learning Progress Summary           Patient Acceptance, E,TB, VU,DU by  at 4/17/2019  9:40 AM    Acceptance, E,TB, VU,NR by  at 4/16/2019  9:00 AM                   Point: Home exercise program (Done)     Learning Progress Summary           Patient Acceptance, E,TB, VU,DU by  at 4/17/2019  9:40 AM    Acceptance, E,TB, VU,NR by  at 4/16/2019  9:00 AM                   Point: Body mechanics (Done)     Learning Progress Summary           Patient Acceptance, E,TB, VU,DU by  at 4/17/2019  9:40 AM    Acceptance, E,TB, VU,NR by  at 4/16/2019  9:00 AM                   Point: Precautions (Done)     Learning Progress Summary           Patient Acceptance, E,TB, VU,DU by  at 4/17/2019  9:40 AM                               User Key     Initials Effective Dates Name Provider Type Discipline     04/03/18 -  Molly Edwards, PT Physical Therapist PT     03/07/18 -  Keaton Nair PTA Physical Therapy Assistant PT                PT Recommendation and Plan  Anticipated Discharge Disposition (PT): home with assist, home with OP services  Therapy Frequency (PT Clinical Impression): daily  Outcome Summary/Treatment Plan (PT)  Anticipated Discharge Disposition (PT): home with assist, home with OP services  Plan of Care Reviewed With: patient  Progress: improving  Outcome Summary: Pt increasing with strength and balance with use of STC and improved activity tolerance and ability to do steps with SBA/CGA  Outcome Measures     Row Name 04/17/19 0900 04/16/19 0900          How much help from another person do you currently need...    Turning from your back to your  side while in flat bed without using bedrails?  4  -CW  4  -EE     Moving from lying on back to sitting on the side of a flat bed without bedrails?  4  -CW  4  -EE     Moving to and from a bed to a chair (including a wheelchair)?  3  -CW  3  -EE     Standing up from a chair using your arms (e.g., wheelchair, bedside chair)?  3  -CW  3  -EE     Climbing 3-5 steps with a railing?  3  -CW  3  -EE     To walk in hospital room?  3  -CW  3  -EE     AM-PAC 6 Clicks Score  20  -CW  20  -EE        Functional Assessment    Outcome Measure Options  AM-PAC 6 Clicks Basic Mobility (PT)  -CW  AM-PAC 6 Clicks Basic Mobility (PT)  -EE       User Key  (r) = Recorded By, (t) = Taken By, (c) = Cosigned By    Initials Name Provider Type    EE Molly Edwards, PT Physical Therapist    Keaton Munson PTA Physical Therapy Assistant         Time Calculation:   PT Charges     Row Name 04/17/19 0941             Time Calculation    Start Time  0914  -CW      Stop Time  0941  -CW      Time Calculation (min)  27 min  -CW      PT Received On  04/17/19  -CW      PT - Next Appointment  04/18/19  -CW        User Key  (r) = Recorded By, (t) = Taken By, (c) = Cosigned By    Initials Name Provider Type    Keaton Munson PTA Physical Therapy Assistant        Therapy Charges for Today     Code Description Service Date Service Provider Modifiers Qty    71054286888 HC PT THER PROC EA 15 MIN 4/17/2019 Keaton Nair PTA GP 2    70366064858 HC PT THER SUPP EA 15 MIN 4/17/2019 Keaton Nair PTA GP 1          PT G-Codes  Outcome Measure Options: AM-PAC 6 Clicks Basic Mobility (PT)  AM-PAC 6 Clicks Score: 20    Keaton Nair PTA  4/17/2019

## 2019-04-17 NOTE — PLAN OF CARE
Problem: Fall Risk (Adult)  Goal: Absence of Fall  Outcome: Ongoing (interventions implemented as appropriate)      Problem: Urinary Tract Infection (Adult)  Goal: Signs and Symptoms of Listed Potential Problems Will be Absent, Minimized or Managed (Urinary Tract Infection)  Outcome: Ongoing (interventions implemented as appropriate)      Problem: Patient Care Overview  Goal: Plan of Care Review  Outcome: Ongoing (interventions implemented as appropriate)   04/17/19 0353   Coping/Psychosocial   Plan of Care Reviewed With patient   Plan of Care Review   Progress improving   OTHER   Outcome Summary Patient BP was elevated at beginning of shift. Called MD and got home meds restarted, BP now WNL. Placed on 1 L O2 while sleeping. Ambulating with stand-by assist. Urine culture positive for E. coli, currently on IV antibiotics. IVF changed to LR at 75 mL/hr. Daily weight. Status changed to Inpatient last night. Will continue to monitor.     Goal: Discharge Needs Assessment  Outcome: Ongoing (interventions implemented as appropriate)      Problem: Skin Injury Risk (Adult)  Goal: Identify Related Risk Factors and Signs and Symptoms  Outcome: Outcome(s) achieved Date Met: 04/17/19    Goal: Skin Health and Integrity  Outcome: Ongoing (interventions implemented as appropriate)

## 2019-04-18 ENCOUNTER — READMISSION MANAGEMENT (OUTPATIENT)
Dept: CALL CENTER | Facility: HOSPITAL | Age: 84
End: 2019-04-18

## 2019-04-18 ENCOUNTER — EPISODE CHANGES (OUTPATIENT)
Dept: CASE MANAGEMENT | Facility: OTHER | Age: 84
End: 2019-04-18

## 2019-04-18 NOTE — PROGRESS NOTES
Case Management Discharge Note    Final Note: Discharged home            Transportation Services  Private: Car    Final Discharge Disposition Code: 01 - home or self-care

## 2019-04-18 NOTE — OUTREACH NOTE
Prep Survey      Responses   Facility patient discharged from?  [facility]   Is patient eligible?  Yes   Discharge diagnosis  Acute UTI, Pyelonephritis, RENO, Hypokalemia, Hypomagnesemia, Facial contusion, Stenosis of AILEEN, hx. of right MCA stroke, essential HTN   Does the patient have one of the following disease processes/diagnoses(primary or secondary)?  Other [Pt. had hx. of right MCA stroke. LACE<7]   Does the patient have Home health ordered?  No   Is there a DME ordered?  No   Comments regarding appointments  Pt. to schedule follow up with PCP   Prep survey completed?  Yes          Siobhan Alcantara RN

## 2019-04-21 ENCOUNTER — READMISSION MANAGEMENT (OUTPATIENT)
Dept: CALL CENTER | Facility: HOSPITAL | Age: 84
End: 2019-04-21

## 2019-04-21 NOTE — OUTREACH NOTE
Medical Week 1 Survey      Responses   Facility patient discharged from?  [facility]   Does the patient have one of the following disease processes/diagnoses(primary or secondary)?  Other   Is there a successful TCM telephone encounter documented?  No   Week 1 attempt successful?  Yes   Call start time  1001   Call end time  1008   Discharge diagnosis  Acute UTI, Pyelonephritis, RENO, Hypokalemia, Hypomagnesemia, Facial contusion, Stenosis of AILEEN, hx. of right MCA stroke, essential HTN   Is patient permission given to speak with other caregiver?  No   Meds reviewed with patient/caregiver?  Yes   Is the patient having any side effects they believe may be caused by any medication additions or changes?  No   Does the patient have all medications ordered at discharge?  Yes   Is the patient taking all medications as directed (includes completed medication regime)?  Yes   Comments regarding appointments  Pt. to schedule follow up with PCP   Does the patient have a primary care provider?   Yes   Does the patient have an appointment with their PCP within 7 days of discharge?  Yes   Has the patient kept scheduled appointments due by today?  N/A   Psychosocial issues?  No   Did the patient receive a copy of their discharge instructions?  Yes   Nursing interventions  Reviewed instructions with patient   What is the patient's perception of their health status since discharge?  Improving   Is the patient/caregiver able to teach back signs and symptoms related to disease process for when to call PCP?  Yes   Is the patient/caregiver able to teach back signs and symptoms related to disease process for when to call 911?  Yes   Is the patient/caregiver able to teach back the hierarchy of who to call/visit for symptoms/problems? PCP, Specialist, Home health nurse, Urgent Care, ED, 911  Yes   Week 1 call completed?  Yes   Wrap up additional comments  Will be having an appt next week with repeat UA. Symptoms are already much better.            Deyanira Cervantes, RN

## 2019-04-22 ENCOUNTER — TELEPHONE (OUTPATIENT)
Dept: INTERNAL MEDICINE | Facility: CLINIC | Age: 84
End: 2019-04-22

## 2019-04-23 ENCOUNTER — EPISODE CHANGES (OUTPATIENT)
Dept: CASE MANAGEMENT | Facility: OTHER | Age: 84
End: 2019-04-23

## 2019-04-24 ENCOUNTER — OFFICE VISIT (OUTPATIENT)
Dept: INTERNAL MEDICINE | Facility: CLINIC | Age: 84
End: 2019-04-24

## 2019-04-24 VITALS
DIASTOLIC BLOOD PRESSURE: 72 MMHG | TEMPERATURE: 98.1 F | HEIGHT: 66 IN | SYSTOLIC BLOOD PRESSURE: 128 MMHG | OXYGEN SATURATION: 97 % | WEIGHT: 122 LBS | HEART RATE: 66 BPM | BODY MASS INDEX: 19.61 KG/M2

## 2019-04-24 DIAGNOSIS — N17.9 AKI (ACUTE KIDNEY INJURY) (HCC): ICD-10-CM

## 2019-04-24 DIAGNOSIS — I10 ESSENTIAL HYPERTENSION: Primary | ICD-10-CM

## 2019-04-24 DIAGNOSIS — N39.0 ACUTE UTI (URINARY TRACT INFECTION): ICD-10-CM

## 2019-04-24 DIAGNOSIS — Z86.73 HISTORY OF RIGHT MCA STROKE: ICD-10-CM

## 2019-04-24 PROCEDURE — 99213 OFFICE O/P EST LOW 20 MIN: CPT | Performed by: NURSE PRACTITIONER

## 2019-04-24 NOTE — PROGRESS NOTES
Name: Gita Wharton  :  1935    Subjective:      CC: FU UTI      Gita Wharton is a 83 y.o. female patient of Alexis Wiggins MD who is here for hospitalization follow up.       She presented to [facility] on 4/15/2019 with nausea and vomiting that started the day before. She also complained of some dysuria and increase urinary frequency. She was admitted and treated for pyelonephritis, RENO and E. Coli UTI.  Her Cr on admit was 1.58 and improved to 0.99 on discharge.  Her Lozol was held. Her UTI was treated with Rocephin then switched to Keflex on discharge and she completed it on Saturday.  She started feeling much better yesterday. No N/V/D     Bps 138/72 at home    Daughter here and states much better today.     Previous issues:   (Pulm)R LL nodule: Had follow up alyson Branch 2018.  Granuloma and suggested yearly cxr to monitor.   (Neuro - Dr LANI Staley) R MCA stroke s/p CEA 2018. She had follow up with vascular (Dr Puente) on 3/12/19 - states everything was fine and has another FU this summer.    Colonoscopy 2018: adenomatous polyp.  FU Scope suggested in     I have reviewed the patient's medical history in detail and updated the computerized patient record.    Past Medical History:   Diagnosis Date   • Acute right MCA stroke (CMS/HCC) 2018   • DVT (deep venous thrombosis) (CMS/HCC)    • Hyperlipidemia    • Hypertension    • Lung granuloma (CMS/HCC) 2018    R LL            Dr FRANKIE Branch - suggested yearly CXR to Monitor   • Subdural hematoma (CMS/HCC) 2018    Secondary to fall       Past Surgical History:   Procedure Laterality Date   • CAROTID ENDARTERECTOMY Right 2018    Procedure: RT CAROTID ENDARTERECTOMY;  Surgeon: Inna Villarreal MD;  Location: [facility] OR;  Service: Vascular   • COLONOSCOPY N/A 2018    Adenomatous polyp.   Repeat .  Surgeon: Ken Tidwell MD;    • HIP SURGERY     • HYSTERECTOMY     • JOINT  REPLACEMENT     • THYROIDECTOMY         Family History   Problem Relation Age of Onset   • Cancer Mother    • Dementia Father    • Hypertension Father    • Hypertension Daughter    • Cancer Daughter        Social History     Socioeconomic History   • Marital status:      Spouse name: Not on file   • Number of children: Not on file   • Years of education: Not on file   • Highest education level: Not on file   Tobacco Use   • Smoking status: Former Smoker   • Tobacco comment: quit 2001   Substance and Sexual Activity   • Alcohol use: Yes   • Drug use: No   • Sexual activity: Defer       Most Recent Immunizations   Administered Date(s) Administered   • Tdap 07/12/2018         Review of Systems:   Review of Systems   Respiratory: Negative for shortness of breath.    Gastrointestinal: Negative for abdominal pain.   Genitourinary: Negative for dysuria, flank pain and frequency.         Objective:      Physical Exam:   Physical Exam   Constitutional: She is oriented to person, place, and time. She appears well-developed. She is cooperative.   Cardiovascular: Normal rate, regular rhythm and normal pulses.   Murmur heard.   Systolic murmur is present with a grade of 4/6.  Holosystolic - audible up to carotids     R LE edema +1 (chronic)    Pulmonary/Chest: Effort normal and breath sounds normal. She exhibits no deformity.   Equal, Unlabored   Abdominal: Soft. Bowel sounds are normal.   Neurological: She is alert and oriented to person, place, and time.   Skin: Capillary refill takes less than 2 seconds.        Hematoma to R forehead    Multiple bruises - purple, under eyes   Psychiatric: She has a normal mood and affect.   Vitals reviewed.        Vital Signs:     Vitals:    04/24/19 1030 04/24/19 1053   BP: (!) 185/66 128/72   BP Location: Right arm Left arm   Patient Position: Sitting    Cuff Size: Small Adult    Pulse: 66    Temp: 98.1 °F (36.7 °C)    TempSrc: Oral    SpO2: 97%    Weight: 55.3 kg (122 lb)    Height:  "167.6 cm (66\")           Results Review:      REVIEWED AND DISCUSSED LAB RESULTS WITH PATIENT  Lab Results   Component Value Date    GLUCOSE 91 04/17/2019    CALCIUM 7.4 (L) 04/17/2019     (L) 04/17/2019    K 3.8 04/17/2019    CO2 19.3 (L) 04/17/2019     04/17/2019    BUN 11 04/17/2019    CREATININE 0.99 04/17/2019    EGFRIFAFRI 60 (L) 02/28/2019    EGFRIFNONA 54 (L) 04/17/2019    BCR 11.1 04/17/2019    ANIONGAP 10.7 04/17/2019     Lab Results   Component Value Date    WBC 10.73 04/17/2019    HGB 10.4 (L) 04/17/2019    HCT 33.8 (L) 04/17/2019    MCV 81.4 04/17/2019     04/17/2019       Requested Prescriptions      No prescriptions requested or ordered in this encounter         Current Outpatient Medications:   •  amLODIPine (NORVASC) 10 MG tablet, Take 1 tablet by mouth Daily., Disp: 90 tablet, Rfl: 3  •  aspirin 81 MG EC tablet, Take 1 tablet by mouth Daily., Disp: , Rfl:   •  atorvastatin (LIPITOR) 20 MG tablet, Take 1 tablet by mouth Daily., Disp: 90 tablet, Rfl: 3  •  azelastine (ASTELIN) 0.1 % nasal spray, 2 sprays into each nostril 2 (Two) Times a Day. Use in each nostril as directed (Patient taking differently: 2 sprays into each nostril 2 (Two) Times a Day As Needed. Use in each nostril as directed), Disp: 1 each, Rfl: 12  •  Cyanocobalamin (B-12) 1000 MCG/ML kit, Inject  as directed 1 (One) Time Per Week. Wednesday, Disp: , Rfl:   •  ferrous sulfate 325 (65 FE) MG tablet, Take 325 mg by mouth Daily With Breakfast., Disp: , Rfl:   •  hydrocortisone (ANUSOL-HC) 25 MG suppository, Insert 1 suppository into the rectum 2 (Two) Times a Day As Needed for Hemorrhoids., Disp: 10 suppository, Rfl: 0  •  losartan (COZAAR) 100 MG tablet, TAKE ONE TABLET BY MOUTH DAILY, Disp: 90 tablet, Rfl: 4  •  metoprolol tartrate (LOPRESSOR) 25 MG tablet, Take 1 tablet by mouth Every 12 (Twelve) Hours. (Patient taking differently: Take 25 mg by mouth 2 (Two) Times a Day With Meals.), Disp: 180 tablet, Rfl: 3  •  " "Multiple Vitamins-Minerals (CENTRUM ADULTS) tablet, Take 1 tablet by mouth Daily., Disp: , Rfl:   •  Omega-3 Fatty Acids (FISH OIL) 1000 MG capsule capsule, Take 1,000 mg by mouth Daily With Breakfast., Disp: , Rfl:        Assessment and Plan:        Problem List Items Addressed This Visit        Cardiovascular and Mediastinum    Essential hypertension - Primary    History of right MCA stroke       Genitourinary    Acute UTI (urinary tract infection)    RENO (acute kidney injury) (CMS/[facility])             Will stay off Lozol for now.  Will resume if edema increases.     Continue ASA/ Statin for secondary prevention.     Discussed any change in Rx and discussed visit with patient.  All questions answered.          Return if symptoms worsen or fail to improve.      Warren \"Francis\" Ritchie, APRN   04/24/19    Additional Patient Counseling:       There are no Patient Instructions on file for this visit.  "

## 2019-04-25 DIAGNOSIS — E78.2 MIXED HYPERLIPIDEMIA: ICD-10-CM

## 2019-04-25 RX ORDER — ATORVASTATIN CALCIUM 20 MG/1
TABLET, FILM COATED ORAL
Qty: 90 TABLET | Refills: 2 | Status: SHIPPED | OUTPATIENT
Start: 2019-04-25 | End: 2019-06-27

## 2019-04-26 ENCOUNTER — PATIENT OUTREACH (OUTPATIENT)
Dept: CASE MANAGEMENT | Facility: OTHER | Age: 84
End: 2019-04-26

## 2019-04-30 ENCOUNTER — PATIENT OUTREACH (OUTPATIENT)
Dept: CASE MANAGEMENT | Facility: OTHER | Age: 84
End: 2019-04-30

## 2019-04-30 NOTE — OUTREACH NOTE
Patient Outreach Note    Spoke with Mrs Wharton for HRCM call, informed of care advisor role, contact number. Pt reports the kidney infection as resolved, continues to drink plenty of water and monitors output daily; is familiar with probiotics and takes occasionally. She lives alone, is independent with ADL's, drives, uses a cane, is compliant with medications, eats a healthy ANA MARIA diet, and check blood pressure daily each morning.  Her daughter goes with her for grocery shopping and assists as needed. Pt attends a senior's exercise class twice a week and does exercises at home daily. The forehead hematoma from a previous fall is resolving, bruising is diminishing. Educated on AWV, pt will schedule. Addressed Pna vaccine. Has advance directives on file; declines MyChart. Voiced no new needs or concerns for care advising service at present.       Kelvin Boyer RN    4/30/2019, 2:27 PM

## 2019-05-09 ENCOUNTER — EPISODE CHANGES (OUTPATIENT)
Dept: CASE MANAGEMENT | Facility: OTHER | Age: 84
End: 2019-05-09

## 2019-06-26 ENCOUNTER — TELEPHONE (OUTPATIENT)
Dept: INTERNAL MEDICINE | Facility: CLINIC | Age: 84
End: 2019-06-26

## 2019-06-26 ENCOUNTER — HOSPITAL ENCOUNTER (OUTPATIENT)
Facility: HOSPITAL | Age: 84
Setting detail: SURGERY ADMIT
End: 2019-06-26
Attending: ORTHOPAEDIC SURGERY | Admitting: ORTHOPAEDIC SURGERY

## 2019-06-26 PROBLEM — S72.002A CLOSED FRACTURE OF NECK OF LEFT FEMUR (HCC): Status: ACTIVE | Noted: 2019-06-26

## 2019-06-26 RX ORDER — ACETAMINOPHEN 500 MG
1000 TABLET ORAL ONCE
Status: CANCELLED | OUTPATIENT
Start: 2019-06-29 | End: 2019-06-26

## 2019-06-26 RX ORDER — CEFAZOLIN SODIUM 2 G/100ML
2 INJECTION, SOLUTION INTRAVENOUS ONCE
Status: CANCELLED | OUTPATIENT
Start: 2019-06-29 | End: 2019-06-26

## 2019-06-27 ENCOUNTER — HOSPITAL ENCOUNTER (INPATIENT)
Facility: HOSPITAL | Age: 84
LOS: 5 days | Discharge: HOME HEALTH | End: 2019-07-02
Attending: INTERNAL MEDICINE | Admitting: ORTHOPAEDIC SURGERY

## 2019-06-27 ENCOUNTER — APPOINTMENT (OUTPATIENT)
Dept: PREADMISSION TESTING | Facility: HOSPITAL | Age: 84
End: 2019-06-27

## 2019-06-27 ENCOUNTER — HOSPITAL ENCOUNTER (OUTPATIENT)
Dept: GENERAL RADIOLOGY | Facility: HOSPITAL | Age: 84
Discharge: HOME | End: 2019-06-27
Admitting: ORTHOPAEDIC SURGERY

## 2019-06-27 ENCOUNTER — HOSPITAL ENCOUNTER (OUTPATIENT)
Dept: GENERAL RADIOLOGY | Facility: HOSPITAL | Age: 84
Discharge: HOME | End: 2019-06-27

## 2019-06-27 VITALS
SYSTOLIC BLOOD PRESSURE: 150 MMHG | BODY MASS INDEX: 21.35 KG/M2 | TEMPERATURE: 97.6 F | HEIGHT: 62 IN | HEART RATE: 58 BPM | WEIGHT: 116 LBS | RESPIRATION RATE: 16 BRPM | OXYGEN SATURATION: 100 % | DIASTOLIC BLOOD PRESSURE: 87 MMHG

## 2019-06-27 DIAGNOSIS — R26.2 DIFFICULTY WALKING: Primary | ICD-10-CM

## 2019-06-27 PROBLEM — R93.89 ABNORMAL CHEST X-RAY: Status: ACTIVE | Noted: 2019-06-27

## 2019-06-27 PROBLEM — S72.009A FRACTURE, HIP (HCC): Status: ACTIVE | Noted: 2019-06-27

## 2019-06-27 LAB
ALBUMIN SERPL-MCNC: 3.4 G/DL (ref 3.5–5.2)
ALBUMIN/GLOB SERPL: 1 G/DL
ALP SERPL-CCNC: 103 U/L (ref 39–117)
ALT SERPL W P-5'-P-CCNC: 11 U/L (ref 1–33)
ANION GAP SERPL CALCULATED.3IONS-SCNC: 10.7 MMOL/L (ref 5–15)
APTT PPP: 33.9 SECONDS (ref 22.7–35.4)
AST SERPL-CCNC: 14 U/L (ref 1–32)
BASOPHILS # BLD AUTO: 0.01 10*3/MM3 (ref 0–0.2)
BASOPHILS NFR BLD AUTO: 0.1 % (ref 0–1.5)
BILIRUB SERPL-MCNC: 0.3 MG/DL (ref 0.2–1.2)
BUN BLD-MCNC: 15 MG/DL (ref 8–23)
BUN/CREAT SERPL: 11.4 (ref 7–25)
CALCIUM SPEC-SCNC: 8.6 MG/DL (ref 8.6–10.5)
CHLORIDE SERPL-SCNC: 108 MMOL/L (ref 98–107)
CO2 SERPL-SCNC: 19.3 MMOL/L (ref 22–29)
CREAT BLD-MCNC: 1.32 MG/DL (ref 0.57–1)
DEPRECATED RDW RBC AUTO: 47.8 FL (ref 37–54)
EOSINOPHIL # BLD AUTO: 0 10*3/MM3 (ref 0–0.4)
EOSINOPHIL NFR BLD AUTO: 0 % (ref 0.3–6.2)
ERYTHROCYTE [DISTWIDTH] IN BLOOD BY AUTOMATED COUNT: 15.7 % (ref 12.3–15.4)
GFR SERPL CREATININE-BSD FRML MDRD: 39 ML/MIN/1.73
GLOBULIN UR ELPH-MCNC: 3.3 GM/DL
GLUCOSE BLD-MCNC: 93 MG/DL (ref 65–99)
HBA1C MFR BLD: 5.37 % (ref 4.8–5.6)
HCT VFR BLD AUTO: 37.2 % (ref 34–46.6)
HGB BLD-MCNC: 11.2 G/DL (ref 12–15.9)
IMM GRANULOCYTES # BLD AUTO: 0.01 10*3/MM3 (ref 0–0.05)
IMM GRANULOCYTES NFR BLD AUTO: 0.1 % (ref 0–0.5)
INR PPP: 0.96 (ref 0.9–1.1)
LYMPHOCYTES # BLD AUTO: 1.05 10*3/MM3 (ref 0.7–3.1)
LYMPHOCYTES NFR BLD AUTO: 14.2 % (ref 19.6–45.3)
MCH RBC QN AUTO: 25.5 PG (ref 26.6–33)
MCHC RBC AUTO-ENTMCNC: 30.1 G/DL (ref 31.5–35.7)
MCV RBC AUTO: 84.7 FL (ref 79–97)
MONOCYTES # BLD AUTO: 0.65 10*3/MM3 (ref 0.1–0.9)
MONOCYTES NFR BLD AUTO: 8.8 % (ref 5–12)
NEUTROPHILS # BLD AUTO: 5.66 10*3/MM3 (ref 1.7–7)
NEUTROPHILS NFR BLD AUTO: 76.8 % (ref 42.7–76)
NRBC BLD AUTO-RTO: 0 /100 WBC (ref 0–0.2)
PLATELET # BLD AUTO: 253 10*3/MM3 (ref 140–450)
PMV BLD AUTO: 12.1 FL (ref 6–12)
POTASSIUM BLD-SCNC: 4.2 MMOL/L (ref 3.5–5.2)
PROT SERPL-MCNC: 6.7 G/DL (ref 6–8.5)
PROTHROMBIN TIME: 12.5 SECONDS (ref 11.7–14.2)
RBC # BLD AUTO: 4.39 10*6/MM3 (ref 3.77–5.28)
SODIUM BLD-SCNC: 138 MMOL/L (ref 136–145)
WBC NRBC COR # BLD: 7.38 10*3/MM3 (ref 3.4–10.8)

## 2019-06-27 PROCEDURE — 85025 COMPLETE CBC W/AUTO DIFF WBC: CPT | Performed by: ORTHOPAEDIC SURGERY

## 2019-06-27 PROCEDURE — 85730 THROMBOPLASTIN TIME PARTIAL: CPT | Performed by: ORTHOPAEDIC SURGERY

## 2019-06-27 PROCEDURE — 93010 ELECTROCARDIOGRAM REPORT: CPT | Performed by: INTERNAL MEDICINE

## 2019-06-27 PROCEDURE — 73502 X-RAY EXAM HIP UNI 2-3 VIEWS: CPT

## 2019-06-27 PROCEDURE — 93005 ELECTROCARDIOGRAM TRACING: CPT

## 2019-06-27 PROCEDURE — 83036 HEMOGLOBIN GLYCOSYLATED A1C: CPT | Performed by: ORTHOPAEDIC SURGERY

## 2019-06-27 PROCEDURE — 85610 PROTHROMBIN TIME: CPT | Performed by: ORTHOPAEDIC SURGERY

## 2019-06-27 PROCEDURE — 71046 X-RAY EXAM CHEST 2 VIEWS: CPT

## 2019-06-27 PROCEDURE — 80053 COMPREHEN METABOLIC PANEL: CPT | Performed by: ORTHOPAEDIC SURGERY

## 2019-06-27 PROCEDURE — 36415 COLL VENOUS BLD VENIPUNCTURE: CPT

## 2019-06-27 RX ORDER — AMLODIPINE BESYLATE 10 MG/1
10 TABLET ORAL
Status: DISCONTINUED | OUTPATIENT
Start: 2019-06-28 | End: 2019-07-02 | Stop reason: HOSPADM

## 2019-06-27 RX ORDER — ATORVASTATIN CALCIUM 20 MG/1
20 TABLET, FILM COATED ORAL DAILY
COMMUNITY
End: 2020-02-24 | Stop reason: SDUPTHER

## 2019-06-27 RX ORDER — SODIUM CHLORIDE 0.9 % (FLUSH) 0.9 %
5 SYRINGE (ML) INJECTION AS NEEDED
Status: DISCONTINUED | OUTPATIENT
Start: 2019-06-27 | End: 2019-07-02 | Stop reason: HOSPADM

## 2019-06-27 RX ORDER — SODIUM CHLORIDE 0.9 % (FLUSH) 0.9 %
3 SYRINGE (ML) INJECTION EVERY 12 HOURS SCHEDULED
Status: DISCONTINUED | OUTPATIENT
Start: 2019-06-27 | End: 2019-07-02 | Stop reason: HOSPADM

## 2019-06-27 RX ORDER — ATORVASTATIN CALCIUM 20 MG/1
20 TABLET, FILM COATED ORAL NIGHTLY
Status: DISCONTINUED | OUTPATIENT
Start: 2019-06-27 | End: 2019-06-27

## 2019-06-27 RX ORDER — ONDANSETRON 2 MG/ML
4 INJECTION INTRAMUSCULAR; INTRAVENOUS EVERY 6 HOURS PRN
Status: DISCONTINUED | OUTPATIENT
Start: 2019-06-27 | End: 2019-07-02 | Stop reason: HOSPADM

## 2019-06-27 RX ORDER — MORPHINE SULFATE 2 MG/ML
1 INJECTION, SOLUTION INTRAMUSCULAR; INTRAVENOUS EVERY 4 HOURS PRN
Status: DISCONTINUED | OUTPATIENT
Start: 2019-06-27 | End: 2019-07-01

## 2019-06-27 RX ORDER — NALOXONE HCL 0.4 MG/ML
0.4 VIAL (ML) INJECTION
Status: DISCONTINUED | OUTPATIENT
Start: 2019-06-27 | End: 2019-07-02 | Stop reason: HOSPADM

## 2019-06-27 RX ORDER — ACETAMINOPHEN 325 MG/1
650 TABLET ORAL EVERY 4 HOURS PRN
Status: DISCONTINUED | OUTPATIENT
Start: 2019-06-27 | End: 2019-07-02 | Stop reason: HOSPADM

## 2019-06-27 RX ORDER — ASPIRIN 81 MG/1
81 TABLET, CHEWABLE ORAL DAILY
Status: DISCONTINUED | OUTPATIENT
Start: 2019-06-28 | End: 2019-06-30 | Stop reason: SDUPTHER

## 2019-06-27 RX ORDER — ATORVASTATIN CALCIUM 20 MG/1
20 TABLET, FILM COATED ORAL DAILY
Status: DISCONTINUED | OUTPATIENT
Start: 2019-06-28 | End: 2019-07-02 | Stop reason: HOSPADM

## 2019-06-27 RX ORDER — SODIUM CHLORIDE 0.9 % (FLUSH) 0.9 %
3 SYRINGE (ML) INJECTION EVERY 12 HOURS SCHEDULED
Status: DISCONTINUED | OUTPATIENT
Start: 2019-06-27 | End: 2019-06-27

## 2019-06-27 RX ORDER — SODIUM CHLORIDE 0.9 % (FLUSH) 0.9 %
3-10 SYRINGE (ML) INJECTION AS NEEDED
Status: DISCONTINUED | OUTPATIENT
Start: 2019-06-27 | End: 2019-07-02 | Stop reason: HOSPADM

## 2019-06-27 RX ORDER — LOSARTAN POTASSIUM 100 MG/1
100 TABLET ORAL EVERY MORNING
COMMUNITY
End: 2019-09-13 | Stop reason: SDUPTHER

## 2019-06-27 RX ORDER — CHLORHEXIDINE GLUCONATE 500 MG/1
CLOTH TOPICAL
COMMUNITY
End: 2019-07-15

## 2019-06-27 ASSESSMENT — HOOS JR
HOOS JR SCORE: 11
HOOS JR SCORE: 55.985

## 2019-06-28 ENCOUNTER — APPOINTMENT (OUTPATIENT)
Dept: CT IMAGING | Facility: HOSPITAL | Age: 84
End: 2019-06-28

## 2019-06-28 PROBLEM — I35.0 AORTIC STENOSIS: Status: ACTIVE | Noted: 2019-06-28

## 2019-06-28 PROBLEM — J84.9 INTERSTITIAL LUNG DISEASE (HCC): Status: ACTIVE | Noted: 2019-06-28

## 2019-06-28 LAB
ANION GAP SERPL CALCULATED.3IONS-SCNC: 9.4 MMOL/L (ref 5–15)
BASOPHILS # BLD AUTO: 0.02 10*3/MM3 (ref 0–0.2)
BASOPHILS NFR BLD AUTO: 0.3 % (ref 0–1.5)
BUN BLD-MCNC: 12 MG/DL (ref 8–23)
BUN/CREAT SERPL: 10.6 (ref 7–25)
CALCIUM SPEC-SCNC: 8.2 MG/DL (ref 8.6–10.5)
CHLORIDE SERPL-SCNC: 111 MMOL/L (ref 98–107)
CO2 SERPL-SCNC: 18.6 MMOL/L (ref 22–29)
CREAT BLD-MCNC: 1.13 MG/DL (ref 0.57–1)
DEPRECATED RDW RBC AUTO: 47.7 FL (ref 37–54)
EOSINOPHIL # BLD AUTO: 0 10*3/MM3 (ref 0–0.4)
EOSINOPHIL NFR BLD AUTO: 0 % (ref 0.3–6.2)
ERYTHROCYTE [DISTWIDTH] IN BLOOD BY AUTOMATED COUNT: 15.9 % (ref 12.3–15.4)
FERRITIN SERPL-MCNC: 86.6 NG/ML (ref 13–150)
FOLATE SERPL-MCNC: >20 NG/ML (ref 4.78–24.2)
GFR SERPL CREATININE-BSD FRML MDRD: 46 ML/MIN/1.73
GLUCOSE BLD-MCNC: 83 MG/DL (ref 65–99)
HCT VFR BLD AUTO: 32.3 % (ref 34–46.6)
HGB BLD-MCNC: 10 G/DL (ref 12–15.9)
IMM GRANULOCYTES # BLD AUTO: 0.01 10*3/MM3 (ref 0–0.05)
IMM GRANULOCYTES NFR BLD AUTO: 0.2 % (ref 0–0.5)
IRON 24H UR-MRATE: 34 MCG/DL (ref 37–145)
IRON 24H UR-MRATE: 34 MCG/DL (ref 37–145)
IRON SATN MFR SERPL: 16 % (ref 20–50)
LYMPHOCYTES # BLD AUTO: 1.31 10*3/MM3 (ref 0.7–3.1)
LYMPHOCYTES NFR BLD AUTO: 21.4 % (ref 19.6–45.3)
MCH RBC QN AUTO: 25.6 PG (ref 26.6–33)
MCHC RBC AUTO-ENTMCNC: 31 G/DL (ref 31.5–35.7)
MCV RBC AUTO: 82.8 FL (ref 79–97)
MONOCYTES # BLD AUTO: 0.63 10*3/MM3 (ref 0.1–0.9)
MONOCYTES NFR BLD AUTO: 10.3 % (ref 5–12)
NEUTROPHILS # BLD AUTO: 4.16 10*3/MM3 (ref 1.7–7)
NEUTROPHILS NFR BLD AUTO: 67.8 % (ref 42.7–76)
NRBC BLD AUTO-RTO: 0 /100 WBC (ref 0–0.2)
PLATELET # BLD AUTO: 235 10*3/MM3 (ref 140–450)
PMV BLD AUTO: 12.1 FL (ref 6–12)
POTASSIUM BLD-SCNC: 3.5 MMOL/L (ref 3.5–5.2)
RBC # BLD AUTO: 3.9 10*6/MM3 (ref 3.77–5.28)
RETICS # AUTO: 0.03 10*6/MM3 (ref 0.02–0.13)
RETICS/RBC NFR AUTO: 0.88 % (ref 0.7–1.9)
SODIUM BLD-SCNC: 139 MMOL/L (ref 136–145)
TIBC SERPL-MCNC: 213 MCG/DL (ref 298–536)
TRANSFERRIN SERPL-MCNC: 143 MG/DL (ref 200–360)
VIT B12 BLD-MCNC: 659 PG/ML (ref 211–946)
WBC NRBC COR # BLD: 6.13 10*3/MM3 (ref 3.4–10.8)

## 2019-06-28 PROCEDURE — 82728 ASSAY OF FERRITIN: CPT | Performed by: INTERNAL MEDICINE

## 2019-06-28 PROCEDURE — 85045 AUTOMATED RETICULOCYTE COUNT: CPT | Performed by: INTERNAL MEDICINE

## 2019-06-28 PROCEDURE — 71250 CT THORAX DX C-: CPT

## 2019-06-28 PROCEDURE — 85025 COMPLETE CBC W/AUTO DIFF WBC: CPT | Performed by: INTERNAL MEDICINE

## 2019-06-28 PROCEDURE — 82746 ASSAY OF FOLIC ACID SERUM: CPT | Performed by: INTERNAL MEDICINE

## 2019-06-28 PROCEDURE — 80048 BASIC METABOLIC PNL TOTAL CA: CPT | Performed by: INTERNAL MEDICINE

## 2019-06-28 PROCEDURE — 83540 ASSAY OF IRON: CPT | Performed by: INTERNAL MEDICINE

## 2019-06-28 PROCEDURE — 82607 VITAMIN B-12: CPT | Performed by: INTERNAL MEDICINE

## 2019-06-28 PROCEDURE — 84466 ASSAY OF TRANSFERRIN: CPT | Performed by: INTERNAL MEDICINE

## 2019-06-28 PROCEDURE — 94799 UNLISTED PULMONARY SVC/PX: CPT

## 2019-06-28 RX ORDER — IPRATROPIUM BROMIDE AND ALBUTEROL SULFATE 2.5; .5 MG/3ML; MG/3ML
3 SOLUTION RESPIRATORY (INHALATION)
Status: DISCONTINUED | OUTPATIENT
Start: 2019-06-28 | End: 2019-07-02 | Stop reason: HOSPADM

## 2019-06-28 RX ADMIN — SODIUM CHLORIDE, PRESERVATIVE FREE 3 ML: 5 INJECTION INTRAVENOUS at 20:42

## 2019-06-28 RX ADMIN — SODIUM CHLORIDE, PRESERVATIVE FREE 3 ML: 5 INJECTION INTRAVENOUS at 09:53

## 2019-06-28 RX ADMIN — AMLODIPINE BESYLATE 10 MG: 10 TABLET ORAL at 09:45

## 2019-06-28 RX ADMIN — METOPROLOL TARTRATE 25 MG: 25 TABLET ORAL at 20:42

## 2019-06-28 RX ADMIN — ATORVASTATIN CALCIUM 20 MG: 20 TABLET, FILM COATED ORAL at 09:45

## 2019-06-28 RX ADMIN — IPRATROPIUM BROMIDE AND ALBUTEROL SULFATE 3 ML: 2.5; .5 SOLUTION RESPIRATORY (INHALATION) at 16:03

## 2019-06-28 RX ADMIN — IPRATROPIUM BROMIDE AND ALBUTEROL SULFATE 3 ML: 2.5; .5 SOLUTION RESPIRATORY (INHALATION) at 19:59

## 2019-06-28 RX ADMIN — IPRATROPIUM BROMIDE AND ALBUTEROL SULFATE 3 ML: 2.5; .5 SOLUTION RESPIRATORY (INHALATION) at 10:31

## 2019-06-28 RX ADMIN — METOPROLOL TARTRATE 25 MG: 25 TABLET ORAL at 09:45

## 2019-06-28 RX ADMIN — IPRATROPIUM BROMIDE AND ALBUTEROL SULFATE 3 ML: 2.5; .5 SOLUTION RESPIRATORY (INHALATION) at 12:33

## 2019-06-29 ENCOUNTER — APPOINTMENT (OUTPATIENT)
Dept: CARDIOLOGY | Facility: HOSPITAL | Age: 84
End: 2019-06-29

## 2019-06-29 PROBLEM — S06.5X0A TRAUMATIC SUBDURAL HEMORRHAGE WITHOUT LOSS OF CONSCIOUSNESS (HCC): Status: RESOLVED | Noted: 2018-07-12 | Resolved: 2019-06-29

## 2019-06-29 PROBLEM — R20.0 LEFT SIDED NUMBNESS: Status: RESOLVED | Noted: 2018-07-24 | Resolved: 2019-06-29

## 2019-06-29 PROBLEM — E87.1 HYPONATREMIA: Status: RESOLVED | Noted: 2018-08-05 | Resolved: 2019-06-29

## 2019-06-29 PROBLEM — S00.83XA FACIAL CONTUSION: Status: RESOLVED | Noted: 2019-04-15 | Resolved: 2019-06-29

## 2019-06-29 PROBLEM — E87.6 HYPOKALEMIA: Status: RESOLVED | Noted: 2019-04-15 | Resolved: 2019-06-29

## 2019-06-29 PROBLEM — D64.9 ANEMIA: Status: RESOLVED | Noted: 2018-08-05 | Resolved: 2019-06-29

## 2019-06-29 PROBLEM — K92.1 HEMATOCHEZIA: Status: RESOLVED | Noted: 2018-08-05 | Resolved: 2019-06-29

## 2019-06-29 PROBLEM — R53.1 WEAKNESS: Status: RESOLVED | Noted: 2018-08-05 | Resolved: 2019-06-29

## 2019-06-29 PROBLEM — E83.42 HYPOMAGNESEMIA: Status: RESOLVED | Noted: 2019-04-15 | Resolved: 2019-06-29

## 2019-06-29 PROBLEM — J30.1 ACUTE ALLERGIC RHINITIS DUE TO POLLEN: Status: RESOLVED | Noted: 2018-04-10 | Resolved: 2019-06-29

## 2019-06-29 PROBLEM — R53.1 LEFT-SIDED WEAKNESS: Status: RESOLVED | Noted: 2018-07-24 | Resolved: 2019-06-29

## 2019-06-29 PROBLEM — N17.9 AKI (ACUTE KIDNEY INJURY) (HCC): Status: RESOLVED | Noted: 2019-04-15 | Resolved: 2019-06-29

## 2019-06-29 PROBLEM — M16.10 OSTEOARTHRITIS OF ONE HIP: Status: ACTIVE | Noted: 2019-06-29

## 2019-06-29 PROBLEM — R47.9 DIFFICULTY WITH SPEECH: Status: RESOLVED | Noted: 2018-07-24 | Resolved: 2019-06-29

## 2019-06-29 PROBLEM — N39.0 ACUTE UTI (URINARY TRACT INFECTION): Status: RESOLVED | Noted: 2019-04-15 | Resolved: 2019-06-29

## 2019-06-29 PROBLEM — N12 PYELONEPHRITIS: Status: RESOLVED | Noted: 2019-04-16 | Resolved: 2019-06-29
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
## 2019-06-29 PROBLEM — I63.511 ACUTE RIGHT MCA STROKE (HCC): Status: RESOLVED | Noted: 2018-07-24 | Resolved: 2019-06-29

## 2019-06-29 PROBLEM — K64.9 HEMORRHOIDS: Status: RESOLVED | Noted: 2018-08-05 | Resolved: 2019-06-29

## 2019-06-29 PROBLEM — S60.10XA SUBUNGUAL HEMATOMA OF DIGIT OF HAND: Status: RESOLVED | Noted: 2019-03-05 | Resolved: 2019-06-29

## 2019-06-29 PROBLEM — S01.81XA LACERATION OF FOREHEAD: Status: RESOLVED | Noted: 2018-07-12 | Resolved: 2019-06-29

## 2019-06-29 LAB
ANION GAP SERPL CALCULATED.3IONS-SCNC: 12.2 MMOL/L (ref 5–15)
AORTIC DIMENSIONLESS INDEX: 0.2 (DI)
BASOPHILS # BLD AUTO: 0.02 10*3/MM3 (ref 0–0.2)
BASOPHILS NFR BLD AUTO: 0.3 % (ref 0–1.5)
BH CV ECHO MEAS - AI DEC SLOPE: 436 CM/SEC^2
BH CV ECHO MEAS - AI MAX PG: 73.6 MMHG
BH CV ECHO MEAS - AI MAX VEL: 429 CM/SEC
BH CV ECHO MEAS - AI P1/2T: 288.2 MSEC
BH CV ECHO MEAS - AO MAX PG (FULL): 49.3 MMHG
BH CV ECHO MEAS - AO MAX PG: 52.4 MMHG
BH CV ECHO MEAS - AO MEAN PG (FULL): 29 MMHG
BH CV ECHO MEAS - AO MEAN PG: 31 MMHG
BH CV ECHO MEAS - AO ROOT AREA (BSA CORRECTED): 1.7
BH CV ECHO MEAS - AO ROOT AREA: 4.9 CM^2
BH CV ECHO MEAS - AO ROOT DIAM: 2.5 CM
BH CV ECHO MEAS - AO V2 MAX: 362 CM/SEC
BH CV ECHO MEAS - AO V2 MEAN: 265 CM/SEC
BH CV ECHO MEAS - AO V2 VTI: 88.8 CM
BH CV ECHO MEAS - ASC AORTA: 3.3 CM
BH CV ECHO MEAS - AVA(I,A): 0.84 CM^2
BH CV ECHO MEAS - AVA(I,D): 0.84 CM^2
BH CV ECHO MEAS - AVA(V,A): 0.84 CM^2
BH CV ECHO MEAS - AVA(V,D): 0.84 CM^2
BH CV ECHO MEAS - BSA(HAYCOCK): 1.5 M^2
BH CV ECHO MEAS - BSA: 1.5 M^2
BH CV ECHO MEAS - BZI_BMI: 21.1 KILOGRAMS/M^2
BH CV ECHO MEAS - BZI_METRIC_HEIGHT: 157 CM
BH CV ECHO MEAS - BZI_METRIC_WEIGHT: 52 KG
BH CV ECHO MEAS - EDV(CUBED): 91.1 ML
BH CV ECHO MEAS - EDV(MOD-SP4): 40.9 ML
BH CV ECHO MEAS - EDV(SP2-EL): 0 ML
BH CV ECHO MEAS - EDV(SP4-EL): 41.4 ML
BH CV ECHO MEAS - EDV(TEICH): 92.4 ML
BH CV ECHO MEAS - EF(CUBED): 78.4 %
BH CV ECHO MEAS - EF(MOD-BP): 49 %
BH CV ECHO MEAS - EF(MOD-SP4): 48.6 %
BH CV ECHO MEAS - EF(TEICH): 70.8 %
BH CV ECHO MEAS - ESV(CUBED): 19.7 ML
BH CV ECHO MEAS - ESV(MOD-SP4): 21 ML
BH CV ECHO MEAS - ESV(TEICH): 27 ML
BH CV ECHO MEAS - FS: 40 %
BH CV ECHO MEAS - IVS/LVPW: 1
BH CV ECHO MEAS - IVSD: 1 CM
BH CV ECHO MEAS - LAT PEAK E' VEL: 9.1 CM/SEC
BH CV ECHO MEAS - LV DIASTOLIC VOL/BSA (35-75): 27.1 ML/M^2
BH CV ECHO MEAS - LV MASS(C)D: 153.3 GRAMS
BH CV ECHO MEAS - LV MASS(C)DI: 101.8 GRAMS/M^2
BH CV ECHO MEAS - LV MAX PG: 3.1 MMHG
BH CV ECHO MEAS - LV MEAN PG: 2 MMHG
BH CV ECHO MEAS - LV SYSTOLIC VOL/BSA (12-30): 13.9 ML/M^2
BH CV ECHO MEAS - LV V1 MAX: 87.7 CM/SEC
BH CV ECHO MEAS - LV V1 MEAN: 61.5 CM/SEC
BH CV ECHO MEAS - LV V1 VTI: 21.5 CM
BH CV ECHO MEAS - LVAD AP4: 18.9 CM^2
BH CV ECHO MEAS - LVIDD: 4.5 CM
BH CV ECHO MEAS - LVIDS: 2.7 CM
BH CV ECHO MEAS - LVLD AP4: 7.7 CM
BH CV ECHO MEAS - LVLS AP4: 6.6 CM
BH CV ECHO MEAS - LVOT AREA (M): 3.5 CM^2
BH CV ECHO MEAS - LVOT AREA: 3.5 CM^2
BH CV ECHO MEAS - LVOT DIAM: 2.1 CM
BH CV ECHO MEAS - LVPWD: 1 CM
BH CV ECHO MEAS - MED PEAK E' VEL: 6.5 CM/SEC
BH CV ECHO MEAS - MV A DUR: 155 SEC
BH CV ECHO MEAS - MV A MAX VEL: 125 CM/SEC
BH CV ECHO MEAS - MV DEC SLOPE: 471.3 CM/SEC^2
BH CV ECHO MEAS - MV DEC TIME: 134 SEC
BH CV ECHO MEAS - MV E MAX VEL: 103 CM/SEC
BH CV ECHO MEAS - MV E/A: 0.82
BH CV ECHO MEAS - MV MAX PG: 8 MMHG
BH CV ECHO MEAS - MV MEAN PG: 4 MMHG
BH CV ECHO MEAS - MV P1/2T MAX VEL: 120 CM/SEC
BH CV ECHO MEAS - MV P1/2T: 74.6 MSEC
BH CV ECHO MEAS - MV V2 MAX: 141 CM/SEC
BH CV ECHO MEAS - MV V2 MEAN: 92.1 CM/SEC
BH CV ECHO MEAS - MV V2 VTI: 35.6 CM
BH CV ECHO MEAS - MVA P1/2T LCG: 1.8 CM^2
BH CV ECHO MEAS - MVA(P1/2T): 3 CM^2
BH CV ECHO MEAS - MVA(VTI): 2.1 CM^2
BH CV ECHO MEAS - PA ACC TIME: 0.12 SEC
BH CV ECHO MEAS - PA MAX PG (FULL): 2.4 MMHG
BH CV ECHO MEAS - PA MAX PG: 5 MMHG
BH CV ECHO MEAS - PA PR(ACCEL): 26.8 MMHG
BH CV ECHO MEAS - PA V2 MAX: 112 CM/SEC
BH CV ECHO MEAS - PVA(V,A): 2.5 CM^2
BH CV ECHO MEAS - PVA(V,D): 2.5 CM^2
BH CV ECHO MEAS - QP/QS: 0.98
BH CV ECHO MEAS - RV MAX PG: 2.6 MMHG
BH CV ECHO MEAS - RV MEAN PG: 2 MMHG
BH CV ECHO MEAS - RV V1 MAX: 81.3 CM/SEC
BH CV ECHO MEAS - RV V1 MEAN: 60.2 CM/SEC
BH CV ECHO MEAS - RV V1 VTI: 21.1 CM
BH CV ECHO MEAS - RVOT AREA: 3.5 CM^2
BH CV ECHO MEAS - RVOT DIAM: 2.1 CM
BH CV ECHO MEAS - SI(AO): 289.5 ML/M^2
BH CV ECHO MEAS - SI(CUBED): 47.5 ML/M^2
BH CV ECHO MEAS - SI(LVOT): 49.5 ML/M^2
BH CV ECHO MEAS - SI(MOD-SP4): 13.2 ML/M^2
BH CV ECHO MEAS - SI(TEICH): 43.5 ML/M^2
BH CV ECHO MEAS - SV(AO): 435.9 ML
BH CV ECHO MEAS - SV(CUBED): 71.4 ML
BH CV ECHO MEAS - SV(LVOT): 74.5 ML
BH CV ECHO MEAS - SV(MOD-SP4): 19.9 ML
BH CV ECHO MEAS - SV(RVOT): 73.1 ML
BH CV ECHO MEAS - SV(TEICH): 65.4 ML
BH CV ECHO MEAS - TAPSE (>1.6): 2.2 CM2
BH CV ECHO MEASUREMENTS AVERAGE E/E' RATIO: 13.21
BH CV XLRA - RV BASE: 3.1 CM
BH CV XLRA - TDI S': 14.3 CM/SEC
BUN BLD-MCNC: 11 MG/DL (ref 8–23)
BUN/CREAT SERPL: 10.7 (ref 7–25)
CALCIUM SPEC-SCNC: 8.4 MG/DL (ref 8.6–10.5)
CHLORIDE SERPL-SCNC: 110 MMOL/L (ref 98–107)
CO2 SERPL-SCNC: 18.8 MMOL/L (ref 22–29)
CREAT BLD-MCNC: 1.03 MG/DL (ref 0.57–1)
DEPRECATED RDW RBC AUTO: 47 FL (ref 37–54)
EOSINOPHIL # BLD AUTO: 0 10*3/MM3 (ref 0–0.4)
EOSINOPHIL NFR BLD AUTO: 0 % (ref 0.3–6.2)
ERYTHROCYTE [DISTWIDTH] IN BLOOD BY AUTOMATED COUNT: 15.9 % (ref 12.3–15.4)
GFR SERPL CREATININE-BSD FRML MDRD: 51 ML/MIN/1.73
GLUCOSE BLD-MCNC: 89 MG/DL (ref 65–99)
HCT VFR BLD AUTO: 33.7 % (ref 34–46.6)
HEMOCCULT STL QL: NEGATIVE
HGB BLD-MCNC: 10.6 G/DL (ref 12–15.9)
IMM GRANULOCYTES # BLD AUTO: 0.02 10*3/MM3 (ref 0–0.05)
IMM GRANULOCYTES NFR BLD AUTO: 0.3 % (ref 0–0.5)
LEFT ATRIUM VOLUME INDEX: 46 ML/M2
LV EF 2D ECHO EST: 60 %
LYMPHOCYTES # BLD AUTO: 1.25 10*3/MM3 (ref 0.7–3.1)
LYMPHOCYTES NFR BLD AUTO: 18.2 % (ref 19.6–45.3)
MAXIMAL PREDICTED HEART RATE: 138 BPM
MCH RBC QN AUTO: 25.6 PG (ref 26.6–33)
MCHC RBC AUTO-ENTMCNC: 31.5 G/DL (ref 31.5–35.7)
MCV RBC AUTO: 81.4 FL (ref 79–97)
MONOCYTES # BLD AUTO: 0.68 10*3/MM3 (ref 0.1–0.9)
MONOCYTES NFR BLD AUTO: 9.9 % (ref 5–12)
NEUTROPHILS # BLD AUTO: 4.9 10*3/MM3 (ref 1.7–7)
NEUTROPHILS NFR BLD AUTO: 71.3 % (ref 42.7–76)
NRBC BLD AUTO-RTO: 0 /100 WBC (ref 0–0.2)
PLATELET # BLD AUTO: 225 10*3/MM3 (ref 140–450)
PMV BLD AUTO: 12 FL (ref 6–12)
POTASSIUM BLD-SCNC: 3.6 MMOL/L (ref 3.5–5.2)
RBC # BLD AUTO: 4.14 10*6/MM3 (ref 3.77–5.28)
SODIUM BLD-SCNC: 141 MMOL/L (ref 136–145)
STRESS TARGET HR: 117 BPM
WBC NRBC COR # BLD: 6.87 10*3/MM3 (ref 3.4–10.8)

## 2019-06-29 PROCEDURE — 94799 UNLISTED PULMONARY SVC/PX: CPT

## 2019-06-29 PROCEDURE — 85025 COMPLETE CBC W/AUTO DIFF WBC: CPT | Performed by: NURSE PRACTITIONER

## 2019-06-29 PROCEDURE — 82272 OCCULT BLD FECES 1-3 TESTS: CPT | Performed by: INTERNAL MEDICINE

## 2019-06-29 PROCEDURE — 80048 BASIC METABOLIC PNL TOTAL CA: CPT | Performed by: NURSE PRACTITIONER

## 2019-06-29 PROCEDURE — 99222 1ST HOSP IP/OBS MODERATE 55: CPT | Performed by: INTERNAL MEDICINE

## 2019-06-29 PROCEDURE — 93306 TTE W/DOPPLER COMPLETE: CPT

## 2019-06-29 PROCEDURE — 93306 TTE W/DOPPLER COMPLETE: CPT | Performed by: INTERNAL MEDICINE

## 2019-06-29 RX ORDER — ACETAMINOPHEN 500 MG
1000 TABLET ORAL ONCE
Status: DISCONTINUED | OUTPATIENT
Start: 2019-06-29 | End: 2019-06-30 | Stop reason: HOSPADM

## 2019-06-29 RX ADMIN — AMLODIPINE BESYLATE 10 MG: 10 TABLET ORAL at 10:09

## 2019-06-29 RX ADMIN — METOPROLOL TARTRATE 25 MG: 25 TABLET ORAL at 10:10

## 2019-06-29 RX ADMIN — SODIUM CHLORIDE, PRESERVATIVE FREE 3 ML: 5 INJECTION INTRAVENOUS at 10:10

## 2019-06-29 RX ADMIN — IPRATROPIUM BROMIDE AND ALBUTEROL SULFATE 3 ML: 2.5; .5 SOLUTION RESPIRATORY (INHALATION) at 13:22

## 2019-06-29 RX ADMIN — ATORVASTATIN CALCIUM 20 MG: 20 TABLET, FILM COATED ORAL at 10:09

## 2019-06-29 RX ADMIN — SODIUM CHLORIDE, PRESERVATIVE FREE 3 ML: 5 INJECTION INTRAVENOUS at 21:07

## 2019-06-29 RX ADMIN — IPRATROPIUM BROMIDE AND ALBUTEROL SULFATE 3 ML: 2.5; .5 SOLUTION RESPIRATORY (INHALATION) at 19:23

## 2019-06-29 RX ADMIN — IPRATROPIUM BROMIDE AND ALBUTEROL SULFATE 3 ML: 2.5; .5 SOLUTION RESPIRATORY (INHALATION) at 17:38

## 2019-06-29 RX ADMIN — METOPROLOL TARTRATE 25 MG: 25 TABLET ORAL at 21:07

## 2019-06-30 ENCOUNTER — APPOINTMENT (OUTPATIENT)
Dept: GENERAL RADIOLOGY | Facility: HOSPITAL | Age: 84
End: 2019-06-30

## 2019-06-30 ENCOUNTER — ANESTHESIA (OUTPATIENT)
Dept: PERIOP | Facility: HOSPITAL | Age: 84
End: 2019-06-30

## 2019-06-30 ENCOUNTER — ANESTHESIA EVENT (OUTPATIENT)
Dept: PERIOP | Facility: HOSPITAL | Age: 84
End: 2019-06-30

## 2019-06-30 PROBLEM — S72.009A FRACTURE, HIP (HCC): Status: RESOLVED | Noted: 2019-06-27 | Resolved: 2019-06-30

## 2019-06-30 PROBLEM — M16.10 OSTEOARTHRITIS OF ONE HIP: Status: RESOLVED | Noted: 2019-06-29 | Resolved: 2019-06-30

## 2019-06-30 PROBLEM — S72.002A CLOSED FRACTURE OF NECK OF LEFT FEMUR (HCC): Status: RESOLVED | Noted: 2019-06-26 | Resolved: 2019-06-30

## 2019-06-30 PROCEDURE — 73501 X-RAY EXAM HIP UNI 1 VIEW: CPT

## 2019-06-30 PROCEDURE — 25010000002 NEOSTIGMINE PER 0.5 MG: Performed by: NURSE ANESTHETIST, CERTIFIED REGISTERED

## 2019-06-30 PROCEDURE — 93005 ELECTROCARDIOGRAM TRACING: CPT | Performed by: INTERNAL MEDICINE

## 2019-06-30 PROCEDURE — 93010 ELECTROCARDIOGRAM REPORT: CPT | Performed by: INTERNAL MEDICINE

## 2019-06-30 PROCEDURE — 25010000002 FENTANYL CITRATE (PF) 100 MCG/2ML SOLUTION: Performed by: NURSE ANESTHETIST, CERTIFIED REGISTERED

## 2019-06-30 PROCEDURE — 25010000003 CEFAZOLIN IN DEXTROSE 2-4 GM/100ML-% SOLUTION: Performed by: ORTHOPAEDIC SURGERY

## 2019-06-30 PROCEDURE — 94799 UNLISTED PULMONARY SVC/PX: CPT

## 2019-06-30 PROCEDURE — 25010000002 ROPIVACAINE PER 1 MG: Performed by: ORTHOPAEDIC SURGERY

## 2019-06-30 PROCEDURE — 25010000002 PROPOFOL 10 MG/ML EMULSION: Performed by: NURSE ANESTHETIST, CERTIFIED REGISTERED

## 2019-06-30 PROCEDURE — 25010000002 KETOROLAC TROMETHAMINE PER 15 MG: Performed by: ORTHOPAEDIC SURGERY

## 2019-06-30 PROCEDURE — 25010000002 ONDANSETRON PER 1 MG: Performed by: NURSE ANESTHETIST, CERTIFIED REGISTERED

## 2019-06-30 PROCEDURE — 93005 ELECTROCARDIOGRAM TRACING: CPT | Performed by: ANESTHESIOLOGY

## 2019-06-30 PROCEDURE — 25010000002 CLONIDINE PER 1 MG: Performed by: ORTHOPAEDIC SURGERY

## 2019-06-30 PROCEDURE — 76000 FLUOROSCOPY <1 HR PHYS/QHP: CPT

## 2019-06-30 PROCEDURE — C1776 JOINT DEVICE (IMPLANTABLE): HCPCS | Performed by: ORTHOPAEDIC SURGERY

## 2019-06-30 PROCEDURE — 0SRB04A REPLACEMENT OF LEFT HIP JOINT WITH CERAMIC ON POLYETHYLENE SYNTHETIC SUBSTITUTE, UNCEMENTED, OPEN APPROACH: ICD-10-PCS | Performed by: ORTHOPAEDIC SURGERY

## 2019-06-30 PROCEDURE — 25010000003 CEFAZOLIN 1-4 GM/50ML-% SOLUTION: Performed by: ORTHOPAEDIC SURGERY

## 2019-06-30 DEVICE — BIOLOX DELTA CERAMIC FEMORAL HEAD 32MM DIA +5.0 12/14 TAPER
Type: IMPLANTABLE DEVICE | Site: HIP | Status: FUNCTIONAL
Brand: BIOLOX DELTA

## 2019-06-30 DEVICE — SUT FW #2 W/TPR NDL 1/2 CIR 38IN 97CM 26.5MM BLU: Type: IMPLANTABLE DEVICE | Site: HIP | Status: FUNCTIONAL

## 2019-06-30 DEVICE — PINNACLE GRIPTION ACETABULAR SHELL SECTOR 48MM OD
Type: IMPLANTABLE DEVICE | Site: HIP | Status: FUNCTIONAL
Brand: PINNACLE GRIPTION

## 2019-06-30 DEVICE — TOTL HIP COP DEPUY 9641334: Type: IMPLANTABLE DEVICE | Site: HIP | Status: FUNCTIONAL

## 2019-06-30 DEVICE — PINNACLE HIP SOLUTIONS ALTRX POLYETHYLENE ACETABULAR LINER NEUTRAL 32MM ID 48MM OD
Type: IMPLANTABLE DEVICE | Site: HIP | Status: FUNCTIONAL
Brand: PINNACLE ALTRX

## 2019-06-30 DEVICE — ACTIS DUOFIX HIP PROSTHESIS (FEMORAL STEM 12/14 TAPER CEMENTLESS SIZE 3 STD COLLAR)  CE
Type: IMPLANTABLE DEVICE | Site: HIP | Status: FUNCTIONAL
Brand: ACTIS

## 2019-06-30 RX ORDER — ACETAMINOPHEN 325 MG/1
650 TABLET ORAL ONCE AS NEEDED
Status: DISCONTINUED | OUTPATIENT
Start: 2019-06-30 | End: 2019-06-30 | Stop reason: HOSPADM

## 2019-06-30 RX ORDER — HYDRALAZINE HYDROCHLORIDE 20 MG/ML
5 INJECTION INTRAMUSCULAR; INTRAVENOUS
Status: DISCONTINUED | OUTPATIENT
Start: 2019-06-30 | End: 2019-06-30 | Stop reason: HOSPADM

## 2019-06-30 RX ORDER — CEFAZOLIN SODIUM 2 G/100ML
2 INJECTION, SOLUTION INTRAVENOUS ONCE
Status: COMPLETED | OUTPATIENT
Start: 2019-06-30 | End: 2019-06-30

## 2019-06-30 RX ORDER — PROPOFOL 10 MG/ML
VIAL (ML) INTRAVENOUS AS NEEDED
Status: DISCONTINUED | OUTPATIENT
Start: 2019-06-30 | End: 2019-06-30 | Stop reason: SURG

## 2019-06-30 RX ORDER — BISACODYL 5 MG/1
10 TABLET, DELAYED RELEASE ORAL DAILY PRN
Status: DISCONTINUED | OUTPATIENT
Start: 2019-06-30 | End: 2019-07-02 | Stop reason: HOSPADM

## 2019-06-30 RX ORDER — NALOXONE HCL 0.4 MG/ML
0.2 VIAL (ML) INJECTION AS NEEDED
Status: DISCONTINUED | OUTPATIENT
Start: 2019-06-30 | End: 2019-06-30 | Stop reason: HOSPADM

## 2019-06-30 RX ORDER — SODIUM CHLORIDE 9 MG/ML
100 INJECTION, SOLUTION INTRAVENOUS CONTINUOUS
Status: ACTIVE | OUTPATIENT
Start: 2019-06-30 | End: 2019-07-01

## 2019-06-30 RX ORDER — EPHEDRINE SULFATE 50 MG/ML
INJECTION, SOLUTION INTRAVENOUS AS NEEDED
Status: DISCONTINUED | OUTPATIENT
Start: 2019-06-30 | End: 2019-06-30 | Stop reason: SURG

## 2019-06-30 RX ORDER — SENNA AND DOCUSATE SODIUM 50; 8.6 MG/1; MG/1
2 TABLET, FILM COATED ORAL 2 TIMES DAILY PRN
Status: DISCONTINUED | OUTPATIENT
Start: 2019-06-30 | End: 2019-07-02 | Stop reason: HOSPADM

## 2019-06-30 RX ORDER — ASPIRIN 81 MG/1
81 TABLET ORAL DAILY
Status: DISCONTINUED | OUTPATIENT
Start: 2019-07-01 | End: 2019-07-02 | Stop reason: HOSPADM

## 2019-06-30 RX ORDER — TRANEXAMIC ACID 100 MG/ML
INJECTION, SOLUTION INTRAVENOUS AS NEEDED
Status: DISCONTINUED | OUTPATIENT
Start: 2019-06-30 | End: 2019-06-30 | Stop reason: HOSPADM

## 2019-06-30 RX ORDER — ONDANSETRON 2 MG/ML
INJECTION INTRAMUSCULAR; INTRAVENOUS AS NEEDED
Status: DISCONTINUED | OUTPATIENT
Start: 2019-06-30 | End: 2019-06-30 | Stop reason: SURG

## 2019-06-30 RX ORDER — FENTANYL CITRATE 50 UG/ML
50 INJECTION, SOLUTION INTRAMUSCULAR; INTRAVENOUS
Status: DISCONTINUED | OUTPATIENT
Start: 2019-06-30 | End: 2019-06-30 | Stop reason: HOSPADM

## 2019-06-30 RX ORDER — CEFAZOLIN SODIUM 1 G/50ML
1 INJECTION, SOLUTION INTRAVENOUS EVERY 8 HOURS
Status: COMPLETED | OUTPATIENT
Start: 2019-06-30 | End: 2019-07-01

## 2019-06-30 RX ORDER — DIPHENHYDRAMINE HCL 25 MG
25 CAPSULE ORAL
Status: DISCONTINUED | OUTPATIENT
Start: 2019-06-30 | End: 2019-06-30 | Stop reason: HOSPADM

## 2019-06-30 RX ORDER — FENTANYL CITRATE 50 UG/ML
INJECTION, SOLUTION INTRAMUSCULAR; INTRAVENOUS
Status: COMPLETED
Start: 2019-06-30 | End: 2019-06-30

## 2019-06-30 RX ORDER — FLUMAZENIL 0.1 MG/ML
0.2 INJECTION INTRAVENOUS AS NEEDED
Status: DISCONTINUED | OUTPATIENT
Start: 2019-06-30 | End: 2019-06-30 | Stop reason: HOSPADM

## 2019-06-30 RX ORDER — OXYCODONE AND ACETAMINOPHEN 7.5; 325 MG/1; MG/1
1 TABLET ORAL ONCE AS NEEDED
Status: DISCONTINUED | OUTPATIENT
Start: 2019-06-30 | End: 2019-06-30 | Stop reason: HOSPADM

## 2019-06-30 RX ORDER — GLYCOPYRROLATE 0.2 MG/ML
INJECTION INTRAMUSCULAR; INTRAVENOUS AS NEEDED
Status: DISCONTINUED | OUTPATIENT
Start: 2019-06-30 | End: 2019-06-30 | Stop reason: SURG

## 2019-06-30 RX ORDER — LIDOCAINE HYDROCHLORIDE 10 MG/ML
0.5 INJECTION, SOLUTION EPIDURAL; INFILTRATION; INTRACAUDAL; PERINEURAL ONCE AS NEEDED
Status: DISCONTINUED | OUTPATIENT
Start: 2019-06-30 | End: 2019-06-30 | Stop reason: HOSPADM

## 2019-06-30 RX ORDER — BISACODYL 10 MG
10 SUPPOSITORY, RECTAL RECTAL DAILY PRN
Status: DISCONTINUED | OUTPATIENT
Start: 2019-06-30 | End: 2019-07-02 | Stop reason: HOSPADM

## 2019-06-30 RX ORDER — SODIUM CHLORIDE, SODIUM LACTATE, POTASSIUM CHLORIDE, CALCIUM CHLORIDE 600; 310; 30; 20 MG/100ML; MG/100ML; MG/100ML; MG/100ML
9 INJECTION, SOLUTION INTRAVENOUS CONTINUOUS
Status: DISCONTINUED | OUTPATIENT
Start: 2019-06-30 | End: 2019-06-30

## 2019-06-30 RX ORDER — FAMOTIDINE 10 MG/ML
20 INJECTION, SOLUTION INTRAVENOUS ONCE
Status: COMPLETED | OUTPATIENT
Start: 2019-06-30 | End: 2019-06-30

## 2019-06-30 RX ORDER — DIPHENHYDRAMINE HYDROCHLORIDE 50 MG/ML
12.5 INJECTION INTRAMUSCULAR; INTRAVENOUS
Status: DISCONTINUED | OUTPATIENT
Start: 2019-06-30 | End: 2019-06-30 | Stop reason: HOSPADM

## 2019-06-30 RX ORDER — PROMETHAZINE HYDROCHLORIDE 25 MG/ML
6.25 INJECTION, SOLUTION INTRAMUSCULAR; INTRAVENOUS
Status: DISCONTINUED | OUTPATIENT
Start: 2019-06-30 | End: 2019-06-30 | Stop reason: HOSPADM

## 2019-06-30 RX ORDER — ROCURONIUM BROMIDE 10 MG/ML
INJECTION, SOLUTION INTRAVENOUS AS NEEDED
Status: DISCONTINUED | OUTPATIENT
Start: 2019-06-30 | End: 2019-06-30 | Stop reason: SURG

## 2019-06-30 RX ORDER — ACETAMINOPHEN 325 MG/1
325 TABLET ORAL EVERY 4 HOURS PRN
Status: DISCONTINUED | OUTPATIENT
Start: 2019-06-30 | End: 2019-07-02 | Stop reason: HOSPADM

## 2019-06-30 RX ORDER — TRAMADOL HYDROCHLORIDE 50 MG/1
50 TABLET ORAL EVERY 12 HOURS PRN
Status: DISCONTINUED | OUTPATIENT
Start: 2019-06-30 | End: 2019-07-02 | Stop reason: HOSPADM

## 2019-06-30 RX ORDER — PROMETHAZINE HYDROCHLORIDE 25 MG/1
25 SUPPOSITORY RECTAL ONCE AS NEEDED
Status: DISCONTINUED | OUTPATIENT
Start: 2019-06-30 | End: 2019-06-30 | Stop reason: HOSPADM

## 2019-06-30 RX ORDER — EPHEDRINE SULFATE 50 MG/ML
5 INJECTION, SOLUTION INTRAVENOUS ONCE AS NEEDED
Status: DISCONTINUED | OUTPATIENT
Start: 2019-06-30 | End: 2019-06-30 | Stop reason: HOSPADM

## 2019-06-30 RX ORDER — ACETAMINOPHEN 500 MG
1000 TABLET ORAL EVERY 8 HOURS
Status: DISCONTINUED | OUTPATIENT
Start: 2019-06-30 | End: 2019-07-02 | Stop reason: HOSPADM

## 2019-06-30 RX ORDER — HYDROCODONE BITARTRATE AND ACETAMINOPHEN 7.5; 325 MG/1; MG/1
1 TABLET ORAL ONCE AS NEEDED
Status: DISCONTINUED | OUTPATIENT
Start: 2019-06-30 | End: 2019-06-30 | Stop reason: HOSPADM

## 2019-06-30 RX ORDER — LABETALOL HYDROCHLORIDE 5 MG/ML
5 INJECTION, SOLUTION INTRAVENOUS
Status: DISCONTINUED | OUTPATIENT
Start: 2019-06-30 | End: 2019-06-30 | Stop reason: HOSPADM

## 2019-06-30 RX ORDER — SODIUM CHLORIDE 0.9 % (FLUSH) 0.9 %
1-10 SYRINGE (ML) INJECTION AS NEEDED
Status: DISCONTINUED | OUTPATIENT
Start: 2019-06-30 | End: 2019-06-30 | Stop reason: HOSPADM

## 2019-06-30 RX ORDER — HYDROCODONE BITARTRATE AND ACETAMINOPHEN 5; 325 MG/1; MG/1
1 TABLET ORAL EVERY 4 HOURS PRN
Status: DISCONTINUED | OUTPATIENT
Start: 2019-06-30 | End: 2019-07-02 | Stop reason: HOSPADM

## 2019-06-30 RX ORDER — FENTANYL CITRATE 50 UG/ML
INJECTION, SOLUTION INTRAMUSCULAR; INTRAVENOUS AS NEEDED
Status: DISCONTINUED | OUTPATIENT
Start: 2019-06-30 | End: 2019-06-30 | Stop reason: SURG

## 2019-06-30 RX ORDER — HYDROMORPHONE HYDROCHLORIDE 1 MG/ML
0.5 INJECTION, SOLUTION INTRAMUSCULAR; INTRAVENOUS; SUBCUTANEOUS
Status: DISCONTINUED | OUTPATIENT
Start: 2019-06-30 | End: 2019-06-30 | Stop reason: HOSPADM

## 2019-06-30 RX ORDER — LIDOCAINE HYDROCHLORIDE 40 MG/ML
SOLUTION TOPICAL AS NEEDED
Status: DISCONTINUED | OUTPATIENT
Start: 2019-06-30 | End: 2019-06-30 | Stop reason: SURG

## 2019-06-30 RX ORDER — DOCUSATE SODIUM 100 MG/1
100 CAPSULE, LIQUID FILLED ORAL 2 TIMES DAILY PRN
Status: DISCONTINUED | OUTPATIENT
Start: 2019-06-30 | End: 2019-07-02 | Stop reason: HOSPADM

## 2019-06-30 RX ORDER — PROMETHAZINE HYDROCHLORIDE 25 MG/1
25 TABLET ORAL ONCE AS NEEDED
Status: DISCONTINUED | OUTPATIENT
Start: 2019-06-30 | End: 2019-06-30 | Stop reason: HOSPADM

## 2019-06-30 RX ORDER — LIDOCAINE HYDROCHLORIDE 20 MG/ML
INJECTION, SOLUTION INFILTRATION; PERINEURAL AS NEEDED
Status: DISCONTINUED | OUTPATIENT
Start: 2019-06-30 | End: 2019-06-30 | Stop reason: SURG

## 2019-06-30 RX ORDER — ONDANSETRON 2 MG/ML
4 INJECTION INTRAMUSCULAR; INTRAVENOUS ONCE AS NEEDED
Status: DISCONTINUED | OUTPATIENT
Start: 2019-06-30 | End: 2019-06-30 | Stop reason: HOSPADM

## 2019-06-30 RX ORDER — PROMETHAZINE HYDROCHLORIDE 25 MG/ML
12.5 INJECTION, SOLUTION INTRAMUSCULAR; INTRAVENOUS ONCE AS NEEDED
Status: DISCONTINUED | OUTPATIENT
Start: 2019-06-30 | End: 2019-06-30 | Stop reason: HOSPADM

## 2019-06-30 RX ORDER — UREA 10 %
1 LOTION (ML) TOPICAL NIGHTLY PRN
Status: DISCONTINUED | OUTPATIENT
Start: 2019-06-30 | End: 2019-07-02 | Stop reason: HOSPADM

## 2019-06-30 RX ADMIN — FENTANYL CITRATE 25 MCG: 50 INJECTION INTRAMUSCULAR; INTRAVENOUS at 09:50

## 2019-06-30 RX ADMIN — FENTANYL CITRATE 50 MCG: 50 INJECTION INTRAMUSCULAR; INTRAVENOUS at 09:29

## 2019-06-30 RX ADMIN — PROPOFOL 100 MG: 10 INJECTION, EMULSION INTRAVENOUS at 09:29

## 2019-06-30 RX ADMIN — ONDANSETRON 4 MG: 2 INJECTION INTRAMUSCULAR; INTRAVENOUS at 11:00

## 2019-06-30 RX ADMIN — AMLODIPINE BESYLATE 10 MG: 10 TABLET ORAL at 14:36

## 2019-06-30 RX ADMIN — IPRATROPIUM BROMIDE AND ALBUTEROL SULFATE 3 ML: 2.5; .5 SOLUTION RESPIRATORY (INHALATION) at 08:17

## 2019-06-30 RX ADMIN — METOPROLOL TARTRATE 25 MG: 25 TABLET ORAL at 21:14

## 2019-06-30 RX ADMIN — NEOSTIGMINE METHYLSULFATE 2 MG: 1 INJECTION INTRAMUSCULAR; INTRAVENOUS; SUBCUTANEOUS at 11:00

## 2019-06-30 RX ADMIN — EPHEDRINE SULFATE 10 MG: 50 INJECTION INTRAMUSCULAR; INTRAVENOUS; SUBCUTANEOUS at 10:12

## 2019-06-30 RX ADMIN — CEFAZOLIN SODIUM 2 G: 2 INJECTION, SOLUTION INTRAVENOUS at 09:40

## 2019-06-30 RX ADMIN — ACETAMINOPHEN 1000 MG: 500 TABLET, FILM COATED ORAL at 21:14

## 2019-06-30 RX ADMIN — LIDOCAINE HYDROCHLORIDE 60 MG: 20 INJECTION, SOLUTION INFILTRATION; PERINEURAL at 09:29

## 2019-06-30 RX ADMIN — ACETAMINOPHEN 1000 MG: 500 TABLET, FILM COATED ORAL at 14:36

## 2019-06-30 RX ADMIN — LIDOCAINE HYDROCHLORIDE 1 EACH: 40 SOLUTION TOPICAL at 09:30

## 2019-06-30 RX ADMIN — GLYCOPYRROLATE 0.3 MG: 0.2 INJECTION INTRAMUSCULAR; INTRAVENOUS at 11:00

## 2019-06-30 RX ADMIN — SODIUM CHLORIDE, POTASSIUM CHLORIDE, SODIUM LACTATE AND CALCIUM CHLORIDE 9 ML/HR: 600; 310; 30; 20 INJECTION, SOLUTION INTRAVENOUS at 09:11

## 2019-06-30 RX ADMIN — METOPROLOL TARTRATE 25 MG: 25 TABLET ORAL at 08:25

## 2019-06-30 RX ADMIN — CEFAZOLIN SODIUM 1 G: 1 INJECTION, SOLUTION INTRAVENOUS at 19:19

## 2019-06-30 RX ADMIN — SODIUM CHLORIDE 100 ML/HR: 9 INJECTION, SOLUTION INTRAVENOUS at 14:36

## 2019-06-30 RX ADMIN — FAMOTIDINE 20 MG: 10 INJECTION INTRAVENOUS at 09:11

## 2019-06-30 RX ADMIN — ROCURONIUM BROMIDE 30 MG: 10 INJECTION INTRAVENOUS at 09:29

## 2019-06-30 NOTE — ANESTHESIA PROCEDURE NOTES
Airway  Urgency: elective    Date/Time: 6/30/2019 9:30 AM  Airway not difficult    General Information and Staff    Patient location during procedure: OR  Anesthesiologist: Farrukh Ro MD  CRNA: Abdi Correa CRNA    Indications and Patient Condition  Indications for airway management: airway protection    Preoxygenated: yes  MILS maintained throughout  Mask difficulty assessment: 2 - vent by mask + OA or adjuvant +/- NMBA    Final Airway Details  Final airway type: endotracheal airway      Successful airway: ETT  Cuffed: yes   Successful intubation technique: direct laryngoscopy  Endotracheal tube insertion site: oral  Blade: Michelle  Blade size: 3  ETT size (mm): 7.0  Cormack-Lehane Classification: grade I - full view of glottis  Placement verified by: chest auscultation and capnometry   Measured from: gums  ETT to gums (cm): 20  Number of attempts at approach: 1

## 2019-06-30 NOTE — ANESTHESIA PREPROCEDURE EVALUATION
Anesthesia Evaluation     Patient summary reviewed and Nursing notes reviewed   no history of anesthetic complications:  NPO Solid Status: > 6 hours             Airway   Mallampati: II  Neck ROM: full  No difficulty expected  Dental    (+) edentulous    Pulmonary - normal exam   (+) a smoker Former,   (-) shortness of breath  Cardiovascular     Beta blocker given within 24 hours of surgery  Rate: abnormal    (+) hypertension, valvular problems/murmurs AS, dysrhythmias Tachycardia, murmur,  carotid artery disease (S/P R CEA 2018)  (-) angina      Neuro/Psych  (+) CVA,     GI/Hepatic/Renal/Endo      Musculoskeletal         ROS comment: Fractured LEFT hip 10 days ago  Abdominal    Substance History      OB/GYN          Other                      Anesthesia Plan    ASA 3     general     Anesthetic plan, all risks, benefits, and alternatives have been provided, discussed and informed consent has been obtained with: patient.

## 2019-06-30 NOTE — ANESTHESIA POSTPROCEDURE EVALUATION
Patient: Gita Wharton    Procedure Summary     Date:  06/30/19 Room / Location:  [facility] OR [address] [place] [address] MAIN OR    Anesthesia Start:  0926 Anesthesia Stop:  1120    Procedure:  LEFT HIP ANTERIOR HIP WITH HANA TABLE (Left Hip) Diagnosis:       Closed fracture of neck of left femur (CMS/HCC)      (Closed fracture of neck of left femur)    Surgeon:  Tiffani Pereira MD Provider:  Farrukh Ro MD    Anesthesia Type:  general ASA Status:  3          Anesthesia Type: general  Last vitals  BP   152/68 (06/30/19 1255)   Temp   36.7 °C (98.1 °F) (06/30/19 1255)   Pulse   88 (06/30/19 1255)   Resp   16 (06/30/19 1255)     SpO2   97 % (06/30/19 1255)     Post Anesthesia Care and Evaluation    Patient location during evaluation: PACU  Anesthetic complications: No anesthetic complications

## 2019-07-01 PROBLEM — G92.9 TOXIC ENCEPHALOPATHY: Status: ACTIVE | Noted: 2019-07-01

## 2019-07-01 LAB
ANION GAP SERPL CALCULATED.3IONS-SCNC: 10.3 MMOL/L (ref 5–15)
BUN BLD-MCNC: 12 MG/DL (ref 8–23)
BUN/CREAT SERPL: 12.1 (ref 7–25)
CALCIUM SPEC-SCNC: 8.4 MG/DL (ref 8.6–10.5)
CHLORIDE SERPL-SCNC: 110 MMOL/L (ref 98–107)
CO2 SERPL-SCNC: 20.7 MMOL/L (ref 22–29)
CREAT BLD-MCNC: 0.99 MG/DL (ref 0.57–1)
DEPRECATED RDW RBC AUTO: 48.1 FL (ref 37–54)
ERYTHROCYTE [DISTWIDTH] IN BLOOD BY AUTOMATED COUNT: 16.1 % (ref 12.3–15.4)
GFR SERPL CREATININE-BSD FRML MDRD: 54 ML/MIN/1.73
GLUCOSE BLD-MCNC: 105 MG/DL (ref 65–99)
HCT VFR BLD AUTO: 32.1 % (ref 34–46.6)
HGB BLD-MCNC: 10.1 G/DL (ref 12–15.9)
MCH RBC QN AUTO: 25.6 PG (ref 26.6–33)
MCHC RBC AUTO-ENTMCNC: 31.5 G/DL (ref 31.5–35.7)
MCV RBC AUTO: 81.5 FL (ref 79–97)
PLATELET # BLD AUTO: 210 10*3/MM3 (ref 140–450)
PMV BLD AUTO: 11.3 FL (ref 6–12)
POTASSIUM BLD-SCNC: 4.4 MMOL/L (ref 3.5–5.2)
RBC # BLD AUTO: 3.94 10*6/MM3 (ref 3.77–5.28)
SODIUM BLD-SCNC: 141 MMOL/L (ref 136–145)
WBC NRBC COR # BLD: 14.42 10*3/MM3 (ref 3.4–10.8)

## 2019-07-01 PROCEDURE — 97162 PT EVAL MOD COMPLEX 30 MIN: CPT

## 2019-07-01 PROCEDURE — 94799 UNLISTED PULMONARY SVC/PX: CPT

## 2019-07-01 PROCEDURE — 80048 BASIC METABOLIC PNL TOTAL CA: CPT | Performed by: HOSPITALIST

## 2019-07-01 PROCEDURE — 85027 COMPLETE CBC AUTOMATED: CPT | Performed by: HOSPITALIST

## 2019-07-01 PROCEDURE — 25010000003 CEFAZOLIN 1-4 GM/50ML-% SOLUTION: Performed by: ORTHOPAEDIC SURGERY

## 2019-07-01 PROCEDURE — 25010000002 HALOPERIDOL LACTATE PER 5 MG: Performed by: HOSPITALIST

## 2019-07-01 PROCEDURE — 97110 THERAPEUTIC EXERCISES: CPT

## 2019-07-01 PROCEDURE — 99232 SBSQ HOSP IP/OBS MODERATE 35: CPT | Performed by: INTERNAL MEDICINE

## 2019-07-01 RX ORDER — HALOPERIDOL 5 MG/ML
2 INJECTION INTRAMUSCULAR ONCE
Status: COMPLETED | OUTPATIENT
Start: 2019-07-01 | End: 2019-07-01

## 2019-07-01 RX ADMIN — IPRATROPIUM BROMIDE AND ALBUTEROL SULFATE 3 ML: 2.5; .5 SOLUTION RESPIRATORY (INHALATION) at 11:50

## 2019-07-01 RX ADMIN — HALOPERIDOL LACTATE 2 MG: 5 INJECTION INTRAMUSCULAR at 06:35

## 2019-07-01 RX ADMIN — CEFAZOLIN SODIUM 1 G: 1 INJECTION, SOLUTION INTRAVENOUS at 03:42

## 2019-07-01 RX ADMIN — ACETAMINOPHEN 650 MG: 325 TABLET, FILM COATED ORAL at 10:30

## 2019-07-01 RX ADMIN — IPRATROPIUM BROMIDE AND ALBUTEROL SULFATE 3 ML: 2.5; .5 SOLUTION RESPIRATORY (INHALATION) at 08:16

## 2019-07-01 RX ADMIN — IPRATROPIUM BROMIDE AND ALBUTEROL SULFATE 3 ML: 2.5; .5 SOLUTION RESPIRATORY (INHALATION) at 15:33

## 2019-07-01 RX ADMIN — SODIUM CHLORIDE, PRESERVATIVE FREE 3 ML: 5 INJECTION INTRAVENOUS at 20:08

## 2019-07-01 RX ADMIN — ACETAMINOPHEN 1000 MG: 500 TABLET, FILM COATED ORAL at 06:27

## 2019-07-01 RX ADMIN — ATORVASTATIN CALCIUM 20 MG: 20 TABLET, FILM COATED ORAL at 09:52

## 2019-07-01 RX ADMIN — METOPROLOL TARTRATE 25 MG: 25 TABLET ORAL at 20:03

## 2019-07-01 RX ADMIN — ACETAMINOPHEN 1000 MG: 500 TABLET, FILM COATED ORAL at 20:02

## 2019-07-01 RX ADMIN — SODIUM CHLORIDE, PRESERVATIVE FREE 3 ML: 5 INJECTION INTRAVENOUS at 09:53

## 2019-07-01 RX ADMIN — ASPIRIN 81 MG: 81 TABLET, COATED ORAL at 09:52

## 2019-07-01 RX ADMIN — IPRATROPIUM BROMIDE AND ALBUTEROL SULFATE 3 ML: 2.5; .5 SOLUTION RESPIRATORY (INHALATION) at 19:29

## 2019-07-01 RX ADMIN — AMLODIPINE BESYLATE 10 MG: 10 TABLET ORAL at 09:52

## 2019-07-01 RX ADMIN — METOPROLOL TARTRATE 25 MG: 25 TABLET ORAL at 09:52

## 2019-07-01 NOTE — NURSING NOTE
Patient received from [facility] this shift and was confused prior to transfer and upon initial assessment. Confusion now appears worse. Normally oriented per daughter, but has had episodes of confusion after anaesthesia in the past and patient did have hip surgery today. Call placed to attending. Spoke to Dr. Ward, no new orders. Will continue to closely monitor.

## 2019-07-01 NOTE — PLAN OF CARE
Problem: Patient Care Overview  Goal: Plan of Care Review  Outcome: Ongoing (interventions implemented as appropriate)   07/01/19 1040   Coping/Psychosocial   Plan of Care Reviewed With patient;daughter   Plan of Care Review   Progress improving   OTHER   Outcome Summary Pt doing well this am. SHe is s/p L anterior THR and now presents with increased post op pain, weakness, and decreased functional mobility. Pt lives alone PTA. SHe plans SNU at [place]. Today, pt able to ambulate approx 30 ft with Rwx and CGA and also perform hip protocol without difficulty. She will continue to benefit from skilled PT to maximize safety and independence with mobility.

## 2019-07-01 NOTE — THERAPY TREATMENT NOTE
Acute Care - Physical Therapy Treatment Note  [facility]     Patient Name: Gita Wharton  : 1935  MRN: [id number]  Today's Date: 2019  Onset of Illness/Injury or Date of Surgery: 19  Date of Referral to PT: 19  Referring Physician: Randall    Admit Date: 2019    Visit Dx:    ICD-10-CM ICD-9-CM   1. Difficulty walking R26.2 719.7     Patient Active Problem List   Diagnosis   • Essential hypertension   • Mixed hyperlipidemia   • Lung nodule   • History of right MCA stroke   • Stenosis of right internal carotid artery   • Abnormal chest x-ray   • Aortic stenosis   • Interstitial lung disease (CMS/HCC)       Therapy Treatment    Rehabilitation Treatment Summary     Row Name 19 1558             Treatment Time/Intention    Discipline  physical therapist  -EJ      Document Type  therapy note (daily note)  -EJ      Subjective Information  no complaints  -EJ      Mode of Treatment  physical therapy  -EJ      Patient/Family Observations  sitting up in chair, no acute distress  -EJ      Patient Effort  good  -EJ      Existing Precautions/Restrictions  fall;hip, anterior;left  -EJ      Recorded by [EJ] Daja Stearns, PT 19 1609      Row Name 19 1558             Cognitive Assessment/Intervention- PT/OT    Personal Safety Interventions  fall prevention program maintained;gait belt;nonskid shoes/slippers when out of bed;supervised activity  -EJ      Recorded by [EJ] Daja Stearns, PT 19 1609      Row Name 19 1558             Bed Mobility Assessment/Treatment    Supine-Sit [place] (Bed Mobility)  not tested  -EJ      Sit-Supine [place] (Bed Mobility)  not tested  -EJ      Comment (Bed Mobility)  up in chair  -EJ      Recorded by [EJ] Daja Stearns, PT 19 1609      Row Name 19 1558             Sit-Stand Transfer    Sit-Stand [place] (Transfers)  verbal cues;contact guard  -EJ      Assistive Device (Sit-Stand Transfers)   walker, front-wheeled  -EJ      Recorded by [EJ] Daja Stearns, PT 07/01/19 1609      Row Name 07/01/19 1558             Stand-Sit Transfer    Stand-Sit [place] (Transfers)  verbal cues;contact guard  -EJ      Assistive Device (Stand-Sit Transfers)  walker, front-wheeled  -EJ      Recorded by [EJ] Daja Stearns, PT 07/01/19 1609      Row Name 07/01/19 1558             Gait/Stairs Assessment/Training    [place] Level (Gait)  verbal cues;contact guard  -EJ      Assistive Device (Gait)  walker, front-wheeled  -EJ      Distance in Feet (Gait)  70  -EJ      Deviations/Abnormal Patterns (Gait)  antalgic;eliceo decreased;stride length decreased  -EJ      Bilateral Gait Deviations  forward flexed posture  -EJ      Recorded by [EJ] Daja Stearns, PT 07/01/19 1609      Row Name 07/01/19 1558             Therapeutic Exercise    Comment (Therapeutic Exercise)  L THR protocol x 15 reps  -EJ      Recorded by [EJ] Daja Stearns, PT 07/01/19 1609      Row Name 07/01/19 1558             Positioning and Restraints    Pre-Treatment Position  sitting in chair/recliner  -EJ      Post Treatment Position  chair  -EJ      In Chair  notified nsg;reclined;call light within reach;encouraged to call for assist  -EJ      Recorded by [EJ] Daja Stearns, PT 07/01/19 1609      Row Name 07/01/19 1558             Pain Scale: Numbers Pre/Post-Treatment    Pain Scale: Numbers, Pretreatment  0/10 - no pain  -EJ      Pain Scale: Numbers, Post-Treatment  0/10 - no pain  -EJ      Recorded by [EJ] Daja Stearns, PT 07/01/19 1609      Row Name                Wound 06/30/19 1056 Left hip incision    Wound - Properties Group Date first assessed: 06/30/19 [AC] Time first assessed: 1056 [AC] Side: Left [AC] Location: hip [AC] Type: incision [AC] Recorded by:  [AC] Janiya Goff RN 06/30/19 1056      User Key  (r) = Recorded By, (t) = Taken By, (c) = Cosigned By    Initials Name Effective Dates Discipline    AC  Janiya Goff, RN 03/18/19 -  Nurse    Daja James, PT 04/03/18 -  PT          Wound 06/30/19 1056 Left hip incision (Active)   Dressing Appearance dry;intact;no drainage 7/1/2019  2:07 PM   Closure SLY 7/1/2019  2:07 PM   Base dressing in place, unable to visualize 7/1/2019  2:07 PM   Drainage Amount none 7/1/2019  2:50 AM       Rehab Goal Summary     Row Name 07/01/19 1012             Physical Therapy Goals    Transfer Goal Selection (PT)  transfer, PT goal 1  -EJ      Gait Training Goal Selection (PT)  gait training, PT goal 1  -EJ         Transfer Goal 1 (PT)    Activity/Assistive Device (Transfer Goal 1, PT)  transfers, all;walker, rolling  -EJ      [place] Level/Cues Needed (Transfer Goal 1, PT)  standby assist  -EJ      Time Frame (Transfer Goal 1, PT)  1 week  -EJ         Gait Training Goal 1 (PT)    Activity/Assistive Device (Gait Training Goal 1, PT)  gait (walking locomotion);walker, rolling  -EJ      [place] Level (Gait Training Goal 1, PT)  standby assist  -EJ      Distance (Gait Goal 1, PT)  100  -EJ      Time Frame (Gait Training Goal 1, PT)  1 week  -EJ        User Key  (r) = Recorded By, (t) = Taken By, (c) = Cosigned By    Initials Name Provider Type Discipline    Daja James, PT Physical Therapist PT          Physical Therapy Education     Title: PT OT SLP Therapies (Done)     Topic: Physical Therapy (Done)     Point: Mobility training (Done)     Learning Progress Summary           Patient Acceptance, E,TB,D, VU,NR by KEANU at 7/1/2019 10:40 AM                   Point: Home exercise program (Done)     Learning Progress Summary           Patient Acceptance, E,TB,D, VU,NR by KEANU at 7/1/2019 10:40 AM                   Point: Body mechanics (Done)     Learning Progress Summary           Patient Acceptance, E,TB,D, VU,NR by KEANU at 7/1/2019 10:40 AM                   Point: Precautions (Done)     Learning Progress Summary           Patient Acceptance, E,TB,D, VU,NR by KEANU at  7/1/2019 10:40 AM                               User Key     Initials Effective Dates Name Provider Type Discipline     04/03/18 -  Daja Stearns, PT Physical Therapist PT                PT Recommendation and Plan  Anticipated Discharge Disposition (PT): skilled nursing facility  Planned Therapy Interventions (PT Eval): bed mobility training, gait training, home exercise program, patient/family education, ROM (range of motion), strengthening, transfer training  Therapy Frequency (PT Clinical Impression): 2 times/day  Outcome Summary/Treatment Plan (PT)  Anticipated Discharge Disposition (PT): skilled nursing facility  Plan of Care Reviewed With: patient, daughter  Progress: improving  Outcome Summary: Pt doing well this am. SHe is s/p L anterior THR and now presents with increased post op pain, weakness, and decreased functional mobility. Pt lives alone PTA. SHe plans SNU at [place]. Today, pt able to ambulate approx 30 ft with Rwx and CGA and also perform hip protocol without difficulty. She will continue to benefit from skilled PT to maximize safety and independence with mobility.  Outcome Measures     Row Name 07/01/19 1000             How much help from another person do you currently need...    Turning from your back to your side while in flat bed without using bedrails?  3  -EJ      Moving from lying on back to sitting on the side of a flat bed without bedrails?  3  -EJ      Moving to and from a bed to a chair (including a wheelchair)?  3  -EJ      Standing up from a chair using your arms (e.g., wheelchair, bedside chair)?  3  -EJ      Climbing 3-5 steps with a railing?  3  -EJ      To walk in hospital room?  3  -EJ      AM-PAC 6 Clicks Score  18  -EJ         Functional Assessment    Outcome Measure Options  AM-PAC 6 Clicks Basic Mobility (PT)  -EJ        User Key  (r) = Recorded By, (t) = Taken By, (c) = Cosigned By    Initials Name Provider Type    EJ Daja Stearns, PT Physical Therapist         Time  Calculation:   PT Charges     Row Name 07/01/19 1609 07/01/19 1042          Time Calculation    Start Time  1558  -EJ  1012  -EJ     Stop Time  1610  -EJ  1038  -EJ     Time Calculation (min)  12 min  -EJ  26 min  -EJ     PT Received On  07/01/19  -EJ  07/01/19  -EJ     PT - Next Appointment  07/02/19  -EJ  07/01/19  -EJ     PT Goal Re-Cert Due Date  --  07/06/19  -EJ        Timed Charges    37918 - PT Therapeutic Exercise Minutes  --  16  -EJ       User Key  (r) = Recorded By, (t) = Taken By, (c) = Cosigned By    Initials Name Provider Type    EJ Daja Stearns, PT Physical Therapist        Therapy Charges for Today     Code Description Service Date Service Provider Modifiers Qty    65719453880 HC PT EVAL MOD COMPLEXITY 2 7/1/2019 Daja Stearns, PT GP 1    93352791620 HC PT THER PROC EA 15 MIN 7/1/2019 Daja Stearns, PT GP 1    35462789003 HC PT THER PROC EA 15 MIN 7/1/2019 Daja Stearns, PT GP 1          PT G-Codes  Outcome Measure Options: AM-PAC 6 Clicks Basic Mobility (PT)  AM-PAC 6 Clicks Score: 18    Daja Stearns PT  7/1/2019

## 2019-07-01 NOTE — PROGRESS NOTES
Name: Gita Wharton ADMIT: 2019   : 1935  PCP: Alexis Wiggins MD    MRN: [id number] LOS: 4 days   AGE/SEX: 83 y.o. female  ROOM: [facility]     Subjective   Subjective   Transferred to monitor due to bradycardia.  Also lots of confusion overnight. Staff reports patient agitated and throwing remote last night upon transfer. Today, she is alert, oriented and cooperative. Family at bedside. She reports mild left hip pain with movement. Denies CP, SOA, N/V/D. Reports no changes in baseline cough. Had small BM yesterday. Working well with physical therapy. Tolerating diet.    Objective   Objective      Vital Signs  Temp:  [97.8 °F (36.6 °C)-98.2 °F (36.8 °C)] 97.9 °F (36.6 °C)  Heart Rate:  [] 72  Resp:  [16-20] 18  BP: (120-172)/(52-73) 172/66  SpO2:  [96 %-100 %] 96 %  on  Flow (L/min):  [2-3] 2;   Device (Oxygen Therapy): room air  Body mass index is 20.12 kg/m².     Physical Exam   Constitutional: She is oriented to person, place, and time. She appears well-developed and well-nourished. No distress.   HENT:   Head: Normocephalic and atraumatic.   Eyes: Conjunctivae and EOM are normal.   Neck: Normal range of motion. Neck supple.   Cardiovascular: Normal rate and regular rhythm.   Murmur heard.  Pulmonary/Chest: Effort normal. No respiratory distress. She has no wheezes. She has rales (RLL).   Abdominal: Soft. Bowel sounds are normal. She exhibits no distension. There is no tenderness.   Musculoskeletal: She exhibits edema (trace at left hip incision) and tenderness (left hip).   Neurological: She is alert and oriented to person, place, and time.   Skin: Skin is warm and dry. No erythema.   Mild bruising left anterior hip; dressing C/D/I   Psychiatric: She has a normal mood and affect. Her behavior is normal.   Nursing note and vitals reviewed.    Results Review:       I reviewed the patient's new clinical results.  Results from last 7 days   Lab Units 19  0320 19  5476  06/27/19  1240   WBC 10*3/mm3 6.87 6.13 7.38   HEMOGLOBIN g/dL 10.6* 10.0* 11.2*   PLATELETS 10*3/mm3 225 235 253     Results from last 7 days   Lab Units 06/29/19  0320 06/28/19  0455 06/27/19  1240   SODIUM mmol/L 141 139 138   POTASSIUM mmol/L 3.6 3.5 4.2   CHLORIDE mmol/L 110* 111* 108*   CO2 mmol/L 18.8* 18.6* 19.3*   BUN mg/dL 11 12 15   CREATININE mg/dL 1.03* 1.13* 1.32*   GLUCOSE mg/dL 89 83 93   Estimated Creatinine Clearance: 33 mL/min (A) (by C-G formula based on SCr of 1.03 mg/dL (H)).  Results from last 7 days   Lab Units 06/27/19  1240   ALBUMIN g/dL 3.40*   BILIRUBIN mg/dL 0.3   ALK PHOS U/L 103   AST (SGOT) U/L 14   ALT (SGPT) U/L 11     Results from last 7 days   Lab Units 06/29/19  0320 06/28/19  0455 06/27/19  1240   CALCIUM mg/dL 8.4* 8.2* 8.6   ALBUMIN g/dL  --   --  3.40*       No results found for: HGBA1C, POCGLU      acetaminophen 1,000 mg Oral Q8H   amLODIPine 10 mg Oral Q24H   aspirin 81 mg Oral Daily   atorvastatin 20 mg Oral Daily   ipratropium-albuterol 3 mL Nebulization 4x Daily - RT   metoprolol tartrate 25 mg Oral Q12H   orthopedic surgery cocktail 2 ([facility])  Injection Once   sodium chloride 3 mL Intravenous Q12H       sodium chloride 100 mL/hr Last Rate: 100 mL/hr (06/30/19 1436)   Diet Regular       Assessment/Plan     Active Hospital Problems    Diagnosis  POA   • Toxic encephalopathy due to anesthetics [G92]  Unknown   • Aortic stenosis [I35.0]  Yes   • Interstitial lung disease (CMS/HCC) [J84.9]  Yes   • Abnormal chest x-ray [R93.89]  Yes   • History of right MCA stroke [Z86.73]  Not Applicable   • Essential hypertension [I10]  Yes      Resolved Hospital Problems    Diagnosis Date Resolved POA   • **Fracture, hip (CMS/HCC) [S72.009A] 06/30/2019 Yes   • Osteoarthritis of one hip [M16.10] 06/30/2019 Yes       Left femoral neck fracture  - POD1 s/p LEFT HIP ANTERIOR HIP WITH HANA TABLE.   -Expected post-operative anemia. Monitor. Hgb 10.1  -Leukocytosis noted. WBC 14.42. Monitor as  this is likely reactive from surgery.  -Suspect confusion/agitation overnight secondary to anesthesia. She is back to baseline now.  -Ok to discharge from ortho standpoint tomorrow (home with home health)    Moderate to severe aortic stenosis  -Echo noted.    -Seems to have tolerated surgery well.  -Continue outpatient surveillance with Dr. Kerns.  -Reported bradycardia yesterday. Dr. Kerns has seen patient. CV stable. See PRN. Currently in NSR on monitor.    Abnormal CXR  - Pulmonology has stated this is stable and she was cleared by them for surgery. Known/chronic abnormality with interstitial findings.   -F/U appointment in August.  -IS post-operatively    HTN  - continue home meds. Bps stable post-operatively.  - hold norvasc after surgery for SBP <100, metoprolol if HR <50    CKD  - stable, monitor  - losartan held    VTE Prophylaxis - SCDs ; ASA per ortho  Code Status - Full code  Disposition- tomorrow?      CYNTHIA Beal  [facility]  07/01/19  2:58 pm      Brief Attending Progress Note     I have seen and examined the patient, performing an independent face-to-face diagnostic evaluation with plan of care reviewed and developed with the advanced practice registered nurse (APRN).       Brief Summary Statement/HPI   Events noted overnight.  Called this morning regarding confusion and agitation.  Seem to improve after receiving some Haldol.  Family at bedside states she is much better but not quite back to baseline.    Attending Physical Exam  Temp:  [97.2 °F (36.2 °C)-97.9 °F (36.6 °C)] 97.5 °F (36.4 °C)  Heart Rate:  [41-84] 69  Resp:  [16-18] 16  BP: (138-172)/(51-66) 144/51  Constitutional: Well-developed, cooperative, no acute distress.   Neck: No JVD or tracheal deviation present.   Respiratory: Clear to auscultation bilaterally  Cardiovascular: RRR, no murmur  Abdominal: Soft, nontender, nondistended.  + BS  Musculoskeletal: No edema.   Neurological: Alert and oriented to person,  place, and time.   Skin: No rashes, no jaundice, no petechiae, no mottling    Assessment/Plan  Anticipate mental status should continue to improve.  Suspect anesthesia related.  Probable discharge tomorrow.  For additional inSee above section for further detailed assessment and plan developed with APRN which I have reviewed and/or edited.      Electronically signed by Brandon Painter MD, 7/1/2019, 5:23 PM.

## 2019-07-01 NOTE — PROGRESS NOTES
Patient: Gita Wharton  YOB: 1935     Date of Admission: 6/27/2019  5:47 PM Medical Record Number: [id number]     Attending Physician: Brandon Painter MD    Status Post:  Procedure(s):  LEFT HIP ANTERIOR HIP WITH HANA TABLEPost Operative Day Number: 1    Subjective : No new orthopaedic complaints     Pain Relief: some relief with present medication.     Systemic Complaints: some confusion overnight  Vitals:    06/30/19 1436 06/30/19 1947 06/30/19 2145 06/30/19 2306   BP: 149/62 150/62 163/63 172/66   BP Location:  Right arm Right arm Left arm   Patient Position:  Lying Lying    Pulse: 72 84 (!) 41 72   Resp:  16  18   Temp:  97.8 °F (36.6 °C)  97.9 °F (36.6 °C)   TempSrc:  Oral  Oral   SpO2:  96%  96%   Weight:    49.6 kg (109 lb 5.6 oz)   Height:           Physical Exam: 83 y.o. female    General Appearance:       Alert, cooperative, in no acute distress                  Extremities:    Dressing Clean, Dry and Intact         Incision healthy without signs or symptoms of infections         No clinical sign of DVT        Able to do good movements of digits    Pulses:   Pulses palpable and equal bilaterally           Diagnostic Tests:     Results from last 7 days   Lab Units 06/29/19  0320 06/28/19  0455 06/27/19  1240   WBC 10*3/mm3 6.87 6.13 7.38   HEMOGLOBIN g/dL 10.6* 10.0* 11.2*   HEMATOCRIT % 33.7* 32.3* 37.2   PLATELETS 10*3/mm3 225 235 253     Results from last 7 days   Lab Units 06/29/19  0320 06/28/19  0455 06/27/19  1240   SODIUM mmol/L 141 139 138   POTASSIUM mmol/L 3.6 3.5 4.2   CHLORIDE mmol/L 110* 111* 108*   CO2 mmol/L 18.8* 18.6* 19.3*   BUN mg/dL 11 12 15   CREATININE mg/dL 1.03* 1.13* 1.32*   GLUCOSE mg/dL 89 83 93   CALCIUM mg/dL 8.4* 8.2* 8.6     Results from last 7 days   Lab Units 06/27/19  1240   INR  0.96   APTT seconds 33.9     Lab Results   Component Value Date    CRP 10.85 (H) 01/04/2017     Lab Results   Component Value Date    SEDRATE 101 (H) 01/04/2017     Lab  Results   Component Value Date    URICACID 8.0 (H) 08/06/2018     No results found for: CRYSTAL  Microbiology Results (last 10 days)     ** No results found for the last 240 hours. **        Xr Chest 2 View    Result Date: 6/27/2019  1. The patient has a 14 x 9 mm nodular opacity in the lateral aspect of the left mid lung zone that is similar to perhaps several millimeters larger than it was 07/23/2018 when it measured 10 x 7 mm. There is new vague nodular opacity overlapping the right 5th posterior rib and right 2nd anterior rib on the PA view measuring 10 mm in size. There is a new small left pleural effusion blunting the left posterolateral costophrenic angle. I suggest a contrast enhanced CT scan of the chest to further evaluate.  PELVIS AND LEFT HIP: Single view of pelvis with 2 views including AP and frog-leg lateral views of the left hip are submitted for interpretation. There is total right hip prosthesis in place with good alignment of hardware. No acute fracture is seen in the pelvis. There is an acute to subacute impacted fracture of the subcapital portion of the left femoral neck.  The results and recommendations were communicated to Dr. Pereira by telephone 06/27/2019 at 2 PM.  This report was finalized on 6/27/2019 8:42 PM by Dr. Kyle Prasad M.D.      Ct Chest Hi Resolution    Result Date: 6/28/2019  1. Patchy groundglass opacities scattered throughout both lung fields and the high-resolution images show scattered ill-defined centrilobular nodules and groundglass density. The appearance is nonspecific, but suggestive of hypersensitivity pneumonitis. There is also evidence for scattered air trapping within both lung fields. There are a few opacities which are not for hypersensitivity pneumonitis and therefore pneumonia cannot be entirely excluded, particularly at the left lung base. Reevaluation is recommended with a noncontrasted chest CT in 3 months. 2. Small left pleural effusion. No evidence for  interstitial edema. 3. There is no significant change in the groundglass opacification and mild atelectatic change at the lingula. 4. Extensive atherosclerotic change throughout the thoracic aorta and visualized abdominal aorta. There is 4.2 cm ectasia of the ascending thoracic aorta.      Xr Hip With Or Without Pelvis 2 - 3 View Left    Result Date: 6/27/2019  1. The patient has a 14 x 9 mm nodular opacity in the lateral aspect of the left mid lung zone that is similar to perhaps several millimeters larger than it was 07/23/2018 when it measured 10 x 7 mm. There is new vague nodular opacity overlapping the right 5th posterior rib and right 2nd anterior rib on the PA view measuring 10 mm in size. There is a new small left pleural effusion blunting the left posterolateral costophrenic angle. I suggest a contrast enhanced CT scan of the chest to further evaluate.  PELVIS AND LEFT HIP: Single view of pelvis with 2 views including AP and frog-leg lateral views of the left hip are submitted for interpretation. There is total right hip prosthesis in place with good alignment of hardware. No acute fracture is seen in the pelvis. There is an acute to subacute impacted fracture of the subcapital portion of the left femoral neck.  The results and recommendations were communicated to Dr. Pereira by telephone 06/27/2019 at 2 PM.  This report was finalized on 6/27/2019 8:42 PM by Dr. Kyle Prasad M.D.              Current Medications:  Scheduled Meds:  acetaminophen 1,000 mg Oral Q8H   amLODIPine 10 mg Oral Q24H   aspirin 81 mg Oral Daily   atorvastatin 20 mg Oral Daily   ipratropium-albuterol 3 mL Nebulization 4x Daily - RT   metoprolol tartrate 25 mg Oral Q12H   orthopedic surgery cocktail 2 ( [facility])  Injection Once   sodium chloride 3 mL Intravenous Q12H     Continuous Infusions:  sodium chloride 100 mL/hr Last Rate: 100 mL/hr (06/30/19 1436)     PRN Meds:.acetaminophen  •  acetaminophen  •  bisacodyl  •  bisacodyl  •   docusate sodium  •  HYDROcodone-acetaminophen  •  melatonin  •  [DISCONTINUED] Morphine **AND** naloxone  •  ondansetron  •  sennosides-docusate sodium  •  sodium chloride  •  sodium chloride  •  traMADol    Assessment: Status post  Procedure(s):  LEFT HIP ANTERIOR HIP WITH HANA TABLE    Patient Active Problem List   Diagnosis   • Essential hypertension   • Mixed hyperlipidemia   • Lung nodule   • History of right MCA stroke   • Stenosis of right internal carotid artery   • Abnormal chest x-ray   • Aortic stenosis   • Interstitial lung disease (CMS/HCC)       PLAN:   Continues current post-op course  Anticoagulation: Aspirin started  Dressing Change prn  Mobilize with PT as tolerated per protocol    Weight Bearing: WBAT  Discharge Plan: OK to plan for discharge in  tomorrow to home and home health  from orthopadic perspective.      Tiffani Pereira MD    Date: 7/1/2019    Time: 7:36 AM

## 2019-07-01 NOTE — NURSING NOTE
Patient still confused but now increasingly agitated, impulsive and attempting to get up without necessary assistance, unable to reorient or redirect. Patient now throwing things and verbally abusive to staff. Discussed with attending MD Dr. Painter. Orders given for 1 x dose of IV Haldol. Will administer and continue to closely monitor.

## 2019-07-01 NOTE — PROGRESS NOTES
LOS: 4 days   Patient Care Team:  Alexis Wiggins MD as PCP - General  Alexis Wiggins MD as PCP - Family Medicine  Alexis Wiggins MD as PCP - Claims Attributed    Chief Complaint:       Interval History:       Objective   Vital Signs  Temp:  [97.2 °F (36.2 °C)-98.2 °F (36.8 °C)] 97.2 °F (36.2 °C)  Heart Rate:  [] 84  Resp:  [16-18] 18  BP: (120-172)/(52-73) 138/61    Intake/Output Summary (Last 24 hours) at 7/1/2019 0958  Last data filed at 7/1/2019 0942  Gross per 24 hour   Intake 2537 ml   Output 400 ml   Net 2137 ml           Physical Exam   Constitutional:   Slightly frail appearing, very pale   HENT:   Head: Normocephalic.   Nose: Nose normal.   Mouth/Throat: Oropharynx is clear and moist.   Eyes: Conjunctivae and EOM are normal. Pupils are equal, round, and reactive to light.   Neck: Normal range of motion. No JVD present.   Cardiovascular: Normal rate, regular rhythm and intact distal pulses.   Murmur heard.   Systolic murmur is present with a grade of 2/6.  Pulmonary/Chest: Effort normal and breath sounds normal.   Abdominal: Soft. There is no tenderness.   Musculoskeletal: She exhibits no edema.   Neurological: She is alert. No cranial nerve deficit.   Skin: Skin is warm and dry. No erythema.   Psychiatric: She has a normal mood and affect. Her behavior is normal. She exhibits abnormal recent memory.   Vitals reviewed.      Results Review:      Results from last 7 days   Lab Units 07/01/19  0739 06/29/19  0320 06/28/19  0455   SODIUM mmol/L 141 141 139   POTASSIUM mmol/L 4.4 3.6 3.5   CHLORIDE mmol/L 110* 110* 111*   CO2 mmol/L 20.7* 18.8* 18.6*   BUN mg/dL 12 11 12   CREATININE mg/dL 0.99 1.03* 1.13*   GLUCOSE mg/dL 105* 89 83   CALCIUM mg/dL 8.4* 8.4* 8.2*         Results from last 7 days   Lab Units 07/01/19  0739 06/29/19  0320 06/28/19  0455   WBC 10*3/mm3 14.42* 6.87 6.13   HEMOGLOBIN g/dL 10.1* 10.6* 10.0*   HEMATOCRIT % 32.1* 33.7* 32.3*   PLATELETS 10*3/mm3 210 225 235      Results from last 7 days   Lab Units 06/27/19  1240   INR  0.96   APTT seconds 33.9                   I reviewed the patient's new clinical results.    I personally viewed and interpreted the patient's EKG/Telemetry data -- NSR, normal EKG        Medication Review:     acetaminophen 1,000 mg Oral Q8H   amLODIPine 10 mg Oral Q24H   aspirin 81 mg Oral Daily   atorvastatin 20 mg Oral Daily   ipratropium-albuterol 3 mL Nebulization 4x Daily - RT   metoprolol tartrate 25 mg Oral Q12H   [facility]  Injection Once   sodium chloride 3 mL Intravenous Q12H            Assessment/Plan     1.  Hip fracture -- POD#1 repair  2.  Aortic stenosis -- moderately severe, follow up as outpatient  3.  Transient bradycardia -- while still very sleepy from anesthesia.  Unfortunately I have no strips/EKG to know if it was sinus dominic or not.  She's been in NSR overnight.  Continue usual dose of metoprolol.  4.  HTN -- generally controlled.  5.  Confusion overnight -- still mildly confused this morning but it seems like it's improving.    Stable CV status.  Will see as needed.    Jareth Kerns MD  07/01/19  9:58 AM

## 2019-07-01 NOTE — PROGRESS NOTES
Discharge Planning Assessment  [facility]     Patient Name: Gita Wharton  MRN: [id number]  Today's Date: 7/1/2019    Admit Date: 6/27/2019    Discharge Needs Assessment     Row Name 07/01/19 0958       Living Environment    Lives With  alone    Current Living Arrangements  home/apartment/condo    Primary Care Provided by  self    Family Caregiver if Needed  child(elena), adult    Family Caregiver Names  daughter, Marce Grullon, can assist as needed    Quality of Family Relationships  helpful;involved;supportive       Resource/Environmental Concerns    Resource/Environmental Concerns  none    Transportation Concerns  car, none       Transition Planning    Patient/Family Anticipates Transition to  home with help/services;inpatient rehabilitation facility    Patient/Family Anticipated Services at Transition  none    Transportation Anticipated  family or friend will provide;health plan transportation       Discharge Needs Assessment    Readmission Within the Last 30 Days  no previous admission in last 30 days    Concerns to be Addressed  discharge planning    Equipment Currently Used at Home  cane, straight    Offered/Gave Vendor List  yes        Discharge Plan     Row Name 07/01/19 1000       Plan    Plan  disposition pending progress with PT- home with home health and family assist vs SNF    Patient/Family in Agreement with Plan  yes    Plan Comments  Spoke with patient and daughter at bedside.  Introduced self and explained role.  Facesheet, PCP, and pharmacy verified.  Patient lives alone and is normally IADLS.  She lives in a single story house with a basement, but states that she doesn't go down there.  There are 3 steps to enter the house through the back.  She uses a cane for ambulation, and also has a walker and rollator.  She has used [facility] in the past and has been to [facility].  Discussed SNF at [place], but patient wanting to return home.  She wants to talk with Dr Pereira and see how she  does with PT before making any decisions about discharge.  Provided her with [facility] and HH/SNF list.  CCP will follow. Gracie Valenzuela RN        Destination      No service coordination in this encounter.      Durable Medical Equipment      No service coordination in this encounter.      Dialysis/Infusion      No service coordination in this encounter.      Home Medical Care      No service coordination in this encounter.      Therapy      No service coordination in this encounter.      Community Resources      No service coordination in this encounter.          Demographic Summary     Row Name 07/01/19 0957       General Information    Admission Type  inpatient    Arrived From  home    Required Notices Provided  Important Message from Medicare    Referral Source  admission list    Reason for Consult  discharge planning    Preferred Language  English        Functional Status     Row Name 07/01/19 0958       Functional Status    Usual Activity Tolerance  moderate    Current Activity Tolerance  moderate       Functional Status, IADL    Medications  independent    Meal Preparation  independent    Housekeeping  independent    Laundry  independent    Shopping  independent       Mental Status    General Appearance WDL  WDL       Mental Status Summary    Recent Changes in Mental Status/Cognitive Functioning  no changes        Psychosocial    No documentation.       Abuse/Neglect    No documentation.       Legal    No documentation.       Substance Abuse    No documentation.       Patient Forms    No documentation.           Gracie Valenzuela RN

## 2019-07-01 NOTE — THERAPY EVALUATION
Acute Care - Physical Therapy Initial Evaluation  [facility]     Patient Name: Gita Wharton  : 1935  MRN: [id number]  Today's Date: 2019   Onset of Illness/Injury or Date of Surgery: 19  Date of Referral to PT: 19  Referring Physician: Randall      Admit Date: 2019    Visit Dx:     ICD-10-CM ICD-9-CM   1. Difficulty walking R26.2 719.7     Patient Active Problem List   Diagnosis   • Essential hypertension   • Mixed hyperlipidemia   • Lung nodule   • History of right MCA stroke   • Stenosis of right internal carotid artery   • Abnormal chest x-ray   • Aortic stenosis   • Interstitial lung disease (CMS/HCC)     Past Medical History:   Diagnosis Date   • Acute right MCA stroke (CMS/HCC) 2018   • Fracture, hip (CMS/HCC)     LEFT   • Hemorrhoids 2018   • History of transfusion    • Hyperlipidemia    • Hypertension    • Lung granuloma (CMS/HCC) 2018    R             Dr FRANKIE Branch - suggested yearly CXR to Monitor   • Pyelonephritis 2019   • Stenosis of right internal carotid artery 2018   • Subdural hematoma (CMS/HCC) 2018    Secondary to fall, 2018     Past Surgical History:   Procedure Laterality Date   • CAROTID ENDARTERECTOMY Right 2018    Procedure: RT CAROTID ENDARTERECTOMY;  Surgeon: Inna Villarreal MD;  Location: [facility];  Service: Vascular   • COLONOSCOPY N/A 2018    Adenomatous polyp.   Repeat .  Surgeon: Ken Tidwell MD;    • HYSTERECTOMY     • THYROIDECTOMY     • TOTAL HIP ARTHROPLASTY Right    • TOTAL HIP ARTHROPLASTY Left 2019    Procedure: LEFT HIP ANTERIOR HIP WITH HANA TABLE;  Surgeon: Tiffani Pereira MD;  Location: [facility] OR;  Service: Orthopedics        PT ASSESSMENT (last 12 hours)      Physical Therapy Evaluation     Row Name 19 1012          PT Evaluation Time/Intention    Subjective Information  complains of;pain;fatigue  -EJ     Document Type  evaluation  -EJ      Mode of Treatment  physical therapy  -EJ     Patient Effort  good  -EJ     Symptoms Noted During/After Treatment  none  -EJ     Row Name 07/01/19 1012          General Information    Onset of Illness/Injury or Date of Surgery  06/27/19  -EJ     Referring Physician  Randall  -EJ     Patient Observations  alert;cooperative;agree to therapy  -EJ     Patient/Family Observations  family present in room  -EJ     General Observations of Patient  reclined in chair, no acute distress  -EJ     Prior Level of Function  independent:;all household mobility;community mobility;ADL's  -EJ     Equipment Currently Used at Home  cane, straight  -EJ     Existing Precautions/Restrictions  fall;hip, anterior;left  -EJ     Barriers to Rehab  none identified  -EJ     Row Name 07/01/19 1012          Relationship/Environment    Lives With  alone  -     Row Name 07/01/19 1012          Resource/Environmental Concerns    Current Living Arrangements  home/apartment/condo  -[facility] Name 07/01/19 1012          Cognitive Assessment/Intervention- PT/OT    Orientation Status (Cognition)  oriented x 3  -EJ     Follows Commands (Cognition)  WNL  -EJ     Personal Safety Interventions  gait belt;fall prevention program maintained;supervised activity;nonskid shoes/slippers when out of bed  -EJ     Row Name 07/01/19 1012          Mobility Assessment/Treatment    Extremity Weight-bearing Status  left lower extremity  -EJ     Left Lower Extremity (Weight-bearing Status)  weight-bearing as tolerated (WBAT)  -     Row Name 07/01/19 1012          Bed Mobility Assessment/Treatment    Bed Mobility Assessment/Treatment  supine-sit;sit-supine  -EJ     Supine-Sit [place] (Bed Mobility)  not tested  -EJ     Sit-Supine [place] (Bed Mobility)  verbal cues;contact guard;minimum assist (75% patient effort)  -     Row Name 07/01/19 1012          Transfer Assessment/Treatment    Transfer Assessment/Treatment  sit-stand transfer;stand-sit transfer  -EJ      Sit-Stand [place] (Transfers)  verbal cues;contact guard;2 person assist  -EJ     Stand-Sit [place] (Transfers)  verbal cues;contact guard  -EJ     Row Name 07/01/19 1012          Sit-Stand Transfer    Assistive Device (Sit-Stand Transfers)  walker, front-wheeled  -EJ     Row Name 07/01/19 1012          Stand-Sit Transfer    Assistive Device (Stand-Sit Transfers)  walker, front-wheeled  -EJ     Row Name 07/01/19 1012          Gait/Stairs Assessment/Training    [place] Level (Gait)  verbal cues;contact guard  -EJ     Assistive Device (Gait)  walker, front-wheeled  -EJ     Distance in Feet (Gait)  30  -EJ     Deviations/Abnormal Patterns (Gait)  antalgic;eliceo decreased;stride length decreased  -EJ     Bilateral Gait Deviations  forward flexed posture;heel strike decreased  -EJ     Row Name 07/01/19 1012          General ROM    GENERAL ROM COMMENTS  WFL, x L hip  -EJ     [facility] Name 07/01/19 1012          MMT (Manual Muscle Testing)    General MMT Comments  post op weakness  -[facility] Name 07/01/19 1012          Motor Assessment/Intervention    Additional Documentation  Therapeutic Exercise Interventions (Group)  -[facility] Name 07/01/19 1012          Therapeutic Exercise    Comment (Therapeutic Exercise)  L THR protocol x 10 reps  -[facility] Name 07/01/19 1012          Pain Assessment    Additional Documentation  Pain Scale: Numbers Pre/Post-Treatment (Group)  -EJ     [facility] Name 07/01/19 1012          Pain Scale: Numbers Pre/Post-Treatment    Pain Scale: Numbers, Pretreatment  0/10 - no pain  -EJ     Pain Scale: Numbers, Post-Treatment  3/10  -EJ     Pain Location - Side  Left  -EJ     Pain Location  hip  -EJ     Row Name             Wound 06/30/19 1056 Left hip incision    Wound - Properties Group Date first assessed: 06/30/19  -AC Time first assessed: 1056  -AC Side: Left  -AC Location: hip  -AC Type: incision  -AC    Row Name 07/01/19 1012          Plan of Care Review    Plan of Care Reviewed  With  patient;daughter  -EJ     Row Name 07/01/19 1012          Physical Therapy Clinical Impression    Date of Referral to PT  07/01/19  -EJ     PT Diagnosis (PT Clinical Impression)  s/p L anterior THR after fall  -EJ     Patient/Family Goals Statement (PT Clinical Impression)  Pt plans SNU at [place]  -EJ     Criteria for Skilled Interventions Met (PT Clinical Impression)  treatment indicated  -EJ     Impairments Found (describe specific impairments)  gait, locomotion, and balance  -EJ     Rehab Potential (PT Clinical Summary)  good, to achieve stated therapy goals  -EJ     Row Name 07/01/19 1012          Physical Therapy Goals    Transfer Goal Selection (PT)  transfer, PT goal 1  -EJ     Gait Training Goal Selection (PT)  gait training, PT goal 1  -EJ     Row Name 07/01/19 1012          Transfer Goal 1 (PT)    Activity/Assistive Device (Transfer Goal 1, PT)  transfers, all;walker, rolling  -EJ     [place] Level/Cues Needed (Transfer Goal 1, PT)  standby assist  -EJ     Time Frame (Transfer Goal 1, PT)  1 week  -EJ     Row Name 07/01/19 1012          Gait Training Goal 1 (PT)    Activity/Assistive Device (Gait Training Goal 1, PT)  gait (walking locomotion);walker, rolling  -EJ     [place] Level (Gait Training Goal 1, PT)  standby assist  -EJ     Distance (Gait Goal 1, PT)  100  -EJ     Time Frame (Gait Training Goal 1, PT)  1 week  -EJ     Row Name 07/01/19 1012          Positioning and Restraints    Pre-Treatment Position  sitting in chair/recliner  -EJ     Post Treatment Position  bed  -EJ     In Bed  notified nsg;supine;call light within reach;encouraged to call for assist;exit alarm on;with family/caregiver  -EJ       User Key  (r) = Recorded By, (t) = Taken By, (c) = Cosigned By    Initials Name Provider Type    AC Janiya Goff, RN Registered Nurse    Daja James, PT Physical Therapist        Physical Therapy Education     Title: PT OT SLP Therapies (Done)     Topic: Physical Therapy  (Done)     Point: Mobility training (Done)     Learning Progress Summary           Patient Acceptance, E,TB,D, VU,NR by  at 7/1/2019 10:40 AM                   Point: Home exercise program (Done)     Learning Progress Summary           Patient Acceptance, E,TB,D, VU,NR by KEANU at 7/1/2019 10:40 AM                   Point: Body mechanics (Done)     Learning Progress Summary           Patient Acceptance, E,TB,D, VU,NR by KEANU at 7/1/2019 10:40 AM                   Point: Precautions (Done)     Learning Progress Summary           Patient Acceptance, E,TB,D, VU,NR by  at 7/1/2019 10:40 AM                               User Key     Initials Effective Dates Name Provider Type Discipline     04/03/18 -  Daja Stearns, PT Physical Therapist PT              PT Recommendation and Plan  Anticipated Discharge Disposition (PT): skilled nursing facility  Planned Therapy Interventions (PT Eval): bed mobility training, gait training, home exercise program, patient/family education, ROM (range of motion), strengthening, transfer training  Therapy Frequency (PT Clinical Impression): 2 times/day  Outcome Summary/Treatment Plan (PT)  Anticipated Discharge Disposition (PT): skilled nursing facility  Plan of Care Reviewed With: patient, daughter  Progress: improving  Outcome Summary: Pt doing well this am. SHe is s/p L anterior THR and now presents with increased post op pain, weakness, and decreased functional mobility. Pt lives alone PTA. SHe plans SNU at [place]. Today, pt able to ambulate approx 30 ft with Rwx and CGA and also perform hip protocol without difficulty. She will continue to benefit from skilled PT to maximize safety and independence with mobility.  Outcome Measures     Row Name 07/01/19 1000             How much help from another person do you currently need...    Turning from your back to your side while in flat bed without using bedrails?  3  -EJ      Moving from lying on back to sitting on the side of a flat bed  without bedrails?  3  -EJ      Moving to and from a bed to a chair (including a wheelchair)?  3  -EJ      Standing up from a chair using your arms (e.g., wheelchair, bedside chair)?  3  -EJ      Climbing 3-5 steps with a railing?  3  -EJ      To walk in hospital room?  3  -EJ      AM-PAC 6 Clicks Score  18  -EJ         Functional Assessment    Outcome Measure Options  AM-PAC 6 Clicks Basic Mobility (PT)  -EJ        User Key  (r) = Recorded By, (t) = Taken By, (c) = Cosigned By    Initials Name Provider Type    Daja James, PT Physical Therapist         Time Calculation:   PT Charges     Row Name 07/01/19 1042             Time Calculation    Start Time  1012  -EJ      Stop Time  1038  -EJ      Time Calculation (min)  26 min  -EJ      PT Received On  07/01/19  -EJ      PT - Next Appointment  07/01/19  -EJ      PT Goal Re-Cert Due Date  07/06/19  -EJ         Timed Charges    04017 - PT Therapeutic Exercise Minutes  16  -EJ        User Key  (r) = Recorded By, (t) = Taken By, (c) = Cosigned By    Initials Name Provider Type    Daja James, PT Physical Therapist        Therapy Charges for Today     Code Description Service Date Service Provider Modifiers Qty    69241760477 HC PT EVAL MOD COMPLEXITY 2 7/1/2019 Daja Stearns, PT GP 1    17506967464 HC PT THER PROC EA 15 MIN 7/1/2019 Daja Stearns, PT GP 1          PT G-Codes  Outcome Measure Options: AM-PAC 6 Clicks Basic Mobility (PT)  AM-PAC 6 Clicks Score: 18      Daja Stearns, PT  7/1/2019

## 2019-07-01 NOTE — NURSING NOTE
Patient having a drop in heart rate at times. Dropping into to 40's and 30's. [facility] and Cardiology notified. STAT EKG ordered by [facility]. Cardiology ordered transfer to tele.

## 2019-07-02 VITALS
HEIGHT: 62 IN | WEIGHT: 109.35 LBS | RESPIRATION RATE: 16 BRPM | HEART RATE: 81 BPM | BODY MASS INDEX: 20.12 KG/M2 | TEMPERATURE: 97.5 F | OXYGEN SATURATION: 96 % | DIASTOLIC BLOOD PRESSURE: 63 MMHG | SYSTOLIC BLOOD PRESSURE: 139 MMHG

## 2019-07-02 PROCEDURE — 97110 THERAPEUTIC EXERCISES: CPT

## 2019-07-02 PROCEDURE — 94799 UNLISTED PULMONARY SVC/PX: CPT

## 2019-07-02 RX ADMIN — ACETAMINOPHEN 1000 MG: 500 TABLET, FILM COATED ORAL at 14:15

## 2019-07-02 RX ADMIN — ASPIRIN 81 MG: 81 TABLET, COATED ORAL at 09:51

## 2019-07-02 RX ADMIN — METOPROLOL TARTRATE 25 MG: 25 TABLET ORAL at 09:51

## 2019-07-02 RX ADMIN — IPRATROPIUM BROMIDE AND ALBUTEROL SULFATE 3 ML: 2.5; .5 SOLUTION RESPIRATORY (INHALATION) at 11:43

## 2019-07-02 RX ADMIN — AMLODIPINE BESYLATE 10 MG: 10 TABLET ORAL at 09:51

## 2019-07-02 RX ADMIN — SODIUM CHLORIDE, PRESERVATIVE FREE 3 ML: 5 INJECTION INTRAVENOUS at 09:52

## 2019-07-02 RX ADMIN — ACETAMINOPHEN 1000 MG: 500 TABLET, FILM COATED ORAL at 06:28

## 2019-07-02 RX ADMIN — IPRATROPIUM BROMIDE AND ALBUTEROL SULFATE 3 ML: 2.5; .5 SOLUTION RESPIRATORY (INHALATION) at 07:47

## 2019-07-02 RX ADMIN — ATORVASTATIN CALCIUM 20 MG: 20 TABLET, FILM COATED ORAL at 09:51

## 2019-07-02 NOTE — DISCHARGE PLACEMENT REQUEST
"Gita Stephenson (83 y.o. Female)     Date of Birth Social Security Number Address Home Phone MRN    1935  [id number] [facility] [address] [phone] [id number]    Hinduism Marital Status          Pent[employer]ostalism        Admission Date Admission Type Admitting Provider Attending Provider Department, Room/Bed    6/27/19 Urgent Tiffani Pereira MD Beard, Lyle E, MD [address], S621/1    Discharge Date Discharge Disposition Discharge Destination         Home-[facility]              Attending Provider:  Ric Marrero MD    Allergies:  Sulfa Antibiotics, Tekturna [Aliskiren]    Isolation:  None   Infection:  None   Code Status:  CPR    Ht:  157 cm (61.81\")   Wt:  49.6 kg (109 lb 5.6 oz)    Admission Cmt:  None   Principal Problem:  Fracture, hip (CMS/[facility]) [S72.009A] More...                 Active Insurance as of 6/27/2019     Primary Coverage     Payor Plan Insurance Group Employer/Plan Group    MEDICARE MEDICARE A & B      Payor Plan Address Payor Plan Phone Number Payor Plan Fax Number Effective Dates    [address] [phone]  12/1/2001 - None Entered    formerly [facility] [id number]       Subscriber Name Subscriber Birth Date Member ID       GITA STEPHENSON P 1935 [id number]           Secondary Coverage     Payor Plan Insurance Group Employer/Plan Group    [facility] SUPP [id number]     Payor Plan Address Payor Plan Phone Number Payor Plan Fax Number Effective Dates    [address]   12/1/2016 - None Entered    [facility] [id number]       Subscriber Name Subscriber Birth Date Member ID       GITA STEPHENSON P 1935 [id number]                 Emergency Contacts      (Rel.) Home Phone Work Phone Mobile Phone    Gaviota Grullony (Daughter) [phone] -- --    AkikoHardik alexander (Son) [phone] -- --              "

## 2019-07-02 NOTE — PROGRESS NOTES
Patient: Gita Wharton  YOB: 1935     Date of Admission: 6/27/2019  5:47 PM Medical Record Number: [id number]     Attending Physician: Ric Marrero MD    Status Post:  Procedure(s):  LEFT HIP ANTERIOR HIP WITH HANA TABLEPost Operative Day Number: 2    Subjective : No new orthopaedic complaints     Pain Relief: some relief with present medication.     Systemic Complaints: No Complaints  Vitals:    07/02/19 0753 07/02/19 1143 07/02/19 1150 07/02/19 1233   BP:    139/63   BP Location:    Right arm   Patient Position:    Sitting   Pulse: 93 81 89 81   Resp: 18 16 16 16   Temp:    97.5 °F (36.4 °C)   TempSrc:    Oral   SpO2: 100% 96% 100% 96%   Weight:       Height:           Physical Exam: 83 y.o. female    General Appearance:       Alert, cooperative, in no acute distress                  Extremities:    Dressing Clean, Dry and Intact         Incision healthy without signs or symptoms of infections         No clinical sign of DVT        Able to do good movements of digits    Pulses:   Pulses palpable and equal bilaterally           Diagnostic Tests:     Results from last 7 days   Lab Units 07/01/19  0739 06/29/19  0320 06/28/19  0455   WBC 10*3/mm3 14.42* 6.87 6.13   HEMOGLOBIN g/dL 10.1* 10.6* 10.0*   HEMATOCRIT % 32.1* 33.7* 32.3*   PLATELETS 10*3/mm3 210 225 235     Results from last 7 days   Lab Units 07/01/19  0739 06/29/19  0320 06/28/19  0455   SODIUM mmol/L 141 141 139   POTASSIUM mmol/L 4.4 3.6 3.5   CHLORIDE mmol/L 110* 110* 111*   CO2 mmol/L 20.7* 18.8* 18.6*   BUN mg/dL 12 11 12   CREATININE mg/dL 0.99 1.03* 1.13*   GLUCOSE mg/dL 105* 89 83   CALCIUM mg/dL 8.4* 8.4* 8.2*     Results from last 7 days   Lab Units 06/27/19  1240   INR  0.96   APTT seconds 33.9     Lab Results   Component Value Date    CRP 10.85 (H) 01/04/2017     Lab Results   Component Value Date    SEDRATE 101 (H) 01/04/2017     Lab Results   Component Value Date    URICACID 8.0 (H) 08/06/2018     No results  found for: CRYSTAL  Microbiology Results (last 10 days)     ** No results found for the last 240 hours. **        Xr Chest 2 View    Result Date: 6/27/2019  1. The patient has a 14 x 9 mm nodular opacity in the lateral aspect of the left mid lung zone that is similar to perhaps several millimeters larger than it was 07/23/2018 when it measured 10 x 7 mm. There is new vague nodular opacity overlapping the right 5th posterior rib and right 2nd anterior rib on the PA view measuring 10 mm in size. There is a new small left pleural effusion blunting the left posterolateral costophrenic angle. I suggest a contrast enhanced CT scan of the chest to further evaluate.  PELVIS AND LEFT HIP: Single view of pelvis with 2 views including AP and frog-leg lateral views of the left hip are submitted for interpretation. There is total right hip prosthesis in place with good alignment of hardware. No acute fracture is seen in the pelvis. There is an acute to subacute impacted fracture of the subcapital portion of the left femoral neck.  The results and recommendations were communicated to Dr. Pereira by telephone 06/27/2019 at 2 PM.  This report was finalized on 6/27/2019 8:42 PM by Dr. Kyle Prasad M.D.      Ct Chest Hi Resolution    Result Date: 7/1/2019  1. Patchy groundglass opacities scattered throughout both lung fields and the high-resolution images show scattered ill-defined centrilobular nodules and groundglass density. The appearance is nonspecific, but suggestive of hypersensitivity pneumonitis. There is also evidence for scattered air trapping within both lung fields. There are a few opacities which are not typical for hypersensitivity pneumonitis and therefore pneumonia cannot be entirely excluded, particularly at the left lung base. Reevaluation is recommended with a noncontrasted chest CT in 3 months. 2. Small left pleural effusion. No evidence for interstitial edema. 3. There is no significant change in the  groundglass opacification and mild atelectatic change at the lingula. 4. Extensive atherosclerotic change throughout the thoracic aorta and visualized abdominal aorta. There is 4.2 cm ectasia of the ascending thoracic aorta.  This report was finalized on 7/1/2019 9:03 AM by Dr. Nicole Guerra M.D.      Xr Hip With Or Without Pelvis 2 - 3 View Left    Result Date: 6/27/2019  1. The patient has a 14 x 9 mm nodular opacity in the lateral aspect of the left mid lung zone that is similar to perhaps several millimeters larger than it was 07/23/2018 when it measured 10 x 7 mm. There is new vague nodular opacity overlapping the right 5th posterior rib and right 2nd anterior rib on the PA view measuring 10 mm in size. There is a new small left pleural effusion blunting the left posterolateral costophrenic angle. I suggest a contrast enhanced CT scan of the chest to further evaluate.  PELVIS AND LEFT HIP: Single view of pelvis with 2 views including AP and frog-leg lateral views of the left hip are submitted for interpretation. There is total right hip prosthesis in place with good alignment of hardware. No acute fracture is seen in the pelvis. There is an acute to subacute impacted fracture of the subcapital portion of the left femoral neck.  The results and recommendations were communicated to Dr. Pereira by telephone 06/27/2019 at 2 PM.  This report was finalized on 6/27/2019 8:42 PM by Dr. Kyle Prasad M.D.              Current Medications:  Scheduled Meds:  acetaminophen 1,000 mg Oral Q8H   amLODIPine 10 mg Oral Q24H   aspirin 81 mg Oral Daily   atorvastatin 20 mg Oral Daily   ipratropium-albuterol 3 mL Nebulization 4x Daily - RT   metoprolol tartrate 25 mg Oral Q12H   orthopedic surgery cocktail 2 ( JAJA)  Injection Once   sodium chloride 3 mL Intravenous Q12H     Continuous Infusions:   PRN Meds:.acetaminophen  •  acetaminophen  •  bisacodyl  •  bisacodyl  •  docusate sodium  •  HYDROcodone-acetaminophen  •   melatonin  •  [DISCONTINUED] Morphine **AND** naloxone  •  ondansetron  •  sennosides-docusate sodium  •  sodium chloride  •  sodium chloride  •  traMADol    Assessment: Status post  Procedure(s):  LEFT HIP ANTERIOR HIP WITH HANA TABLE    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      Tiffani Pereira MD    Date: 7/2/2019    Time: 1:21 PM

## 2019-07-02 NOTE — PLAN OF CARE
Problem: Patient Care Overview  Goal: Plan of Care Review  Outcome: Ongoing (interventions implemented as appropriate)   07/02/19 1015   Coping/Psychosocial   Plan of Care Reviewed With patient   Plan of Care Review   Progress improving   OTHER   Outcome Summary Pt doing well, agreeabel to PT this am. She is hoping to DC home later today. She continues to progress with ambulation and hip exercises. She plans to DC to sister's home where there are no steps. She does have all necessary equipment. Will continue to progress as tolerated if pt remains in hospital.

## 2019-07-02 NOTE — PROGRESS NOTES
Continued Stay Note  [facility]     Patient Name: Gita Wharton  MRN: [id number]  Today's Date: 7/2/2019    Admit Date: 6/27/2019    Discharge Plan     Row Name 07/02/19 1225       Plan    Plan  home with sister and Caretenders  to follow    Patient/Family in Agreement with Plan  yes    Plan Comments  Address that patient will be staying at is [address] [address].  Call placed to [facility] to update.  Daughter, Marce Grullon ( [phone]), has asked that she be called to arrange appointments.  CCP will follow. Gracie Valenzuela RN    Row Name 07/02/19 6021       Plan    Plan  home with sisters and Caretenders  to follow    Plan Comments  Spoke with patient at bedside.  She will be going to stay with her sister for a few days at [place]. Her daughter is supposed to be bringing in the address this afternoon.   She would like [facility] to follow.  Referral sent via [facility].  [facility] is aware.  CCP will follow. Gracie Valenzuela RN        Discharge Codes    No documentation.       Expected Discharge Date and Time     Expected Discharge Date Expected Discharge Time    Jul 2, 2019             Gracie Valenzuela RN

## 2019-07-02 NOTE — PROGRESS NOTES
Continued Stay Note  [facility]     Patient Name: Gita hWarton  MRN: [id number]  Today's Date: 7/2/2019    Admit Date: 6/27/2019    Discharge Plan     Row Name 07/02/19 1157       Plan    Plan  home with sisters and Caretenders  to follow    Plan Comments  Spoke with patient at bedside.  She will be going to stay with her sister for a few days at [place]. Her daughter is supposed to be bringing in the address this afternoon.   [facility].  Referral sent via [facility].  [facility] is aware.  [facility] will follow. Gracie Valenzuela RN        Discharge Codes    No documentation.       Expected Discharge Date and Time     Expected Discharge Date Expected Discharge Time    Jul 2, 2019             Gracie Valenzuela RN

## 2019-07-02 NOTE — PLAN OF CARE
Problem: Patient Care Overview  Goal: Plan of Care Review  Outcome: Ongoing (interventions implemented as appropriate)   07/01/19 1040 07/02/19 0048 07/02/19 0304   Coping/Psychosocial   Plan of Care Reviewed With --  patient --    Plan of Care Review   Progress improving --  --    OTHER   Outcome Summary --  --  No complaints overnight. Minimal confusion and reoriented very easily. Ambulated to bathroom with stand by assistance. VSS. Will continue to monitor.      Goal: Discharge Needs Assessment  Outcome: Ongoing (interventions implemented as appropriate)    Goal: Interprofessional Rounds/Family Conf  Outcome: Ongoing (interventions implemented as appropriate)

## 2019-07-02 NOTE — THERAPY TREATMENT NOTE
Acute Care - Physical Therapy Treatment Note  [facility]     Patient Name: Gita Wharton  : 1935  MRN: [id number]  Today's Date: 2019  Onset of Illness/Injury or Date of Surgery: 19  Date of Referral to PT: 19  Referring Physician: Randall    Admit Date: 2019    Visit Dx:    ICD-10-CM ICD-9-CM   1. Difficulty walking R26.2 719.7     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       Therapy Treatment    Rehabilitation Treatment Summary     Row Name 19 0958             Treatment Time/Intention    Discipline  physical therapist  -EJ      Document Type  therapy note (daily note)  -EJ      Subjective Information  no complaints  -EJ      Mode of Treatment  physical therapy  -EJ      Patient/Family Observations  supine in bed, no acute distress  -EJ      Patient Effort  good  -EJ      Existing Precautions/Restrictions  fall;hip, anterior;left  -EJ      Recorded by [EJ] Daja Stearns, PT 19 1015      Row Name 19 0958             Cognitive Assessment/Intervention- PT/OT    Personal Safety Interventions  fall prevention program maintained;gait belt;nonskid shoes/slippers when out of bed;supervised activity  -EJ      Recorded by [EJ] Daja Stearns, PT 19 1015      Row Name 19 0958             Bed Mobility Assessment/Treatment    Supine-Sit [place] (Bed Mobility)  verbal cues;contact guard  -EJ      Sit-Supine [place] (Bed Mobility)  not tested  -EJ      Recorded by [EJ] Daja Stearns, PT 19 1015      Row Name 19 0958             Sit-Stand Transfer    Sit-Stand [place] (Transfers)  verbal cues;contact guard  -EJ      Assistive Device (Sit-Stand  Transfers)  walker, front-wheeled  -EJ      Recorded by [EJ] Daja Stearns, PT 07/02/19 1015      Row Name 07/02/19 0958             Stand-Sit Transfer    Stand-Sit [place] (Transfers)  verbal cues;contact guard  -EJ      Assistive Device (Stand-Sit Transfers)  walker, front-wheeled  -EJ      Recorded by [EJ] Daja Stearns, PT 07/02/19 1015      Row Name 07/02/19 0958             Gait/Stairs Assessment/Training    [place] Level (Gait)  verbal cues;contact guard  -EJ      Assistive Device (Gait)  walker, front-wheeled  -EJ      Distance in Feet (Gait)  100  -EJ      Deviations/Abnormal Patterns (Gait)  antalgic;eliceo decreased;stride length decreased  -EJ      Bilateral Gait Deviations  forward flexed posture;heel strike decreased  -EJ      Comment (Gait/Stairs)  cues for upright posture  -EJ      Recorded by [EJ] Daja Stearns, PT 07/02/19 1015      Row Name 07/02/19 0958             Therapeutic Exercise    Comment (Therapeutic Exercise)  L THR protocol x 20 reps  -EJ      Recorded by [EJ] Daja Stearns, PT 07/02/19 1015      Row Name 07/02/19 0958             Positioning and Restraints    Pre-Treatment Position  in bed  -EJ      Post Treatment Position  chair  -EJ      In Chair  notified nsg;reclined;call light within reach;encouraged to call for assist;exit alarm on;with family/caregiver  -EJ      Recorded by [EJ] Daja Stearns, PT 07/02/19 1015      Row Name 07/02/19 0958             Pain Scale: Numbers Pre/Post-Treatment    Pain Scale: Numbers, Pretreatment  3/10  -EJ      Pain Scale: Numbers, Post-Treatment  3/10  -EJ      Pain Location - Side  Left  -EJ      Pain Location  hip  -EJ      Recorded by [EJ] Daja Stearns, PT 07/02/19 1015      Row Name                Wound 06/30/19 1056 Left hip incision    Wound - Properties Group Date first assessed: 06/30/19 [AC] Time first assessed: 1056 [AC] Side: Left [AC] Location: hip [AC] Type: incision [AC] Recorded by:  [AC]  Janiya Goff RN 06/30/19 1056      User Key  (r) = Recorded By, (t) = Taken By, (c) = Cosigned By    Initials Name Effective Dates Discipline    AC Janiya Goff RN 03/18/19 -  Nurse    Daja James, PT 04/03/18 -  PT          Wound 06/30/19 1056 Left hip incision (Active)   Dressing Appearance dry;intact;no drainage 7/2/2019 12:48 AM   Closure SLY 7/2/2019 12:48 AM   Base dressing in place, unable to visualize 7/2/2019 12:48 AM   Periwound Temperature warm 7/2/2019 12:48 AM   Periwound Skin Turgor soft 7/2/2019 12:48 AM   Drainage Amount none 7/2/2019 12:48 AM   Dressing Care, Wound other (see comments) 7/1/2019  7:50 PM           Physical Therapy Education     Title: PT OT SLP Therapies (Done)     Topic: Physical Therapy (Done)     Point: Mobility training (Done)     Learning Progress Summary           Patient Acceptance, E,TB,D, VU,NR by KEANU at 7/2/2019 10:15 AM    Acceptance, E,TB,D, VU,NR by KEANU at 7/1/2019 10:40 AM   Family Acceptance, E,TB,D, VU,NR by KEANU at 7/2/2019 10:15 AM                   Point: Home exercise program (Done)     Learning Progress Summary           Patient Acceptance, E,TB,D, VU,NR by KEANU at 7/2/2019 10:15 AM    Acceptance, E,TB,D, VU,NR by KEANU at 7/1/2019 10:40 AM   Family Acceptance, E,TB,D, VU,NR by KEANU at 7/2/2019 10:15 AM                   Point: Body mechanics (Done)     Learning Progress Summary           Patient Acceptance, E,TB,D, VU,NR by KEANU at 7/2/2019 10:15 AM    Acceptance, E,TB,D, VU,NR by KEANU at 7/1/2019 10:40 AM   Family Acceptance, E,TB,D, VU,NR by KEANU at 7/2/2019 10:15 AM                   Point: Precautions (Done)     Learning Progress Summary           Patient Acceptance, E,TB,D, VU,NR by KEANU at 7/2/2019 10:15 AM    Acceptance, E,TB,D, VU,NR by KEANU at 7/1/2019 10:40 AM   Family Acceptance, E,ELIU FIELDS, [place],NR by KEANU at 7/2/2019 10:15 AM                               User Key     Initials Effective Dates Name Provider Type Discipline     04/03/18 -  Daja Stearns  C, PT Physical Therapist PT                PT Recommendation and Plan  Anticipated Discharge Disposition (PT): skilled nursing facility  Planned Therapy Interventions (PT Eval): bed mobility training, gait training, home exercise program, patient/family education, ROM (range of motion), strengthening, transfer training  Therapy Frequency (PT Clinical Impression): 2 times/day  Outcome Summary/Treatment Plan (PT)  Anticipated Discharge Disposition (PT): skilled nursing facility  Plan of Care Reviewed With: patient  Progress: improving  Outcome Summary: Pt doing well, agreeabel to PT this am. She is hoping to DC home later today. She continues to progress with ambulation and hip exercises. She plans to DC to sister's home where there are no steps. She does have all necessary equipment. Will continue to progress as tolerated if pt remains in hospital.  Outcome Measures     Row Name 07/02/19 1000 07/01/19 1000          How much help from another person do you currently need...    Turning from your back to your side while in flat bed without using bedrails?  4  -EJ  3  -EJ     Moving from lying on back to sitting on the side of a flat bed without bedrails?  3  -EJ  3  -EJ     Moving to and from a bed to a chair (including a wheelchair)?  3  -EJ  3  -EJ     Standing up from a chair using your arms (e.g., wheelchair, bedside chair)?  3  -EJ  3  -EJ     Climbing 3-5 steps with a railing?  3  -EJ  3  -EJ     To walk in hospital room?  3  -EJ  3  -EJ     AM-PAC 6 Clicks Score (PT)  19  -EJ  18  -EJ        Functional Assessment    Outcome Measure Options  AM-PAC 6 Clicks Basic Mobility (PT)  -EJ  AM-PAC 6 Clicks Basic Mobility (PT)  -EJ       User Key  (r) = Recorded By, (t) = Taken By, (c) = Cosigned By    Initials Name Provider Type    Daja James, PT Physical Therapist         Time Calculation:   PT Charges     Row Name 07/02/19 1017             Time Calculation    Start Time  0956  -EJ      Stop Time  1020  -EJ       Time Calculation (min)  24 min  -EJ      PT Received On  07/02/19  -KEANU      PT - Next Appointment  07/02/19  -KEANU        User Key  (r) = Recorded By, (t) = Taken By, (c) = Cosigned By    Initials Name Provider Type    Daja James, PT Physical Therapist        Therapy Charges for Today     Code Description Service Date Service Provider Modifiers Qty    68564196401 HC PT EVAL MOD COMPLEXITY 2 7/1/2019 Daja Stearns, PT GP 1    20424753766 HC PT THER PROC EA 15 MIN 7/1/2019 Daja Stearns, PT GP 1    41624586419 HC PT THER PROC EA 15 MIN 7/1/2019 Daja Stearns, PT GP 1    77800857727 HC PT THER PROC EA 15 MIN 7/2/2019 Daja Stearns, PT GP 2          PT G-Codes  Outcome Measure Options: AM-PAC 6 Clicks Basic Mobility (PT)  AM-PAC 6 Clicks Score (PT): 19    Daja Stearns PT  7/2/2019

## 2019-07-02 NOTE — DISCHARGE SUMMARY
PHYSICIAN DISCHARGE SUMMARY                                                                        [facility]    Patient Identification:  Name: Gita Wharton  Age: 83 y.o.  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]  Unknown   • Aortic stenosis [I35.0]  Yes   • Interstitial lung disease (CMS/HCC) [J84.9]  Yes   • Abnormal chest x-ray [R93.89]  Yes   • History of right MCA stroke [Z86.73]  Not Applicable   • Essential hypertension [I10]  Yes      Resolved Hospital Problems    Diagnosis Date Resolved POA   • **Fracture, hip (CMS/HCC) [S72.009A] 2019 Yes   • Osteoarthritis of one hip [M16.10] 2019 Yes          PMHX:   Past Medical History:   Diagnosis Date   • Acute right MCA stroke (CMS/HCC) 2018   • Fracture, hip (CMS/HCC)     LEFT   • Hemorrhoids 2018   • History of transfusion    • Hyperlipidemia    • Hypertension    • Lung granuloma (CMS/HCC) 2018    R LL            Dr FRANKIE Branch - suggested yearly CXR to Monitor   • Pyelonephritis 2019   • Stenosis of right internal carotid artery 2018   • Subdural hematoma (CMS/HCC) 2018    Secondary to fall, 2018     PSHX:   Past Surgical History:   Procedure Laterality Date   • CAROTID ENDARTERECTOMY Right 2018    Procedure: RT CAROTID ENDARTERECTOMY;  Surgeon: Inna Villarreal MD;  Location: [facility];  Service: Vascular   • COLONOSCOPY N/A 2018    Adenomatous polyp.   Repeat .  Surgeon: Ken Tidwell MD;    • HYSTERECTOMY     • THYROIDECTOMY     • TOTAL HIP ARTHROPLASTY Right    • TOTAL HIP ARTHROPLASTY Left 2019    Procedure: LEFT HIP ANTERIOR HIP WITH HANA  TABLE;  Surgeon: Tiffani Pereira MD;  Location: [facility];  Service: Orthopedics       Hospital Course: Gita Wharton  Is an 83-year-old female with past medical history history as noted above has known history of abnormal findings in the chest based on the CT scan of the chest performed in July 2018 and has been following up with Dr. Ann who has recommended a yearly chest x-ray to monitor and is currently scheduled to see him in August was in her usual state of her health and do not have any specific symptoms of any kind when she was in an attempt to get her medicine from the top of the shelf lost her balance while turning after getting the medication and fell.  This occurred about a week ago.  She was able to get up and walk but has been complaining of progressive discomfort in her left hip requiring her to use walker.  She was able to get up and about and doing things despite the pain in that area.  Patient's daughter has been monitoring her discomfort and eventually she decided that despite her mother's refusal to seek help she would make appointment with orthopedic surgery service as the pain was worsening.  Patient was today seen by  as x-rays revealed acute to subacute impacted fracture of the subcapital portion of the left femoral neck.  Patient was noted to have 14 x 9 mm nodular opacity in the lateral aspect of the left midlung which is similar to but slightly larger than how it was seen in July 2018.  There was new area of vague nodular opacity overlapping the right fifth posterior rib and right second anterior rib on the PA view measuring 10 mm in size patient was also noted to have new small left pleural effusion and blunting of the left costophrenic angle.  Radiologist service recommended further evaluation with CT scan.  Because these radiological findings and recommendation patient is requested to be admitted for further work-up while surgery is being planned for  this coming Saturday.          Patient was admitted to the hospital and underwent left total hip replacement.  Patient had some question of some bradycardia postoperatively and was seen by cardiology but no documented rhythm strips of anything showing a significant bradycardia.  She was feeling better after being in the hospital for several days and looked well enough to go home with her family and home health services.  The CT of the chest did show some abnormality and radiology wanted to have follow-up noncontrast CT of chest in 3 months.  She will follow-up with orthopedic surgery for postop check and follow-up with her primary care in 1 week for continuing care.    Consults:     Consults     Date and Time Order Name Status Description    6/28/2019 1837 Inpatient Cardiology Consult Completed     6/28/2019 0539 Inpatient Pulmonology Consult      6/27/2019 2157 Inpatient Orthopedic Surgery Consult Completed         Results from last 7 days   Lab Units 07/01/19  0739   WBC 10*3/mm3 14.42*   HEMOGLOBIN g/dL 10.1*   HEMATOCRIT % 32.1*   PLATELETS 10*3/mm3 210     Results from last 7 days   Lab Units 07/01/19  0739   SODIUM mmol/L 141   POTASSIUM mmol/L 4.4   CHLORIDE mmol/L 110*   CO2 mmol/L 20.7*   BUN mg/dL 12   CREATININE mg/dL 0.99   GLUCOSE mg/dL 105*   CALCIUM mg/dL 8.4*     Significant Diagnostic Studies:   Lab Results   Component Value Date    WBC 14.42 (H) 07/01/2019    HGB 10.1 (L) 07/01/2019    HCT 32.1 (L) 07/01/2019     07/01/2019     Lab Results   Component Value Date     07/01/2019    K 4.4 07/01/2019     (H) 07/01/2019    CO2 20.7 (L) 07/01/2019    BUN 12 07/01/2019    CREATININE 0.99 07/01/2019    GLUCOSE 105 (H) 07/01/2019     Lab Results   Component Value Date    CALCIUM 8.4 (L) 07/01/2019     No results found for: AST, ALT, ALKPHOS  No results found for: APTT, INR  No results found for: COLORU, CLARITYU, SPECGRAV, PHUR, PROTEINUR, GLUCOSEU, KETONESU, BLOODU, NITRITE,  LEUKOCYTESUR, BILIRUBINUR, UROBILINOGEN, RBCUA, WBCUA, BACTERIA, UACOMMENT  No results found for: TROPONINT, TROPONINI, BNP  No components found for: HGBA1C;2  No components found for: TSH;2  Imaging Results (all)     Procedure Component Value Units Date/Time    CT Chest Hi Resolution [[id number]] Collected:  06/28/19 1442     Updated:  07/01/19 0906    Narrative:       CT CHEST WITHOUT CONTRAST, HIGH RESOLUTION CHEST CT     HISTORY: 83-year-old female with an abnormal chest radiograph. Cough.     TECHNIQUE: Radiation dose reduction techniques were utilized, including  automated exposure control and exposure modulation based on body size. 3  mm images were obtained through the chest without the administration of  IV contrast. 1 mm high-resolution images were obtained in the prone  position.  Expiratory series was performed in the supine position as  well. Compared with chest CT from 07/28/2018.     FINDINGS: There are very small patchy groundglass opacities at the upper  lobes and to a lesser degree the right lower lobe. There is a more  confluent region of groundglass opacification at the lingula and some  atelectatic change and scarring and these were present at the lingula  previously. There is also a small groundglass opacity at the left lung  base as well as some reticular markings. There is a small left pleural  effusion. There is no pericardial effusion and there is no  lymphadenopathy within the chest. There are extensive thoracic aortic  atherosclerotic changes and fairly extensive coronary artery  calcifications. The ascending thoracic aorta is ectatic measuring 4.2 cm  in AP diameter. The caliber tapers to 3.3 cm at the aortic arch.  Pulmonary arteries appear enlarged. There is no subpleural reticulation,  honeycombing, or bronchiectasis. There is no significant emphysematous  change. There are some scattered ill-defined centrilobular nodules of  groundglass density. The expiratory series shows a mosaic  pattern of  density.       Impression:       1. Patchy groundglass opacities scattered throughout both lung fields  and the high-resolution images show scattered ill-defined centrilobular  nodules and groundglass density. The appearance is nonspecific, but  suggestive of hypersensitivity pneumonitis. There is also evidence for  scattered air trapping within both lung fields. There are a few  opacities which are not typical for hypersensitivity pneumonitis and  therefore pneumonia cannot be entirely excluded, particularly at the  left lung base. Reevaluation is recommended with a noncontrasted chest  CT in 3 months.  2. Small left pleural effusion. No evidence for interstitial edema.  3. There is no significant change in the groundglass opacification and  mild atelectatic change at the lingula.  4. Extensive atherosclerotic change throughout the thoracic aorta and  visualized abdominal aorta. There is 4.2 cm ectasia of the ascending  thoracic aorta.     This report was finalized on 7/1/2019 9:03 AM by Dr. Nicole Guerra M.D.       XR Hip With or Without Pelvis 1 View Left [[id number]] Collected:  06/30/19 1208     Updated:  06/30/19 1344    Narrative:       ONE-VIEW PORTABLE LEFT HIP AND ONE-VIEW AP PELVIS     HISTORY: Left hip replacement for fracture.     FINDINGS: The patient has had recent total hip replacement on the left  and the alignment appears satisfactory. There is also a previous total  hip replacement on the right.     This report was finalized on 6/30/2019 1:41 PM by Dr. Erik Casanova M.D.       XR Hip With or Without Pelvis 1 View Left [[id number]] Collected:  06/30/19 1102     Updated:  06/30/19 1106    Narrative:       TWO-VIEW LEFT HIP IN OR     HISTORY: Hip replacement for fracture.     FINDINGS: Imaging was performed in the operating room at the time of  total hip replacement and the alignment appears satisfactory. 3 views  were obtained and the fluoroscopy time measures 43 seconds.     This report  was finalized on 6/30/2019 11:03 AM by Dr. Erik Casanova M.D.       FL C Arm During Surgery [[id number]] Resulted:  06/30/19 1058     Updated:  06/30/19 1058    Narrative:       This procedure was auto-finalized with no dictation required.        Lab Results (last 7 days)     Procedure Component Value Units Date/Time    Basic Metabolic Panel [[id number]]  (Abnormal) Collected:  07/01/19 0739    Specimen:  Blood Updated:  07/01/19 0833     Glucose 105 mg/dL      BUN 12 mg/dL      Creatinine 0.99 mg/dL      Sodium 141 mmol/L      Potassium 4.4 mmol/L      Chloride 110 mmol/L      CO2 20.7 mmol/L      Calcium 8.4 mg/dL      eGFR Non African Amer 54 mL/min/1.73      BUN/Creatinine Ratio 12.1     Anion Gap 10.3 mmol/L     Narrative:       GFR Normal >60  Chronic Kidney Disease <60  Kidney Failure <15    CBC (No Diff) [[id number]]  (Abnormal) Collected:  07/01/19 0739    Specimen:  Blood Updated:  07/01/19 0757     WBC 14.42 10*3/mm3      RBC 3.94 10*6/mm3      Hemoglobin 10.1 g/dL      Hematocrit 32.1 %      MCV 81.5 fL      MCH 25.6 pg      MCHC 31.5 g/dL      RDW 16.1 %      RDW-SD 48.1 fl      MPV 11.3 fL      Platelets 210 10*3/mm3     Occult Blood X 1, Stool - Stool, Per Rectum [[id number]]  (Normal) Collected:  06/29/19 1150    Specimen:  Stool from Per Rectum Updated:  06/29/19 1321     Fecal Occult Blood Negative    Basic Metabolic Panel [[id number]]  (Abnormal) Collected:  06/29/19 0320    Specimen:  Blood Updated:  06/29/19 0434     Glucose 89 mg/dL      BUN 11 mg/dL      Creatinine 1.03 mg/dL      Sodium 141 mmol/L      Potassium 3.6 mmol/L      Chloride 110 mmol/L      CO2 18.8 mmol/L      Calcium 8.4 mg/dL      eGFR Non African Amer 51 mL/min/1.73      BUN/Creatinine Ratio 10.7     Anion Gap 12.2 mmol/L     Narrative:       GFR Normal >60  Chronic Kidney Disease <60  Kidney Failure <15    CBC & Differential [[id number]] Collected:  06/29/19 0320    Specimen:  Blood Updated:  06/29/19 0414    Narrative:       The  following orders were created for panel order CBC & Differential.  Procedure                               Abnormality         Status                     ---------                               -----------         ------                     CBC Auto Differential[[id number]]        Abnormal            Final result                 Please view results for these tests on the individual orders.    CBC Auto Differential [[id number]]  (Abnormal) Collected:  06/29/19 0320    Specimen:  Blood Updated:  06/29/19 0414     WBC 6.87 10*3/mm3      RBC 4.14 10*6/mm3      Hemoglobin 10.6 g/dL      Hematocrit 33.7 %      MCV 81.4 fL      MCH 25.6 pg      MCHC 31.5 g/dL      RDW 15.9 %      RDW-SD 47.0 fl      MPV 12.0 fL      Platelets 225 10*3/mm3      Neutrophil % 71.3 %      Lymphocyte % 18.2 %      Monocyte % 9.9 %      Eosinophil % 0.0 %      Basophil % 0.3 %      Immature Grans % 0.3 %      Neutrophils, Absolute 4.90 10*3/mm3      Lymphocytes, Absolute 1.25 10*3/mm3      Monocytes, Absolute 0.68 10*3/mm3      Eosinophils, Absolute 0.00 10*3/mm3      Basophils, Absolute 0.02 10*3/mm3      Immature Grans, Absolute 0.02 10*3/mm3      nRBC 0.0 /100 WBC     Ferritin [[id number]]  (Normal) Collected:  06/28/19 0455    Specimen:  Blood from Arm, Right Updated:  06/28/19 0911     Ferritin 86.60 ng/mL     Iron [[id number]]  (Abnormal) Collected:  06/28/19 0455    Specimen:  Blood from Arm, Right Updated:  06/28/19 0906     Iron 34 mcg/dL     Iron Profile [[id number]]  (Abnormal) Collected:  06/28/19 0455    Specimen:  Blood from Arm, Right Updated:  06/28/19 0906     Iron 34 mcg/dL      Iron Saturation 16 %      Transferrin 143 mg/dL      TIBC 213 mcg/dL     Vitamin B12 [[id number]]  (Normal) Collected:  06/28/19 0554    Specimen:  Blood from Arm, Right Updated:  06/28/19 0701     Vitamin B-12 659 pg/mL     Folate [[id number]]  (Normal) Collected:  06/28/19 0554    Specimen:  Blood from Arm, Right Updated:  06/28/19 0701     Folate >20.00  ng/mL     Basic Metabolic Panel [[id number]]  (Abnormal) Collected:  06/28/19 0455    Specimen:  Blood from Arm, Right Updated:  06/28/19 0558     Glucose 83 mg/dL      BUN 12 mg/dL      Creatinine 1.13 mg/dL      Sodium 139 mmol/L      Potassium 3.5 mmol/L      Chloride 111 mmol/L      CO2 18.6 mmol/L      Calcium 8.2 mg/dL      eGFR Non African Amer 46 mL/min/1.73      BUN/Creatinine Ratio 10.6     Anion Gap 9.4 mmol/L     Narrative:       GFR Normal >60  Chronic Kidney Disease <60  Kidney Failure <15    Reticulocytes [[id number]]  (Normal) Collected:  06/28/19 0455    Specimen:  Blood from Arm, Right Updated:  06/28/19 0545     Reticulocyte % 0.88 %      Reticulocyte Absolute 0.0346 10*6/mm3     CBC & Differential [[id number]] Collected:  06/28/19 0455    Specimen:  Blood Updated:  06/28/19 0530    Narrative:       The following orders were created for panel order CBC & Differential.  Procedure                               Abnormality         Status                     ---------                               -----------         ------                     CBC Auto Differential[[id number]]        Abnormal            Final result                 Please view results for these tests on the individual orders.    CBC Auto Differential [[id number]]  (Abnormal) Collected:  06/28/19 0455    Specimen:  Blood from Arm, Right Updated:  06/28/19 0530     WBC 6.13 10*3/mm3      RBC 3.90 10*6/mm3      Hemoglobin 10.0 g/dL      Hematocrit 32.3 %      MCV 82.8 fL      MCH 25.6 pg      MCHC 31.0 g/dL      RDW 15.9 %      RDW-SD 47.7 fl      MPV 12.1 fL      Platelets 235 10*3/mm3      Neutrophil % 67.8 %      Lymphocyte % 21.4 %      Monocyte % 10.3 %      Eosinophil % 0.0 %      Basophil % 0.3 %      Immature Grans % 0.2 %      Neutrophils, Absolute 4.16 10*3/mm3      Lymphocytes, Absolute 1.31 10*3/mm3      Monocytes, Absolute 0.63 10*3/mm3      Eosinophils, Absolute 0.00 10*3/mm3      Basophils, Absolute 0.02 10*3/mm3      Immature  "Grans, Absolute 0.01 10*3/mm3      nRBC 0.0 /100 WBC         /63 (BP Location: Right arm, Patient Position: Sitting)   Pulse 81   Temp 97.5 °F (36.4 °C) (Oral)   Resp 16   Ht 157 cm (61.81\")   Wt 49.6 kg (109 lb 5.6 oz)   LMP  (LMP Unknown)   SpO2 96%   BMI 20.12 kg/m²     Discharge Exam:  General Appearance:    Alert, cooperative, no distress                          Head:    Normocephalic, without obvious abnormality, atraumatic                          Eyes:                            Throat:   Lips, tongue, gums normal                          Neck:   Supple, symmetrical, trachea midline, no JVD                        Lungs:     Clear to auscultation bilaterally, respirations unlabored                Chest Wall:    No tenderness or deformity                        Heart:    Regular rate and rhythm, S1 and S2 normal, no murmur,no  Rub or gallop                  Abdomen:     Soft, non-tender, bowel sounds active, no masses, no organomegaly                  Extremities:   Extremities normal, left hip surgical changes, no cyanosis or edema                             Skin:   Skin is warm and dry,  no rashes or palpable lesions                  Neurologic:   no focal deficits noted     Disposition:  Home with home health    Patient Instructions:      Discharge Medications      Changes to Medications      Instructions Start Date   amLODIPine 10 MG tablet  Commonly known as:  NORVASC  What changed:  when to take this   10 mg, Oral, Daily      aspirin 81 MG EC tablet  What changed:  additional instructions   81 mg, Oral, Daily      metoprolol tartrate 25 MG tablet  Commonly known as:  LOPRESSOR  What changed:  when to take this   25 mg, Oral, Every 12 Hours Scheduled         Continue These Medications      Instructions Start Date   atorvastatin 20 MG tablet  Commonly known as:  LIPITOR   20 mg, Oral, Daily      CENTRUM ADULTS tablet   1 tablet, Oral, Daily, PT HOLDING FOR SURGERY      Chlorhexidine " Gluconate Cloth 2 % pads   Apply externally, As directed pre op       fish oil 1000 MG capsule capsule   1,000 mg, Oral, Daily With Breakfast, PT HOLDING FOR SURGERY      hydrocortisone 25 MG suppository  Commonly known as:  ANUSOL-HC   25 mg, Rectal, 2 Times Daily PRN      losartan 100 MG tablet  Commonly known as:  COZAAR   100 mg, Oral, Every Morning      mupirocin 2 % nasal ointment  Commonly known as:  BACTROBAN   Nasal, As directed pre op            Future Appointments   Date Time Provider Department Center   7/10/2019 10:00 AM Warren Dugan III, NP-C [facility] None   8/29/2019  9:00 AM [facility] None   9/4/2019  1:00 PM Yashira Landin APRN [facility] None     Additional Instructions for the Follow-ups that You Need to Schedule     Ambulatory Referral to Home Health   As directed      Face to Face Visit Date:  7/2/2019    Follow-up Provider for Plan of Care?:  I treated the patient in an acute care facility and will not continue treatment after discharge.    Follow-up Provider:  RENATO GRAFF [[id number]]    Reason/Clinical Findings:  weak    Describe mobility limitations that make leaving home difficult:  weakness, hip fracture surgery    Nursing/Therapeutic Services Requested:  Skilled Nursing Physical Therapy    Skilled nursing orders:  Other    PT orders:  Therapeutic exercise    Frequency:  1 Week 1           Follow-up Information     Tiffani Pereira MD Follow up on 7/22/2019.    Specialty:  Orthopedic Surgery  Contact information:  [address]  [facility] [address]  [facility] [id number] [phone]             Jareth Kerns MD Follow up in 3 month(s).    Specialty:  Cardiology  Why:  We will call with the appointment  Contact information:  [address]  [facility] [address]  [facility] [id number] [phone]             Warren Dugan III, NP-C Follow up in 8 day(s).    Specialty:  Family Medicine  Why:  July 10th @ 10:00 AM  Contact information:  [address]  [facility]  [address]  [place] [address]  [phone]             Alexis Wiggins MD Follow up in 1 week(s).    Specialty:  Family Medicine  Why:  Will need to get follow-up CT of chest in 3 months  Contact information:  [address]  [facility] [address]  [facility] [address]  [phone]             [facility] [place] [facility] Follow up.    Specialty:  Home Health Services  Contact information:  [address] [facility] [address]  [facility] [id number]               Discharge Order (From admission, onward)    Start     Ordered    07/02/19 1154  Discharge patient  Once     Expected Discharge Date:  07/02/19    Discharge Disposition:  Home-Health Care c    Physician of Record for Attribution - Please select from Treatment Team:  VALDO MARRERO [[id number]]    Review needed by CMO to determine Physician of Record:  No       Question Answer Comment   Physician of Record for Attribution - Please select from Treatment Team VALDO MARRERO    Review needed by CMO to determine Physician of Record No        07/02/19 1157          Total time spent discharging patient including evaluation,post hospitalization follow up,  medication and post hospitalization instructions and education total time exceeds 30 minutes.    Signed:  Valdo Marrero MD  7/2/2019  12:59 PM

## 2019-07-03 ENCOUNTER — READMISSION MANAGEMENT (OUTPATIENT)
Dept: CALL CENTER | Facility: HOSPITAL | Age: 84
End: 2019-07-03

## 2019-07-03 NOTE — PROGRESS NOTES
Case Management Discharge Note    Final Note: DC home with [facility]. Gracie Valenzuela RN    Destination      No service has been selected for the patient.      Durable Medical Equipment      No service has been selected for the patient.      Dialysis/Infusion      No service has been selected for the patient.      Home Medical Care      No service has been selected for the patient.      Therapy      No service has been selected for the patient.      Community Resources      No service has been selected for the patient.        Transportation Services  Private: Car    Final Discharge Disposition Code: 06 - home with home health care

## 2019-07-04 NOTE — OUTREACH NOTE
Prep Survey      Responses   Facility patient discharged from?  [place]   Is patient eligible?  Yes   Discharge diagnosis  Hip fracture   Does the patient have one of the following disease processes/diagnoses(primary or secondary)?  Total Joint Replacement   Does the patient have Home health ordered?  Yes   What is the Home health agency?   [facility]    Is there a DME ordered?  No   Prep survey completed?  Yes          Pamella Curry RN

## 2019-07-05 ENCOUNTER — EPISODE CHANGES (OUTPATIENT)
Dept: CASE MANAGEMENT | Facility: OTHER | Age: 84
End: 2019-07-05

## 2019-07-11 ENCOUNTER — READMISSION MANAGEMENT (OUTPATIENT)
Dept: CALL CENTER | Facility: HOSPITAL | Age: 84
End: 2019-07-11

## 2019-07-11 DIAGNOSIS — I10 ESSENTIAL HYPERTENSION: ICD-10-CM

## 2019-07-11 RX ORDER — AMLODIPINE BESYLATE 10 MG/1
10 TABLET ORAL DAILY
Qty: 90 TABLET | Refills: 3 | Status: SHIPPED | OUTPATIENT
Start: 2019-07-11 | End: 2020-07-08 | Stop reason: SDUPTHER

## 2019-07-11 NOTE — OUTREACH NOTE
Total Joint Week 1 Survey      Responses   Facility patient discharged from?  [place]   Does the patient have one of the following disease processes/diagnoses(primary or secondary)?  Total Joint Replacement   Is there a successful TCM telephone encounter documented?  No   Joint surgery performed?  Hip   Week 1 attempt successful?  No   Unsuccessful attempts  Attempt 1          Rosmery Jj RN

## 2019-07-12 ENCOUNTER — READMISSION MANAGEMENT (OUTPATIENT)
Dept: CALL CENTER | Facility: HOSPITAL | Age: 84
End: 2019-07-12

## 2019-07-12 NOTE — OUTREACH NOTE
Total Joint Week 1 Survey      Responses   Facility patient discharged from?  [place]   Does the patient have one of the following disease processes/diagnoses(primary or secondary)?  Total Joint Replacement   Is there a successful TCM telephone encounter documented?  No   Joint surgery performed?  Hip   Week 1 attempt successful?  Yes   Call start time  0948   Rescheduled  Revoked   Revoke  Decline to participate   Call end time  0949   Discharge diagnosis  Hip fracture          Rosmery Jj RN

## 2019-07-14 ENCOUNTER — EPISODE CHANGES (OUTPATIENT)
Dept: CASE MANAGEMENT | Facility: OTHER | Age: 84
End: 2019-07-14

## 2019-07-15 ENCOUNTER — APPOINTMENT (OUTPATIENT)
Dept: CT IMAGING | Facility: HOSPITAL | Age: 84
End: 2019-07-15

## 2019-07-15 ENCOUNTER — APPOINTMENT (OUTPATIENT)
Dept: GENERAL RADIOLOGY | Facility: HOSPITAL | Age: 84
End: 2019-07-15

## 2019-07-15 ENCOUNTER — HOSPITAL ENCOUNTER (INPATIENT)
Facility: HOSPITAL | Age: 84
LOS: 3 days | Discharge: NURSING, REHAB OR LONG-TERM CARE | End: 2019-07-18
Attending: EMERGENCY MEDICINE | Admitting: HOSPITALIST

## 2019-07-15 DIAGNOSIS — N39.0 ACUTE URINARY TRACT INFECTION: Primary | ICD-10-CM

## 2019-07-15 DIAGNOSIS — R77.8 ELEVATED TROPONIN: ICD-10-CM

## 2019-07-15 DIAGNOSIS — R53.1 GENERALIZED WEAKNESS: ICD-10-CM

## 2019-07-15 LAB
ALBUMIN SERPL-MCNC: 2.6 G/DL (ref 3.5–5.2)
ALBUMIN/GLOB SERPL: 0.8 G/DL
ALP SERPL-CCNC: 133 U/L (ref 39–117)
ALT SERPL W P-5'-P-CCNC: 6 U/L (ref 1–33)
ANION GAP SERPL CALCULATED.3IONS-SCNC: 13.2 MMOL/L (ref 5–15)
AST SERPL-CCNC: 14 U/L (ref 1–32)
BACTERIA UR QL AUTO: ABNORMAL /HPF
BASOPHILS # BLD AUTO: 0.01 10*3/MM3 (ref 0–0.2)
BASOPHILS NFR BLD AUTO: 0.1 % (ref 0–1.5)
BILIRUB SERPL-MCNC: 0.3 MG/DL (ref 0.2–1.2)
BILIRUB UR QL STRIP: NEGATIVE
BUN BLD-MCNC: 13 MG/DL (ref 8–23)
BUN/CREAT SERPL: 11.7 (ref 7–25)
CALCIUM SPEC-SCNC: 8.2 MG/DL (ref 8.6–10.5)
CHLORIDE SERPL-SCNC: 108 MMOL/L (ref 98–107)
CLARITY UR: ABNORMAL
CO2 SERPL-SCNC: 17.8 MMOL/L (ref 22–29)
COLOR UR: YELLOW
CREAT BLD-MCNC: 1.11 MG/DL (ref 0.57–1)
D DIMER PPP FEU-MCNC: 2.13 MCGFEU/ML (ref 0–0.49)
D-LACTATE SERPL-SCNC: 1.8 MMOL/L (ref 0.5–2)
DEPRECATED RDW RBC AUTO: 49.7 FL (ref 37–54)
EOSINOPHIL # BLD AUTO: 0 10*3/MM3 (ref 0–0.4)
EOSINOPHIL NFR BLD AUTO: 0 % (ref 0.3–6.2)
ERYTHROCYTE [DISTWIDTH] IN BLOOD BY AUTOMATED COUNT: 16.9 % (ref 12.3–15.4)
GFR SERPL CREATININE-BSD FRML MDRD: 47 ML/MIN/1.73
GLOBULIN UR ELPH-MCNC: 3.1 GM/DL
GLUCOSE BLD-MCNC: 84 MG/DL (ref 65–99)
GLUCOSE UR STRIP-MCNC: NEGATIVE MG/DL
HCT VFR BLD AUTO: 29 % (ref 34–46.6)
HGB BLD-MCNC: 9.1 G/DL (ref 12–15.9)
HGB UR QL STRIP.AUTO: ABNORMAL
HYALINE CASTS UR QL AUTO: ABNORMAL /LPF
IMM GRANULOCYTES # BLD AUTO: 0.1 10*3/MM3 (ref 0–0.05)
IMM GRANULOCYTES NFR BLD AUTO: 0.6 % (ref 0–0.5)
KETONES UR QL STRIP: NEGATIVE
LEUKOCYTE ESTERASE UR QL STRIP.AUTO: ABNORMAL
LYMPHOCYTES # BLD AUTO: 0.55 10*3/MM3 (ref 0.7–3.1)
LYMPHOCYTES NFR BLD AUTO: 3.4 % (ref 19.6–45.3)
MCH RBC QN AUTO: 25.8 PG (ref 26.6–33)
MCHC RBC AUTO-ENTMCNC: 31.4 G/DL (ref 31.5–35.7)
MCV RBC AUTO: 82.2 FL (ref 79–97)
MONOCYTES # BLD AUTO: 0.68 10*3/MM3 (ref 0.1–0.9)
MONOCYTES NFR BLD AUTO: 4.2 % (ref 5–12)
NEUTROPHILS # BLD AUTO: 15.01 10*3/MM3 (ref 1.7–7)
NEUTROPHILS NFR BLD AUTO: 91.7 % (ref 42.7–76)
NITRITE UR QL STRIP: POSITIVE
NRBC BLD AUTO-RTO: 0 /100 WBC (ref 0–0.2)
PH UR STRIP.AUTO: <=5 [PH] (ref 5–8)
PLATELET # BLD AUTO: 277 10*3/MM3 (ref 140–450)
PMV BLD AUTO: 11.7 FL (ref 6–12)
POTASSIUM BLD-SCNC: 3.7 MMOL/L (ref 3.5–5.2)
PROT SERPL-MCNC: 5.7 G/DL (ref 6–8.5)
PROT UR QL STRIP: ABNORMAL
RBC # BLD AUTO: 3.53 10*6/MM3 (ref 3.77–5.28)
RBC # UR: ABNORMAL /HPF
REF LAB TEST METHOD: ABNORMAL
SODIUM BLD-SCNC: 139 MMOL/L (ref 136–145)
SP GR UR STRIP: 1.01 (ref 1–1.03)
SQUAMOUS #/AREA URNS HPF: ABNORMAL /HPF
TROPONIN T SERPL-MCNC: 0.08 NG/ML (ref 0–0.03)
UROBILINOGEN UR QL STRIP: ABNORMAL
WBC NRBC COR # BLD: 16.35 10*3/MM3 (ref 3.4–10.8)
WBC UR QL AUTO: ABNORMAL /HPF

## 2019-07-15 PROCEDURE — 0 IOPAMIDOL PER 1 ML: Performed by: EMERGENCY MEDICINE

## 2019-07-15 PROCEDURE — 81001 URINALYSIS AUTO W/SCOPE: CPT | Performed by: NURSE PRACTITIONER

## 2019-07-15 PROCEDURE — 93005 ELECTROCARDIOGRAM TRACING: CPT | Performed by: NURSE PRACTITIONER

## 2019-07-15 PROCEDURE — 85379 FIBRIN DEGRADATION QUANT: CPT | Performed by: NURSE PRACTITIONER

## 2019-07-15 PROCEDURE — P9612 CATHETERIZE FOR URINE SPEC: HCPCS

## 2019-07-15 PROCEDURE — 85025 COMPLETE CBC W/AUTO DIFF WBC: CPT | Performed by: NURSE PRACTITIONER

## 2019-07-15 PROCEDURE — 87150 DNA/RNA AMPLIFIED PROBE: CPT | Performed by: NURSE PRACTITIONER

## 2019-07-15 PROCEDURE — 83605 ASSAY OF LACTIC ACID: CPT | Performed by: NURSE PRACTITIONER

## 2019-07-15 PROCEDURE — 87186 SC STD MICRODIL/AGAR DIL: CPT | Performed by: NURSE PRACTITIONER

## 2019-07-15 PROCEDURE — 71275 CT ANGIOGRAPHY CHEST: CPT

## 2019-07-15 PROCEDURE — 80053 COMPREHEN METABOLIC PANEL: CPT | Performed by: NURSE PRACTITIONER

## 2019-07-15 PROCEDURE — 87040 BLOOD CULTURE FOR BACTERIA: CPT | Performed by: NURSE PRACTITIONER

## 2019-07-15 PROCEDURE — 36415 COLL VENOUS BLD VENIPUNCTURE: CPT

## 2019-07-15 PROCEDURE — 71046 X-RAY EXAM CHEST 2 VIEWS: CPT

## 2019-07-15 PROCEDURE — 87086 URINE CULTURE/COLONY COUNT: CPT | Performed by: NURSE PRACTITIONER

## 2019-07-15 PROCEDURE — 25010000002 CEFTRIAXONE PER 250 MG: Performed by: NURSE PRACTITIONER

## 2019-07-15 PROCEDURE — 93010 ELECTROCARDIOGRAM REPORT: CPT | Performed by: INTERNAL MEDICINE

## 2019-07-15 PROCEDURE — 99285 EMERGENCY DEPT VISIT HI MDM: CPT

## 2019-07-15 PROCEDURE — 84484 ASSAY OF TROPONIN QUANT: CPT | Performed by: NURSE PRACTITIONER

## 2019-07-15 PROCEDURE — 87088 URINE BACTERIA CULTURE: CPT | Performed by: NURSE PRACTITIONER

## 2019-07-15 PROCEDURE — 96365 THER/PROPH/DIAG IV INF INIT: CPT

## 2019-07-15 RX ORDER — SODIUM CHLORIDE 0.9 % (FLUSH) 0.9 %
10 SYRINGE (ML) INJECTION AS NEEDED
Status: DISCONTINUED | OUTPATIENT
Start: 2019-07-15 | End: 2019-07-18 | Stop reason: HOSPADM

## 2019-07-15 RX ORDER — SODIUM CHLORIDE 0.9 % (FLUSH) 0.9 %
3-10 SYRINGE (ML) INJECTION AS NEEDED
Status: DISCONTINUED | OUTPATIENT
Start: 2019-07-15 | End: 2019-07-18 | Stop reason: HOSPADM

## 2019-07-15 RX ORDER — CEFTRIAXONE SODIUM 1 G/50ML
1 INJECTION, SOLUTION INTRAVENOUS ONCE
Status: COMPLETED | OUTPATIENT
Start: 2019-07-15 | End: 2019-07-15

## 2019-07-15 RX ORDER — ACETAMINOPHEN 500 MG
1000 TABLET ORAL ONCE
Status: COMPLETED | OUTPATIENT
Start: 2019-07-15 | End: 2019-07-15

## 2019-07-15 RX ORDER — ATORVASTATIN CALCIUM 20 MG/1
20 TABLET, FILM COATED ORAL DAILY
Status: DISCONTINUED | OUTPATIENT
Start: 2019-07-15 | End: 2019-07-18 | Stop reason: HOSPADM

## 2019-07-15 RX ORDER — ZOLPIDEM TARTRATE 5 MG/1
5 TABLET ORAL NIGHTLY PRN
Status: DISCONTINUED | OUTPATIENT
Start: 2019-07-15 | End: 2019-07-18 | Stop reason: HOSPADM

## 2019-07-15 RX ORDER — ONDANSETRON 2 MG/ML
4 INJECTION INTRAMUSCULAR; INTRAVENOUS EVERY 6 HOURS PRN
Status: DISCONTINUED | OUTPATIENT
Start: 2019-07-15 | End: 2019-07-18 | Stop reason: HOSPADM

## 2019-07-15 RX ORDER — CEFTRIAXONE SODIUM 1 G/50ML
1 INJECTION, SOLUTION INTRAVENOUS EVERY 24 HOURS
Status: DISCONTINUED | OUTPATIENT
Start: 2019-07-16 | End: 2019-07-16

## 2019-07-15 RX ORDER — ASPIRIN 81 MG/1
81 TABLET ORAL DAILY
Status: DISCONTINUED | OUTPATIENT
Start: 2019-07-15 | End: 2019-07-18 | Stop reason: HOSPADM

## 2019-07-15 RX ORDER — AMLODIPINE BESYLATE 10 MG/1
10 TABLET ORAL DAILY
Status: DISCONTINUED | OUTPATIENT
Start: 2019-07-15 | End: 2019-07-18 | Stop reason: HOSPADM

## 2019-07-15 RX ORDER — SODIUM CHLORIDE 9 MG/ML
100 INJECTION, SOLUTION INTRAVENOUS CONTINUOUS
Status: DISCONTINUED | OUTPATIENT
Start: 2019-07-15 | End: 2019-07-18 | Stop reason: HOSPADM

## 2019-07-15 RX ORDER — ACETAMINOPHEN 325 MG/1
650 TABLET ORAL EVERY 4 HOURS PRN
Status: DISCONTINUED | OUTPATIENT
Start: 2019-07-15 | End: 2019-07-18 | Stop reason: HOSPADM

## 2019-07-15 RX ORDER — LOSARTAN POTASSIUM 100 MG/1
100 TABLET ORAL EVERY MORNING
Status: DISCONTINUED | OUTPATIENT
Start: 2019-07-16 | End: 2019-07-18 | Stop reason: HOSPADM

## 2019-07-15 RX ORDER — SODIUM CHLORIDE 0.9 % (FLUSH) 0.9 %
3 SYRINGE (ML) INJECTION EVERY 12 HOURS SCHEDULED
Status: DISCONTINUED | OUTPATIENT
Start: 2019-07-15 | End: 2019-07-18 | Stop reason: HOSPADM

## 2019-07-15 RX ADMIN — ZOLPIDEM TARTRATE 5 MG: 5 TABLET ORAL at 23:00

## 2019-07-15 RX ADMIN — IOPAMIDOL 100 ML: 755 INJECTION, SOLUTION INTRAVENOUS at 13:46

## 2019-07-15 RX ADMIN — SODIUM CHLORIDE, PRESERVATIVE FREE 3 ML: 5 INJECTION INTRAVENOUS at 20:20

## 2019-07-15 RX ADMIN — SODIUM CHLORIDE 100 ML/HR: 9 INJECTION, SOLUTION INTRAVENOUS at 18:35

## 2019-07-15 RX ADMIN — CEFTRIAXONE SODIUM 1 G: 1 INJECTION, SOLUTION INTRAVENOUS at 11:48

## 2019-07-15 RX ADMIN — METOPROLOL TARTRATE 25 MG: 25 TABLET ORAL at 20:20

## 2019-07-15 RX ADMIN — ACETAMINOPHEN 1000 MG: 500 TABLET, FILM COATED ORAL at 11:28

## 2019-07-16 LAB
ALBUMIN SERPL-MCNC: 2.2 G/DL (ref 3.5–5.2)
ALBUMIN/GLOB SERPL: 0.8 G/DL
ALP SERPL-CCNC: 109 U/L (ref 39–117)
ALT SERPL W P-5'-P-CCNC: <5 U/L (ref 1–33)
ANION GAP SERPL CALCULATED.3IONS-SCNC: 10.9 MMOL/L (ref 5–15)
AST SERPL-CCNC: 10 U/L (ref 1–32)
BACTERIA BLD CULT: ABNORMAL
BASOPHILS # BLD AUTO: 0.03 10*3/MM3 (ref 0–0.2)
BASOPHILS NFR BLD AUTO: 0.2 % (ref 0–1.5)
BILIRUB SERPL-MCNC: 0.2 MG/DL (ref 0.2–1.2)
BUN BLD-MCNC: 13 MG/DL (ref 8–23)
BUN/CREAT SERPL: 12.9 (ref 7–25)
CALCIUM SPEC-SCNC: 7.7 MG/DL (ref 8.6–10.5)
CHLORIDE SERPL-SCNC: 108 MMOL/L (ref 98–107)
CO2 SERPL-SCNC: 17.1 MMOL/L (ref 22–29)
CREAT BLD-MCNC: 1.01 MG/DL (ref 0.57–1)
D-LACTATE SERPL-SCNC: 0.9 MMOL/L (ref 0.5–2)
DEPRECATED RDW RBC AUTO: 48.8 FL (ref 37–54)
EOSINOPHIL # BLD AUTO: 0 10*3/MM3 (ref 0–0.4)
EOSINOPHIL NFR BLD AUTO: 0 % (ref 0.3–6.2)
ERYTHROCYTE [DISTWIDTH] IN BLOOD BY AUTOMATED COUNT: 17 % (ref 12.3–15.4)
GFR SERPL CREATININE-BSD FRML MDRD: 52 ML/MIN/1.73
GLOBULIN UR ELPH-MCNC: 2.9 GM/DL
GLUCOSE BLD-MCNC: 92 MG/DL (ref 65–99)
HCT VFR BLD AUTO: 27 % (ref 34–46.6)
HGB BLD-MCNC: 8.4 G/DL (ref 12–15.9)
IMM GRANULOCYTES # BLD AUTO: 0.07 10*3/MM3 (ref 0–0.05)
IMM GRANULOCYTES NFR BLD AUTO: 0.6 % (ref 0–0.5)
LYMPHOCYTES # BLD AUTO: 0.7 10*3/MM3 (ref 0.7–3.1)
LYMPHOCYTES NFR BLD AUTO: 5.7 % (ref 19.6–45.3)
MCH RBC QN AUTO: 25.1 PG (ref 26.6–33)
MCHC RBC AUTO-ENTMCNC: 31.1 G/DL (ref 31.5–35.7)
MCV RBC AUTO: 80.6 FL (ref 79–97)
MONOCYTES # BLD AUTO: 1.12 10*3/MM3 (ref 0.1–0.9)
MONOCYTES NFR BLD AUTO: 9.2 % (ref 5–12)
NEUTROPHILS # BLD AUTO: 10.31 10*3/MM3 (ref 1.7–7)
NEUTROPHILS NFR BLD AUTO: 84.3 % (ref 42.7–76)
NRBC BLD AUTO-RTO: 0 /100 WBC (ref 0–0.2)
PLATELET # BLD AUTO: 227 10*3/MM3 (ref 140–450)
PMV BLD AUTO: 12.1 FL (ref 6–12)
POTASSIUM BLD-SCNC: 3.5 MMOL/L (ref 3.5–5.2)
PROT SERPL-MCNC: 5.1 G/DL (ref 6–8.5)
RBC # BLD AUTO: 3.35 10*6/MM3 (ref 3.77–5.28)
SODIUM BLD-SCNC: 136 MMOL/L (ref 136–145)
TROPONIN T SERPL-MCNC: 0.08 NG/ML (ref 0–0.03)
WBC NRBC COR # BLD: 12.23 10*3/MM3 (ref 3.4–10.8)

## 2019-07-16 PROCEDURE — 85025 COMPLETE CBC W/AUTO DIFF WBC: CPT | Performed by: INTERNAL MEDICINE

## 2019-07-16 PROCEDURE — 25010000003 CEFTRIAXONE PER 250 MG: Performed by: HOSPITALIST

## 2019-07-16 PROCEDURE — 80053 COMPREHEN METABOLIC PANEL: CPT | Performed by: INTERNAL MEDICINE

## 2019-07-16 PROCEDURE — 84484 ASSAY OF TROPONIN QUANT: CPT | Performed by: INTERNAL MEDICINE

## 2019-07-16 PROCEDURE — 83605 ASSAY OF LACTIC ACID: CPT | Performed by: INTERNAL MEDICINE

## 2019-07-16 RX ORDER — CEFTRIAXONE SODIUM 2 G/50ML
2 INJECTION, SOLUTION INTRAVENOUS EVERY 24 HOURS
Status: COMPLETED | OUTPATIENT
Start: 2019-07-16 | End: 2019-07-18

## 2019-07-16 RX ADMIN — SODIUM CHLORIDE, PRESERVATIVE FREE 3 ML: 5 INJECTION INTRAVENOUS at 09:32

## 2019-07-16 RX ADMIN — ACETAMINOPHEN 650 MG: 325 TABLET, FILM COATED ORAL at 14:24

## 2019-07-16 RX ADMIN — LOSARTAN POTASSIUM 100 MG: 100 TABLET, FILM COATED ORAL at 09:31

## 2019-07-16 RX ADMIN — ATORVASTATIN CALCIUM 20 MG: 20 TABLET, FILM COATED ORAL at 09:31

## 2019-07-16 RX ADMIN — METOPROLOL TARTRATE 25 MG: 25 TABLET ORAL at 09:31

## 2019-07-16 RX ADMIN — AMLODIPINE BESYLATE 10 MG: 10 TABLET ORAL at 09:31

## 2019-07-16 RX ADMIN — ASPIRIN 81 MG: 81 TABLET, COATED ORAL at 09:31

## 2019-07-16 RX ADMIN — SODIUM CHLORIDE, PRESERVATIVE FREE 3 ML: 5 INJECTION INTRAVENOUS at 23:36

## 2019-07-16 RX ADMIN — CEFTRIAXONE SODIUM 2 G: 2 INJECTION, SOLUTION INTRAVENOUS at 12:05

## 2019-07-16 RX ADMIN — SODIUM CHLORIDE 100 ML/HR: 9 INJECTION, SOLUTION INTRAVENOUS at 04:45

## 2019-07-16 RX ADMIN — ACETAMINOPHEN 650 MG: 325 TABLET, FILM COATED ORAL at 20:36

## 2019-07-16 RX ADMIN — METOPROLOL TARTRATE 25 MG: 25 TABLET ORAL at 20:36

## 2019-07-17 LAB
ALBUMIN SERPL-MCNC: 2.5 G/DL (ref 3.5–5.2)
ANION GAP SERPL CALCULATED.3IONS-SCNC: 9.6 MMOL/L (ref 5–15)
BACTERIA SPEC AEROBE CULT: ABNORMAL
BUN BLD-MCNC: 9 MG/DL (ref 8–23)
BUN/CREAT SERPL: 9.8 (ref 7–25)
CALCIUM SPEC-SCNC: 7.5 MG/DL (ref 8.6–10.5)
CHLORIDE SERPL-SCNC: 110 MMOL/L (ref 98–107)
CO2 SERPL-SCNC: 18.4 MMOL/L (ref 22–29)
CREAT BLD-MCNC: 0.92 MG/DL (ref 0.57–1)
DEPRECATED RDW RBC AUTO: 52.1 FL (ref 37–54)
ERYTHROCYTE [DISTWIDTH] IN BLOOD BY AUTOMATED COUNT: 17.2 % (ref 12.3–15.4)
GFR SERPL CREATININE-BSD FRML MDRD: 58 ML/MIN/1.73
GLUCOSE BLD-MCNC: 86 MG/DL (ref 65–99)
GLUCOSE BLDC GLUCOMTR-MCNC: 91 MG/DL (ref 70–130)
HCT VFR BLD AUTO: 29.3 % (ref 34–46.6)
HGB BLD-MCNC: 8.9 G/DL (ref 12–15.9)
MCH RBC QN AUTO: 25.5 PG (ref 26.6–33)
MCHC RBC AUTO-ENTMCNC: 30.4 G/DL (ref 31.5–35.7)
MCV RBC AUTO: 84 FL (ref 79–97)
PHOSPHATE SERPL-MCNC: 2.7 MG/DL (ref 2.5–4.5)
PLATELET # BLD AUTO: 195 10*3/MM3 (ref 140–450)
PMV BLD AUTO: 12 FL (ref 6–12)
POTASSIUM BLD-SCNC: 3.2 MMOL/L (ref 3.5–5.2)
RBC # BLD AUTO: 3.49 10*6/MM3 (ref 3.77–5.28)
SODIUM BLD-SCNC: 138 MMOL/L (ref 136–145)
WBC NRBC COR # BLD: 6.53 10*3/MM3 (ref 3.4–10.8)

## 2019-07-17 PROCEDURE — 87040 BLOOD CULTURE FOR BACTERIA: CPT | Performed by: INTERNAL MEDICINE

## 2019-07-17 PROCEDURE — 82962 GLUCOSE BLOOD TEST: CPT

## 2019-07-17 PROCEDURE — 97162 PT EVAL MOD COMPLEX 30 MIN: CPT

## 2019-07-17 PROCEDURE — 97110 THERAPEUTIC EXERCISES: CPT

## 2019-07-17 PROCEDURE — 99223 1ST HOSP IP/OBS HIGH 75: CPT | Performed by: INTERNAL MEDICINE

## 2019-07-17 PROCEDURE — 85027 COMPLETE CBC AUTOMATED: CPT | Performed by: HOSPITALIST

## 2019-07-17 PROCEDURE — 25010000003 CEFTRIAXONE PER 250 MG: Performed by: HOSPITALIST

## 2019-07-17 PROCEDURE — 80069 RENAL FUNCTION PANEL: CPT | Performed by: HOSPITALIST

## 2019-07-17 RX ADMIN — METOPROLOL TARTRATE 25 MG: 25 TABLET ORAL at 08:39

## 2019-07-17 RX ADMIN — AMLODIPINE BESYLATE 10 MG: 10 TABLET ORAL at 08:38

## 2019-07-17 RX ADMIN — ACETAMINOPHEN 650 MG: 325 TABLET, FILM COATED ORAL at 08:39

## 2019-07-17 RX ADMIN — SODIUM CHLORIDE 100 ML/HR: 9 INJECTION, SOLUTION INTRAVENOUS at 03:11

## 2019-07-17 RX ADMIN — LOSARTAN POTASSIUM 100 MG: 100 TABLET, FILM COATED ORAL at 08:38

## 2019-07-17 RX ADMIN — CEFTRIAXONE SODIUM 2 G: 2 INJECTION, SOLUTION INTRAVENOUS at 10:51

## 2019-07-17 RX ADMIN — ACETAMINOPHEN 650 MG: 325 TABLET, FILM COATED ORAL at 19:48

## 2019-07-17 RX ADMIN — ATORVASTATIN CALCIUM 20 MG: 20 TABLET, FILM COATED ORAL at 08:38

## 2019-07-17 RX ADMIN — ASPIRIN 81 MG: 81 TABLET, COATED ORAL at 08:38

## 2019-07-17 RX ADMIN — METOPROLOL TARTRATE 25 MG: 25 TABLET ORAL at 19:48

## 2019-07-17 RX ADMIN — SODIUM CHLORIDE, PRESERVATIVE FREE 3 ML: 5 INJECTION INTRAVENOUS at 10:50

## 2019-07-17 RX ADMIN — SODIUM CHLORIDE 100 ML/HR: 9 INJECTION, SOLUTION INTRAVENOUS at 18:43

## 2019-07-18 ENCOUNTER — EPISODE CHANGES (OUTPATIENT)
Dept: CASE MANAGEMENT | Facility: OTHER | Age: 84
End: 2019-07-18

## 2019-07-18 VITALS
OXYGEN SATURATION: 96 % | DIASTOLIC BLOOD PRESSURE: 53 MMHG | BODY MASS INDEX: 21.62 KG/M2 | RESPIRATION RATE: 14 BRPM | HEART RATE: 99 BPM | TEMPERATURE: 98 F | WEIGHT: 117.5 LBS | HEIGHT: 62 IN | SYSTOLIC BLOOD PRESSURE: 121 MMHG

## 2019-07-18 LAB
ALBUMIN SERPL-MCNC: 2.4 G/DL (ref 3.5–5.2)
ANION GAP SERPL CALCULATED.3IONS-SCNC: 10.4 MMOL/L (ref 5–15)
BACTERIA SPEC AEROBE CULT: ABNORMAL
BACTERIA SPEC AEROBE CULT: ABNORMAL
BUN BLD-MCNC: 6 MG/DL (ref 8–23)
BUN/CREAT SERPL: 7.8 (ref 7–25)
CALCIUM SPEC-SCNC: 7.4 MG/DL (ref 8.6–10.5)
CHLORIDE SERPL-SCNC: 109 MMOL/L (ref 98–107)
CO2 SERPL-SCNC: 20.6 MMOL/L (ref 22–29)
CREAT BLD-MCNC: 0.77 MG/DL (ref 0.57–1)
DEPRECATED RDW RBC AUTO: 52.1 FL (ref 37–54)
ERYTHROCYTE [DISTWIDTH] IN BLOOD BY AUTOMATED COUNT: 17.2 % (ref 12.3–15.4)
GFR SERPL CREATININE-BSD FRML MDRD: 72 ML/MIN/1.73
GLUCOSE BLD-MCNC: 88 MG/DL (ref 65–99)
GRAM STN SPEC: ABNORMAL
HCT VFR BLD AUTO: 28.6 % (ref 34–46.6)
HGB BLD-MCNC: 8.7 G/DL (ref 12–15.9)
MAGNESIUM SERPL-MCNC: 1.9 MG/DL (ref 1.6–2.4)
MCH RBC QN AUTO: 25.4 PG (ref 26.6–33)
MCHC RBC AUTO-ENTMCNC: 30.4 G/DL (ref 31.5–35.7)
MCV RBC AUTO: 83.4 FL (ref 79–97)
PHOSPHATE SERPL-MCNC: 2.6 MG/DL (ref 2.5–4.5)
PLATELET # BLD AUTO: 215 10*3/MM3 (ref 140–450)
PMV BLD AUTO: 12.3 FL (ref 6–12)
POTASSIUM BLD-SCNC: 3.2 MMOL/L (ref 3.5–5.2)
RBC # BLD AUTO: 3.43 10*6/MM3 (ref 3.77–5.28)
SODIUM BLD-SCNC: 140 MMOL/L (ref 136–145)
WBC NRBC COR # BLD: 6.01 10*3/MM3 (ref 3.4–10.8)

## 2019-07-18 PROCEDURE — 85027 COMPLETE CBC AUTOMATED: CPT | Performed by: HOSPITALIST

## 2019-07-18 PROCEDURE — 25010000003 CEFTRIAXONE PER 250 MG: Performed by: HOSPITALIST

## 2019-07-18 PROCEDURE — 83735 ASSAY OF MAGNESIUM: CPT | Performed by: HOSPITALIST

## 2019-07-18 PROCEDURE — 80069 RENAL FUNCTION PANEL: CPT | Performed by: HOSPITALIST

## 2019-07-18 PROCEDURE — 99232 SBSQ HOSP IP/OBS MODERATE 35: CPT | Performed by: INTERNAL MEDICINE

## 2019-07-18 RX ORDER — LEVOFLOXACIN 500 MG/1
500 TABLET, FILM COATED ORAL EVERY 24 HOURS
Status: DISCONTINUED | OUTPATIENT
Start: 2019-07-19 | End: 2019-07-18 | Stop reason: HOSPADM

## 2019-07-18 RX ORDER — LEVOFLOXACIN 500 MG/1
500 TABLET, FILM COATED ORAL EVERY 24 HOURS
Qty: 12 TABLET | Refills: 0 | Status: SHIPPED | OUTPATIENT
Start: 2019-07-19 | End: 2019-07-31

## 2019-07-18 RX ORDER — POTASSIUM CHLORIDE 750 MG/1
40 CAPSULE, EXTENDED RELEASE ORAL 2 TIMES DAILY WITH MEALS
Status: COMPLETED | OUTPATIENT
Start: 2019-07-18 | End: 2019-07-18

## 2019-07-18 RX ADMIN — CEFTRIAXONE SODIUM 2 G: 2 INJECTION, SOLUTION INTRAVENOUS at 10:44

## 2019-07-18 RX ADMIN — AMLODIPINE BESYLATE 10 MG: 10 TABLET ORAL at 08:50

## 2019-07-18 RX ADMIN — LOSARTAN POTASSIUM 100 MG: 100 TABLET, FILM COATED ORAL at 08:49

## 2019-07-18 RX ADMIN — POTASSIUM CHLORIDE 40 MEQ: 750 CAPSULE, EXTENDED RELEASE ORAL at 14:07

## 2019-07-18 RX ADMIN — ACETAMINOPHEN 650 MG: 325 TABLET, FILM COATED ORAL at 13:28

## 2019-07-18 RX ADMIN — SODIUM CHLORIDE 100 ML/HR: 9 INJECTION, SOLUTION INTRAVENOUS at 02:37

## 2019-07-18 RX ADMIN — ATORVASTATIN CALCIUM 20 MG: 20 TABLET, FILM COATED ORAL at 08:50

## 2019-07-18 RX ADMIN — POTASSIUM CHLORIDE 40 MEQ: 750 CAPSULE, EXTENDED RELEASE ORAL at 10:44

## 2019-07-18 RX ADMIN — METOPROLOL TARTRATE 25 MG: 25 TABLET ORAL at 08:50

## 2019-07-18 RX ADMIN — ASPIRIN 81 MG: 81 TABLET, COATED ORAL at 08:50

## 2019-07-22 LAB
BACTERIA SPEC AEROBE CULT: NORMAL
BACTERIA SPEC AEROBE CULT: NORMAL

## 2019-08-07 ENCOUNTER — EPISODE CHANGES (OUTPATIENT)
Dept: CASE MANAGEMENT | Facility: OTHER | Age: 84
End: 2019-08-07

## 2019-08-09 ENCOUNTER — PATIENT OUTREACH (OUTPATIENT)
Dept: CASE MANAGEMENT | Facility: OTHER | Age: 84
End: 2019-08-09

## 2019-08-09 NOTE — OUTREACH NOTE
Patient Outreach Note    Requested a call back for next week.  Reports Sherley is following for home health services from [facility]. Currently she is having lunch.     Renny Foley RN    8/9/2019, 12:14 PM

## 2019-08-12 ENCOUNTER — PATIENT OUTREACH (OUTPATIENT)
Dept: CASE MANAGEMENT | Facility: OTHER | Age: 84
End: 2019-08-12

## 2019-08-12 ENCOUNTER — EPISODE CHANGES (OUTPATIENT)
Dept: CASE MANAGEMENT | Facility: OTHER | Age: 84
End: 2019-08-12

## 2019-08-28 ENCOUNTER — OFFICE VISIT (OUTPATIENT)
Dept: INTERNAL MEDICINE | Facility: CLINIC | Age: 84
End: 2019-08-28

## 2019-08-28 VITALS
SYSTOLIC BLOOD PRESSURE: 151 MMHG | DIASTOLIC BLOOD PRESSURE: 64 MMHG | OXYGEN SATURATION: 100 % | BODY MASS INDEX: 20.61 KG/M2 | HEART RATE: 77 BPM | WEIGHT: 112 LBS | HEIGHT: 62 IN | TEMPERATURE: 97.1 F

## 2019-08-28 DIAGNOSIS — I35.0 AORTIC VALVE STENOSIS, ETIOLOGY OF CARDIAC VALVE DISEASE UNSPECIFIED: ICD-10-CM

## 2019-08-28 DIAGNOSIS — E87.6 HYPOKALEMIA: ICD-10-CM

## 2019-08-28 DIAGNOSIS — I10 ESSENTIAL HYPERTENSION: Primary | ICD-10-CM

## 2019-08-28 DIAGNOSIS — Z86.73 HISTORY OF RIGHT MCA STROKE: ICD-10-CM

## 2019-08-28 PROCEDURE — 99214 OFFICE O/P EST MOD 30 MIN: CPT | Performed by: NURSE PRACTITIONER

## 2019-08-28 NOTE — PROGRESS NOTES
Name: Gita Wharton  :  1935    Subjective:      Chief Complaint   Patient presents with   • Follow-up     c/c pt states here for follow up hospital visit        Giat Wharton is a 83 y.o. female patient of Alexis Wiggins MD who is here for hospitalization follow up.       Admit date: 7/15/19  Discharge date: 19    She fell in end of  and waited about a week to go and have it checked. Was found to have L hip fracture and underwent L total hip with Dr Pereira.  She was discharged home. A few weeks later she had fever, chills and weakness.  She was admittied to [facility] with bacteriema.  It was not felt that the source was from the hip but rather UTI.  She was treated with IV abx.  Improved and then [facility].  States it was good for her and she is now ambulating with a cane.  She has no complaints today.  She does have aortic stenosis and will follow up with cardiology in the next few weeks.  On discharge from the hospital her potassium was slightly low. She denies N/V/D.    She continues Norvasc, losartan, and Lopressor for hypertension.  States her blood pressures been controlled at home.  She is on ASA and statin for previous CVA.    HPI      I have reviewed the patient's medical history in detail and updated the computerized patient record.    Discharge medications reviewed with the patient and updated.   Current outpatient and discharge medications have been reconciled for the patient.  Reviewed by: Warren Dugan III, NP-C      Past Medical History:   Diagnosis Date   • Acute right MCA stroke (CMS/HCC) 2018   • Fracture, hip (CMS/HCC)     LEFT   • Hemorrhoids 2018   • History of transfusion    • Hyperlipidemia    • Hypertension    • Lung granuloma (CMS/HCC) 2018    R LL            Dr FRANKIE Branch - suggested yearly CXR to Monitor   • Pyelonephritis 2019   • Stenosis of right internal carotid artery 2018    • Subdural hematoma (CMS/HCC) 07/12/2018    Secondary to fall, JULY 2018       Past Surgical History:   Procedure Laterality Date   • CAROTID ENDARTERECTOMY Right 7/26/2018    Procedure: RT CAROTID ENDARTERECTOMY;  Surgeon: Inna Villarreal MD;  Location: [facility] OR;  Service: Vascular   • COLONOSCOPY N/A 8/7/2018    Adenomatous polyp.   Repeat 2021.  Surgeon: Ken Tidwell MD;    • HYSTERECTOMY     • THYROIDECTOMY     • TOTAL HIP ARTHROPLASTY Right    • TOTAL HIP ARTHROPLASTY Left 6/30/2019    Procedure: LEFT HIP ANTERIOR HIP WITH HANA TABLE;  Surgeon: Tiffani Pereira MD;  Location: [facility] OR;  Service: Orthopedics       Family History   Problem Relation Age of Onset   • Cancer Mother    • Dementia Father    • Hypertension Father    • Hypertension Daughter    • Cancer Daughter    • Malig Hyperthermia Neg Hx        Social History     Socioeconomic History   • Marital status:      Spouse name: Not on file   • Number of children: Not on file   • Years of education: Not on file   • Highest education level: Not on file   Tobacco Use   • Smoking status: Former Smoker     Packs/day: 0.50     Years: 40.00     Pack years: 20.00     Types: Cigarettes   • Tobacco comment: quit 2001   Substance and Sexual Activity   • Alcohol use: Yes     Comment: pt reports drinking beer every once in a while.   • Drug use: No   • Sexual activity: Defer       Most Recent Immunizations   Administered Date(s) Administered   • Tdap 07/12/2018         Review of Systems:   Review of Systems   Constitutional: Negative for unexpected weight change.   HENT: Negative.    Respiratory: Negative for shortness of breath.    Cardiovascular: Negative for chest pain.        R LE edema    Gastrointestinal: Negative for abdominal pain.   Genitourinary: Negative for difficulty urinating, dysuria and hematuria.   Musculoskeletal: Positive for gait problem.        Ambulating with cane         Objective:      Physical Exam:  "  Physical Exam   Constitutional: She is oriented to person, place, and time. She appears well-developed. She is cooperative.   Eyes: Conjunctivae are normal. Pupils are equal, round, and reactive to light.   Neck: Neck supple. No thyromegaly present.   Cardiovascular: Normal rate, regular rhythm, intact distal pulses and normal pulses.   Murmur heard.  Murmur audible to back    Pulmonary/Chest: Effort normal and breath sounds normal. She exhibits no deformity.   Equal, Unlabored   Abdominal: Soft. Bowel sounds are normal.   Musculoskeletal: Normal range of motion.   R LE edema (she has sock folded down several times)    Neurological: She is alert and oriented to person, place, and time.   Skin: Capillary refill takes 2 to 3 seconds.   Psychiatric: She has a normal mood and affect.   Vitals reviewed.        Vital Signs:     Vitals:    08/28/19 1220   BP: 151/64   BP Location: Left arm   Patient Position: Sitting   Pulse: 77   Temp: 97.1 °F (36.2 °C)   TempSrc: Oral   SpO2: 100%   Weight: 50.8 kg (112 lb)   Height: 157.5 cm (62\")          Results Review:      REVIEWED AND DISCUSSED LAB RESULTS WITH PATIENT      Requested Prescriptions      No prescriptions requested or ordered in this encounter         Current Outpatient Medications:   •  amLODIPine (NORVASC) 10 MG tablet, Take 1 tablet by mouth Daily., Disp: 90 tablet, Rfl: 3  •  aspirin 81 MG EC tablet, Take 1 tablet by mouth Daily., Disp: , Rfl:   •  atorvastatin (LIPITOR) 20 MG tablet, Take 20 mg by mouth Daily., Disp: , Rfl:   •  losartan (COZAAR) 100 MG tablet, Take 100 mg by mouth Every Morning., Disp: , Rfl:   •  metoprolol tartrate (LOPRESSOR) 25 MG tablet, Take 25 mg by mouth 2 (Two) Times a Day., Disp: , Rfl:   •  Multiple Vitamins-Minerals (CENTRUM ADULTS) tablet, Take 1 tablet by mouth Daily. PT HOLDING FOR SURGERY, Disp: , Rfl:   •  Omega-3 Fatty Acids (FISH OIL) 1000 MG capsule capsule, Take 1,000 mg by mouth Daily With Breakfast. PT HOLDING FOR " "SURGERY, Disp: , Rfl:        Assessment and Plan:        Problem List Items Addressed This Visit        Cardiovascular and Mediastinum    Aortic stenosis    Essential hypertension - Primary    Relevant Orders    CBC (No Diff)    Basic Metabolic Panel    History of right MCA stroke      Other Visit Diagnoses     Hypokalemia        Relevant Orders    Basic Metabolic Panel           S/p L hip.  Improved    Edema to R LE: daughter states is chronic after R hip done.  Discussed compression stockings and not to roll down sock.     Check potassium today in bmp.     Discussed any change in Rx and discussed visit with patient.  All questions answered.      Return in about 3 months (around 11/28/2019).    Warren \"Francis\" iRtchie, APRN   08/28/19    Additional Patient Counseling:       Patient Instructions   Wear compression stockings to lower legs, below the knee.    Apply in the morning and remove at night.     Diet:    • Eat vegetables, fruits, whole grain, low-fat dairy, poultry, fish, beans, nontropical vegetable oils, and nuts, but avoid red meat (i.e., Mediterranean-style diet, DASH [Dietary Approaches to Stop Hypertension] diet).  • Limit sugary drinks and sweets.  • Limit saturated and trans fat to 5% to 6% of calories.  • Limit sodium intake to 2,400 mg daily (about one teaspoon table salt [kosher/sea salt have less sodium per teaspoon]).  Weight loss / Calorie Counting Apps:    • Lose It!   • MyFitness Pal   • Works great when you try it with a partner/ friend  Exercise:   • Engage in moderate-to-vigorous aerobic activity for at least 40 minutes (on average) three to four times each week.  Wearables:   • Activity tracker   • Step tracker: getting 7,500 steps daily can cut your cardiac risks by 44%   Bone Health:   • [email or web address]  • Routine weight bearing exercise    Vaccines:   • Flu vaccine every fall            "

## 2019-08-28 NOTE — PATIENT INSTRUCTIONS
Wear compression stockings to lower legs, below the knee.    Apply in the morning and remove at night.     Diet:    • Eat vegetables, fruits, whole grain, low-fat dairy, poultry, fish, beans, nontropical vegetable oils, and nuts, but avoid red meat (i.e., Mediterranean-style diet, DASH [Dietary Approaches to Stop Hypertension] diet).  • Limit sugary drinks and sweets.  • Limit saturated and trans fat to 5% to 6% of calories.  • Limit sodium intake to 2,400 mg daily (about one teaspoon table salt [kosher/sea salt have less sodium per teaspoon]).  Weight loss / Calorie Counting Apps:    • Lose It!   • MyFitness Pal   • Works great when you try it with a partner/ friend  Exercise:   • Engage in moderate-to-vigorous aerobic activity for at least 40 minutes (on average) three to four times each week.  Wearables:   • Activity tracker   • Step tracker: getting 7,500 steps daily can cut your cardiac risks by 44%   Bone Health:   • [email or web address]  • Routine weight bearing exercise    Vaccines:   • Flu vaccine every fall

## 2019-08-29 LAB
BUN SERPL-MCNC: 13 MG/DL (ref 8–23)
BUN/CREAT SERPL: 10.4 (ref 7–25)
CALCIUM SERPL-MCNC: 8.7 MG/DL (ref 8.6–10.5)
CHLORIDE SERPL-SCNC: 104 MMOL/L (ref 98–107)
CO2 SERPL-SCNC: 22.6 MMOL/L (ref 22–29)
CREAT SERPL-MCNC: 1.25 MG/DL (ref 0.57–1)
ERYTHROCYTE [DISTWIDTH] IN BLOOD BY AUTOMATED COUNT: 16.5 % (ref 12.3–15.4)
GLUCOSE SERPL-MCNC: 103 MG/DL (ref 65–99)
HCT VFR BLD AUTO: 35.7 % (ref 34–46.6)
HGB BLD-MCNC: 10.4 G/DL (ref 12–15.9)
MCH RBC QN AUTO: 25.9 PG (ref 26.6–33)
MCHC RBC AUTO-ENTMCNC: 29.1 G/DL (ref 31.5–35.7)
MCV RBC AUTO: 88.8 FL (ref 79–97)
PLATELET # BLD AUTO: 258 10*3/MM3 (ref 140–450)
POTASSIUM SERPL-SCNC: 4.8 MMOL/L (ref 3.5–5.2)
RBC # BLD AUTO: 4.02 10*6/MM3 (ref 3.77–5.28)
SODIUM SERPL-SCNC: 141 MMOL/L (ref 136–145)
WBC # BLD AUTO: 9.17 10*3/MM3 (ref 3.4–10.8)

## 2019-09-13 ENCOUNTER — OFFICE VISIT (OUTPATIENT)
Dept: CARDIOLOGY | Facility: CLINIC | Age: 84
End: 2019-09-13

## 2019-09-13 VITALS
SYSTOLIC BLOOD PRESSURE: 116 MMHG | BODY MASS INDEX: 20.68 KG/M2 | HEART RATE: 62 BPM | WEIGHT: 112.4 LBS | DIASTOLIC BLOOD PRESSURE: 60 MMHG | HEIGHT: 62 IN

## 2019-09-13 DIAGNOSIS — R01.1 HEART MURMUR: ICD-10-CM

## 2019-09-13 DIAGNOSIS — Z86.73 HISTORY OF RIGHT MCA STROKE: ICD-10-CM

## 2019-09-13 DIAGNOSIS — I77.810 ASCENDING AORTA DILATION (HCC): ICD-10-CM

## 2019-09-13 DIAGNOSIS — I35.1 NONRHEUMATIC AORTIC VALVE INSUFFICIENCY: ICD-10-CM

## 2019-09-13 DIAGNOSIS — R91.1 LUNG NODULE: ICD-10-CM

## 2019-09-13 DIAGNOSIS — I35.0 NONRHEUMATIC AORTIC VALVE STENOSIS: Primary | ICD-10-CM

## 2019-09-13 DIAGNOSIS — I10 ESSENTIAL HYPERTENSION: ICD-10-CM

## 2019-09-13 DIAGNOSIS — J90 PLEURAL EFFUSION: ICD-10-CM

## 2019-09-13 PROBLEM — N39.0 ACUTE URINARY TRACT INFECTION: Status: RESOLVED | Noted: 2019-07-15 | Resolved: 2019-09-13

## 2019-09-13 PROCEDURE — 99214 OFFICE O/P EST MOD 30 MIN: CPT | Performed by: NURSE PRACTITIONER

## 2019-09-13 PROCEDURE — 93000 ELECTROCARDIOGRAM COMPLETE: CPT | Performed by: NURSE PRACTITIONER

## 2019-09-13 RX ORDER — LOSARTAN POTASSIUM 100 MG/1
TABLET ORAL
Qty: 30 TABLET | Refills: 3 | Status: SHIPPED | OUTPATIENT
Start: 2019-09-13 | End: 2019-11-29

## 2019-09-13 NOTE — PROGRESS NOTES
Date of Office Visit: 2019  Encounter Provider: CYNTHIA Melendez  Place of Service: [facility] CARDIOLOGY  Patient Name: Gita Wharton  :1935      Chief Complaint   Patient presents with   • Aortic Stenosis   • Follow-up   :     Dear Dr. Alexis Wiggins,     HPI: Gita Wharton is a pleasant 83 y.o. female who presents today for hospital follow up. She is a new patient to me and her previous records have been reviewed. She has a history of hypertension and stroke in  and .  In 2018 she was found to have severe right ICA disease and underwent endarterectomy.  Echocardiogram completed prior to the surgery showed normal EF, moderately severe aortic stenosis, mild mitral regurgitation.    In 2019, she underwent left total hip replacement.  Postoperatively she had bradycardia and was seen by Jareth Kerns.  There were no other arrhythmias noted on telemetry.  An echocardiogram was performed on 2019 which revealed an EF of 60%, left ventricle small, mild LVH, grade 1 diastolic dysfunction, moderate to severe aortic valve stenosis, and moderate aortic valve regurgitation.  Dr. Kerns recommended an outpatient echocardiogram in 3 months.    On 7/15/2019 she presented to [facility] with fevers, chills, weakness, and fatigue.  She was found to have a UTI and bacteremia.  ID was consulted and she was treated with IV antibiotics.  She had been status post recent hip repair.  During the hospitalization she had a CT of the chest completed which showed stable dilation of the aorta with ascending portion measuring 4.2 cm, aortic arch 3.3 cm, and mid descending 3.3, left-sided pleural effusion, right upper lobe stable nodule, left upper lobe infiltrate/consolidation, groundglass infiltrates noted.    She presents today with her daughter accompanying her.  She states she has had a heart murmur since age 17.  Her sister had rheumatic fever as a child and  "end of having aortic valve replacement in her 20s.  She also has a brother who had valvular replacement.  She does not think that she ever had rheumatic fever.  She remains very active doing all of her own laundry and housework.  She says she is just a bit slower recovering from the hip surgery.  She only experiences dyspnea when walking long distances and denies it with her daily activities.  She has had chronic right leg swelling for the past \"50 years\".  She denies chest pain, PND, orthopnea, cough, wheezing, palpitations, dizziness, or syncope.  Her blood pressure and heart rate are normal today.    Past Medical History:   Diagnosis Date   • Acute right MCA stroke (CMS/HCC) 07/24/2018   • Aortic stenosis    • Fracture, hip (CMS/HCC)     LEFT   • Hemorrhoids 8/5/2018   • History of transfusion    • Hyperlipidemia    • Hypertension    • Hypokalemia    • 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    R             Dr FRANKIE Branch - suggested yearly CXR to Monitor   • Pyelonephritis 4/16/2019   • Stenosis of right internal carotid artery 7/25/2018   • Subdural hematoma (CMS/[facility]) 07/12/2018    Secondary to fall, JULY 2018       Past Surgical History:   Procedure Laterality Date   • CAROTID ENDARTERECTOMY Right 7/26/2018    Procedure: RT CAROTID ENDARTERECTOMY;  Surgeon: Inna Villarreal MD;  Location: [facility];  Service: Vascular   • COLONOSCOPY N/A 8/7/2018    Adenomatous polyp.   Repeat 2021.  Surgeon: Ken Tidwell MD;    • HYSTERECTOMY     • THYROIDECTOMY     • TOTAL HIP ARTHROPLASTY Right    • TOTAL HIP ARTHROPLASTY Left 6/30/2019    Procedure: LEFT HIP ANTERIOR HIP WITH HANA TABLE;  Surgeon: Tiffani Pereira MD;  Location: [facility];  Service: Orthopedics       Social History     Socioeconomic History   • Marital status:      Spouse name: Not on file   • Number of children: Not on file   • Years of education: Not on file   • Highest education level: Not on file   Tobacco Use   • " Smoking status: Former Smoker     Packs/day: 0.50     Years: 40.00     Pack years: 20.00     Types: Cigarettes     Last attempt to quit:      Years since quittin.7   Substance and Sexual Activity   • Alcohol use: Yes     Comment: Caffeine use   • Drug use: No   • Sexual activity: Defer       Family History   Problem Relation Age of Onset   • Cancer Mother    • Dementia Father    • Hypertension Father    • Hypertension Daughter    • Cancer Daughter    • Malig Hyperthermia Neg Hx        The following portion of the patient's history were reviewed and updated as appropriate: past medical history, past surgical history, past social history, past family history, allergies, current medications, and problem list.    Review of Systems   Constitution: Positive for decreased appetite and malaise/fatigue. Negative for chills, diaphoresis, fever, night sweats, weight gain and weight loss.   HENT: Negative for hearing loss, nosebleeds, sore throat and tinnitus.    Eyes: Negative for blurred vision, double vision, pain and visual disturbance.   Cardiovascular: Positive for leg swelling. Negative for chest pain, claudication, cyanosis, dyspnea on exertion, irregular heartbeat, near-syncope, orthopnea, palpitations, paroxysmal nocturnal dyspnea and syncope.   Respiratory: Negative for cough, hemoptysis, shortness of breath, snoring and wheezing.    Endocrine: Negative for cold intolerance, heat intolerance and polyuria.   Hematologic/Lymphatic: Negative for bleeding problem. Does not bruise/bleed easily.   Skin: Negative for color change, dry skin, flushing and itching.   Musculoskeletal: Negative for falls, joint pain, joint swelling, muscle cramps, muscle weakness and myalgias.   Gastrointestinal: Negative for abdominal pain, constipation, heartburn, melena, nausea and vomiting.   Genitourinary: Negative for dysuria and hematuria.   Neurological: Negative for excessive daytime sleepiness, dizziness, light-headedness,  "loss of balance, numbness, paresthesias, seizures and vertigo.   Psychiatric/Behavioral: Positive for depression. Negative for altered mental status, memory loss and substance abuse. The patient does not have insomnia and is not nervous/anxious.    Allergic/Immunologic: Negative for environmental allergies.       Allergies   Allergen Reactions   • Sulfa Antibiotics Unknown (See Comments)     CAUSED DIZZINESS   • Tekturna [Aliskiren] Diarrhea         Current Outpatient Medications:   •  amLODIPine (NORVASC) 10 MG tablet, Take 1 tablet by mouth Daily., Disp: 90 tablet, Rfl: 3  •  aspirin 81 MG EC tablet, Take 1 tablet by mouth Daily., Disp: , Rfl:   •  atorvastatin (LIPITOR) 20 MG tablet, Take 20 mg by mouth Daily., Disp: , Rfl:   •  losartan (COZAAR) 100 MG tablet, TAKE ONE TABLET BY MOUTH DAILY, Disp: 30 tablet, Rfl: 3  •  metoprolol tartrate (LOPRESSOR) 25 MG tablet, Take 25 mg by mouth 2 (Two) Times a Day., Disp: , Rfl:   •  Multiple Vitamins-Minerals (CENTRUM ADULTS) tablet, Take 1 tablet by mouth Daily. PT HOLDING FOR SURGERY, Disp: , Rfl:   •  Omega-3 Fatty Acids (FISH OIL) 1000 MG capsule capsule, Take 1,000 mg by mouth Daily With Breakfast. PT HOLDING FOR SURGERY, Disp: , Rfl:         Objective:     Vitals:    09/13/19 1031   BP: 116/60   BP Location: Left arm   Pulse: 62   Weight: 51 kg (112 lb 6.4 oz)   Height: 157.5 cm (62\")     Body mass index is 20.56 kg/m².    PHYSICAL EXAM:    Vitals Reviewed.   General Appearance: No acute distress, well developed and well nourished. Thin.   Eyes: Conjunctiva and lids: No erythema, swelling, or discharge. Sclera non-icteric.   HENT: Atraumatic, normocephalic. External eyes, ears, and nose normal. No hearing loss noted. Mucous membranes normal. Lips not cyanotic. Neck supple with no tenderness.  Respiratory: No signs of respiratory distress. Respiration rhythm and depth normal.   Clear to auscultation. No rales, crackles, rhonchi, or wheezing auscultated. "   Cardiovascular:  Jugular Venous Pressure: Normal  Heart Rate and Rhythm: Normal, Heart Sounds: Normal S1 and S2. No S3 or S4 noted.  Murmurs: RUSB 4/6 harsh, systolic murmur noted. No rubs, thrills, or gallops.   Arterial Pulses: Carotid pulses normal. No carotid bruit noted. Posterior tibialis and dorsalis pedis pulses normal.   Lower Extremities: Bilateral lower extremity edema, right greater than left.  Gastrointestinal:  Abdomen soft, non-distended, non-tender. Normal bowel sounds. No hepatomegaly.   Musculoskeletal: Normal movement of extremities  Skin and Nails: General appearance normal. No pallor, cyanosis, diaphoresis. Skin temperature normal. No clubbing of fingernails.   Psychiatric: Patient alert and oriented to person, place, and time. Speech and behavior appropriate. Normal mood and affect.       ECG 12 Lead  Date/Time: 9/13/2019 10:37 AM  Performed by: Yashira Landin APRN  Authorized by: Yashira Landin APRN   Comparison: compared with previous ECG from 7/15/2019  Similar to previous ECG  Rhythm: sinus rhythm  Rate: normal  BPM: 62  Q waves: V1 and V2    ST Segments: ST segments normal  T inversion: I, II, V3, V2, aVF, V4, V5 and V6  QRS axis: normal    Clinical impression: abnormal EKG              Assessment:       Diagnosis Plan   1. Nonrheumatic aortic valve stenosis  Adult Transthoracic Echo Complete W/ Cont if Necessary Per Protocol   2. Nonrheumatic aortic valve insufficiency  Adult Transthoracic Echo Complete W/ Cont if Necessary Per Protocol   3. Ascending aorta dilation (CMS/HCC)  Adult Transthoracic Echo Complete W/ Cont if Necessary Per Protocol   4. Heart murmur     5. Essential hypertension     6. History of right MCA stroke     7. Pleural effusion     8. Lung nodule            Plan:       1.  Aortic Stenosis/Aortic Regurgitation-Ascending Aorta Dilation/Heart Murmur: She reports a heart murmur since age 17 and her sister had rheumatic fever in which she ended up having valve  replacement in her 20s.  Her brother also has a history of valvular replacement.  Per echocardiogram in June 2019, she was noted to have moderate to severe aortic valve stenosis and moderate aortic valve regurgitation.  Recent CT of the chest showed ascending aorta dilation.  Dr. Kerns had recommended a repeat echocardiogram at the end of September and will arrange for this to occur.  Thus far she is really not having any symptoms of valvular dysfunction.  If her valvular status worsens in the future, she may be a good candidate for TAVR.     5.  Hypertension: Blood pressure well controlled today.    6.  History of stroke: Denies stroke symptoms and remains on aspirin and atorvastatin.    7/8: Pleural Effusion and Lung Nodule: Noted on CT scan in July 2019 and will follow up with her PCP.    9.  We will review the echocardiogram and further recommendations and follow-up appointment will follow.    As always, it has been a pleasure to participate in your patient's care. Thank you.       Sincerely,         CYNTHIA Daniel        **Dragon Disclaimer:**  Much of this encounter note is an electronic transcription/translation of spoken language to printed text. The electronic translation of spoken language may permit erroneous, or at times, nonsensical words or phrases to be inadvertently transcribed. Although I have reviewed the note for such errors, some may still exist.

## 2019-09-30 ENCOUNTER — HOSPITAL ENCOUNTER (OUTPATIENT)
Dept: CARDIOLOGY | Facility: HOSPITAL | Age: 84
Discharge: HOME | End: 2019-09-30
Admitting: NURSE PRACTITIONER

## 2019-09-30 VITALS
DIASTOLIC BLOOD PRESSURE: 62 MMHG | BODY MASS INDEX: 20.61 KG/M2 | SYSTOLIC BLOOD PRESSURE: 169 MMHG | HEIGHT: 62 IN | WEIGHT: 112 LBS

## 2019-09-30 DIAGNOSIS — I35.1 NONRHEUMATIC AORTIC VALVE INSUFFICIENCY: ICD-10-CM

## 2019-09-30 DIAGNOSIS — I35.0 NONRHEUMATIC AORTIC VALVE STENOSIS: ICD-10-CM

## 2019-09-30 DIAGNOSIS — I77.810 ASCENDING AORTA DILATION (HCC): ICD-10-CM

## 2019-09-30 LAB
AORTIC DIMENSIONLESS INDEX: 0.2 (DI)
AORTIC ROOT ANNULUS: 2.1 CM
ASCENDING AORTA: 3.9 CM
BH CV ECHO MEAS - ACS: 0.82 CM
BH CV ECHO MEAS - AO MAX PG (FULL): 51.7 MMHG
BH CV ECHO MEAS - AO MAX PG: 68 MMHG
BH CV ECHO MEAS - AO MEAN PG (FULL): 30.8 MMHG
BH CV ECHO MEAS - AO MEAN PG: 40 MMHG
BH CV ECHO MEAS - AO V2 MAX: 413 CM/SEC
BH CV ECHO MEAS - AO V2 MEAN: 274 CM/SEC
BH CV ECHO MEAS - AO V2 VTI: 116 CM
BH CV ECHO MEAS - AVA(I,A): 0.81 CM^2
BH CV ECHO MEAS - AVA(I,D): 0.7 CM^2
BH CV ECHO MEAS - AVA(V,A): 0.78 CM^2
BH CV ECHO MEAS - AVA(V,D): 0.78 CM^2
BH CV ECHO MEAS - BSA(HAYCOCK): 1.5 M^2
BH CV ECHO MEAS - BSA: 1.5 M^2
BH CV ECHO MEAS - BZI_BMI: 20.5 KILOGRAMS/M^2
BH CV ECHO MEAS - BZI_METRIC_HEIGHT: 157.5 CM
BH CV ECHO MEAS - BZI_METRIC_WEIGHT: 50.8 KG
BH CV ECHO MEAS - EDV(MOD-SP2): 61.2 ML
BH CV ECHO MEAS - EDV(MOD-SP4): 73.1 ML
BH CV ECHO MEAS - EDV(TEICH): 103.6 ML
BH CV ECHO MEAS - EF(CUBED): 75.4 %
BH CV ECHO MEAS - EF(MOD-BP): 62 %
BH CV ECHO MEAS - EF(MOD-SP2): 62.6 %
BH CV ECHO MEAS - EF(MOD-SP4): 61 %
BH CV ECHO MEAS - EF(TEICH): 67.3 %
BH CV ECHO MEAS - ESV(MOD-SP2): 22.9 ML
BH CV ECHO MEAS - ESV(MOD-SP4): 28.5 ML
BH CV ECHO MEAS - ESV(TEICH): 33.9 ML
BH CV ECHO MEAS - FS: 37.3 %
BH CV ECHO MEAS - IVS/LVPW: 1.1
BH CV ECHO MEAS - IVSD: 1 CM
BH CV ECHO MEAS - LAT PEAK E' VEL: 10 CM/SEC
BH CV ECHO MEAS - LV DIASTOLIC VOL/BSA (35-75): 48.9 ML/M^2
BH CV ECHO MEAS - LV MASS(C)D: 158 GRAMS
BH CV ECHO MEAS - LV MASS(C)DI: 105.8 GRAMS/M^2
BH CV ECHO MEAS - LV MAX PG: 3.2 MMHG
BH CV ECHO MEAS - LV MEAN PG: 1.8 MMHG
BH CV ECHO MEAS - LV SYSTOLIC VOL/BSA (12-30): 19.1 ML/M^2
BH CV ECHO MEAS - LV V1 MAX: 89.2 CM/SEC
BH CV ECHO MEAS - LV V1 MEAN: 64.7 CM/SEC
BH CV ECHO MEAS - LV V1 VTI: 26.2 CM
BH CV ECHO MEAS - LVIDD: 4.7 CM
BH CV ECHO MEAS - LVIDS: 3 CM
BH CV ECHO MEAS - LVLD AP2: 7.3 CM
BH CV ECHO MEAS - LVLD AP4: 8.2 CM
BH CV ECHO MEAS - LVLS AP2: 6.5 CM
BH CV ECHO MEAS - LVLS AP4: 6.8 CM
BH CV ECHO MEAS - LVOT AREA (M): 3.1 CM^2
BH CV ECHO MEAS - LVOT AREA: 3.2 CM^2
BH CV ECHO MEAS - LVOT DIAM: 2 CM
BH CV ECHO MEAS - LVPWD: 0.93 CM
BH CV ECHO MEAS - MED PEAK E' VEL: 8 CM/SEC
BH CV ECHO MEAS - MR MAX PG: 122.8 MMHG
BH CV ECHO MEAS - MR MAX VEL: 552.3 CM/SEC
BH CV ECHO MEAS - MV A MAX VEL: 110.1 CM/SEC
BH CV ECHO MEAS - MV DEC SLOPE: 432.1 CM/SEC^2
BH CV ECHO MEAS - MV DEC TIME: 0.2 SEC
BH CV ECHO MEAS - MV E MAX VEL: 102.8 CM/SEC
BH CV ECHO MEAS - MV E/A: 0.93
BH CV ECHO MEAS - MV MAX PG: 4.9 MMHG
BH CV ECHO MEAS - MV MEAN PG: 1.9 MMHG
BH CV ECHO MEAS - MV P1/2T MAX VEL: 110.2 CM/SEC
BH CV ECHO MEAS - MV P1/2T: 74.7 MSEC
BH CV ECHO MEAS - MV V2 MAX: 110.1 CM/SEC
BH CV ECHO MEAS - MV V2 MEAN: 64.5 CM/SEC
BH CV ECHO MEAS - MV V2 VTI: 33.4 CM
BH CV ECHO MEAS - MVA P1/2T LCG: 2 CM^2
BH CV ECHO MEAS - MVA(P1/2T): 2.9 CM^2
BH CV ECHO MEAS - MVA(VTI): 2.5 CM^2
BH CV ECHO MEAS - PA ACC TIME: 0.14 SEC
BH CV ECHO MEAS - PA MAX PG (FULL): 0.69 MMHG
BH CV ECHO MEAS - PA MAX PG: 1.9 MMHG
BH CV ECHO MEAS - PA PR(ACCEL): 16 MMHG
BH CV ECHO MEAS - PA V2 MAX: 69 CM/SEC
BH CV ECHO MEAS - PULM A REVS DUR: 0.13 SEC
BH CV ECHO MEAS - PULM A REVS VEL: 29.1 CM/SEC
BH CV ECHO MEAS - PULM DIAS VEL: 50.4 CM/SEC
BH CV ECHO MEAS - PULM S/D: 1.4
BH CV ECHO MEAS - PULM SYS VEL: 70.8 CM/SEC
BH CV ECHO MEAS - PVA(V,A): 2.5 CM^2
BH CV ECHO MEAS - PVA(V,D): 2.5 CM^2
BH CV ECHO MEAS - QP/QS: 0.48
BH CV ECHO MEAS - RAP SYSTOLE: 8 MMHG
BH CV ECHO MEAS - RV MAX PG: 1.2 MMHG
BH CV ECHO MEAS - RV MEAN PG: 0.74 MMHG
BH CV ECHO MEAS - RV V1 MAX: 55.2 CM/SEC
BH CV ECHO MEAS - RV V1 MEAN: 41.6 CM/SEC
BH CV ECHO MEAS - RV V1 VTI: 12.7 CM
BH CV ECHO MEAS - RVOT AREA: 3.2 CM^2
BH CV ECHO MEAS - RVOT DIAM: 2 CM
BH CV ECHO MEAS - RVSP: 32 MMHG
BH CV ECHO MEAS - SI(CUBED): 53.2 ML/M^2
BH CV ECHO MEAS - SI(LVOT): 56.3 ML/M^2
BH CV ECHO MEAS - SI(MOD-SP2): 25.6 ML/M^2
BH CV ECHO MEAS - SI(MOD-SP4): 29.9 ML/M^2
BH CV ECHO MEAS - SI(TEICH): 46.7 ML/M^2
BH CV ECHO MEAS - SUP REN AO DIAM: 2.2 CM
BH CV ECHO MEAS - SV(CUBED): 79.5 ML
BH CV ECHO MEAS - SV(LVOT): 84.1 ML
BH CV ECHO MEAS - SV(MOD-SP2): 38.3 ML
BH CV ECHO MEAS - SV(MOD-SP4): 44.6 ML
BH CV ECHO MEAS - SV(RVOT): 40.2 ML
BH CV ECHO MEAS - SV(TEICH): 69.7 ML
BH CV ECHO MEAS - TAPSE (>1.6): 2 CM
BH CV ECHO MEAS - TR MAX VEL: 244 CM/SEC
BH CV ECHO MEASUREMENTS AVERAGE E/E' RATIO: 11.42
BH CV XLRA - RV BASE: 2.2 CM
BH CV XLRA - TDI S': 10 CM/SEC
LEFT ATRIUM VOLUME INDEX: 34 ML/M2
LV EF 2D ECHO EST: 62 %
SINUS: 3.1 CM
STJ: 2.5 CM

## 2019-09-30 PROCEDURE — 93306 TTE W/DOPPLER COMPLETE: CPT

## 2019-09-30 PROCEDURE — 93306 TTE W/DOPPLER COMPLETE: CPT | Performed by: INTERNAL MEDICINE

## 2019-10-04 ENCOUNTER — TELEPHONE (OUTPATIENT)
Dept: CARDIOLOGY | Facility: CLINIC | Age: 84
End: 2019-10-04

## 2019-10-04 DIAGNOSIS — I35.0 NONRHEUMATIC AORTIC VALVE STENOSIS: Primary | ICD-10-CM

## 2019-10-04 DIAGNOSIS — I34.0 NONRHEUMATIC MITRAL VALVE REGURGITATION: ICD-10-CM

## 2019-10-04 NOTE — TELEPHONE ENCOUNTER
Patient informed and wants to talk to her daughter first before making the decision.  Her daughter is on vacation next week and they both will call upon her return.

## 2019-10-04 NOTE — TELEPHONE ENCOUNTER
Result Text     · Left ventricular systolic function is normal. Calculated EF = 62%. Estimated EF was in agreement with the calculated EF. Estimated EF = 62%. Normal left ventricular cavity size noted. All left ventricular wall segments contract normally. Left ventricular wall thickness is consistent with mild concentric hypertrophy. Left ventricular diastolic function was indeterminate.  · There is severe calcification of the aortic valve.Mild to moderate aortic valve regurgitation is present. Severe aortic valve stenosis is present. Peak velocity of the flow distal to the aortic valve is 413.0 cm/s. Aortic valve mean pressure gradient is 40.0 mmHg. Aortic valve area is 0.7 cm2. Aortic valve dimensionless index is 0.2.  · Moderate MAC is present. Moderate mitral valve regurgitation is present  · Mild tricuspid valve regurgitation is present. Estimated right ventricular systolic pressure from tricuspid regurgitation is normal (<35 mmHg). Calculated right ventricular systolic pressure from tricuspid regurgitation is 32 mmHg.  · Mild dilation of the ascending aorta is present. The ascending aorta measures 3.9 cm.     Notes recorded by Jareth Kerns MD on 10/1/2019 at 10:33 AM EDT  She will need a cath (R+L) as she has severe AS and moderate MR.  I reviewed the study and mostly agree.  I think the AS still looks mod-severe, but the dimensionless index is 0.26, which is low...    JDK  ------    Notes recorded by Yashira Landin APRN on 9/30/2019 at 4:52 PM EDT  Echocardiogram repeated per your request.  Noted to have severe aortic stenosis.  Please review and advise.  Thank you

## 2019-10-14 NOTE — TELEPHONE ENCOUNTER
Pts daughter Letty is back from vacation and returned your call    Spoke with Letty and informed her that you are out until tomorrow and should hear something from you tomorrow afternoon

## 2019-10-15 NOTE — TELEPHONE ENCOUNTER
I spoke with patient and Marce about right and left heart cath. Her daughter reports weakness in her legs. Marce would like for her to proceed with right and left heart cath.     Kim - please schedule right and left heart cath. Please call daughter Marce daughter at [phone].   Thanks.

## 2019-10-18 PROBLEM — I34.0 NONRHEUMATIC MITRAL VALVE REGURGITATION: Status: ACTIVE | Noted: 2019-10-18

## 2019-10-18 PROBLEM — I35.0 NONRHEUMATIC AORTIC VALVE STENOSIS: Status: ACTIVE | Noted: 2019-10-18

## 2019-10-22 ENCOUNTER — APPOINTMENT (OUTPATIENT)
Dept: LAB | Facility: HOSPITAL | Age: 84
End: 2019-10-22

## 2019-10-22 ENCOUNTER — LAB (OUTPATIENT)
Dept: LAB | Facility: HOSPITAL | Age: 84
End: 2019-10-22

## 2019-10-22 ENCOUNTER — TRANSCRIBE ORDERS (OUTPATIENT)
Dept: CARDIOLOGY | Facility: CLINIC | Age: 84
End: 2019-10-22

## 2019-10-22 DIAGNOSIS — I35.0 NODULAR CALCIFIC AORTIC VALVE STENOSIS: ICD-10-CM

## 2019-10-22 DIAGNOSIS — Z01.810 PRE-OPERATIVE CARDIOVASCULAR EXAMINATION: ICD-10-CM

## 2019-10-22 DIAGNOSIS — Z13.6 SCREENING FOR ISCHEMIC HEART DISEASE: ICD-10-CM

## 2019-10-22 DIAGNOSIS — Z01.810 PRE-OPERATIVE CARDIOVASCULAR EXAMINATION: Primary | ICD-10-CM

## 2019-10-22 LAB
ANION GAP SERPL CALCULATED.3IONS-SCNC: 12.9 MMOL/L (ref 5–15)
BASOPHILS # BLD AUTO: 0.02 10*3/MM3 (ref 0–0.2)
BASOPHILS NFR BLD AUTO: 0.3 % (ref 0–1.5)
BUN BLD-MCNC: 15 MG/DL (ref 8–23)
BUN/CREAT SERPL: 12 (ref 7–25)
CALCIUM SPEC-SCNC: 9.2 MG/DL (ref 8.6–10.5)
CHLORIDE SERPL-SCNC: 106 MMOL/L (ref 98–107)
CO2 SERPL-SCNC: 22.1 MMOL/L (ref 22–29)
CREAT BLD-MCNC: 1.25 MG/DL (ref 0.57–1)
DEPRECATED RDW RBC AUTO: 40.2 FL (ref 37–54)
EOSINOPHIL # BLD AUTO: 0 10*3/MM3 (ref 0–0.4)
EOSINOPHIL NFR BLD AUTO: 0 % (ref 0.3–6.2)
ERYTHROCYTE [DISTWIDTH] IN BLOOD BY AUTOMATED COUNT: 13.5 % (ref 12.3–15.4)
GFR SERPL CREATININE-BSD FRML MDRD: 41 ML/MIN/1.73
GLUCOSE BLD-MCNC: 116 MG/DL (ref 65–99)
HCT VFR BLD AUTO: 32.1 % (ref 34–46.6)
HGB BLD-MCNC: 10.1 G/DL (ref 12–15.9)
IMM GRANULOCYTES # BLD AUTO: 0.03 10*3/MM3 (ref 0–0.05)
IMM GRANULOCYTES NFR BLD AUTO: 0.4 % (ref 0–0.5)
LYMPHOCYTES # BLD AUTO: 1.1 10*3/MM3 (ref 0.7–3.1)
LYMPHOCYTES NFR BLD AUTO: 14 % (ref 19.6–45.3)
MCH RBC QN AUTO: 25.9 PG (ref 26.6–33)
MCHC RBC AUTO-ENTMCNC: 31.5 G/DL (ref 31.5–35.7)
MCV RBC AUTO: 82.3 FL (ref 79–97)
MONOCYTES # BLD AUTO: 0.74 10*3/MM3 (ref 0.1–0.9)
MONOCYTES NFR BLD AUTO: 9.4 % (ref 5–12)
NEUTROPHILS # BLD AUTO: 5.98 10*3/MM3 (ref 1.7–7)
NEUTROPHILS NFR BLD AUTO: 75.9 % (ref 42.7–76)
NRBC BLD AUTO-RTO: 0 /100 WBC (ref 0–0.2)
PLATELET # BLD AUTO: 205 10*3/MM3 (ref 140–450)
PMV BLD AUTO: 12.9 FL (ref 6–12)
POTASSIUM BLD-SCNC: 3.8 MMOL/L (ref 3.5–5.2)
RBC # BLD AUTO: 3.9 10*6/MM3 (ref 3.77–5.28)
SODIUM BLD-SCNC: 141 MMOL/L (ref 136–145)
WBC NRBC COR # BLD: 7.87 10*3/MM3 (ref 3.4–10.8)

## 2019-10-22 PROCEDURE — 36415 COLL VENOUS BLD VENIPUNCTURE: CPT

## 2019-10-22 PROCEDURE — 85025 COMPLETE CBC W/AUTO DIFF WBC: CPT

## 2019-10-22 PROCEDURE — 80048 BASIC METABOLIC PNL TOTAL CA: CPT

## 2019-10-24 ENCOUNTER — HOSPITAL ENCOUNTER (OUTPATIENT)
Facility: HOSPITAL | Age: 84
Setting detail: HOSPITAL OUTPATIENT SURGERY
Discharge: HOME | End: 2019-10-24
Attending: INTERNAL MEDICINE | Admitting: INTERNAL MEDICINE

## 2019-10-24 VITALS
BODY MASS INDEX: 18.8 KG/M2 | HEIGHT: 64 IN | HEART RATE: 52 BPM | TEMPERATURE: 98 F | RESPIRATION RATE: 16 BRPM | OXYGEN SATURATION: 97 % | WEIGHT: 110.13 LBS | SYSTOLIC BLOOD PRESSURE: 159 MMHG | DIASTOLIC BLOOD PRESSURE: 57 MMHG

## 2019-10-24 DIAGNOSIS — I34.0 NONRHEUMATIC MITRAL VALVE REGURGITATION: ICD-10-CM

## 2019-10-24 DIAGNOSIS — I35.0 NONRHEUMATIC AORTIC VALVE STENOSIS: ICD-10-CM

## 2019-10-24 DIAGNOSIS — I35.0 AORTIC VALVE STENOSIS, ETIOLOGY OF CARDIAC VALVE DISEASE UNSPECIFIED: Primary | ICD-10-CM

## 2019-10-24 DIAGNOSIS — I65.21 STENOSIS OF RIGHT INTERNAL CAROTID ARTERY: ICD-10-CM

## 2019-10-24 PROCEDURE — C1769 GUIDE WIRE: HCPCS | Performed by: INTERNAL MEDICINE

## 2019-10-24 PROCEDURE — C1894 INTRO/SHEATH, NON-LASER: HCPCS | Performed by: INTERNAL MEDICINE

## 2019-10-24 PROCEDURE — 25010000002 HEPARIN (PORCINE) PER 1000 UNITS: Performed by: INTERNAL MEDICINE

## 2019-10-24 PROCEDURE — 0 IOPAMIDOL PER 1 ML: Performed by: INTERNAL MEDICINE

## 2019-10-24 PROCEDURE — 99153 MOD SED SAME PHYS/QHP EA: CPT | Performed by: INTERNAL MEDICINE

## 2019-10-24 PROCEDURE — 25010000002 FENTANYL CITRATE (PF) 100 MCG/2ML SOLUTION: Performed by: INTERNAL MEDICINE

## 2019-10-24 PROCEDURE — 25010000002 MIDAZOLAM PER 1 MG: Performed by: INTERNAL MEDICINE

## 2019-10-24 PROCEDURE — 99152 MOD SED SAME PHYS/QHP 5/>YRS: CPT | Performed by: INTERNAL MEDICINE

## 2019-10-24 PROCEDURE — 85018 HEMOGLOBIN: CPT

## 2019-10-24 PROCEDURE — 93456 R HRT CORONARY ARTERY ANGIO: CPT | Performed by: INTERNAL MEDICINE

## 2019-10-24 PROCEDURE — S0260 H&P FOR SURGERY: HCPCS | Performed by: INTERNAL MEDICINE

## 2019-10-24 PROCEDURE — 85014 HEMATOCRIT: CPT

## 2019-10-24 RX ORDER — SODIUM CHLORIDE 0.9 % (FLUSH) 0.9 %
10 SYRINGE (ML) INJECTION AS NEEDED
Status: DISCONTINUED | OUTPATIENT
Start: 2019-10-24 | End: 2019-10-24 | Stop reason: HOSPADM

## 2019-10-24 RX ORDER — SODIUM CHLORIDE 0.9 % (FLUSH) 0.9 %
3 SYRINGE (ML) INJECTION EVERY 12 HOURS SCHEDULED
Status: DISCONTINUED | OUTPATIENT
Start: 2019-10-24 | End: 2019-10-24 | Stop reason: HOSPADM

## 2019-10-24 RX ORDER — FOLIC ACID 1 MG/1
1 TABLET ORAL DAILY
COMMUNITY
End: 2021-01-05

## 2019-10-24 RX ORDER — SODIUM CHLORIDE 9 MG/ML
75 INJECTION, SOLUTION INTRAVENOUS CONTINUOUS
Status: DISCONTINUED | OUTPATIENT
Start: 2019-10-24 | End: 2019-10-24 | Stop reason: HOSPADM

## 2019-10-24 RX ORDER — LIDOCAINE HYDROCHLORIDE 20 MG/ML
INJECTION, SOLUTION INFILTRATION; PERINEURAL AS NEEDED
Status: DISCONTINUED | OUTPATIENT
Start: 2019-10-24 | End: 2019-10-24 | Stop reason: HOSPADM

## 2019-10-24 RX ORDER — MIDAZOLAM HYDROCHLORIDE 1 MG/ML
INJECTION INTRAMUSCULAR; INTRAVENOUS AS NEEDED
Status: DISCONTINUED | OUTPATIENT
Start: 2019-10-24 | End: 2019-10-24 | Stop reason: HOSPADM

## 2019-10-24 RX ORDER — SODIUM CHLORIDE 9 MG/ML
100 INJECTION, SOLUTION INTRAVENOUS CONTINUOUS
Status: DISCONTINUED | OUTPATIENT
Start: 2019-10-24 | End: 2019-10-24 | Stop reason: HOSPADM

## 2019-10-24 RX ORDER — LIDOCAINE HYDROCHLORIDE 10 MG/ML
0.1 INJECTION, SOLUTION EPIDURAL; INFILTRATION; INTRACAUDAL; PERINEURAL ONCE AS NEEDED
Status: DISCONTINUED | OUTPATIENT
Start: 2019-10-24 | End: 2019-10-24 | Stop reason: HOSPADM

## 2019-10-24 RX ORDER — FENTANYL CITRATE 50 UG/ML
INJECTION, SOLUTION INTRAMUSCULAR; INTRAVENOUS AS NEEDED
Status: DISCONTINUED | OUTPATIENT
Start: 2019-10-24 | End: 2019-10-24 | Stop reason: HOSPADM

## 2019-10-24 RX ADMIN — SODIUM CHLORIDE 75 ML/HR: 9 INJECTION, SOLUTION INTRAVENOUS at 07:40

## 2019-10-24 NOTE — DISCHARGE INSTRUCTIONS
[facility]  [address] [place] [address]    Coronary Angiogram (Radial/Ulnar Approach) After Care    Refer to this sheet in the next few weeks. These instructions provide you with information on caring for yourself after your procedure. Your caregiver may also give you more specific instructions. Your treatment has been planned according to current medical practices, but problems sometimes occur. Call your caregiver if you have any problems or questions after your procedure.    Home Care Instructions:  · You may shower the day after the procedure. Remove the bandage (dressing) and gently wash the site with plain soap and water. Gently pat the site dry. You may apply a band aid daily for 2 days if desired.    · Do not apply powder or lotion to the site.  · Do not submerge the affected site in water for 3 to 5 days or until the site is completely healed.   · Do not lift, push or pull anything over 10 pounds for 2 days after your procedure.  · Inspect the site at least twice daily. You may notice some bruising at the site and it may be tender for 1 to 2 weeks.     · Increase your fluid intake for the next 2 days.    · Keep arm elevated for 24 hours. For the remainder of the day, keep your arm in “Pledge of Allegiance” position when up and about.     · You may drive 24 hours after the procedure unless otherwise instructed by your caregiver.  · Do not operate machinery or power tools for 24 hours.  · A responsible adult should be with you for the first 24 hours after you arrive home. Do not make any important legal decisions or sign legal papers for 24 hours.  Do not drink alcohol for 24 hours.    · Metformin or any medications containing Metformin should not be taken for 48 hours after your procedure.      Call Your Doctor if:   · You have unusual pain at the radial/ulnar (wrist) site.  · You have redness, warmth, swelling, or pain at the radial/ulnar (wrist) site.  · You have drainage (other  than a small amount of blood on the dressing).  · You have chills or a fever > 101.  · Your arm becomes pale or dark, cool, tingly, or numb.  · You have heavy bleeding from the site, hold pressure on the site for 20 minutes.  If the bleeding stops, apply a fresh bandage and call your cardiologist.  However, if you continue to have bleeding, call 911.

## 2019-10-24 NOTE — H&P
Structural heart team note  Date of Hospital Visit: 10/24/19  Encounter Provider: Warren Tanner MD  Place of Service: [facility] CARDIOLOGY  Patient Name: Gita Wharton  :1935  [id number]    Chief complaint: Fatigue and weakness    History of Present Illness: 83-year-old lady long-standing heart murmur who still lives at home for a long time LAD her senior exercise class but here recently has not.  She had a stroke last year and was found to have severe carotid disease had a carotid endarterectomy.  She also had a fall last year hitting her head and having what sounds like a subdural hematoma that was treated conservatively.  She fell with a mechanical trip and fall this summer 3 months ago and fractured her hip and since that time has just really struggled to get better.  She can probably walk may be a half a block and she has to stop just because she is exhausted and fatigued.  But she is adamant she is not short of breath.  She has not had chest pain she has not had syncope.  She has not had any other bleeding problems.  She has a history of hypertension.  She does not have diabetes.  She does not smoke.  She is never had any other cardiac problems.  He does not have any history of allergy to medicines    Past Medical History:   Diagnosis Date   • Acute right MCA stroke (CMS/Formerly [facility]) 2018   • Aortic stenosis    • Fracture, hip (CMS/Formerly [facility])     LEFT   • Hemorrhoids 2018   • History of transfusion    • Hyperlipidemia    • Hypertension    • Hypokalemia    • Lung granuloma (CMS/HCC) 2018    R LL            Dr FRANKIE Branch - suggested yearly CXR to Monitor   • Pyelonephritis 2019   • Stenosis of right internal carotid artery 2018   • Subdural hematoma (CMS/Formerly [facility]) 2018    Secondary to fall, 2018       Past Surgical History:   Procedure Laterality Date   • CAROTID ENDARTERECTOMY Right 2018    Procedure: RT CAROTID ENDARTERECTOMY;  Surgeon: Inna  ELISA Villarreal MD;  Location: [facility] MAIN OR;  Service: Vascular   • COLONOSCOPY N/A 8/7/2018    Adenomatous polyp.   Repeat 2021.  Surgeon: Ken Tidwell MD;    • HYSTERECTOMY     • THYROIDECTOMY     • TOTAL HIP ARTHROPLASTY Right    • TOTAL HIP ARTHROPLASTY Left 6/30/2019    Procedure: LEFT HIP ANTERIOR HIP WITH HANA TABLE;  Surgeon: Tiffani Pereira MD;  Location: [facility] MAIN OR;  Service: Orthopedics       Medications Prior to Admission   Medication Sig Dispense Refill Last Dose   • amLODIPine (NORVASC) 10 MG tablet Take 1 tablet by mouth Daily. 90 tablet 3 10/24/2019 at Unknown time   • aspirin 81 MG EC tablet Take 1 tablet by mouth Daily.   10/24/2019 at Unknown time   • atorvastatin (LIPITOR) 20 MG tablet Take 20 mg by mouth Daily.   10/24/2019 at Unknown time   • folic acid (FOLVITE) 1 MG tablet Take 1 mg by mouth Daily.   10/23/2019 at Unknown time   • losartan (COZAAR) 100 MG tablet TAKE ONE TABLET BY MOUTH DAILY 30 tablet 3 10/24/2019 at Unknown time   • metoprolol tartrate (LOPRESSOR) 25 MG tablet TAKE ONE TABLET BY MOUTH EVERY 12 HOURS 180 tablet 2 10/24/2019 at Unknown time   • Multiple Vitamins-Minerals (CENTRUM ADULTS) tablet Take 1 tablet by mouth Daily.   10/23/2019 at Unknown time   • Omega-3 Fatty Acids (FISH OIL) 1000 MG capsule capsule Take 1,000 mg by mouth Daily With Breakfast.   10/23/2019 at Unknown time       Current Meds  No current facility-administered medications on file prior to encounter.      Current Outpatient Medications on File Prior to Encounter   Medication Sig Dispense Refill   • amLODIPine (NORVASC) 10 MG tablet Take 1 tablet by mouth Daily. 90 tablet 3   • aspirin 81 MG EC tablet Take 1 tablet by mouth Daily.     • atorvastatin (LIPITOR) 20 MG tablet Take 20 mg by mouth Daily.     • folic acid (FOLVITE) 1 MG tablet Take 1 mg by mouth Daily.     • losartan (COZAAR) 100 MG tablet TAKE ONE TABLET BY MOUTH DAILY 30 tablet 3   • metoprolol tartrate (LOPRESSOR) 25 MG  tablet TAKE ONE TABLET BY MOUTH EVERY 12 HOURS 180 tablet 2   • Multiple Vitamins-Minerals (CENTRUM ADULTS) tablet Take 1 tablet by mouth Daily.     • Omega-3 Fatty Acids (FISH OIL) 1000 MG capsule capsule Take 1,000 mg by mouth Daily With Breakfast.         Social History     Socioeconomic History   • Marital status:      Spouse name: Not on file   • Number of children: Not on file   • Years of education: Not on file   • Highest education level: Not on file   Tobacco Use   • Smoking status: Former Smoker     Packs/day: 0.50     Years: 40.00     Pack years: 20.00     Types: Cigarettes     Last attempt to quit:      Years since quittin.8   • Smokeless tobacco: Never Used   Substance and Sexual Activity   • Alcohol use: Yes     Comment: Caffeine use   • Drug use: No   • Sexual activity: Defer       Family Hx: Non-contributory    REVIEW OF SYSTEMS:   ROS was performed and is negative except as outlined in HPI     REVIEW OF SYSTEMS:   CONSTITUTIONAL: No weight loss, fever, chills, weakness or fatigue.   HEENT: Eyes: No visual loss, blurred vision, double vision or yellow sclerae. Ears, Nose, Throat: No hearing loss, sneezing, congestion, runny nose or sore throat.   SKIN: No rash or itching.     RESPIRATORY: No shortness of breath, hemoptysis, cough or sputum.   GASTROINTESTINAL: No anorexia, nausea, vomiting or diarrhea. No abdominal pain, bright red blood per rectum or melena.  NEUROLOGICAL: No headache, dizziness, syncope, paralysis, numbness or tingling in the extremities.  MUSCULOSKELETAL: No muscle, back pain, joint pain or stiffness.   HEMATOLOGIC: No anemia, bleeding or bruising.   LYMPHATICS: No enlarged nodes.  PSYCHIATRIC: No history of depression, anxiety, hallucinations.   ENDOCRINOLOGIC: No reports of sweating, cold or heat intolerance. No polyuria or polydipsia.        Objective:     Vitals:    10/24/19 0735 10/24/19 0831 10/24/19 0836   BP: (!) 193/82     BP Location: Left arm     Patient  "Position: Lying     Pulse: 64     Resp: 16 16 16   Temp: 98 °F (36.7 °C)     TempSrc: Temporal     SpO2: 97%     Weight: 50 kg (110 lb 2 oz)     Height: 161.3 cm (63.5\")       Body mass index is 19.2 kg/m².  Flowsheet Rows      First Filed Value   Admission Height  161.3 cm (63.5\") Documented at 10/24/2019 0735   Admission Weight  50 kg (110 lb 2 oz) Documented at 10/24/2019 0735          General Appearance:    Alert, oriented x 3, in no acute distress   Head:    Normocephalic, without obvious abnormality, atraumatic   Ears:    Ears appear intact with no abnormalities noted   Throat:   No oral lesions, dentition good   Neck:   No adenopathy, supple, trachea midline, no thyromegaly, no carotid bruit, no JVD, pulses parvus at tardus   Lungs:    Breath sounds are equal and  clear to auscultation    Heart:   Normal S1 and very soft S2, RRR, late peaking 3 out of 6 systolic ejection murmur/gallop or rub   Abdomen:    Normal bowel sounds, obese, soft non-tender, non-distended, no organomegaly, no guarding   Extremities:   Moves all extremities well, no edema, no cyanosis, no redness   Pulses:   Pulses palpable and equal bilaterally. Normal radial pulses   Skin:   No bleeding, bruising or rash   Lymph nodes:   No palpable adenopathy     I personally viewed and interpreted the patient's EKG/Telemetry data    Assessment:  Active Hospital Problems    Diagnosis  POA   • Nonrheumatic aortic valve stenosis [I35.0]  Unknown   • Nonrheumatic mitral valve regurgitation [I34.0]  Unknown      Resolved Hospital Problems   No resolved problems to display.       Plan: She has severe aortic stenosis by exam as well has history.  This is nonrheumatic severe degenerative aortic stenosis.  Her echo which is a high quality echo shows severe aortic stenosis mild LVH normal LV function mild aortic aneurysm 3.9 cm.  At this point I think she is symptomatic from this and I think we should move forward with a TAVR evaluation.  We are going to " start with a cardiac cath today I am not going to cross the aortic valve.  I have explained the risks of the cardiac cath as well as the risks of the TAVR with her family as well as with the patient and they agree and understand.  Further decisions will be based on the findings of our tests but hopefully will be replacing her valve in the next couple weeks

## 2019-10-24 NOTE — NURSING NOTE
Spoke with patient and daughter RE: TAVR evaluation process.  Will work on scheduling testing and will be in touch with procedure dates and times. Patient and daughter expressed understanding and request from patient to call daughter with any testing dates and times.

## 2019-10-25 LAB
HCT VFR BLDA CALC: 32 % (ref 38–51)
HCT VFR BLDA CALC: 33 % (ref 38–51)
HGB BLDA-MCNC: 10.9 G/DL (ref 12–17)
HGB BLDA-MCNC: 11.2 G/DL (ref 12–17)
SAO2 % BLDA: 71 % (ref 95–98)
SAO2 % BLDA: 96 % (ref 95–98)

## 2019-10-31 ENCOUNTER — TRANSCRIBE ORDERS (OUTPATIENT)
Dept: CARDIOLOGY | Facility: HOSPITAL | Age: 84
End: 2019-10-31

## 2019-10-31 ENCOUNTER — HOSPITAL ENCOUNTER (OUTPATIENT)
Dept: RESPIRATORY THERAPY | Facility: HOSPITAL | Age: 84
Discharge: HOME | End: 2019-10-31

## 2019-10-31 ENCOUNTER — HOSPITAL ENCOUNTER (OUTPATIENT)
Dept: CARDIOLOGY | Facility: HOSPITAL | Age: 84
Discharge: HOME | End: 2019-10-31

## 2019-10-31 ENCOUNTER — HOSPITAL ENCOUNTER (OUTPATIENT)
Dept: CT IMAGING | Facility: HOSPITAL | Age: 84
Discharge: HOME | End: 2019-10-31
Admitting: INTERNAL MEDICINE

## 2019-10-31 DIAGNOSIS — I35.0 AORTIC VALVE STENOSIS, ETIOLOGY OF CARDIAC VALVE DISEASE UNSPECIFIED: Primary | ICD-10-CM

## 2019-10-31 DIAGNOSIS — I35.0 AORTIC VALVE STENOSIS, ETIOLOGY OF CARDIAC VALVE DISEASE UNSPECIFIED: ICD-10-CM

## 2019-10-31 DIAGNOSIS — I65.21 STENOSIS OF RIGHT INTERNAL CAROTID ARTERY: ICD-10-CM

## 2019-10-31 LAB
BH CV XLRA MEAS LEFT CCA RATIO VEL: -61.3 CM/SEC
BH CV XLRA MEAS LEFT DIST CCA EDV: -7.1 CM/SEC
BH CV XLRA MEAS LEFT DIST CCA PSV: -61.3 CM/SEC
BH CV XLRA MEAS LEFT DIST ICA EDV: -16.9 CM/SEC
BH CV XLRA MEAS LEFT DIST ICA PSV: -65.4 CM/SEC
BH CV XLRA MEAS LEFT ICA RATIO VEL: 100 CM/SEC
BH CV XLRA MEAS LEFT ICA/CCA RATIO: -1.6
BH CV XLRA MEAS LEFT MID ICA EDV: -11.4 CM/SEC
BH CV XLRA MEAS LEFT MID ICA PSV: -65.4 CM/SEC
BH CV XLRA MEAS LEFT PROX CCA EDV: 4.7 CM/SEC
BH CV XLRA MEAS LEFT PROX CCA PSV: 49.5 CM/SEC
BH CV XLRA MEAS LEFT PROX ECA EDV: 0 CM/SEC
BH CV XLRA MEAS LEFT PROX ECA PSV: -131 CM/SEC
BH CV XLRA MEAS LEFT PROX ICA EDV: 14.1 CM/SEC
BH CV XLRA MEAS LEFT PROX ICA PSV: 100 CM/SEC
BH CV XLRA MEAS LEFT PROX SCLA PSV: 129 CM/SEC
BH CV XLRA MEAS LEFT VERTEBRAL A EDV: -15.2 CM/SEC
BH CV XLRA MEAS LEFT VERTEBRAL A PSV: -86.8 CM/SEC
BH CV XLRA MEAS RIGHT CCA RATIO VEL: -78.6 CM/SEC
BH CV XLRA MEAS RIGHT DIST CCA EDV: -6.7 CM/SEC
BH CV XLRA MEAS RIGHT DIST CCA PSV: -78.6 CM/SEC
BH CV XLRA MEAS RIGHT DIST ICA EDV: -13 CM/SEC
BH CV XLRA MEAS RIGHT DIST ICA PSV: -60.8 CM/SEC
BH CV XLRA MEAS RIGHT ICA RATIO VEL: -65.3 CM/SEC
BH CV XLRA MEAS RIGHT ICA/CCA RATIO: 0.83
BH CV XLRA MEAS RIGHT MID ICA EDV: -11.9 CM/SEC
BH CV XLRA MEAS RIGHT MID ICA PSV: -65.3 CM/SEC
BH CV XLRA MEAS RIGHT PROX CCA EDV: 5.5 CM/SEC
BH CV XLRA MEAS RIGHT PROX CCA PSV: 58.5 CM/SEC
BH CV XLRA MEAS RIGHT PROX ECA EDV: 0 CM/SEC
BH CV XLRA MEAS RIGHT PROX ECA PSV: -87.4 CM/SEC
BH CV XLRA MEAS RIGHT PROX ICA EDV: -6.2 CM/SEC
BH CV XLRA MEAS RIGHT PROX ICA PSV: -40.9 CM/SEC
BH CV XLRA MEAS RIGHT PROX SCLA PSV: 69.2 CM/SEC
BH CV XLRA MEAS RIGHT VERTEBRAL A EDV: -7.1 CM/SEC
BH CV XLRA MEAS RIGHT VERTEBRAL A PSV: -82.9 CM/SEC
CREAT BLDA-MCNC: 1.1 MG/DL (ref 0.6–1.3)
LEFT ARM BP: NORMAL MMHG
RIGHT ARM BP: NORMAL MMHG

## 2019-10-31 PROCEDURE — 93005 ELECTROCARDIOGRAM TRACING: CPT | Performed by: INTERNAL MEDICINE

## 2019-10-31 PROCEDURE — 71275 CT ANGIOGRAPHY CHEST: CPT

## 2019-10-31 PROCEDURE — 94010 BREATHING CAPACITY TEST: CPT

## 2019-10-31 PROCEDURE — 74174 CTA ABD&PLVS W/CONTRAST: CPT

## 2019-10-31 PROCEDURE — 93010 ELECTROCARDIOGRAM REPORT: CPT | Performed by: INTERNAL MEDICINE

## 2019-10-31 PROCEDURE — 82565 ASSAY OF CREATININE: CPT

## 2019-10-31 PROCEDURE — 0 IOPAMIDOL PER 1 ML: Performed by: INTERNAL MEDICINE

## 2019-10-31 PROCEDURE — 93880 EXTRACRANIAL BILAT STUDY: CPT

## 2019-10-31 PROCEDURE — 94726 PLETHYSMOGRAPHY LUNG VOLUMES: CPT

## 2019-10-31 PROCEDURE — 94729 DIFFUSING CAPACITY: CPT

## 2019-10-31 RX ORDER — ALBUTEROL SULFATE 2.5 MG/3ML
2.5 SOLUTION RESPIRATORY (INHALATION) ONCE AS NEEDED
Status: DISCONTINUED | OUTPATIENT
Start: 2019-10-31 | End: 2019-11-01 | Stop reason: HOSPADM

## 2019-10-31 RX ADMIN — IOPAMIDOL 95 ML: 755 INJECTION, SOLUTION INTRAVENOUS at 14:53

## 2019-11-05 ENCOUNTER — OFFICE VISIT (OUTPATIENT)
Dept: CARDIAC SURGERY | Facility: CLINIC | Age: 84
End: 2019-11-05

## 2019-11-05 VITALS
BODY MASS INDEX: 19.84 KG/M2 | HEIGHT: 63 IN | SYSTOLIC BLOOD PRESSURE: 153 MMHG | WEIGHT: 112 LBS | TEMPERATURE: 98.2 F | DIASTOLIC BLOOD PRESSURE: 88 MMHG | OXYGEN SATURATION: 98 % | HEART RATE: 66 BPM | RESPIRATION RATE: 20 BRPM

## 2019-11-05 DIAGNOSIS — I35.1 NONRHEUMATIC AORTIC VALVE INSUFFICIENCY: ICD-10-CM

## 2019-11-05 DIAGNOSIS — I35.0 NONRHEUMATIC AORTIC VALVE STENOSIS: ICD-10-CM

## 2019-11-05 DIAGNOSIS — I34.0 NONRHEUMATIC MITRAL VALVE REGURGITATION: ICD-10-CM

## 2019-11-05 DIAGNOSIS — I10 ESSENTIAL HYPERTENSION: Primary | ICD-10-CM

## 2019-11-05 DIAGNOSIS — I77.810 ASCENDING AORTA DILATION (HCC): ICD-10-CM

## 2019-11-05 DIAGNOSIS — R53.1 WEAKNESS GENERALIZED: ICD-10-CM

## 2019-11-05 PROCEDURE — 99204 OFFICE O/P NEW MOD 45 MIN: CPT | Performed by: THORACIC SURGERY (CARDIOTHORACIC VASCULAR SURGERY)

## 2019-11-12 ENCOUNTER — TELEPHONE (OUTPATIENT)
Dept: CARDIOLOGY | Facility: CLINIC | Age: 84
End: 2019-11-12

## 2019-11-12 NOTE — TELEPHONE ENCOUNTER
Daughter Letty calling wanting to know the next step for the TAVR  Procedure  Letty can be reached at [phone]

## 2019-11-16 NOTE — PROGRESS NOTES
11/16/2019    Seen on 11/5/19    Chief Complaint     Evaluation of aortic stenosis    History of Present Illness:       Dear Dr. Baptiste and Colleagues,  It was nice to see Gita Wharton in consultation at your request. She is a 83 y.o. female with a history of HTN, high lipids, frailty who has developed progressive dyspnea of exertion and fatigue. She denies chest pain, syncope, TIA, orthopnea or LE edema.She had TTE that showed severe AS with a MG of 40 mmHg, MAYRA of o.7 cm2 and PV of 4.1 m/s. Also, moderate MR was noted. She had a cardiac cath that showed a 70 %mid CX and normal EF. 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 She has no family history of aneurysms or dissections.    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   • Weakness generalized   • Nonrheumatic aortic valve insufficiency   • Ascending aorta dilation (CMS/HCC)   • Nonrheumatic aortic valve stenosis   • Nonrheumatic mitral valve regurgitation       Past Medical History:   Diagnosis Date   • Acute right MCA stroke (CMS/HCC) 07/24/2018   • Aortic stenosis    • Fracture, hip (CMS/HCC)     LEFT   • Hemorrhoids 8/5/2018   • History of transfusion    • Hyperlipidemia    • Hypertension    • Hypokalemia    • 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    R LL            Dr FRANKIE Branch - suggested yearly CXR to Monitor   • Pyelonephritis 4/16/2019   • Stenosis of right internal carotid artery 7/25/2018   • Subdural hematoma (CMS/HCC) 07/12/2018    Secondary to fall, JULY 2018       Past Surgical History:   Procedure Laterality Date   • CARDIAC CATHETERIZATION N/A 10/24/2019     Procedure: Coronary angiography;  Surgeon: Warren Tanner MD;  Location:  [facility] CATH INVASIVE LOCATION;  Service: Cardiovascular   • CARDIAC CATHETERIZATION N/A 10/24/2019    Procedure: Left heart cath;  Surgeon: Warren Tanner MD;  Location:  [facility] CATH INVASIVE LOCATION;  Service: Cardiovascular   • CARDIAC CATHETERIZATION N/A 10/24/2019    Procedure: Right heart cath;  Surgeon: Warren Tanner MD;  Location:  [facility] CATH INVASIVE LOCATION;  Service: Cardiovascular   • CAROTID ENDARTERECTOMY Right 7/26/2018    Procedure: RT CAROTID ENDARTERECTOMY;  Surgeon: Inna Villarreal MD;  Location: [facility] MAIN OR;  Service: Vascular   • COLONOSCOPY N/A 8/7/2018    Adenomatous polyp.   Repeat 2021.  Surgeon: Ken Tidwell MD;    • HYSTERECTOMY     • THYROIDECTOMY     • TOTAL HIP ARTHROPLASTY Right    • TOTAL HIP ARTHROPLASTY Left 6/30/2019    Procedure: LEFT HIP ANTERIOR HIP WITH HANA TABLE;  Surgeon: Tiffani Pereira MD;  [facility];  Service: Orthopedics       Allergies   Allergen Reactions   • Sulfa Antibiotics Unknown (See Comments)     CAUSED DIZZINESS   • Tekturna [Aliskiren] Diarrhea         Current Outpatient Medications:   •  amLODIPine (NORVASC) 10 MG tablet, Take 1 tablet by mouth Daily., Disp: 90 tablet, Rfl: 3  •  aspirin 81 MG EC tablet, Take 1 tablet by mouth Daily., Disp: , Rfl:   •  atorvastatin (LIPITOR) 20 MG tablet, Take 20 mg by mouth Daily., Disp: , Rfl:   •  folic acid (FOLVITE) 1 MG tablet, Take 1 mg by mouth Daily., Disp: , Rfl:   •  losartan (COZAAR) 100 MG tablet, TAKE ONE TABLET BY MOUTH DAILY, Disp: 30 tablet, Rfl: 3  •  metoprolol tartrate (LOPRESSOR) 25 MG tablet, TAKE ONE TABLET BY MOUTH EVERY 12 HOURS, Disp: 180 tablet, Rfl: 2  •  Multiple Vitamins-Minerals (CENTRUM ADULTS) tablet, Take 1 tablet by mouth Daily., Disp: , Rfl:     Social History     Socioeconomic History   • Marital status:      Spouse name: Not on file   • Number of children: Not  on file   • Years of education: Not on file   • Highest education level: Not on file   Tobacco Use   • Smoking status: Former Smoker     Packs/day: 0.50     Years: 40.00     Pack years: 20.00     Types: Cigarettes     Last attempt to quit:      Years since quittin.8   • Smokeless tobacco: Never Used   Substance and Sexual Activity   • Alcohol use: Yes     Comment: Caffeine use   • Drug use: No   • Sexual activity: Defer       Family History   Problem Relation Age of Onset   • Cancer Mother    • Dementia Father    • Hypertension Father    • Hypertension Daughter    • Cancer Daughter    • Malig Hyperthermia Neg Hx      Review of Systems   Constitutional: Positive for fatigue.   Respiratory: Positive for shortness of breath.    Cardiovascular: Negative for chest pain and leg swelling.   Genitourinary: Negative for dysuria.   Neurological: Positive for weakness. Negative for dizziness and tremors.   All other systems reviewed and are negative.      Physical Exam:    Vital Signs:  Weight: 50.8 kg (112 lb)   Body mass index is 19.84 kg/m².  Temp: 98.2 °F (36.8 °C)   Heart Rate: 66   BP: 153/88     Physical Exam   Constitutional: She appears well-developed and well-nourished.   HENT:   Head: Normocephalic.   Mouth/Throat: Oropharynx is clear and moist. No oropharyngeal exudate.   Eyes: Pupils are equal, round, and reactive to light. Right eye exhibits no discharge. Left eye exhibits discharge. No scleral icterus.   Neck: Normal range of motion. No tracheal deviation present. No thyromegaly present.   Cardiovascular: S1 normal and S2 normal. PMI is not displaced. Exam reveals no decreased pulses.   Murmur heard.  Pulses:       Femoral pulses are 0 on the right side, and 1+ on the left side.       Dorsalis pedis pulses are 0 on the right side.   3/6 systolic murmur in aortic area   Pulmonary/Chest: Effort normal and breath sounds normal. No stridor. No respiratory distress. She has no wheezes. She has no rales.         Assessment:     Problem List Items Addressed This Visit        Cardiovascular and Mediastinum    Essential hypertension - Primary    Aortic stenosis    Nonrheumatic aortic valve insufficiency    Ascending aorta dilation (CMS/HCC)    Nonrheumatic mitral valve regurgitation       Other    Weakness generalized          1. Symptomatic aortic stenosis  2. Moderate MR  3. Frailty  4. 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 HTN, well controlled    Recommendation/Plan:     I recommend TAVR if feasible. She is probably inoperable due to her calcific aorta. I will favor TAVR access through the left carotid and right innominate artery as a default access. I discussed the risks (STS calculated), benefits and alternatives of surgery and she wishes to proceed.    Thank you for allowing me to participate in her care.    Regards,    Octaviano Yan M.D.

## 2019-11-19 ENCOUNTER — PREP FOR SURGERY (OUTPATIENT)
Dept: OTHER | Facility: HOSPITAL | Age: 84
End: 2019-11-19

## 2019-11-19 DIAGNOSIS — R79.9 ABNORMAL FINDING OF BLOOD CHEMISTRY, UNSPECIFIED: ICD-10-CM

## 2019-11-19 DIAGNOSIS — I13.0 HYPERTENSIVE HEART AND CHRONIC KIDNEY DISEASE WITH HEART FAILURE AND STAGE 1 THROUGH STAGE 4 CHRONIC KIDNEY DISEASE, OR UNSPECIFIED CHRONIC KIDNEY DISEASE (HCC): ICD-10-CM

## 2019-11-19 DIAGNOSIS — I35.0 NONRHEUMATIC AORTIC VALVE STENOSIS: Primary | ICD-10-CM

## 2019-11-19 DIAGNOSIS — I35.0 AORTIC STENOSIS: Primary | ICD-10-CM

## 2019-11-19 DIAGNOSIS — I50.32 CHRONIC DIASTOLIC CONGESTIVE HEART FAILURE (HCC): ICD-10-CM

## 2019-11-19 DIAGNOSIS — R79.1 ABNORMAL COAGULATION PROFILE: ICD-10-CM

## 2019-11-19 RX ORDER — CHLORHEXIDINE GLUCONATE 0.12 MG/ML
15 RINSE ORAL EVERY 12 HOURS
Status: CANCELLED | OUTPATIENT
Start: 2019-11-19 | End: 2019-11-20

## 2019-11-19 RX ORDER — CEFAZOLIN SODIUM 2 G/100ML
2 INJECTION, SOLUTION INTRAVENOUS
Status: CANCELLED | OUTPATIENT
Start: 2019-12-03 | End: 2019-12-04

## 2019-11-19 RX ORDER — CHLORHEXIDINE GLUCONATE 0.12 MG/ML
15 RINSE ORAL ONCE
Status: CANCELLED | OUTPATIENT
Start: 2019-12-03 | End: 2019-11-19

## 2019-11-29 ENCOUNTER — ANESTHESIA EVENT (OUTPATIENT)
Dept: PERIOP | Facility: HOSPITAL | Age: 84
End: 2019-11-29

## 2019-11-29 ENCOUNTER — HOSPITAL ENCOUNTER (OUTPATIENT)
Dept: GENERAL RADIOLOGY | Facility: HOSPITAL | Age: 84
Discharge: HOME | End: 2019-11-29
Admitting: NURSE PRACTITIONER

## 2019-11-29 ENCOUNTER — PREP FOR SURGERY (OUTPATIENT)
Dept: OTHER | Facility: HOSPITAL | Age: 84
End: 2019-11-29

## 2019-11-29 ENCOUNTER — APPOINTMENT (OUTPATIENT)
Dept: PREADMISSION TESTING | Facility: HOSPITAL | Age: 84
End: 2019-11-29

## 2019-11-29 VITALS
BODY MASS INDEX: 20.38 KG/M2 | SYSTOLIC BLOOD PRESSURE: 179 MMHG | HEIGHT: 63 IN | OXYGEN SATURATION: 100 % | HEART RATE: 65 BPM | RESPIRATION RATE: 18 BRPM | DIASTOLIC BLOOD PRESSURE: 66 MMHG | TEMPERATURE: 97 F | WEIGHT: 115 LBS

## 2019-11-29 DIAGNOSIS — I13.0 HYPERTENSIVE HEART AND CHRONIC KIDNEY DISEASE WITH HEART FAILURE AND STAGE 1 THROUGH STAGE 4 CHRONIC KIDNEY DISEASE, OR UNSPECIFIED CHRONIC KIDNEY DISEASE (HCC): ICD-10-CM

## 2019-11-29 DIAGNOSIS — R79.9 ABNORMAL FINDING OF BLOOD CHEMISTRY, UNSPECIFIED: ICD-10-CM

## 2019-11-29 DIAGNOSIS — N30.00 ACUTE CYSTITIS WITHOUT HEMATURIA: ICD-10-CM

## 2019-11-29 DIAGNOSIS — R79.1 ABNORMAL COAGULATION PROFILE: ICD-10-CM

## 2019-11-29 DIAGNOSIS — I35.0 NONRHEUMATIC AORTIC VALVE STENOSIS: Primary | ICD-10-CM

## 2019-11-29 DIAGNOSIS — I35.0 AORTIC STENOSIS: ICD-10-CM

## 2019-11-29 LAB
ABO GROUP BLD: NORMAL
ALBUMIN SERPL-MCNC: 3.9 G/DL (ref 3.5–5.2)
ALBUMIN/GLOB SERPL: 1.2 G/DL
ALP SERPL-CCNC: 67 U/L (ref 39–117)
ALT SERPL W P-5'-P-CCNC: 5 U/L (ref 1–33)
ANION GAP SERPL CALCULATED.3IONS-SCNC: 10.1 MMOL/L (ref 5–15)
APTT PPP: 30.4 SECONDS (ref 22.7–35.4)
AST SERPL-CCNC: 14 U/L (ref 1–32)
BACTERIA UR QL AUTO: ABNORMAL /HPF
BASOPHILS # BLD AUTO: 0.02 10*3/MM3 (ref 0–0.2)
BASOPHILS NFR BLD AUTO: 0.2 % (ref 0–1.5)
BILIRUB SERPL-MCNC: 0.2 MG/DL (ref 0.2–1.2)
BILIRUB UR QL STRIP: NEGATIVE
BLD GP AB SCN SERPL QL: NEGATIVE
BUN BLD-MCNC: 13 MG/DL (ref 8–23)
BUN/CREAT SERPL: 10.8 (ref 7–25)
CALCIUM SPEC-SCNC: 9.3 MG/DL (ref 8.6–10.5)
CHLORIDE SERPL-SCNC: 100 MMOL/L (ref 98–107)
CLARITY UR: ABNORMAL
CLOSE TME COLL+ADP + EPINEP PNL BLD: 94 % (ref 86–100)
CO2 SERPL-SCNC: 23.9 MMOL/L (ref 22–29)
COLOR UR: YELLOW
CREAT BLD-MCNC: 1.2 MG/DL (ref 0.57–1)
DEPRECATED RDW RBC AUTO: 39.5 FL (ref 37–54)
EOSINOPHIL # BLD AUTO: 0.01 10*3/MM3 (ref 0–0.4)
EOSINOPHIL NFR BLD AUTO: 0.1 % (ref 0.3–6.2)
ERYTHROCYTE [DISTWIDTH] IN BLOOD BY AUTOMATED COUNT: 14.3 % (ref 12.3–15.4)
GFR SERPL CREATININE-BSD FRML MDRD: 43 ML/MIN/1.73
GLOBULIN UR ELPH-MCNC: 3.2 GM/DL
GLUCOSE BLD-MCNC: 80 MG/DL (ref 65–99)
GLUCOSE UR STRIP-MCNC: NEGATIVE MG/DL
HBA1C MFR BLD: 5.38 % (ref 4.8–5.6)
HCT VFR BLD AUTO: 33.9 % (ref 34–46.6)
HGB BLD-MCNC: 10.9 G/DL (ref 12–15.9)
HGB UR QL STRIP.AUTO: ABNORMAL
HYALINE CASTS UR QL AUTO: ABNORMAL /LPF
IMM GRANULOCYTES # BLD AUTO: 0.03 10*3/MM3 (ref 0–0.05)
IMM GRANULOCYTES NFR BLD AUTO: 0.3 % (ref 0–0.5)
INR PPP: 0.98 (ref 0.9–1.1)
KETONES UR QL STRIP: NEGATIVE
LEUKOCYTE ESTERASE UR QL STRIP.AUTO: ABNORMAL
LYMPHOCYTES # BLD AUTO: 1.44 10*3/MM3 (ref 0.7–3.1)
LYMPHOCYTES NFR BLD AUTO: 16.4 % (ref 19.6–45.3)
MCH RBC QN AUTO: 25.1 PG (ref 26.6–33)
MCHC RBC AUTO-ENTMCNC: 32.2 G/DL (ref 31.5–35.7)
MCV RBC AUTO: 77.9 FL (ref 79–97)
MONOCYTES # BLD AUTO: 0.94 10*3/MM3 (ref 0.1–0.9)
MONOCYTES NFR BLD AUTO: 10.7 % (ref 5–12)
NEUTROPHILS # BLD AUTO: 6.32 10*3/MM3 (ref 1.7–7)
NEUTROPHILS NFR BLD AUTO: 72.3 % (ref 42.7–76)
NITRITE UR QL STRIP: POSITIVE
NRBC BLD AUTO-RTO: 0 /100 WBC (ref 0–0.2)
NT-PROBNP SERPL-MCNC: 2860 PG/ML (ref 5–1800)
PH UR STRIP.AUTO: 6.5 [PH] (ref 5–8)
PLATELET # BLD AUTO: 206 10*3/MM3 (ref 140–450)
PMV BLD AUTO: 11.9 FL (ref 6–12)
POTASSIUM BLD-SCNC: 4.4 MMOL/L (ref 3.5–5.2)
PROT SERPL-MCNC: 7.1 G/DL (ref 6–8.5)
PROT UR QL STRIP: ABNORMAL
PROTHROMBIN TIME: 12.7 SECONDS (ref 11.7–14.2)
RBC # BLD AUTO: 4.35 10*6/MM3 (ref 3.77–5.28)
RBC # UR: ABNORMAL /HPF
REF LAB TEST METHOD: ABNORMAL
RH BLD: POSITIVE
SODIUM BLD-SCNC: 134 MMOL/L (ref 136–145)
SP GR UR STRIP: 1.01 (ref 1–1.03)
SQUAMOUS #/AREA URNS HPF: ABNORMAL /HPF
T&S EXPIRATION DATE: NORMAL
UROBILINOGEN UR QL STRIP: ABNORMAL
WBC NRBC COR # BLD: 8.76 10*3/MM3 (ref 3.4–10.8)
WBC UR QL AUTO: ABNORMAL /HPF

## 2019-11-29 PROCEDURE — 86901 BLOOD TYPING SEROLOGIC RH(D): CPT | Performed by: NURSE PRACTITIONER

## 2019-11-29 PROCEDURE — A9270 NON-COVERED ITEM OR SERVICE: HCPCS | Performed by: NURSE PRACTITIONER

## 2019-11-29 PROCEDURE — 86900 BLOOD TYPING SEROLOGIC ABO: CPT | Performed by: NURSE PRACTITIONER

## 2019-11-29 PROCEDURE — 87086 URINE CULTURE/COLONY COUNT: CPT | Performed by: NURSE PRACTITIONER

## 2019-11-29 PROCEDURE — 36415 COLL VENOUS BLD VENIPUNCTURE: CPT

## 2019-11-29 PROCEDURE — 86923 COMPATIBILITY TEST ELECTRIC: CPT

## 2019-11-29 PROCEDURE — 83880 ASSAY OF NATRIURETIC PEPTIDE: CPT | Performed by: NURSE PRACTITIONER

## 2019-11-29 PROCEDURE — 81001 URINALYSIS AUTO W/SCOPE: CPT | Performed by: NURSE PRACTITIONER

## 2019-11-29 PROCEDURE — 86850 RBC ANTIBODY SCREEN: CPT | Performed by: NURSE PRACTITIONER

## 2019-11-29 PROCEDURE — 93010 ELECTROCARDIOGRAM REPORT: CPT | Performed by: INTERNAL MEDICINE

## 2019-11-29 PROCEDURE — 71046 X-RAY EXAM CHEST 2 VIEWS: CPT

## 2019-11-29 PROCEDURE — 85025 COMPLETE CBC W/AUTO DIFF WBC: CPT | Performed by: NURSE PRACTITIONER

## 2019-11-29 PROCEDURE — 63710000001 CHLORHEXIDINE 0.12 % SOLUTION: Performed by: NURSE PRACTITIONER

## 2019-11-29 PROCEDURE — 80053 COMPREHEN METABOLIC PANEL: CPT | Performed by: NURSE PRACTITIONER

## 2019-11-29 PROCEDURE — 87088 URINE BACTERIA CULTURE: CPT | Performed by: NURSE PRACTITIONER

## 2019-11-29 PROCEDURE — 83036 HEMOGLOBIN GLYCOSYLATED A1C: CPT | Performed by: NURSE PRACTITIONER

## 2019-11-29 PROCEDURE — 85576 BLOOD PLATELET AGGREGATION: CPT | Performed by: NURSE PRACTITIONER

## 2019-11-29 PROCEDURE — 93005 ELECTROCARDIOGRAM TRACING: CPT

## 2019-11-29 PROCEDURE — 87186 SC STD MICRODIL/AGAR DIL: CPT | Performed by: NURSE PRACTITIONER

## 2019-11-29 PROCEDURE — 85610 PROTHROMBIN TIME: CPT | Performed by: NURSE PRACTITIONER

## 2019-11-29 PROCEDURE — 85730 THROMBOPLASTIN TIME PARTIAL: CPT | Performed by: NURSE PRACTITIONER

## 2019-11-29 RX ORDER — LOSARTAN POTASSIUM 100 MG/1
100 TABLET ORAL DAILY
COMMUNITY
End: 2020-01-10

## 2019-11-29 RX ORDER — LEVOFLOXACIN 500 MG/1
500 TABLET, FILM COATED ORAL DAILY
Qty: 7 TABLET | Refills: 0 | Status: SHIPPED | OUTPATIENT
Start: 2019-11-29 | End: 2019-12-06

## 2019-11-29 RX ORDER — CHLORHEXIDINE GLUCONATE 0.12 MG/ML
15 RINSE ORAL
COMMUNITY
End: 2019-12-06 | Stop reason: HOSPADM

## 2019-11-29 RX ORDER — CHLORHEXIDINE GLUCONATE 0.12 MG/ML
15 RINSE ORAL EVERY 12 HOURS
Status: DISPENSED | OUTPATIENT
Start: 2019-11-29 | End: 2019-11-30

## 2019-11-29 RX ORDER — DIPHENHYDRAMINE HCL 25 MG
12.5 CAPSULE ORAL DAILY PRN
COMMUNITY

## 2019-12-01 LAB — BACTERIA SPEC AEROBE CULT: ABNORMAL

## 2019-12-02 ENCOUNTER — LAB (OUTPATIENT)
Dept: LAB | Facility: HOSPITAL | Age: 84
End: 2019-12-02

## 2019-12-02 DIAGNOSIS — I35.0 NONRHEUMATIC AORTIC VALVE STENOSIS: ICD-10-CM

## 2019-12-02 DIAGNOSIS — N30.00 ACUTE CYSTITIS WITHOUT HEMATURIA: ICD-10-CM

## 2019-12-02 LAB
BACTERIA UR QL AUTO: ABNORMAL /HPF
BILIRUB UR QL STRIP: NEGATIVE
CLARITY UR: CLEAR
COLOR UR: YELLOW
GLUCOSE UR STRIP-MCNC: NEGATIVE MG/DL
HGB UR QL STRIP.AUTO: NEGATIVE
HYALINE CASTS UR QL AUTO: ABNORMAL /LPF
KETONES UR QL STRIP: NEGATIVE
LEUKOCYTE ESTERASE UR QL STRIP.AUTO: ABNORMAL
NITRITE UR QL STRIP: NEGATIVE
PH UR STRIP.AUTO: 6 [PH] (ref 5–8)
PROT UR QL STRIP: ABNORMAL
RBC # UR: ABNORMAL /HPF
REF LAB TEST METHOD: ABNORMAL
SP GR UR STRIP: 1.01 (ref 1–1.03)
SQUAMOUS #/AREA URNS HPF: ABNORMAL /HPF
UROBILINOGEN UR QL STRIP: ABNORMAL
WBC UR QL AUTO: ABNORMAL /HPF

## 2019-12-02 PROCEDURE — 81001 URINALYSIS AUTO W/SCOPE: CPT

## 2019-12-02 PROCEDURE — 87086 URINE CULTURE/COLONY COUNT: CPT

## 2019-12-03 ENCOUNTER — HOSPITAL ENCOUNTER (INPATIENT)
Facility: HOSPITAL | Age: 84
LOS: 3 days | Discharge: HOME | End: 2019-12-06
Attending: THORACIC SURGERY (CARDIOTHORACIC VASCULAR SURGERY) | Admitting: THORACIC SURGERY (CARDIOTHORACIC VASCULAR SURGERY)

## 2019-12-03 ENCOUNTER — ANESTHESIA (OUTPATIENT)
Dept: PERIOP | Facility: HOSPITAL | Age: 84
End: 2019-12-03

## 2019-12-03 ENCOUNTER — APPOINTMENT (OUTPATIENT)
Dept: GENERAL RADIOLOGY | Facility: HOSPITAL | Age: 84
End: 2019-12-03

## 2019-12-03 ENCOUNTER — ANCILLARY PROCEDURE (OUTPATIENT)
Dept: PERIOP | Facility: HOSPITAL | Age: 84
End: 2019-12-03

## 2019-12-03 DIAGNOSIS — I35.0 NONRHEUMATIC AORTIC VALVE STENOSIS: Primary | ICD-10-CM

## 2019-12-03 DIAGNOSIS — I50.32 CHRONIC DIASTOLIC CONGESTIVE HEART FAILURE (HCC): ICD-10-CM

## 2019-12-03 DIAGNOSIS — I35.0 NONRHEUMATIC AORTIC VALVE STENOSIS: ICD-10-CM

## 2019-12-03 DIAGNOSIS — I35.0 AORTIC STENOSIS: ICD-10-CM

## 2019-12-03 LAB
ACT BLD: 114 SECONDS (ref 82–152)
ACT BLD: 136 SECONDS (ref 82–152)
ACT BLD: 340 SECONDS (ref 82–152)
ACT BLD: 345 SECONDS (ref 82–152)
ALBUMIN SERPL-MCNC: 3 G/DL (ref 3.5–5.2)
ALBUMIN SERPL-MCNC: 3 G/DL (ref 3.5–5.2)
ALBUMIN/GLOB SERPL: 1.2 G/DL
ALP SERPL-CCNC: 52 U/L (ref 39–117)
ALT SERPL W P-5'-P-CCNC: 7 U/L (ref 1–33)
ANION GAP SERPL CALCULATED.3IONS-SCNC: 15.4 MMOL/L (ref 5–15)
ANION GAP SERPL CALCULATED.3IONS-SCNC: 15.4 MMOL/L (ref 5–15)
ARTERIAL PATENCY WRIST A: ABNORMAL
ARTERIAL PATENCY WRIST A: ABNORMAL
AST SERPL-CCNC: 14 U/L (ref 1–32)
ATMOSPHERIC PRESS: 745.3 MMHG
ATMOSPHERIC PRESS: 747.9 MMHG
BASE EXCESS BLDA CALC-SCNC: -3 MMOL/L (ref -5–5)
BASE EXCESS BLDA CALC-SCNC: -3.5 MMOL/L (ref 0–2)
BASE EXCESS BLDA CALC-SCNC: -4.3 MMOL/L (ref 0–2)
BASE EXCESS BLDA CALC-SCNC: -5 MMOL/L (ref -5–5)
BASOPHILS # BLD AUTO: 0.02 10*3/MM3 (ref 0–0.2)
BASOPHILS NFR BLD AUTO: 0.3 % (ref 0–1.5)
BDY SITE: ABNORMAL
BDY SITE: ABNORMAL
BILIRUB SERPL-MCNC: 0.2 MG/DL (ref 0.2–1.2)
BUN BLD-MCNC: 11 MG/DL (ref 8–23)
BUN BLD-MCNC: 11 MG/DL (ref 8–23)
BUN/CREAT SERPL: 10.3 (ref 7–25)
BUN/CREAT SERPL: 10.3 (ref 7–25)
CALCIUM SPEC-SCNC: 7.8 MG/DL (ref 8.6–10.5)
CALCIUM SPEC-SCNC: 7.8 MG/DL (ref 8.6–10.5)
CHLORIDE SERPL-SCNC: 101 MMOL/L (ref 98–107)
CHLORIDE SERPL-SCNC: 101 MMOL/L (ref 98–107)
CO2 BLDA-SCNC: 23 MMOL/L (ref 24–29)
CO2 BLDA-SCNC: 24 MMOL/L (ref 24–29)
CO2 SERPL-SCNC: 14.6 MMOL/L (ref 22–29)
CO2 SERPL-SCNC: 14.6 MMOL/L (ref 22–29)
CREAT BLD-MCNC: 1.07 MG/DL (ref 0.57–1)
CREAT BLD-MCNC: 1.07 MG/DL (ref 0.57–1)
DEPRECATED RDW RBC AUTO: 40.6 FL (ref 37–54)
EOSINOPHIL # BLD AUTO: 0 10*3/MM3 (ref 0–0.4)
EOSINOPHIL NFR BLD AUTO: 0 % (ref 0.3–6.2)
ERYTHROCYTE [DISTWIDTH] IN BLOOD BY AUTOMATED COUNT: 14.1 % (ref 12.3–15.4)
GFR SERPL CREATININE-BSD FRML MDRD: 49 ML/MIN/1.73
GFR SERPL CREATININE-BSD FRML MDRD: 49 ML/MIN/1.73
GLOBULIN UR ELPH-MCNC: 2.6 GM/DL
GLUCOSE BLD-MCNC: 199 MG/DL (ref 65–99)
GLUCOSE BLD-MCNC: 206 MG/DL (ref 65–99)
GLUCOSE BLDC GLUCOMTR-MCNC: 122 MG/DL (ref 70–130)
GLUCOSE BLDC GLUCOMTR-MCNC: 162 MG/DL (ref 70–130)
GLUCOSE BLDC GLUCOMTR-MCNC: 164 MG/DL (ref 70–130)
GLUCOSE BLDC GLUCOMTR-MCNC: 207 MG/DL (ref 70–130)
HCO3 BLDA-SCNC: 20.2 MMOL/L (ref 22–28)
HCO3 BLDA-SCNC: 21.4 MMOL/L (ref 22–28)
HCO3 BLDA-SCNC: 21.7 MMOL/L (ref 22–26)
HCO3 BLDA-SCNC: 22.7 MMOL/L (ref 22–26)
HCT VFR BLD AUTO: 28.8 % (ref 34–46.6)
HCT VFR BLDA CALC: 27 % (ref 38–51)
HCT VFR BLDA CALC: 29 % (ref 38–51)
HGB BLD-MCNC: 9.5 G/DL (ref 12–15.9)
HGB BLDA-MCNC: 9.2 G/DL (ref 12–17)
HGB BLDA-MCNC: 9.9 G/DL (ref 12–17)
HOROWITZ INDEX BLD+IHG-RTO: 100 %
HOROWITZ INDEX BLD+IHG-RTO: 40 %
IMM GRANULOCYTES # BLD AUTO: 0.05 10*3/MM3 (ref 0–0.05)
IMM GRANULOCYTES NFR BLD AUTO: 0.7 % (ref 0–0.5)
LYMPHOCYTES # BLD AUTO: 0.47 10*3/MM3 (ref 0.7–3.1)
LYMPHOCYTES NFR BLD AUTO: 6.2 % (ref 19.6–45.3)
MCH RBC QN AUTO: 26 PG (ref 26.6–33)
MCHC RBC AUTO-ENTMCNC: 33 G/DL (ref 31.5–35.7)
MCV RBC AUTO: 78.7 FL (ref 79–97)
MODALITY: ABNORMAL
MODALITY: ABNORMAL
MONOCYTES # BLD AUTO: 0.74 10*3/MM3 (ref 0.1–0.9)
MONOCYTES NFR BLD AUTO: 9.8 % (ref 5–12)
NEUTROPHILS # BLD AUTO: 6.25 10*3/MM3 (ref 1.7–7)
NEUTROPHILS NFR BLD AUTO: 83 % (ref 42.7–76)
NRBC BLD AUTO-RTO: 0 /100 WBC (ref 0–0.2)
O2 A-A PPRESDIFF RESPIRATORY: 0.7 MMHG
O2 A-A PPRESDIFF RESPIRATORY: 0.9 MMHG
PCO2 BLDA: 34.5 MM HG (ref 35–45)
PCO2 BLDA: 37.1 MM HG (ref 35–45)
PCO2 BLDA: 44.2 MM HG (ref 35–45)
PCO2 BLDA: 44.7 MM HG (ref 35–45)
PEEP RESPIRATORY: 5 CM[H2O]
PEEP RESPIRATORY: 5 CM[H2O]
PH BLDA: 7.29 PH UNITS (ref 7.35–7.6)
PH BLDA: 7.32 PH UNITS (ref 7.35–7.6)
PH BLDA: 7.37 PH UNITS (ref 7.35–7.45)
PH BLDA: 7.38 PH UNITS (ref 7.35–7.45)
PHOSPHATE SERPL-MCNC: 3.1 MG/DL (ref 2.5–4.5)
PLATELET # BLD AUTO: 167 10*3/MM3 (ref 140–450)
PMV BLD AUTO: 12 FL (ref 6–12)
PO2 BLDA: 217.3 MM HG (ref 80–100)
PO2 BLDA: 429 MMHG (ref 80–105)
PO2 BLDA: 488 MMHG (ref 80–105)
PO2 BLDA: 506.7 MM HG (ref 80–100)
POTASSIUM BLD-SCNC: 3.6 MMOL/L (ref 3.5–5.2)
POTASSIUM BLD-SCNC: 3.6 MMOL/L (ref 3.5–5.2)
POTASSIUM BLDA-SCNC: 3.6 MMOL/L (ref 3.5–4.9)
POTASSIUM BLDA-SCNC: 3.7 MMOL/L (ref 3.5–4.9)
PROT SERPL-MCNC: 5.6 G/DL (ref 6–8.5)
PSV: 8 CMH2O
RBC # BLD AUTO: 3.66 10*6/MM3 (ref 3.77–5.28)
SAO2 % BLDA: 100 % (ref 95–98)
SAO2 % BLDA: 100 % (ref 95–98)
SAO2 % BLDCOA: 100 % (ref 92–99)
SAO2 % BLDCOA: 99.8 % (ref 92–99)
SET MECH RESP RATE: 12
SET MECH RESP RATE: 15
SODIUM BLD-SCNC: 131 MMOL/L (ref 136–145)
SODIUM BLD-SCNC: 131 MMOL/L (ref 136–145)
TOTAL RATE: 13 BREATHS/MINUTE
TOTAL RATE: 15 BREATHS/MINUTE
VENTILATOR MODE: ABNORMAL
VENTILATOR MODE: AC
VT ON VENT VENT: 401 ML
VT ON VENT VENT: 600 ML
WBC NRBC COR # BLD: 7.53 10*3/MM3 (ref 3.4–10.8)

## 2019-12-03 PROCEDURE — C1769 GUIDE WIRE: HCPCS | Performed by: THORACIC SURGERY (CARDIOTHORACIC VASCULAR SURGERY)

## 2019-12-03 PROCEDURE — 93355 ECHO TRANSESOPHAGEAL (TEE): CPT

## 2019-12-03 PROCEDURE — C1894 INTRO/SHEATH, NON-LASER: HCPCS | Performed by: THORACIC SURGERY (CARDIOTHORACIC VASCULAR SURGERY)

## 2019-12-03 PROCEDURE — 25010000002 MAGNESIUM SULFATE IN D5W 1G/100ML (PREMIX) 1-5 GM/100ML-% SOLUTION: Performed by: INTERNAL MEDICINE

## 2019-12-03 PROCEDURE — 25010000002 FENTANYL CITRATE (PF) 100 MCG/2ML SOLUTION: Performed by: ANESTHESIOLOGY

## 2019-12-03 PROCEDURE — 25010000002 ONDANSETRON PER 1 MG: Performed by: ANESTHESIOLOGY

## 2019-12-03 PROCEDURE — 80069 RENAL FUNCTION PANEL: CPT | Performed by: THORACIC SURGERY (CARDIOTHORACIC VASCULAR SURGERY)

## 2019-12-03 PROCEDURE — 94799 UNLISTED PULMONARY SVC/PX: CPT

## 2019-12-03 PROCEDURE — 85025 COMPLETE CBC W/AUTO DIFF WBC: CPT | Performed by: THORACIC SURGERY (CARDIOTHORACIC VASCULAR SURGERY)

## 2019-12-03 PROCEDURE — 85347 COAGULATION TIME ACTIVATED: CPT

## 2019-12-03 PROCEDURE — 93005 ELECTROCARDIOGRAM TRACING: CPT | Performed by: INTERNAL MEDICINE

## 2019-12-03 PROCEDURE — C1760 CLOSURE DEV, VASC: HCPCS | Performed by: THORACIC SURGERY (CARDIOTHORACIC VASCULAR SURGERY)

## 2019-12-03 PROCEDURE — B41D1ZZ FLUOROSCOPY OF AORTA AND BILATERAL LOWER EXTREMITY ARTERIES USING LOW OSMOLAR CONTRAST: ICD-10-PCS | Performed by: INTERNAL MEDICINE

## 2019-12-03 PROCEDURE — 85018 HEMOGLOBIN: CPT

## 2019-12-03 PROCEDURE — 25010000002 PROPOFOL 10 MG/ML EMULSION: Performed by: ANESTHESIOLOGY

## 2019-12-03 PROCEDURE — 33361 REPLACE AORTIC VALVE PERQ: CPT | Performed by: INTERNAL MEDICINE

## 2019-12-03 PROCEDURE — 25010000002 SUCCINYLCHOLINE PER 20 MG: Performed by: ANESTHESIOLOGY

## 2019-12-03 PROCEDURE — 80053 COMPREHEN METABOLIC PANEL: CPT | Performed by: INTERNAL MEDICINE

## 2019-12-03 PROCEDURE — 25010000003 CEFAZOLIN IN DEXTROSE 2-4 GM/100ML-% SOLUTION: Performed by: NURSE PRACTITIONER

## 2019-12-03 PROCEDURE — 71045 X-RAY EXAM CHEST 1 VIEW: CPT

## 2019-12-03 PROCEDURE — S0260 H&P FOR SURGERY: HCPCS | Performed by: INTERNAL MEDICINE

## 2019-12-03 PROCEDURE — C1751 CATH, INF, PER/CENT/MIDLINE: HCPCS | Performed by: ANESTHESIOLOGY

## 2019-12-03 PROCEDURE — 94002 VENT MGMT INPAT INIT DAY: CPT

## 2019-12-03 PROCEDURE — 25010000002 PROTAMINE SULFATE PER 10 MG: Performed by: ANESTHESIOLOGY

## 2019-12-03 PROCEDURE — 82803 BLOOD GASES ANY COMBINATION: CPT

## 2019-12-03 PROCEDURE — B246ZZ4 ULTRASONOGRAPHY OF RIGHT AND LEFT HEART, TRANSESOPHAGEAL: ICD-10-PCS | Performed by: INTERNAL MEDICINE

## 2019-12-03 PROCEDURE — 85014 HEMATOCRIT: CPT

## 2019-12-03 PROCEDURE — 82962 GLUCOSE BLOOD TEST: CPT

## 2019-12-03 PROCEDURE — 93010 ELECTROCARDIOGRAM REPORT: CPT | Performed by: INTERNAL MEDICINE

## 2019-12-03 PROCEDURE — 0 IOPAMIDOL PER 1 ML: Performed by: THORACIC SURGERY (CARDIOTHORACIC VASCULAR SURGERY)

## 2019-12-03 PROCEDURE — 02RF38Z REPLACEMENT OF AORTIC VALVE WITH ZOOPLASTIC TISSUE, PERCUTANEOUS APPROACH: ICD-10-PCS | Performed by: THORACIC SURGERY (CARDIOTHORACIC VASCULAR SURGERY)

## 2019-12-03 PROCEDURE — 82947 ASSAY GLUCOSE BLOOD QUANT: CPT

## 2019-12-03 PROCEDURE — 25010000002 MORPHINE PER 10 MG: Performed by: THORACIC SURGERY (CARDIOTHORACIC VASCULAR SURGERY)

## 2019-12-03 PROCEDURE — 25010000002 HEPARIN (PORCINE) PER 1000 UNITS: Performed by: ANESTHESIOLOGY

## 2019-12-03 DEVICE — VLV HEART TRNSCATH SAPIEN3 23MM: Type: IMPLANTABLE DEVICE | Site: HEART | Status: FUNCTIONAL

## 2019-12-03 RX ORDER — LEVOFLOXACIN 500 MG/1
250 TABLET, FILM COATED ORAL EVERY 24 HOURS
Status: DISCONTINUED | OUTPATIENT
Start: 2019-12-03 | End: 2019-12-03

## 2019-12-03 RX ORDER — NOREPINEPHRINE BIT/0.9 % NACL 8 MG/250ML
.02-.3 INFUSION BOTTLE (ML) INTRAVENOUS
Status: DISCONTINUED | OUTPATIENT
Start: 2019-12-03 | End: 2019-12-03

## 2019-12-03 RX ORDER — LOSARTAN POTASSIUM 100 MG/1
100 TABLET ORAL DAILY
Status: DISCONTINUED | OUTPATIENT
Start: 2019-12-04 | End: 2019-12-06 | Stop reason: HOSPADM

## 2019-12-03 RX ORDER — ASPIRIN 81 MG/1
81 TABLET ORAL DAILY
Status: DISCONTINUED | OUTPATIENT
Start: 2019-12-03 | End: 2019-12-06 | Stop reason: HOSPADM

## 2019-12-03 RX ORDER — PROPOFOL 10 MG/ML
VIAL (ML) INTRAVENOUS CONTINUOUS PRN
Status: DISCONTINUED | OUTPATIENT
Start: 2019-12-03 | End: 2019-12-03 | Stop reason: SURG

## 2019-12-03 RX ORDER — SUCCINYLCHOLINE CHLORIDE 20 MG/ML
INJECTION INTRAMUSCULAR; INTRAVENOUS AS NEEDED
Status: DISCONTINUED | OUTPATIENT
Start: 2019-12-03 | End: 2019-12-03 | Stop reason: SURG

## 2019-12-03 RX ORDER — LIDOCAINE HYDROCHLORIDE 20 MG/ML
INJECTION, SOLUTION INFILTRATION; PERINEURAL AS NEEDED
Status: DISCONTINUED | OUTPATIENT
Start: 2019-12-03 | End: 2019-12-03 | Stop reason: SURG

## 2019-12-03 RX ORDER — SODIUM CHLORIDE 9 MG/ML
75 INJECTION, SOLUTION INTRAVENOUS CONTINUOUS
Status: DISCONTINUED | OUTPATIENT
Start: 2019-12-03 | End: 2019-12-03

## 2019-12-03 RX ORDER — PROPOFOL 10 MG/ML
VIAL (ML) INTRAVENOUS AS NEEDED
Status: DISCONTINUED | OUTPATIENT
Start: 2019-12-03 | End: 2019-12-03 | Stop reason: SURG

## 2019-12-03 RX ORDER — CEFAZOLIN SODIUM 2 G/100ML
2 INJECTION, SOLUTION INTRAVENOUS ONCE
Status: DISCONTINUED | OUTPATIENT
Start: 2019-12-03 | End: 2019-12-03

## 2019-12-03 RX ORDER — AMIODARONE HYDROCHLORIDE 200 MG/1
400 TABLET ORAL EVERY 12 HOURS SCHEDULED
Status: DISCONTINUED | OUTPATIENT
Start: 2019-12-03 | End: 2019-12-04

## 2019-12-03 RX ORDER — CLOPIDOGREL BISULFATE 75 MG/1
75 TABLET ORAL DAILY
Status: DISCONTINUED | OUTPATIENT
Start: 2019-12-03 | End: 2019-12-06 | Stop reason: HOSPADM

## 2019-12-03 RX ORDER — SODIUM CHLORIDE, SODIUM LACTATE, POTASSIUM CHLORIDE, CALCIUM CHLORIDE 600; 310; 30; 20 MG/100ML; MG/100ML; MG/100ML; MG/100ML
INJECTION, SOLUTION INTRAVENOUS CONTINUOUS PRN
Status: DISCONTINUED | OUTPATIENT
Start: 2019-12-03 | End: 2019-12-03 | Stop reason: SURG

## 2019-12-03 RX ORDER — AMLODIPINE BESYLATE 10 MG/1
10 TABLET ORAL DAILY
Status: DISCONTINUED | OUTPATIENT
Start: 2019-12-04 | End: 2019-12-06 | Stop reason: HOSPADM

## 2019-12-03 RX ORDER — ONDANSETRON 2 MG/ML
INJECTION INTRAMUSCULAR; INTRAVENOUS AS NEEDED
Status: DISCONTINUED | OUTPATIENT
Start: 2019-12-03 | End: 2019-12-03 | Stop reason: SURG

## 2019-12-03 RX ORDER — POTASSIUM CHLORIDE 750 MG/1
40 CAPSULE, EXTENDED RELEASE ORAL ONCE
Status: COMPLETED | OUTPATIENT
Start: 2019-12-03 | End: 2019-12-03

## 2019-12-03 RX ORDER — SODIUM CHLORIDE 9 MG/ML
INJECTION, SOLUTION INTRAVENOUS CONTINUOUS PRN
Status: DISCONTINUED | OUTPATIENT
Start: 2019-12-03 | End: 2019-12-03 | Stop reason: SURG

## 2019-12-03 RX ORDER — FAMOTIDINE 10 MG/ML
20 INJECTION, SOLUTION INTRAVENOUS ONCE
Status: COMPLETED | OUTPATIENT
Start: 2019-12-03 | End: 2019-12-03

## 2019-12-03 RX ORDER — ACETAMINOPHEN 325 MG/1
650 TABLET ORAL EVERY 6 HOURS PRN
Status: DISCONTINUED | OUTPATIENT
Start: 2019-12-03 | End: 2019-12-06 | Stop reason: HOSPADM

## 2019-12-03 RX ORDER — CEFAZOLIN SODIUM 2 G/100ML
2 INJECTION, SOLUTION INTRAVENOUS
Status: COMPLETED | OUTPATIENT
Start: 2019-12-03 | End: 2019-12-03

## 2019-12-03 RX ORDER — MAGNESIUM SULFATE 1 G/100ML
2 INJECTION INTRAVENOUS ONCE
Status: COMPLETED | OUTPATIENT
Start: 2019-12-03 | End: 2019-12-03

## 2019-12-03 RX ORDER — ROCURONIUM BROMIDE 10 MG/ML
INJECTION, SOLUTION INTRAVENOUS AS NEEDED
Status: DISCONTINUED | OUTPATIENT
Start: 2019-12-03 | End: 2019-12-03 | Stop reason: SURG

## 2019-12-03 RX ORDER — PROTAMINE SULFATE 10 MG/ML
INJECTION, SOLUTION INTRAVENOUS AS NEEDED
Status: DISCONTINUED | OUTPATIENT
Start: 2019-12-03 | End: 2019-12-03 | Stop reason: SURG

## 2019-12-03 RX ORDER — HEPARIN SODIUM 1000 [USP'U]/ML
INJECTION, SOLUTION INTRAVENOUS; SUBCUTANEOUS AS NEEDED
Status: DISCONTINUED | OUTPATIENT
Start: 2019-12-03 | End: 2019-12-03 | Stop reason: SURG

## 2019-12-03 RX ORDER — MORPHINE SULFATE 2 MG/ML
2 INJECTION, SOLUTION INTRAMUSCULAR; INTRAVENOUS
Status: DISCONTINUED | OUTPATIENT
Start: 2019-12-03 | End: 2019-12-06 | Stop reason: HOSPADM

## 2019-12-03 RX ORDER — CHLORHEXIDINE GLUCONATE 0.12 MG/ML
15 RINSE ORAL ONCE
Status: DISCONTINUED | OUTPATIENT
Start: 2019-12-03 | End: 2019-12-03 | Stop reason: HOSPADM

## 2019-12-03 RX ORDER — FENTANYL CITRATE 50 UG/ML
INJECTION, SOLUTION INTRAMUSCULAR; INTRAVENOUS AS NEEDED
Status: DISCONTINUED | OUTPATIENT
Start: 2019-12-03 | End: 2019-12-03 | Stop reason: SURG

## 2019-12-03 RX ORDER — NICARDIPINE HYDROCHLORIDE 2.5 MG/ML
INJECTION INTRAVENOUS AS NEEDED
Status: DISCONTINUED | OUTPATIENT
Start: 2019-12-03 | End: 2019-12-03 | Stop reason: SURG

## 2019-12-03 RX ORDER — HYDROCODONE BITARTRATE AND ACETAMINOPHEN 5; 325 MG/1; MG/1
2 TABLET ORAL EVERY 4 HOURS PRN
Status: DISCONTINUED | OUTPATIENT
Start: 2019-12-03 | End: 2019-12-06 | Stop reason: HOSPADM

## 2019-12-03 RX ORDER — ATORVASTATIN CALCIUM 20 MG/1
20 TABLET, FILM COATED ORAL DAILY
Status: DISCONTINUED | OUTPATIENT
Start: 2019-12-04 | End: 2019-12-06 | Stop reason: HOSPADM

## 2019-12-03 RX ADMIN — CEFAZOLIN SODIUM 2 G: 2 INJECTION, SOLUTION INTRAVENOUS at 08:14

## 2019-12-03 RX ADMIN — FAMOTIDINE 20 MG: 10 INJECTION INTRAVENOUS at 06:30

## 2019-12-03 RX ADMIN — NOREPINEPHRINE BITARTRATE 32 MCG: 1 INJECTION, SOLUTION, CONCENTRATE INTRAVENOUS at 07:37

## 2019-12-03 RX ADMIN — PROPOFOL 50 MCG/KG/MIN: 10 INJECTION, EMULSION INTRAVENOUS at 09:37

## 2019-12-03 RX ADMIN — ASPIRIN 81 MG: 81 TABLET, COATED ORAL at 14:50

## 2019-12-03 RX ADMIN — ONDANSETRON HYDROCHLORIDE 4 MG: 2 SOLUTION INTRAMUSCULAR; INTRAVENOUS at 09:29

## 2019-12-03 RX ADMIN — MORPHINE SULFATE 2 MG: 2 INJECTION, SOLUTION INTRAMUSCULAR; INTRAVENOUS at 13:07

## 2019-12-03 RX ADMIN — MAGNESIUM SULFATE 2 G: 1 INJECTION INTRAVENOUS at 11:15

## 2019-12-03 RX ADMIN — FENTANYL CITRATE 50 MCG: 50 INJECTION INTRAMUSCULAR; INTRAVENOUS at 07:24

## 2019-12-03 RX ADMIN — NOREPINEPHRINE BITARTRATE 32 MCG: 1 INJECTION, SOLUTION, CONCENTRATE INTRAVENOUS at 07:43

## 2019-12-03 RX ADMIN — Medication 0.04 MCG/KG/MIN: at 12:10

## 2019-12-03 RX ADMIN — NICARDIPINE HYDROCHLORIDE 0.2 MG: 25 INJECTION INTRAVENOUS at 08:44

## 2019-12-03 RX ADMIN — FENTANYL CITRATE 50 MCG: 50 INJECTION INTRAMUSCULAR; INTRAVENOUS at 08:48

## 2019-12-03 RX ADMIN — METOPROLOL TARTRATE 25 MG: 25 TABLET ORAL at 16:08

## 2019-12-03 RX ADMIN — SODIUM CHLORIDE, POTASSIUM CHLORIDE, SODIUM LACTATE AND CALCIUM CHLORIDE: 600; 310; 30; 20 INJECTION, SOLUTION INTRAVENOUS at 07:45

## 2019-12-03 RX ADMIN — PROPOFOL 50 MG: 10 INJECTION, EMULSION INTRAVENOUS at 07:25

## 2019-12-03 RX ADMIN — PROPOFOL 50 MG: 10 INJECTION, EMULSION INTRAVENOUS at 08:37

## 2019-12-03 RX ADMIN — DEXMEDETOMIDINE HYDROCHLORIDE 0.4 MCG/KG/HR: 100 INJECTION, SOLUTION, CONCENTRATE INTRAVENOUS at 11:10

## 2019-12-03 RX ADMIN — SODIUM CHLORIDE: 9 INJECTION, SOLUTION INTRAVENOUS at 07:24

## 2019-12-03 RX ADMIN — NOREPINEPHRINE BITARTRATE 0.05 MCG/KG/MIN: 1 INJECTION, SOLUTION, CONCENTRATE INTRAVENOUS at 07:48

## 2019-12-03 RX ADMIN — POTASSIUM CHLORIDE 40 MEQ: 750 CAPSULE, EXTENDED RELEASE ORAL at 15:19

## 2019-12-03 RX ADMIN — AMIODARONE HYDROCHLORIDE 400 MG: 200 TABLET ORAL at 15:19

## 2019-12-03 RX ADMIN — SUCCINYLCHOLINE CHLORIDE 100 MG: 20 INJECTION, SOLUTION INTRAMUSCULAR; INTRAVENOUS; PARENTERAL at 07:26

## 2019-12-03 RX ADMIN — HYDROCODONE BITARTRATE AND ACETAMINOPHEN 2 TABLET: 5; 325 TABLET ORAL at 14:50

## 2019-12-03 RX ADMIN — ROCURONIUM BROMIDE 50 MG: 10 INJECTION INTRAVENOUS at 07:35

## 2019-12-03 RX ADMIN — LIDOCAINE HYDROCHLORIDE 60 MG: 20 INJECTION, SOLUTION INFILTRATION; PERINEURAL at 07:20

## 2019-12-03 RX ADMIN — HEPARIN SODIUM 2000 UNITS: 1000 INJECTION, SOLUTION INTRAVENOUS; SUBCUTANEOUS at 08:53

## 2019-12-03 RX ADMIN — PROTAMINE SULFATE 50 MG: 10 INJECTION, SOLUTION INTRAVENOUS at 09:22

## 2019-12-03 RX ADMIN — HEPARIN SODIUM 8000 UNITS: 1000 INJECTION, SOLUTION INTRAVENOUS; SUBCUTANEOUS at 08:43

## 2019-12-03 NOTE — ANESTHESIA POSTPROCEDURE EVALUATION
Patient: Gita Wharton    Procedure Summary     Date:  12/03/19 Room / Location:  [facility] OR 19 INV / [facility] HYBRID OR 18/19    Anesthesia Start:  0707 Anesthesia Stop:  1020    Procedures:       TRACEY TRANSCAROTID TRANSCATHETER AORTIC VALVE REPLACEMENT (N/A Chest)      Transcarotid Transcatheter Aortic Valve Replacement w/Intra Op TRACEY and Possible Open Bailout Surgery (N/A ) Diagnosis:       Nonrheumatic aortic valve stenosis      Chronic diastolic congestive heart failure (CMS/HCC)      (Nonrheumatic aortic valve stenosis [I35.0])      (Chronic diastolic congestive heart failure (CMS/HCC) [I50.32])    Surgeon:  Octaviano Yan MD; Warren Tanner MD Provider:  Marcial Nuno MD    Anesthesia Type:  general ASA Status:  3          Anesthesia Type: general  Last vitals  BP   115/51 (12/03/19 1500)   Temp   36.9 °C (98.4 °F) (12/03/19 0550)   Pulse   75 (12/03/19 1500)   Resp   12 (12/03/19 1018)     SpO2   100 % (12/03/19 1500)     Post Anesthesia Care and Evaluation    Patient location during evaluation: bedside  Pain management: adequate  Airway patency: patent  Anesthetic complications: No anesthetic complications    Cardiovascular status: acceptable  Respiratory status: acceptable  Hydration status: acceptable

## 2019-12-03 NOTE — ANESTHESIA PROCEDURE NOTES
Arterial Line      Patient reassessed immediately prior to procedure    Patient location during procedure: OR  Start time: 12/3/2019 7:15 AM  Stop Time:12/3/2019 7:20 AM       Performed By   Anesthesiologist: Marcial Nuno MD  Preanesthetic Checklist  Completed: patient identified, surgical consent, pre-op evaluation, timeout performed and risks and benefits discussed  Arterial Line Prep   Sterile Tech: cap, gloves and mask  Prep: ChloraPrep and alcohol swabs  Patient monitoring: blood pressure monitoring, continuous pulse oximetry and EKG  Arterial Line Procedure   Laterality:right  Location:  radial artery  Catheter size: 20 G   Guidance: ultrasound guided  PROCEDURE NOTE/ULTRASOUND INTERPRETATION.  Using ultrasound guidance the potential vascular sites for insertion of the catheter were visualized to determine the patency of the vessel to be used for vascular access.  After selecting the appropriate site for insertion, the needle was visualized under ultrasound being inserted into the radial artery, followed by ultrasound confirmation of wire and catheter placement. There were no abnormalities seen on ultrasound; an image was taken; and the patient tolerated the procedure with no complications.   Number of attempts: 1  Successful placement: yes  Post Assessment   Dressing Type: occlusive dressing applied and secured with tape.   Complications no  Patient Tolerance: patient tolerated the procedure well with no apparent complications

## 2019-12-03 NOTE — ANESTHESIA PROCEDURE NOTES
Central Line      Patient reassessed immediately prior to procedure    Patient location during procedure: OR  Indications: vascular access and central pressure monitoring  Staff  Anesthesiologist: Marcial Nuno MD  Preanesthetic Checklist  Completed: patient identified and risks and benefits discussed  Central Line Prep  Sterile Tech:cap, gloves, gown, mask and sterile barriers  Prep: chloraprep  Patient monitoring: blood pressure monitoring, continuous pulse oximetry and EKG  Central Line Procedure  Laterality:right  Location:internal jugular  Catheter Type:single lumen  Catheter Size:6 Fr  Guidance:ultrasound guided  PROCEDURE NOTE/ULTRASOUND INTERPRETATION.  Using ultrasound guidance the potential vascular sites for insertion of the catheter were visualized to determine the patency of the vessel to be used for vascular access.  After selecting the appropriate site for insertion, the needle was visualized under ultrasound being inserted into the internal jugular vein, followed by ultrasound confirmation of wire and catheter placement. There were no abnormalities seen on ultrasound; an image was taken; and the patient tolerated the procedure with no complications. Images: still images not obtained  Assessment  Post procedure:biopatch applied, line sutured, occlusive dressing applied and secured with tape  Assessement:blood return through all ports  Complications:no  Patient Tolerance:patient tolerated the procedure well with no apparent complications  Additional Notes  Ultrasound Interpretation:  Using ultrasound guidance the potential vascular sites for insertion of the catheter were visualized to determine the patency of the vessel to be used for vascular access.  After selecting the appropriate site for insertion, the needle was visualized under ultrasound being inserted into the vessel, followed by ultrasound confirmation of wire and catheter placement.  There were no abnormalities seen on ultrasound; an  image was taken/ and the patient tolerated the procedure with no complications.

## 2019-12-03 NOTE — ANESTHESIA PROCEDURE NOTES
Procedure Performed: Emergent/Open-Heart Anesthesia TRACEY     Start Time:        End Time:        General Procedure Information  Diagnostic Indications for Echo:  assessment of surgical repair  Physician Requesting Echo: Octaviano Yan MD  Location performed:  OR  Intubated  Bite block not placed  Heart visualized  Probe Insertion:  Easy  Modalities:  2D only, color flow mapping and continuous wave Doppler    Echocardiographic and Doppler Measurements    Ventricles    Left Ventricle:  Cavity size normal.  Hypertrophy present.  Global Function normal.  Ejection Fraction 60%.          Valves    Aortic Valve:  Annulus calcified.  Stenosis severe.  Regurgitation moderate.  Leaflets calcified and thickened.  Leaflet motions restricted.      Mitral Valve:  Annulus normal.  Regurgitation moderate.  Leaflets thickened.            Aorta    Ascending Aorta:  Size normal.          Atria    Right Atrium:  Size dilated.    Left Atrium:  Size dilated.  Left atrial appendage normal.      Septa    Atrial Septum:  Intra-atrial septal morphology normal.                  Anesthesia Information  Performed Personally      Echocardiogram Comments:       Diagnosis: non-rheumatic mitral regurgitation

## 2019-12-03 NOTE — ANESTHESIA PROCEDURE NOTES
Airway  Date/Time: 12/3/2019 7:27 AM  Airway not difficult    General Information and Staff    Patient location during procedure: OR  Anesthesiologist: Marcial Nuno MD    Indications and Patient Condition    Preoxygenated: yes  Mask difficulty assessment: 0 - not attempted    Final Airway Details  Final airway type: endotracheal airway      Successful airway: ETT  Cuffed: yes   Successful intubation technique: direct laryngoscopy  Facilitating devices/methods: intubating stylet  Endotracheal tube insertion site: oral  Blade: Michelle  Blade size: 3  ETT size (mm): 7.5  Cormack-Lehane Classification: grade I - full view of glottis  Placement verified by: capnometry   Measured from: teeth  ETT/EBT  to teeth (cm): 21  Number of attempts at approach: 1  Assessment: lips, teeth, and gum same as pre-op and atraumatic intubation

## 2019-12-04 ENCOUNTER — APPOINTMENT (OUTPATIENT)
Dept: CARDIOLOGY | Facility: HOSPITAL | Age: 84
End: 2019-12-04

## 2019-12-04 PROBLEM — I73.9 PVD (PERIPHERAL VASCULAR DISEASE) WITH CLAUDICATION (HCC): Status: ACTIVE | Noted: 2019-12-04

## 2019-12-04 LAB
ANION GAP SERPL CALCULATED.3IONS-SCNC: 13.1 MMOL/L (ref 5–15)
AORTIC DIMENSIONLESS INDEX: 0.6 (DI)
BACTERIA SPEC AEROBE CULT: NO GROWTH
BH CV ECHO MEAS - AO MAX PG (FULL): 11.5 MMHG
BH CV ECHO MEAS - AO MAX PG: 15.7 MMHG
BH CV ECHO MEAS - AO MEAN PG (FULL): 5 MMHG
BH CV ECHO MEAS - AO MEAN PG: 7 MMHG
BH CV ECHO MEAS - AO ROOT AREA (BSA CORRECTED): 2
BH CV ECHO MEAS - AO ROOT AREA: 7.1 CM^2
BH CV ECHO MEAS - AO ROOT DIAM: 3 CM
BH CV ECHO MEAS - AO V2 MAX: 198 CM/SEC
BH CV ECHO MEAS - AO V2 MEAN: 122 CM/SEC
BH CV ECHO MEAS - AO V2 VTI: 42.8 CM
BH CV ECHO MEAS - ASC AORTA: 3.1 CM
BH CV ECHO MEAS - AVA(I,A): 1.3 CM^2
BH CV ECHO MEAS - AVA(I,D): 1.3 CM^2
BH CV ECHO MEAS - AVA(V,A): 1.2 CM^2
BH CV ECHO MEAS - AVA(V,D): 1.2 CM^2
BH CV ECHO MEAS - BSA(HAYCOCK): 1.5 M^2
BH CV ECHO MEAS - BSA: 1.5 M^2
BH CV ECHO MEAS - BZI_BMI: 19.8 KILOGRAMS/M^2
BH CV ECHO MEAS - BZI_METRIC_HEIGHT: 160 CM
BH CV ECHO MEAS - BZI_METRIC_WEIGHT: 50.8 KG
BH CV ECHO MEAS - EDV(CUBED): 64 ML
BH CV ECHO MEAS - EDV(MOD-SP2): 74 ML
BH CV ECHO MEAS - EDV(MOD-SP4): 86 ML
BH CV ECHO MEAS - EDV(TEICH): 70 ML
BH CV ECHO MEAS - EF(CUBED): 61.9 %
BH CV ECHO MEAS - EF(MOD-BP): 64 %
BH CV ECHO MEAS - EF(MOD-SP2): 60.8 %
BH CV ECHO MEAS - EF(MOD-SP4): 66.3 %
BH CV ECHO MEAS - EF(TEICH): 54 %
BH CV ECHO MEAS - ESV(CUBED): 24.4 ML
BH CV ECHO MEAS - ESV(MOD-SP2): 29 ML
BH CV ECHO MEAS - ESV(MOD-SP4): 29 ML
BH CV ECHO MEAS - ESV(TEICH): 32.2 ML
BH CV ECHO MEAS - FS: 27.5 %
BH CV ECHO MEAS - IVS/LVPW: 1
BH CV ECHO MEAS - IVSD: 1 CM
BH CV ECHO MEAS - LAT PEAK E' VEL: 6 CM/SEC
BH CV ECHO MEAS - LV DIASTOLIC VOL/BSA (35-75): 56.9 ML/M^2
BH CV ECHO MEAS - LV MASS(C)D: 127.1 GRAMS
BH CV ECHO MEAS - LV MASS(C)DI: 84.1 GRAMS/M^2
BH CV ECHO MEAS - LV MAX PG: 4.2 MMHG
BH CV ECHO MEAS - LV MEAN PG: 2 MMHG
BH CV ECHO MEAS - LV SYSTOLIC VOL/BSA (12-30): 19.2 ML/M^2
BH CV ECHO MEAS - LV V1 MAX: 102 CM/SEC
BH CV ECHO MEAS - LV V1 MEAN: 66.5 CM/SEC
BH CV ECHO MEAS - LV V1 VTI: 23.6 CM
BH CV ECHO MEAS - LVIDD: 4 CM
BH CV ECHO MEAS - LVIDS: 2.9 CM
BH CV ECHO MEAS - LVLD AP2: 8.1 CM
BH CV ECHO MEAS - LVLD AP4: 8.1 CM
BH CV ECHO MEAS - LVLS AP2: 7.5 CM
BH CV ECHO MEAS - LVLS AP4: 6.6 CM
BH CV ECHO MEAS - LVOT AREA (M): 2.3 CM^2
BH CV ECHO MEAS - LVOT AREA: 2.3 CM^2
BH CV ECHO MEAS - LVOT DIAM: 1.7 CM
BH CV ECHO MEAS - LVPWD: 1 CM
BH CV ECHO MEAS - MED PEAK E' VEL: 4 CM/SEC
BH CV ECHO MEAS - MR MAX PG: 70.9 MMHG
BH CV ECHO MEAS - MR MAX VEL: 421 CM/SEC
BH CV ECHO MEAS - MV A DUR: 0.14 SEC
BH CV ECHO MEAS - MV A MAX VEL: 130 CM/SEC
BH CV ECHO MEAS - MV DEC SLOPE: 516.5 CM/SEC^2
BH CV ECHO MEAS - MV DEC TIME: 230 SEC
BH CV ECHO MEAS - MV E MAX VEL: 128 CM/SEC
BH CV ECHO MEAS - MV E/A: 0.98
BH CV ECHO MEAS - MV MAX PG: 6.5 MMHG
BH CV ECHO MEAS - MV MEAN PG: 3 MMHG
BH CV ECHO MEAS - MV P1/2T MAX VEL: 125 CM/SEC
BH CV ECHO MEAS - MV P1/2T: 70.9 MSEC
BH CV ECHO MEAS - MV V2 MAX: 127 CM/SEC
BH CV ECHO MEAS - MV V2 MEAN: 80.7 CM/SEC
BH CV ECHO MEAS - MV V2 VTI: 34.7 CM
BH CV ECHO MEAS - MVA P1/2T LCG: 1.8 CM^2
BH CV ECHO MEAS - MVA(P1/2T): 3.1 CM^2
BH CV ECHO MEAS - MVA(VTI): 1.5 CM^2
BH CV ECHO MEAS - PA ACC TIME: 0.11 SEC
BH CV ECHO MEAS - PA MAX PG (FULL): 1.7 MMHG
BH CV ECHO MEAS - PA MAX PG: 3.9 MMHG
BH CV ECHO MEAS - PA PR(ACCEL): 31.3 MMHG
BH CV ECHO MEAS - PA V2 MAX: 98.2 CM/SEC
BH CV ECHO MEAS - PULM A REVS DUR: 0.14 SEC
BH CV ECHO MEAS - PULM A REVS VEL: 55.1 CM/SEC
BH CV ECHO MEAS - PULM DIAS VEL: 54 CM/SEC
BH CV ECHO MEAS - PULM S/D: 1.2
BH CV ECHO MEAS - PULM SYS VEL: 64 CM/SEC
BH CV ECHO MEAS - PVA(V,A): 1.7 CM^2
BH CV ECHO MEAS - PVA(V,D): 1.7 CM^2
BH CV ECHO MEAS - QP/QS: 0.66
BH CV ECHO MEAS - RAP SYSTOLE: 3 MMHG
BH CV ECHO MEAS - RV MAX PG: 2.2 MMHG
BH CV ECHO MEAS - RV MEAN PG: 1 MMHG
BH CV ECHO MEAS - RV V1 MAX: 73.9 CM/SEC
BH CV ECHO MEAS - RV V1 MEAN: 45.6 CM/SEC
BH CV ECHO MEAS - RV V1 VTI: 15.6 CM
BH CV ECHO MEAS - RVOT AREA: 2.3 CM^2
BH CV ECHO MEAS - RVOT DIAM: 1.7 CM
BH CV ECHO MEAS - RVSP: 34 MMHG
BH CV ECHO MEAS - SI(AO): 200.2 ML/M^2
BH CV ECHO MEAS - SI(CUBED): 26.2 ML/M^2
BH CV ECHO MEAS - SI(LVOT): 35.4 ML/M^2
BH CV ECHO MEAS - SI(MOD-SP2): 29.8 ML/M^2
BH CV ECHO MEAS - SI(MOD-SP4): 37.7 ML/M^2
BH CV ECHO MEAS - SI(TEICH): 25 ML/M^2
BH CV ECHO MEAS - SV(AO): 302.5 ML
BH CV ECHO MEAS - SV(CUBED): 39.6 ML
BH CV ECHO MEAS - SV(LVOT): 53.6 ML
BH CV ECHO MEAS - SV(MOD-SP2): 45 ML
BH CV ECHO MEAS - SV(MOD-SP4): 57 ML
BH CV ECHO MEAS - SV(RVOT): 35.4 ML
BH CV ECHO MEAS - SV(TEICH): 37.8 ML
BH CV ECHO MEAS - TAPSE (>1.6): 1.8 CM2
BH CV ECHO MEAS - TR MAX VEL: 280 CM/SEC
BH CV ECHO MEASUREMENTS AVERAGE E/E' RATIO: 25.6
BH CV VAS BP RIGHT ARM: NORMAL MMHG
BH CV XLRA - RV BASE: 2.3 CM
BH CV XLRA - TDI S': 13 CM/SEC
BUN BLD-MCNC: 11 MG/DL (ref 8–23)
BUN/CREAT SERPL: 11 (ref 7–25)
CALCIUM SPEC-SCNC: 7.8 MG/DL (ref 8.6–10.5)
CHLORIDE SERPL-SCNC: 103 MMOL/L (ref 98–107)
CO2 SERPL-SCNC: 18.9 MMOL/L (ref 22–29)
CREAT BLD-MCNC: 1 MG/DL (ref 0.57–1)
DEPRECATED RDW RBC AUTO: 39.4 FL (ref 37–54)
ERYTHROCYTE [DISTWIDTH] IN BLOOD BY AUTOMATED COUNT: 13.9 % (ref 12.3–15.4)
GFR SERPL CREATININE-BSD FRML MDRD: 53 ML/MIN/1.73
GLUCOSE BLD-MCNC: 86 MG/DL (ref 65–99)
HCT VFR BLD AUTO: 27 % (ref 34–46.6)
HGB BLD-MCNC: 8.9 G/DL (ref 12–15.9)
LEFT ATRIUM VOLUME INDEX: 35 ML/M2
LEFT ATRIUM VOLUME: 49 CM3
LV EF 2D ECHO EST: 64 %
MAXIMAL PREDICTED HEART RATE: 138 BPM
MCH RBC QN AUTO: 25.9 PG (ref 26.6–33)
MCHC RBC AUTO-ENTMCNC: 33 G/DL (ref 31.5–35.7)
MCV RBC AUTO: 78.5 FL (ref 79–97)
PLATELET # BLD AUTO: 139 10*3/MM3 (ref 140–450)
PMV BLD AUTO: 12.6 FL (ref 6–12)
POTASSIUM BLD-SCNC: 4.9 MMOL/L (ref 3.5–5.2)
RBC # BLD AUTO: 3.44 10*6/MM3 (ref 3.77–5.28)
SODIUM BLD-SCNC: 135 MMOL/L (ref 136–145)
STRESS TARGET HR: 117 BPM
WBC NRBC COR # BLD: 9.17 10*3/MM3 (ref 3.4–10.8)

## 2019-12-04 PROCEDURE — 99233 SBSQ HOSP IP/OBS HIGH 50: CPT | Performed by: INTERNAL MEDICINE

## 2019-12-04 PROCEDURE — 93010 ELECTROCARDIOGRAM REPORT: CPT | Performed by: INTERNAL MEDICINE

## 2019-12-04 PROCEDURE — 93306 TTE W/DOPPLER COMPLETE: CPT

## 2019-12-04 PROCEDURE — 99024 POSTOP FOLLOW-UP VISIT: CPT | Performed by: PHYSICIAN ASSISTANT

## 2019-12-04 PROCEDURE — 85027 COMPLETE CBC AUTOMATED: CPT | Performed by: INTERNAL MEDICINE

## 2019-12-04 PROCEDURE — 80048 BASIC METABOLIC PNL TOTAL CA: CPT | Performed by: INTERNAL MEDICINE

## 2019-12-04 PROCEDURE — 93005 ELECTROCARDIOGRAM TRACING: CPT | Performed by: INTERNAL MEDICINE

## 2019-12-04 PROCEDURE — 93306 TTE W/DOPPLER COMPLETE: CPT | Performed by: INTERNAL MEDICINE

## 2019-12-04 RX ADMIN — METOPROLOL TARTRATE 25 MG: 25 TABLET ORAL at 09:32

## 2019-12-04 RX ADMIN — ASPIRIN 81 MG: 81 TABLET, COATED ORAL at 09:32

## 2019-12-04 RX ADMIN — CLOPIDOGREL 75 MG: 75 TABLET, FILM COATED ORAL at 14:59

## 2019-12-04 RX ADMIN — METOPROLOL TARTRATE 25 MG: 25 TABLET ORAL at 21:42

## 2019-12-04 RX ADMIN — AMLODIPINE BESYLATE 10 MG: 10 TABLET ORAL at 09:32

## 2019-12-04 RX ADMIN — ATORVASTATIN CALCIUM 20 MG: 20 TABLET, FILM COATED ORAL at 09:32

## 2019-12-04 RX ADMIN — LOSARTAN POTASSIUM 100 MG: 100 TABLET, FILM COATED ORAL at 09:31

## 2019-12-05 PROCEDURE — 99232 SBSQ HOSP IP/OBS MODERATE 35: CPT | Performed by: INTERNAL MEDICINE

## 2019-12-05 PROCEDURE — 93010 ELECTROCARDIOGRAM REPORT: CPT | Performed by: INTERNAL MEDICINE

## 2019-12-05 PROCEDURE — 99238 HOSP IP/OBS DSCHRG MGMT 30/<: CPT | Performed by: NURSE PRACTITIONER

## 2019-12-05 PROCEDURE — 97110 THERAPEUTIC EXERCISES: CPT

## 2019-12-05 PROCEDURE — 97162 PT EVAL MOD COMPLEX 30 MIN: CPT

## 2019-12-05 PROCEDURE — 25010000002 HYDRALAZINE PER 20 MG: Performed by: INTERNAL MEDICINE

## 2019-12-05 PROCEDURE — 93005 ELECTROCARDIOGRAM TRACING: CPT | Performed by: THORACIC SURGERY (CARDIOTHORACIC VASCULAR SURGERY)

## 2019-12-05 RX ORDER — CLOPIDOGREL BISULFATE 75 MG/1
75 TABLET ORAL DAILY
Qty: 30 TABLET | Refills: 3 | Status: SHIPPED | OUTPATIENT
Start: 2019-12-06 | End: 2020-05-12 | Stop reason: SDUPTHER

## 2019-12-05 RX ORDER — HYDRALAZINE HYDROCHLORIDE 20 MG/ML
10 INJECTION INTRAMUSCULAR; INTRAVENOUS ONCE
Status: COMPLETED | OUTPATIENT
Start: 2019-12-05 | End: 2019-12-05

## 2019-12-05 RX ORDER — METOPROLOL TARTRATE 50 MG/1
50 TABLET, FILM COATED ORAL EVERY 12 HOURS SCHEDULED
Status: DISCONTINUED | OUTPATIENT
Start: 2019-12-06 | End: 2019-12-06 | Stop reason: HOSPADM

## 2019-12-05 RX ADMIN — ASPIRIN 81 MG: 81 TABLET, COATED ORAL at 08:07

## 2019-12-05 RX ADMIN — CLOPIDOGREL 75 MG: 75 TABLET, FILM COATED ORAL at 08:07

## 2019-12-05 RX ADMIN — ATORVASTATIN CALCIUM 20 MG: 20 TABLET, FILM COATED ORAL at 08:07

## 2019-12-05 RX ADMIN — HYDRALAZINE HYDROCHLORIDE 10 MG: 20 INJECTION INTRAMUSCULAR; INTRAVENOUS at 15:43

## 2019-12-05 RX ADMIN — METOPROLOL TARTRATE 25 MG: 25 TABLET ORAL at 21:32

## 2019-12-05 RX ADMIN — METOPROLOL TARTRATE 5 MG: 5 INJECTION INTRAVENOUS at 21:32

## 2019-12-05 RX ADMIN — METOPROLOL TARTRATE 25 MG: 25 TABLET ORAL at 08:05

## 2019-12-05 RX ADMIN — LOSARTAN POTASSIUM 100 MG: 100 TABLET, FILM COATED ORAL at 08:05

## 2019-12-05 RX ADMIN — AMLODIPINE BESYLATE 10 MG: 10 TABLET ORAL at 08:07

## 2019-12-06 ENCOUNTER — TRANSCRIBE ORDERS (OUTPATIENT)
Dept: CARDIAC REHAB | Facility: HOSPITAL | Age: 84
End: 2019-12-06

## 2019-12-06 VITALS
RESPIRATION RATE: 16 BRPM | SYSTOLIC BLOOD PRESSURE: 125 MMHG | DIASTOLIC BLOOD PRESSURE: 73 MMHG | OXYGEN SATURATION: 98 % | WEIGHT: 113.2 LBS | TEMPERATURE: 98 F | HEART RATE: 74 BPM | BODY MASS INDEX: 20.06 KG/M2 | HEIGHT: 63 IN

## 2019-12-06 DIAGNOSIS — Z95.2 S/P TAVR (TRANSCATHETER AORTIC VALVE REPLACEMENT): Primary | ICD-10-CM

## 2019-12-06 PROCEDURE — 99232 SBSQ HOSP IP/OBS MODERATE 35: CPT | Performed by: INTERNAL MEDICINE

## 2019-12-06 RX ORDER — METOPROLOL TARTRATE 50 MG/1
50 TABLET, FILM COATED ORAL EVERY 12 HOURS SCHEDULED
Qty: 60 TABLET | Refills: 2 | OUTPATIENT
Start: 2019-12-06 | End: 2021-02-08 | Stop reason: HOSPADM

## 2019-12-06 RX ADMIN — LOSARTAN POTASSIUM 100 MG: 100 TABLET, FILM COATED ORAL at 08:27

## 2019-12-06 RX ADMIN — METOPROLOL TARTRATE 50 MG: 50 TABLET, FILM COATED ORAL at 08:26

## 2019-12-06 RX ADMIN — ASPIRIN 81 MG: 81 TABLET, COATED ORAL at 08:27

## 2019-12-06 RX ADMIN — CLOPIDOGREL 75 MG: 75 TABLET, FILM COATED ORAL at 08:27

## 2019-12-06 RX ADMIN — AMLODIPINE BESYLATE 10 MG: 10 TABLET ORAL at 08:27

## 2019-12-06 RX ADMIN — ATORVASTATIN CALCIUM 20 MG: 20 TABLET, FILM COATED ORAL at 08:27

## 2019-12-07 ENCOUNTER — READMISSION MANAGEMENT (OUTPATIENT)
Dept: CALL CENTER | Facility: HOSPITAL | Age: 84
End: 2019-12-07

## 2019-12-07 LAB
ABO + RH BLD: NORMAL
ABO + RH BLD: NORMAL
BH BB BLOOD EXPIRATION DATE: NORMAL
BH BB BLOOD EXPIRATION DATE: NORMAL
BH BB BLOOD TYPE BARCODE: 5100
BH BB BLOOD TYPE BARCODE: 5100
BH BB DISPENSE STATUS: NORMAL
BH BB DISPENSE STATUS: NORMAL
BH BB PRODUCT CODE: NORMAL
BH BB PRODUCT CODE: NORMAL
BH BB UNIT NUMBER: NORMAL
BH BB UNIT NUMBER: NORMAL
UNIT  ABO: NORMAL
UNIT  ABO: NORMAL
UNIT  RH: NORMAL
UNIT  RH: NORMAL

## 2019-12-07 NOTE — OUTREACH NOTE
Prep Survey      Responses   Facility patient discharged from?  [place]   Is patient eligible?  Yes   Discharge diagnosis  Nonrheumatic aortic valve stenosis/CHF, s/p TAVR   Does the patient have one of the following disease processes/diagnoses(primary or secondary)?  Cardiothoracic surgery   Does the patient have Home health ordered?  No   Is there a DME ordered?  Yes   What DME was ordered?  [place] [facility] bed   Prep survey completed?  Yes          Pamella Curry RN

## 2019-12-10 ENCOUNTER — OFFICE VISIT (OUTPATIENT)
Dept: CARDIAC REHAB | Facility: HOSPITAL | Age: 84
End: 2019-12-10

## 2019-12-10 VITALS
BODY MASS INDEX: 20.45 KG/M2 | HEART RATE: 58 BPM | DIASTOLIC BLOOD PRESSURE: 50 MMHG | HEIGHT: 63 IN | OXYGEN SATURATION: 96 % | SYSTOLIC BLOOD PRESSURE: 140 MMHG | WEIGHT: 115.4 LBS

## 2019-12-10 DIAGNOSIS — Z95.2 S/P TAVR (TRANSCATHETER AORTIC VALVE REPLACEMENT): Primary | ICD-10-CM

## 2019-12-10 PROCEDURE — 93797 PHYS/QHP OP CAR RHAB WO ECG: CPT

## 2019-12-10 NOTE — PROGRESS NOTES
Cardiac Rehab Initial Assessment      Name: Gita Wharton  :1935 Allergies:Tekturna [aliskiren]   MRN: [id number] 83 y.o. Physician: Alexis Wiggins MD   Primary Diagnosis:    Diagnosis Plan   1. S/P TAVR (transcatheter aortic valve replacement)      Event Date: 12/3/2019 Specialist: Warren Bates MD    Surgeon:  Dr. Yan   Secondary Diagnosis:  Risk Stratification:Moderate Risk Note Author: Melissa Benitez RN     Cardiovascular History: PAD:  Pt has been told both femoral arteries are blocked.  Pt's procedure was performed through left carotid.  Pt has had right CEA in 2018.  She had a fall in 2018 and suffered SDH.  She also had a stroke in .   Post TAVR she had atrial fibrillation but it resolved.      EXERCISE AT HOME  no        EF: 64%      Source: Echo 2019          Ambulatory Status:Walker  Ambulatory Fall Risk Assessed on Initial Visit: yes    Pt is currently using a rollator type walker which she has been using since her hip fracture in 2019.  Prior to that she was using a cane.  6 Minute Walk Pre- Cardiac Rehab:  Distance:658ft      RPE:3  Max. HR: 99       SPO2:96-99    MET: 1.9  MPH: 1.2             RPD: 1  Resting BP: 140/50 LA, 144/54 RA    Peak BP: 190/66  Recovery BP: 180/60  Comments: Walked full 6 minutes without stopping using her rolator.      NUTRITION  Lipids:yes If yes, labs as follows;  Total: No components found for: CHOLESTEROL  HDL:   HDL Cholesterol   Date Value Ref Range Status   2019 62 (H) 40 - 60 mg/dL Final   2018 45 40 - 60 mg/dL Final    Lipids continued:  LDL:  LDL Cholesterol    Date Value Ref Range Status   2019 68 0 - 100 mg/dL Final   2018 60 0 - 100 mg/dL Final     Triglyceride: No components found for: TRIGLYCERIDE   Weight Management:                 Weight: 115.4 lb  Height: 63 in                                   BMI: Body mass index is 20.44 kg/m².  Waist Circumference: 32.5  inches   Alcohol Use: has an  "occasional beer, but none since surgery Diabetes:No    Last HGBA1C with date if applicable:No components found for: A1C         SOCIAL HISTORY  Social History     Socioeconomic History   • Marital status:      Spouse name: Not on file   • Number of children: Not on file   • Years of education: Not on file   • Highest education level: Not on file   Tobacco Use   • Smoking status: Former Smoker     Packs/day: 0.50     Years: 40.00     Pack years: 20.00     Types: Cigarettes     Last attempt to quit:      Years since quittin.9   • Smokeless tobacco: Never Used   Substance and Sexual Activity   • Alcohol use: Yes     Alcohol/week: 7.0 standard drinks     Types: 7 Cans of beer per week   • Drug use: No   • Sexual activity: Defer       Educational Level (choose one that applies) high school diploma/GED Learning Barriers:Ready to Learn    Family Support:yes    Living Arrangement: lives alone usually, but is now staying with daughter, Marce.     Risk Factors: Stress  Yes, Clinical Depression  No, Heredity  No If Yes na, Hyperlipidemia  Yes and Obesity  No    No regular exercise since 2019 secondary to fractured hip and repair.      Not diabetic Tobacco Adjunct: No  Former smoker quit       Comorbidities: Cerebrovascular disease and Peripheral Artery Disease     Had stroke in , fell and suffered SDH and had right CEA in 2018.  Both femorals are \"blocked\" according to patient     PSYCHOSOCIAL  Clinical Depression: no    Stress: yes, pt is a worrier.     Assess presence or absence of depression using a valid screening tool: yes      PHYSICAL ASSESSMENT  Influenza vaccine: yes  Pneumococcal vaccine: yes          Angina: no    Describe angina scale of 0 - 4: 0 = none    Today are you having incisional pain? No. If, Yes, Scale:     Left side of neck with incision clean, dry, intact. Pt says her neck is just a little sore.      Today are you having any other pain? No. If, Yes, Scale:      Diagnosed " with Hypertension:yes    Heart Sounds: S1 S2 regular with murmur      Lung Sounds: normal air entry, lungs clear to auscultation         Assessment: Pleasant lady determined to improve her function.  Alert and appropriate to discussion Orthopedic Problems: Repair of fractured left hip June 2019.  Had Right total hip replacement 2015.  Has osteo-arthritis.      Are you being hurt, hit, or frightened by anyone at home or in your life? no    Are you being neglected by a caregiver? No Shoulder flexibility/Range of motion: Average     Recommended arm activity: Any    Chair sit and reach within: 5 inches on left leg and 10 inches right leg   Leg flexibility: Poor    Leg Strength/Balance/Five times sit to stand: 0 seconds.   Unable to perform  Chose one: Fall Risk    Recommended stretching: Chair    Assessment: Skin warm, dry, pink.  Bilateral slight ankle edema. Pt says she usually has some swelling in left ankle anyway.  Discussed weight tracking (which pt does) and sodium limits of no more than 2,000 mg per day.  Discussed sources of hidden sodium.  Pt and daughter say they do like the salt, but will be more vigilant with her diet.     Family attends [place]: yes, daughter Marce Time of arrival: 0940  Time of departure: 1115     Patient Goals: MET 3-4  Increase strength, stamina, energy.  Improve balance.  Be able to go back to using cane.  Be able to return to previous independence: Driving, shopping, housework.  Develop an exercise program for at least 150 minutes per week.  Learn more about heart healthy diet and stress management.          12/10/2019  9:59 AM  Melissa Benitez RN

## 2019-12-11 ENCOUNTER — READMISSION MANAGEMENT (OUTPATIENT)
Dept: CALL CENTER | Facility: HOSPITAL | Age: 84
End: 2019-12-11

## 2019-12-11 NOTE — OUTREACH NOTE
CT Surgery Week 1 Survey      Responses   Facility patient discharged from?  [place]   Does the patient have one of the following disease processes/diagnoses(primary or secondary)?  Cardiothoracic surgery   Is there a successful TCM telephone encounter documented?  No   Week 1 attempt successful?  No   Unsuccessful attempts  Attempt 1            Eder Loera RN

## 2019-12-13 ENCOUNTER — READMISSION MANAGEMENT (OUTPATIENT)
Dept: CALL CENTER | Facility: HOSPITAL | Age: 84
End: 2019-12-13

## 2019-12-13 ENCOUNTER — TREATMENT (OUTPATIENT)
Dept: CARDIAC REHAB | Facility: HOSPITAL | Age: 84
End: 2019-12-13

## 2019-12-13 DIAGNOSIS — Z95.2 S/P TAVR (TRANSCATHETER AORTIC VALVE REPLACEMENT): Primary | ICD-10-CM

## 2019-12-13 PROCEDURE — 93798 PHYS/QHP OP CAR RHAB W/ECG: CPT

## 2019-12-13 NOTE — OUTREACH NOTE
CT Surgery Week 1 Survey      Responses   Facility patient discharged from?  [place]   Does the patient have one of the following disease processes/diagnoses(primary or secondary)?  Cardiothoracic surgery   Is there a successful TCM telephone encounter documented?  No   Week 1 attempt successful?  Yes   Call start time  1316   Call end time  1326   Discharge diagnosis  Nonrheumatic aortic valve stenosis/CHF, s/p TAVR   Is patient permission given to speak with other caregiver?  Yes   List who call center can speak with  daughter   Meds reviewed with patient/caregiver?  Yes   Is the patient having any side effects they believe may be caused by any medication additions or changes?  No   Does the patient have all medications related to this admission filled (includes all antibiotics, pain medications, cardiac medications, etc.)  Yes   Is the patient taking all medications as directed (includes completed medication regime)?  Yes   Comments regarding appointments  Pt's daughter has made all appointments.   Does the patient have an appointment scheduled with their C/T surgeon?  Yes   Has the patient kept scheduled appointments due by today?  N/A   Has home health visited the patient within 72 hours of discharge?  N/A   Psychosocial issues?  No   Did the patient receive a copy of their discharge instructions?  Yes   Nursing interventions  Reviewed instructions with patient   What is the patient's perception of their health status since discharge?  Improving   Nursing interventions  Nurse provided patient education   Is the patient/caregiver able to teach back normal signs of recovery?  Nausea and lack of appetite, Constipation, Depression or irritability, Pain or discomfort at incisional site   Nursing interventions  Reassured on normal signs of recovery   Is the patient /caregiver able to teach back basic post-op care?  Continue use of incentive spirometry at least 1 week post discharge, Practice 'cough and deep  breath', Drive as instructed by MD in discharge instructions, Take showers only when approved by MD-sponge bathe until then, No tub bath, swimming, or hot tub until instructed by MD, Keep incision areas clean, dry and protected, Do not remove steri-strips, Lifting as instructed by MD in discharge instructions   Is the patient/caregiver able to teach back signs and symptoms of incisional infection?  Increased redness, swelling or pain at the incisonal site, Increased drainage or bleeding, Incisional warmth, Pus or odor from incision, Fever   Is the patient/caregiver able to teach back steps to recovery at home?  Set small, achievable goals for return to baseline health, Rest and rebuild strength, gradually increase activity, Eat a well-balance diet   Is the patient /caregiver able to teach back the importance of cardiac rehab?  Yes   Is the patient/caregiver able to teach back the hierarchy of who to call/visit for symptoms/problems? PCP, Specialist, Home health nurse, Urgent Care, ED, 911  Yes   Additional teach back comments  Pt's daughter takes care of her appointments. Pt had been staying with her daughter until today when she has eturned to her home.   Week 1 call completed?  Yes            Makayla Gonzalez RN

## 2019-12-16 ENCOUNTER — TREATMENT (OUTPATIENT)
Dept: CARDIAC REHAB | Facility: HOSPITAL | Age: 84
End: 2019-12-16

## 2019-12-16 ENCOUNTER — TELEPHONE (OUTPATIENT)
Dept: CARDIAC SURGERY | Facility: CLINIC | Age: 84
End: 2019-12-16

## 2019-12-16 DIAGNOSIS — Z95.2 S/P TAVR (TRANSCATHETER AORTIC VALVE REPLACEMENT): Primary | ICD-10-CM

## 2019-12-16 PROCEDURE — 93798 PHYS/QHP OP CAR RHAB W/ECG: CPT

## 2019-12-16 NOTE — TELEPHONE ENCOUNTER
Patients daughter calls because they can't make her appointment scheduled for today with Dr Yan. She does have a follow up appointment Thursday with [facility]. She reports her wound looks great . They will keep the appointment 12/19/19 and will call me back if there are any changes with the wound before then

## 2019-12-18 PROBLEM — Z95.2 S/P TAVR (TRANSCATHETER AORTIC VALVE REPLACEMENT): Status: ACTIVE | Noted: 2019-12-18

## 2019-12-19 ENCOUNTER — OFFICE VISIT (OUTPATIENT)
Dept: CARDIOLOGY | Facility: CLINIC | Age: 84
End: 2019-12-19

## 2019-12-19 VITALS
HEIGHT: 63 IN | HEART RATE: 55 BPM | DIASTOLIC BLOOD PRESSURE: 50 MMHG | SYSTOLIC BLOOD PRESSURE: 148 MMHG | OXYGEN SATURATION: 98 % | BODY MASS INDEX: 21.3 KG/M2 | RESPIRATION RATE: 18 BRPM | WEIGHT: 120.2 LBS

## 2019-12-19 DIAGNOSIS — I35.0 NONRHEUMATIC AORTIC VALVE STENOSIS: Primary | ICD-10-CM

## 2019-12-19 DIAGNOSIS — Z95.2 S/P TAVR (TRANSCATHETER AORTIC VALVE REPLACEMENT): ICD-10-CM

## 2019-12-19 PROCEDURE — 99214 OFFICE O/P EST MOD 30 MIN: CPT | Performed by: NURSE PRACTITIONER

## 2019-12-19 PROCEDURE — 93000 ELECTROCARDIOGRAM COMPLETE: CPT | Performed by: NURSE PRACTITIONER

## 2019-12-19 NOTE — PROGRESS NOTES
Date of Office Visit: 2019  Encounter Provider: CYNTHIA Baltazar  Place of Service: [facility] CARDIOLOGY  Patient Name: Gita Wharton  :1935    Chief Complaint   Patient presents with   • Congestive Heart Failure     1 [facility] HOSP FOLLOW UP   • Cardiac Valve Problem   :     HPI: Gita Wharton is a 84 y.o. female, new to me, who presents today for follow-up.  Old records have been obtained and reviewed by me.  She is a patient with a past medical history significant for hypertension and stroke.  In , she was admitted after a fall and diagnosed with a subdural hematoma.  She was found to have severe right ICA disease and underwent endarterectomy.  In 2019, she underwent a left total hip replacement.  Postoperatively she developed bradycardia and was seen in consultation by Dr. Kerns. She had an echocardiogram that revealed normal LV function, moderate-severe aortic stenosis, and moderate aortic regurgitation.  A repeat echocardiogram was recommended in 3 months. In July, she was hospitalized with bacteremia and UTI.  During hospitalization, she had a CT of the chest which demonstrated stable dilation of the aorta.     She was last seen in the office by Yashira Landin on 2019 at which time she was doing well with no complaints of angina or heart failure.  She was reporting dyspnea when walking long distances.  Echocardiogram revealed severe aortic stenosis and she was scheduled for a right and left heart catheterization.  This revealed a significant circumflex lesion which was asymptomatic and severe aortic stenosis.  A TAVR was recommended.  On 12/3/2019, she underwent successful deployment of a 23 mm Suzanne 3 transcatheter aortic heart valve via trans-carotid approach.  The following day, echocardiogram revealed normal LV function with an EF of 64%, grade II diastolic dysfunction, a well-functioning TAVR valve with peak and mean gradient within  defined limits, and trivial perivalvular leak.  She had a brief episode of paroxysmal atrial fibrillation.  Due to her history of subdural hematoma, the decision was made to just keep her on Plavix and aspirin.  If she continued to have atrial fibrillation, anticoagulation would be considered.  On 12/5/2019, she was stable and ready for discharge.  I am seeing her today for follow-up.        Since being discharged, she has been doing well from a cardiac standpoint.  She denies any chest pain, shortness of breath, palpitations, dizziness, or syncope.  She denies any bleeding difficulties or melena.  She really was not too symptomatic prior to her valve replacement.  However, she does notice a pretty significant difference in her energy level.  Additionally, her daughter reports that there has been a significant change in the past 3 to 4 days.  She is participating in cardiac rehab.  Unfortunately, she has had some plumbing issues at home and this is currently her biggest complaint.    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    R [facility]            Dr FRANKIE Branch - suggested yearly CXR to Monitor   • Pressure sore     BUTTOCKS   • Pyelonephritis 4/16/2019   • Runny nose    • Stenosis of right internal carotid artery 7/25/2018   • Subdural hematoma (CMS/[facility]) 07/12/2018    Secondary to fall, JULY 2018   • Toxic encephalopathy 2019    POST OP LAST HIP SURGERY       Past Surgical History:   Procedure Laterality Date   • AORTIC VALVE REPAIR/REPLACEMENT N/A 12/3/2019    Procedure: TRACEY TRANSCAROTID TRANSCATHETER AORTIC VALVE  REPLACEMENT;  Surgeon: Octaviano Yan MD;  Location: [facility] OR ;  Service: Cardiothoracic   • AORTIC VALVE REPAIR/REPLACEMENT N/A 12/3/2019    Procedure: Transcarotid Transcatheter Aortic Valve Replacement w/Intra Op TRACEY and Possible Open Bailout Surgery;  Surgeon: Warren Tanner MD;  Location: [facility] OR ;  Service: Cardiovascular   • CARDIAC CATHETERIZATION N/A 10/24/2019    Procedure: Coronary angiography;  Surgeon: Warren Tanner MD;  Location: [facility] CATH INVASIVE LOCATION;  Service: Cardiovascular   • CARDIAC CATHETERIZATION N/A 10/24/2019    Procedure: Left heart cath;  Surgeon: Warren Tanner MD;  [facility] INVASIVE LOC[facility];  Service: Cardiovascular   • CARDIAC CATHETERIZATION N/A 10/24/2019    Procedure: Right heart cath;  Surgeon: Warren Tanner MD;  Location: [facility] CATH INVASIVE LOCATION;  Service: Cardiovascular   • CAROTID ENDARTERECTOMY Right 2018    Procedure: RT CAROTID ENDARTERECTOMY;  Surgeon: Inna Villarreal MD;  Location: [facility] OR;  Service: Vascular   • COLONOSCOPY N/A 2018    Adenomatous polyp.   Repeat .  Surgeon: Ken Tidwell MD;    • HYSTERECTOMY     • THYROIDECTOMY, PARTIAL     • TOTAL HIP ARTHROPLASTY Right    • TOTAL HIP ARTHROPLASTY Left 2019    Procedure: LEFT HIP ANTERIOR HIP WITH HANA TABLE;  Surgeon: Tiffani Pereira MD;  Location: [facility] OR;  Service: Orthopedics       Social History     Socioeconomic History   • Marital status:      Spouse name: Not on file   • Number of children: 4   • Years of education: 12   • Highest education level: High school graduate   Tobacco Use   • Smoking status: Former Smoker     Packs/day: 0.50     Years: 43.00     Pack years: 21.50     Types: Cigarettes     Start date:      Last attempt to quit:      Years since quittin.9   • Smokeless tobacco: Never Used   Substance and Sexual Activity   • Alcohol use: Not Currently      Alcohol/week: 7.0 standard drinks     Types: 7 Cans of beer per week   • Drug use: No   • Sexual activity: Defer       Family History   Problem Relation Age of Onset   • Cancer Mother    • Dementia Father    • Hypertension Father    • Hypertension Daughter    • Cancer Daughter    • Malig Hyperthermia Neg Hx        Review of Systems   Constitution: Negative for chills, fever and malaise/fatigue.   Cardiovascular: Positive for leg swelling. Negative for chest pain, dyspnea on exertion, near-syncope, orthopnea, palpitations, paroxysmal nocturnal dyspnea and syncope.   Respiratory: Negative for cough and shortness of breath.    Musculoskeletal: Negative for joint pain, joint swelling and myalgias.   Gastrointestinal: Negative for abdominal pain, diarrhea, melena, nausea and vomiting.   Genitourinary: Negative for frequency and hematuria.   Neurological: Positive for numbness. Negative for light-headedness, paresthesias and seizures.   Allergic/Immunologic: Negative.    All other systems reviewed and are negative.      Allergies   Allergen Reactions   • Tekturna [Aliskiren] Diarrhea         Current Outpatient Medications:   •  Acetaminophen (TYLENOL 8 HOUR ARTHRITIS PAIN PO), Take 1 tablet by mouth Daily., Disp: , Rfl:   •  amLODIPine (NORVASC) 10 MG tablet, Take 1 tablet by mouth Daily., Disp: 90 tablet, Rfl: 3  •  aspirin 81 MG EC tablet, Take 1 tablet by mouth Daily., Disp: , Rfl:   •  atorvastatin (LIPITOR) 20 MG tablet, Take 20 mg by mouth Daily., Disp: , Rfl:   •  clopidogrel (PLAVIX) 75 MG tablet, Take 1 tablet by mouth Daily., Disp: 30 tablet, Rfl: 3  •  diphenhydrAMINE (BENADRYL) 25 mg capsule, Take 12.5 mg by mouth Daily As Needed for Itching., Disp: , Rfl:   •  folic acid (FOLVITE) 1 MG tablet, Take 1 mg by mouth Daily., Disp: , Rfl:   •  losartan (COZAAR) 100 MG tablet, Take 100 mg by mouth Daily., Disp: , Rfl:   •  metoprolol tartrate (LOPRESSOR) 50 MG tablet, Take 1 tablet by mouth Every 12 (Twelve) Hours  "for 90 days., Disp: 60 tablet, Rfl: 2  •  Multiple Vitamins-Minerals (CENTRUM ADULTS) tablet, Take 1 tablet by mouth Daily., Disp: , Rfl:       Objective:     Vitals:    12/19/19 0959 12/19/19 1009   BP: 154/50 148/50   BP Location: Right arm Left arm   Patient Position: Sitting Sitting   Pulse: 55    Resp: 18    SpO2: 98%    Weight: 54.5 kg (120 lb 3.2 oz)    Height: 160 cm (63\")      Body mass index is 21.29 kg/m².    PHYSICAL EXAM:    Physical Exam   Constitutional: She is oriented to person, place, and time. She appears well-developed and well-nourished. No distress.   HENT:   Head: Normocephalic and atraumatic.   Eyes: Pupils are equal, round, and reactive to light.   Neck: No JVD present. No thyromegaly present.   Cardiovascular: Regular rhythm and intact distal pulses. Bradycardia present.   Murmur heard.   Harsh midsystolic murmur is present with a grade of 1/6 at the upper right sternal border radiating to the neck.  Pulmonary/Chest: Effort normal and breath sounds normal. No respiratory distress.   Abdominal: Soft. Bowel sounds are normal. She exhibits no distension. There is no splenomegaly or hepatomegaly. There is no tenderness.   Musculoskeletal: Normal range of motion. She exhibits no edema.   Neurological: She is alert and oriented to person, place, and time.   Skin: Skin is warm and dry. She is not diaphoretic. No erythema.   Left carotid site clean, dry, and intact with no erythema or edema   Psychiatric: She has a normal mood and affect. Her behavior is normal. Judgment normal.         ECG 12 Lead  Date/Time: 12/19/2019 10:19 AM  Performed by: Cherelle Vela APRN  Authorized by: Cherelle Vela APRN   Comparison: compared with previous ECG from 12/5/2019  Similar to previous ECG  Rhythm: sinus bradycardia  Ectopy: atrial premature contractions  Rate: bradycardic  BPM: 48  Q waves: V1    T flattening: III and aVF    Clinical impression: abnormal EKG  Comments: Indication: Status post " TAVR              Assessment:       Diagnosis Plan   1. Nonrheumatic aortic valve stenosis  ECG 12 Lead   2. S/P TAVR (transcatheter aortic valve replacement)  ECG 12 Lead     Orders Placed This Encounter   Procedures   • ECG 12 Lead     This order was created via procedure documentation          Plan:       Overall I think she is doing really well.  She denies any symptoms of angina or heart failure.  We discussed the symptoms of acute heart failure and advised her to let me know should any of these arise.  She is bradycardic today.  However, in light of the fact that she is asymptomatic and had some atrial fibrillation in the hospital, I am not going to make any changes to her beta-blockade.  She has not had any recurrence of atrial fibrillation.  She will follow-up with Dr. Tanner on 1/22/2020 at which time she will also have a repeat echocardiogram.      As always, it has been a pleasure to participate in your patient's care.      Sincerely,         CYNTHIA Goel

## 2019-12-20 ENCOUNTER — TREATMENT (OUTPATIENT)
Dept: CARDIAC REHAB | Facility: HOSPITAL | Age: 84
End: 2019-12-20

## 2019-12-20 ENCOUNTER — READMISSION MANAGEMENT (OUTPATIENT)
Dept: CALL CENTER | Facility: HOSPITAL | Age: 84
End: 2019-12-20

## 2019-12-20 DIAGNOSIS — Z95.2 S/P TAVR (TRANSCATHETER AORTIC VALVE REPLACEMENT): Primary | ICD-10-CM

## 2019-12-20 PROCEDURE — 93798 PHYS/QHP OP CAR RHAB W/ECG: CPT

## 2019-12-20 NOTE — OUTREACH NOTE
CT Surgery Week 2 Survey      Responses   Facility patient discharged from?  [place]   Does the patient have one of the following disease processes/diagnoses(primary or secondary)?  Cardiothoracic surgery   Week 2 attempt successful?  No   Unsuccessful attempts  Attempt 1          Jennifer Kendrick RN

## 2019-12-23 ENCOUNTER — TREATMENT (OUTPATIENT)
Dept: CARDIAC REHAB | Facility: HOSPITAL | Age: 84
End: 2019-12-23

## 2019-12-23 DIAGNOSIS — Z95.2 S/P TAVR (TRANSCATHETER AORTIC VALVE REPLACEMENT): Primary | ICD-10-CM

## 2019-12-23 PROCEDURE — 93798 PHYS/QHP OP CAR RHAB W/ECG: CPT

## 2019-12-24 ENCOUNTER — READMISSION MANAGEMENT (OUTPATIENT)
Dept: CALL CENTER | Facility: HOSPITAL | Age: 84
End: 2019-12-24

## 2019-12-24 NOTE — OUTREACH NOTE
CT Surgery Week 2 Survey      Responses   Facility patient discharged from?  [place]   Does the patient have one of the following disease processes/diagnoses(primary or secondary)?  Cardiothoracic surgery   Week 2 attempt successful?  No   Unsuccessful attempts  Attempt 2          Kimmie Boyer RN

## 2019-12-27 ENCOUNTER — TREATMENT (OUTPATIENT)
Dept: CARDIAC REHAB | Facility: HOSPITAL | Age: 84
End: 2019-12-27

## 2019-12-27 DIAGNOSIS — Z95.2 S/P TAVR (TRANSCATHETER AORTIC VALVE REPLACEMENT): Primary | ICD-10-CM

## 2019-12-27 PROCEDURE — 93798 PHYS/QHP OP CAR RHAB W/ECG: CPT

## 2020-01-03 ENCOUNTER — TREATMENT (OUTPATIENT)
Dept: CARDIAC REHAB | Facility: HOSPITAL | Age: 85
End: 2020-01-03

## 2020-01-03 DIAGNOSIS — Z95.2 S/P TAVR (TRANSCATHETER AORTIC VALVE REPLACEMENT): Primary | ICD-10-CM

## 2020-01-03 PROCEDURE — 93798 PHYS/QHP OP CAR RHAB W/ECG: CPT

## 2020-01-06 ENCOUNTER — TREATMENT (OUTPATIENT)
Dept: CARDIAC REHAB | Facility: HOSPITAL | Age: 85
End: 2020-01-06

## 2020-01-06 DIAGNOSIS — Z95.2 S/P TAVR (TRANSCATHETER AORTIC VALVE REPLACEMENT): Primary | ICD-10-CM

## 2020-01-06 PROCEDURE — 93798 PHYS/QHP OP CAR RHAB W/ECG: CPT

## 2020-01-10 ENCOUNTER — TREATMENT (OUTPATIENT)
Dept: CARDIAC REHAB | Facility: HOSPITAL | Age: 85
End: 2020-01-10

## 2020-01-10 DIAGNOSIS — Z95.2 S/P TAVR (TRANSCATHETER AORTIC VALVE REPLACEMENT): Primary | ICD-10-CM

## 2020-01-10 PROCEDURE — 93798 PHYS/QHP OP CAR RHAB W/ECG: CPT

## 2020-01-10 RX ORDER — LOSARTAN POTASSIUM 100 MG/1
TABLET ORAL
Qty: 90 TABLET | Refills: 2 | Status: SHIPPED | OUTPATIENT
Start: 2020-01-10 | End: 2020-07-08 | Stop reason: SDUPTHER

## 2020-01-13 ENCOUNTER — TREATMENT (OUTPATIENT)
Dept: CARDIAC REHAB | Facility: HOSPITAL | Age: 85
End: 2020-01-13

## 2020-01-13 DIAGNOSIS — Z95.2 S/P TAVR (TRANSCATHETER AORTIC VALVE REPLACEMENT): Primary | ICD-10-CM

## 2020-01-13 PROCEDURE — 93798 PHYS/QHP OP CAR RHAB W/ECG: CPT

## 2020-01-15 ENCOUNTER — TREATMENT (OUTPATIENT)
Dept: CARDIAC REHAB | Facility: HOSPITAL | Age: 85
End: 2020-01-15

## 2020-01-15 DIAGNOSIS — Z95.2 S/P TAVR (TRANSCATHETER AORTIC VALVE REPLACEMENT): Primary | ICD-10-CM

## 2020-01-15 PROCEDURE — 93798 PHYS/QHP OP CAR RHAB W/ECG: CPT

## 2020-01-17 ENCOUNTER — TREATMENT (OUTPATIENT)
Dept: CARDIAC REHAB | Facility: HOSPITAL | Age: 85
End: 2020-01-17

## 2020-01-17 DIAGNOSIS — Z95.2 S/P TAVR (TRANSCATHETER AORTIC VALVE REPLACEMENT): Primary | ICD-10-CM

## 2020-01-17 PROCEDURE — 93798 PHYS/QHP OP CAR RHAB W/ECG: CPT

## 2020-01-20 ENCOUNTER — TREATMENT (OUTPATIENT)
Dept: CARDIAC REHAB | Facility: HOSPITAL | Age: 85
End: 2020-01-20

## 2020-01-20 DIAGNOSIS — Z95.2 S/P TAVR (TRANSCATHETER AORTIC VALVE REPLACEMENT): Primary | ICD-10-CM

## 2020-01-20 PROCEDURE — 93798 PHYS/QHP OP CAR RHAB W/ECG: CPT

## 2020-01-22 ENCOUNTER — LAB (OUTPATIENT)
Dept: LAB | Facility: HOSPITAL | Age: 85
End: 2020-01-22

## 2020-01-22 ENCOUNTER — OFFICE VISIT (OUTPATIENT)
Dept: CARDIOLOGY | Facility: CLINIC | Age: 85
End: 2020-01-22

## 2020-01-22 ENCOUNTER — HOSPITAL ENCOUNTER (OUTPATIENT)
Dept: GENERAL RADIOLOGY | Facility: HOSPITAL | Age: 85
Discharge: HOME | End: 2020-01-22

## 2020-01-22 ENCOUNTER — HOSPITAL ENCOUNTER (OUTPATIENT)
Dept: CARDIOLOGY | Facility: HOSPITAL | Age: 85
Discharge: HOME | End: 2020-01-22
Admitting: INTERNAL MEDICINE

## 2020-01-22 VITALS
SYSTOLIC BLOOD PRESSURE: 138 MMHG | DIASTOLIC BLOOD PRESSURE: 68 MMHG | HEART RATE: 61 BPM | HEIGHT: 63 IN | BODY MASS INDEX: 20.59 KG/M2 | WEIGHT: 116.2 LBS

## 2020-01-22 VITALS
HEIGHT: 63 IN | DIASTOLIC BLOOD PRESSURE: 58 MMHG | HEART RATE: 61 BPM | SYSTOLIC BLOOD PRESSURE: 110 MMHG | BODY MASS INDEX: 21.26 KG/M2 | WEIGHT: 120 LBS

## 2020-01-22 DIAGNOSIS — I73.9 PVD (PERIPHERAL VASCULAR DISEASE) WITH CLAUDICATION (HCC): ICD-10-CM

## 2020-01-22 DIAGNOSIS — I35.0 NONRHEUMATIC AORTIC VALVE STENOSIS: Primary | ICD-10-CM

## 2020-01-22 DIAGNOSIS — I50.32 CHRONIC DIASTOLIC CONGESTIVE HEART FAILURE (HCC): ICD-10-CM

## 2020-01-22 DIAGNOSIS — I35.0 NONRHEUMATIC AORTIC VALVE STENOSIS: ICD-10-CM

## 2020-01-22 DIAGNOSIS — Z95.2 S/P TAVR (TRANSCATHETER AORTIC VALVE REPLACEMENT): ICD-10-CM

## 2020-01-22 DIAGNOSIS — J90 PLEURAL EFFUSION: ICD-10-CM

## 2020-01-22 PROBLEM — I35.1 NONRHEUMATIC AORTIC VALVE INSUFFICIENCY: Status: RESOLVED | Noted: 2019-09-13 | Resolved: 2020-01-22

## 2020-01-22 LAB
AORTIC DIMENSIONLESS INDEX: 0.8 (DI)
AORTIC ROOT ANNULUS: 1.9 CM
ASCENDING AORTA: 3.5 CM
BH CV ECHO AV AORTIC VALVE AT ACCEL TIME CALCULATED: NORMAL MSEC
BH CV ECHO MEAS - ACS: 1.2 CM
BH CV ECHO MEAS - AI MAX PG: 25.2 MMHG
BH CV ECHO MEAS - AI MAX VEL: 251.1 CM/SEC
BH CV ECHO MEAS - AO ACC TIME: 0.13 SEC
BH CV ECHO MEAS - AO MAX PG (FULL): 6.2 MMHG
BH CV ECHO MEAS - AO MAX PG: 14.4 MMHG
BH CV ECHO MEAS - AO MEAN PG (FULL): 3.4 MMHG
BH CV ECHO MEAS - AO MEAN PG: 8.7 MMHG
BH CV ECHO MEAS - AO V2 MAX: 189.8 CM/SEC
BH CV ECHO MEAS - AO V2 MEAN: 142.2 CM/SEC
BH CV ECHO MEAS - AO V2 VTI: 50.7 CM
BH CV ECHO MEAS - ASC AORTA: 3.5 CM
BH CV ECHO MEAS - AT: 127 SEC
BH CV ECHO MEAS - AVA(I,A): 1.5 CM^2
BH CV ECHO MEAS - AVA(I,D): 1.5 CM^2
BH CV ECHO MEAS - AVA(V,A): 1.6 CM^2
BH CV ECHO MEAS - AVA(V,D): 1.6 CM^2
BH CV ECHO MEAS - BSA(HAYCOCK): 1.6 M^2
BH CV ECHO MEAS - BSA: 1.6 M^2
BH CV ECHO MEAS - BZI_BMI: 21.3 KILOGRAMS/M^2
BH CV ECHO MEAS - BZI_METRIC_HEIGHT: 160 CM
BH CV ECHO MEAS - BZI_METRIC_WEIGHT: 54.4 KG
BH CV ECHO MEAS - EDV(MOD-SP2): 66 ML
BH CV ECHO MEAS - EDV(MOD-SP4): 58 ML
BH CV ECHO MEAS - EDV(TEICH): 65.1 ML
BH CV ECHO MEAS - EF(CUBED): 82.9 %
BH CV ECHO MEAS - EF(MOD-BP): 63 %
BH CV ECHO MEAS - EF(MOD-SP2): 63.6 %
BH CV ECHO MEAS - EF(MOD-SP4): 62.1 %
BH CV ECHO MEAS - EF(TEICH): 76.4 %
BH CV ECHO MEAS - ESV(MOD-SP2): 24 ML
BH CV ECHO MEAS - ESV(MOD-SP4): 22 ML
BH CV ECHO MEAS - ESV(TEICH): 15.4 ML
BH CV ECHO MEAS - FS: 44.5 %
BH CV ECHO MEAS - IVS/LVPW: 0.94
BH CV ECHO MEAS - IVSD: 1.2 CM
BH CV ECHO MEAS - LAT PEAK E' VEL: 7 CM/SEC
BH CV ECHO MEAS - LV DIASTOLIC VOL/BSA (35-75): 37.3 ML/M^2
BH CV ECHO MEAS - LV MASS(C)D: 171.5 GRAMS
BH CV ECHO MEAS - LV MASS(C)DI: 110.2 GRAMS/M^2
BH CV ECHO MEAS - LV MAX PG: 8.2 MMHG
BH CV ECHO MEAS - LV MEAN PG: 5.3 MMHG
BH CV ECHO MEAS - LV SYSTOLIC VOL/BSA (12-30): 14.1 ML/M^2
BH CV ECHO MEAS - LV V1 MAX: 143.5 CM/SEC
BH CV ECHO MEAS - LV V1 MEAN: 109.7 CM/SEC
BH CV ECHO MEAS - LV V1 VTI: 35.2 CM
BH CV ECHO MEAS - LVIDD: 3.9 CM
BH CV ECHO MEAS - LVIDS: 2.2 CM
BH CV ECHO MEAS - LVLD AP2: 8.2 CM
BH CV ECHO MEAS - LVLD AP4: 7.2 CM
BH CV ECHO MEAS - LVLS AP2: 6.7 CM
BH CV ECHO MEAS - LVLS AP4: 5.7 CM
BH CV ECHO MEAS - LVOT AREA (M): 2.3 CM^2
BH CV ECHO MEAS - LVOT AREA: 2.2 CM^2
BH CV ECHO MEAS - LVOT DIAM: 1.7 CM
BH CV ECHO MEAS - LVPWD: 1.3 CM
BH CV ECHO MEAS - MED PEAK E' VEL: 4 CM/SEC
BH CV ECHO MEAS - MR MAX PG: 90 MMHG
BH CV ECHO MEAS - MR MAX VEL: 474.4 CM/SEC
BH CV ECHO MEAS - MV A DUR: 0.14 SEC
BH CV ECHO MEAS - MV A MAX VEL: 115.7 CM/SEC
BH CV ECHO MEAS - MV DEC SLOPE: 238 CM/SEC^2
BH CV ECHO MEAS - MV DEC TIME: 0.31 SEC
BH CV ECHO MEAS - MV E MAX VEL: 77.6 CM/SEC
BH CV ECHO MEAS - MV E/A: 0.67
BH CV ECHO MEAS - MV MAX PG: 6.1 MMHG
BH CV ECHO MEAS - MV MEAN PG: 2.2 MMHG
BH CV ECHO MEAS - MV P1/2T MAX VEL: 76.7 CM/SEC
BH CV ECHO MEAS - MV P1/2T: 94.4 MSEC
BH CV ECHO MEAS - MV V2 MAX: 123.1 CM/SEC
BH CV ECHO MEAS - MV V2 MEAN: 67.9 CM/SEC
BH CV ECHO MEAS - MV V2 VTI: 47.9 CM
BH CV ECHO MEAS - MVA P1/2T LCG: 2.9 CM^2
BH CV ECHO MEAS - MVA(P1/2T): 2.3 CM^2
BH CV ECHO MEAS - MVA(VTI): 1.6 CM^2
BH CV ECHO MEAS - PA ACC TIME: 0.15 SEC
BH CV ECHO MEAS - PA MAX PG (FULL): 0.84 MMHG
BH CV ECHO MEAS - PA MAX PG: 2.7 MMHG
BH CV ECHO MEAS - PA PR(ACCEL): 11.3 MMHG
BH CV ECHO MEAS - PA V2 MAX: 82.4 CM/SEC
BH CV ECHO MEAS - PULM A REVS DUR: 0.11 SEC
BH CV ECHO MEAS - PULM A REVS VEL: 26.1 CM/SEC
BH CV ECHO MEAS - PULM DIAS VEL: 35.6 CM/SEC
BH CV ECHO MEAS - PULM S/D: 1.5
BH CV ECHO MEAS - PULM SYS VEL: 52.7 CM/SEC
BH CV ECHO MEAS - PVA(V,A): 2.2 CM^2
BH CV ECHO MEAS - PVA(V,D): 2.2 CM^2
BH CV ECHO MEAS - QP/QS: 0.53
BH CV ECHO MEAS - RAP SYSTOLE: 3 MMHG
BH CV ECHO MEAS - RV MAX PG: 1.9 MMHG
BH CV ECHO MEAS - RV MEAN PG: 1.1 MMHG
BH CV ECHO MEAS - RV V1 MAX: 68.6 CM/SEC
BH CV ECHO MEAS - RV V1 MEAN: 50 CM/SEC
BH CV ECHO MEAS - RV V1 VTI: 15.5 CM
BH CV ECHO MEAS - RVOT AREA: 2.6 CM^2
BH CV ECHO MEAS - RVOT DIAM: 1.8 CM
BH CV ECHO MEAS - RVSP: 27 MMHG
BH CV ECHO MEAS - SI(CUBED): 31.1 ML/M^2
BH CV ECHO MEAS - SI(LVOT): 48.9 ML/M^2
BH CV ECHO MEAS - SI(MOD-SP2): 27 ML/M^2
BH CV ECHO MEAS - SI(MOD-SP4): 23.1 ML/M^2
BH CV ECHO MEAS - SI(TEICH): 32 ML/M^2
BH CV ECHO MEAS - SUP REN AO DIAM: 2.2 CM
BH CV ECHO MEAS - SV(CUBED): 48.4 ML
BH CV ECHO MEAS - SV(LVOT): 76.2 ML
BH CV ECHO MEAS - SV(MOD-SP2): 42 ML
BH CV ECHO MEAS - SV(MOD-SP4): 36 ML
BH CV ECHO MEAS - SV(RVOT): 40.1 ML
BH CV ECHO MEAS - SV(TEICH): 49.8 ML
BH CV ECHO MEAS - TAPSE (>1.6): 2.3 CM2
BH CV ECHO MEAS - TR MAX VEL: 243.9 CM/SEC
BH CV ECHO MEASUREMENTS AVERAGE E/E' RATIO: 14.11
BH CV XLRA - RV BASE: 2.7 CM
BH CV XLRA - RV LENGTH: 6.4 CM
BH CV XLRA - RV MID: 1.9 CM
BH CV XLRA - TDI S': 13 CM/SEC
DEPRECATED RDW RBC AUTO: 39.6 FL (ref 37–54)
ERYTHROCYTE [DISTWIDTH] IN BLOOD BY AUTOMATED COUNT: 14.2 % (ref 12.3–15.4)
HCT VFR BLD AUTO: 30.3 % (ref 34–46.6)
HGB BLD-MCNC: 9.5 G/DL (ref 12–15.9)
LEFT ATRIUM VOLUME INDEX: 28 ML/M2
LV EF 2D ECHO EST: 63 %
MAXIMAL PREDICTED HEART RATE: 137 BPM
MCH RBC QN AUTO: 24.2 PG (ref 26.6–33)
MCHC RBC AUTO-ENTMCNC: 31.4 G/DL (ref 31.5–35.7)
MCV RBC AUTO: 77.3 FL (ref 79–97)
PLATELET # BLD AUTO: 195 10*3/MM3 (ref 140–450)
PMV BLD AUTO: 11.3 FL (ref 6–12)
RBC # BLD AUTO: 3.92 10*6/MM3 (ref 3.77–5.28)
SINUS: 3.5 CM
STJ: 3.3 CM
STRESS TARGET HR: 116 BPM
WBC NRBC COR # BLD: 8.52 10*3/MM3 (ref 3.4–10.8)

## 2020-01-22 PROCEDURE — 85027 COMPLETE CBC AUTOMATED: CPT

## 2020-01-22 PROCEDURE — 36415 COLL VENOUS BLD VENIPUNCTURE: CPT

## 2020-01-22 PROCEDURE — 93306 TTE W/DOPPLER COMPLETE: CPT | Performed by: INTERNAL MEDICINE

## 2020-01-22 PROCEDURE — 99214 OFFICE O/P EST MOD 30 MIN: CPT | Performed by: INTERNAL MEDICINE

## 2020-01-22 PROCEDURE — 93356 MYOCRD STRAIN IMG SPCKL TRCK: CPT | Performed by: INTERNAL MEDICINE

## 2020-01-22 PROCEDURE — 71046 X-RAY EXAM CHEST 2 VIEWS: CPT

## 2020-01-22 PROCEDURE — 93000 ELECTROCARDIOGRAM COMPLETE: CPT | Performed by: INTERNAL MEDICINE

## 2020-01-22 PROCEDURE — 93306 TTE W/DOPPLER COMPLETE: CPT

## 2020-01-22 PROCEDURE — 93356 MYOCRD STRAIN IMG SPCKL TRCK: CPT

## 2020-01-22 NOTE — PROGRESS NOTES
Date of Office Visit: 20  Encounter Provider: Warren Tanner MD  Place of Service: [facility] CARDIOLOGY  Patient Name: Gita Wharton  :1935  [id number]    Chief Complaint   Patient presents with   • Cardiac Valve Problem   :     HPI: Gita Wharton is a 84 y.o. female she had a carotid approach #23 sapient transaortic valve replacement about a month ago.  And she is here for follow-up.  Daughter says she is much improved she is able to do a lot more activity than she was before.  She denies any PND orthopnea syncope.  She has a little bit of edema but she chronically has had that she just did 30 minutes of cardiac rehab earlier this week and she wants to go 2 times a week    Past Medical History:   Diagnosis Date   • Acute right MCA stroke (CMS/HCC) 2018   • Anesthesia complication     TOXIC ENCEPHALOPATHY   • Aortic stenosis    • Aortic valve stenosis    • Fracture, hip (CMS/HCC)     LEFT, CHIP ON TOP OF HIP D/T FALL 2 WEEKS POST OP   • Hemorrhoids 2018   • History of transfusion 2001    13 UNITS, HERE AT [facility]   • Hyperlipidemia    • Hypertension    • Hypokalemia    • Lung granuloma (CMS/HCC) 2018    R LL            Dr FRANKIE Branch - suggested yearly CXR to Monitor   • Pressure sore     BUTTOCKS   • Pyelonephritis 2019   • Runny nose    • Stenosis of right internal carotid artery 2018   • Subdural hematoma (CMS/HCC) 2018    Secondary to fall, 2018   • Toxic encephalopathy 2019    POST OP LAST HIP SURGERY       Past Surgical History:   Procedure Laterality Date   • AORTIC VALVE REPAIR/REPLACEMENT N/A 12/3/2019    Procedure: TRACEY TRANSCAROTID TRANSCATHETER AORTIC VALVE REPLACEMENT;  Surgeon: Octaviano Yan MD;  Location: [facility] OR ;  Service: Cardiothoracic   • AORTIC VALVE REPAIR/REPLACEMENT N/A 12/3/2019    Procedure: Transcarotid Transcatheter Aortic Valve Replacement w/Intra Op TRACEY and Possible Open Bailout  Surgery;  Surgeon: Warren Tanner MD;  Location: [facility] HYBRID OR ;  Service: Cardiovascular   • CARDIAC CATHETERIZATION N/A 10/24/2019    Procedure: Coronary angiography;  Surgeon: Warren Tanner MD;  Location: [facility] CATH INVASIVE LOCATION;  Service: Cardiovascular   • CARDIAC CATHETERIZATION N/A 10/24/2019    Procedure: Left heart cath;  Surgeon: Warren Tanner MD;  Location: [facility] CATH INVASIVE LOCATION;  Service: Cardiovascular   • CARDIAC CATHETERIZATION N/A 10/24/2019    Procedure: Right heart cath;  Surgeon: Warren Tanner MD;  Location: [facility] CATH INVASIVE LOCATION;  Service: Cardiovascular   • CAROTID ENDARTERECTOMY Right 2018    Procedure: RT CAROTID ENDARTERECTOMY;  Surgeon: Inna Villarreal MD;  Location: [facility] MAIN OR;  Service: Vascular   • COLONOSCOPY N/A 2018    Adenomatous polyp.   Repeat .  Surgeon: Ken Tidwell MD;    • HYSTERECTOMY     • THYROIDECTOMY, PARTIAL     • TOTAL HIP ARTHROPLASTY Right    • TOTAL HIP ARTHROPLASTY Left 2019    Procedure: LEFT HIP ANTERIOR HIP WITH HANA TABLE;  Surgeon: Tiffani Pereira MD;  Location: [facility] MAIN OR;  Service: Orthopedics       Social History     Socioeconomic History   • Marital status:      Spouse name: Not on file   • Number of children: 4   • Years of education: 12   • Highest education level: High school graduate   Tobacco Use   • Smoking status: Former Smoker     Packs/day: 0.50     Years: 43.00     Pack years: 21.50     Types: Cigarettes     Start date:      Last attempt to quit:      Years since quittin.0   • Smokeless tobacco: Never Used   Substance and Sexual Activity   • Alcohol use: Not Currently     Alcohol/week: 7.0 standard drinks     Types: 7 Cans of beer per week   • Drug use: No   • Sexual activity: Defer       Family History   Problem Relation Age of Onset   • Cancer Mother    • Dementia Father    • Hypertension Father    • Hypertension Daughter    • Cancer  Daughter    • Malig Hyperthermia Neg Hx        Review of Systems   Constitution: Negative for decreased appetite, fever, malaise/fatigue and weight loss.   HENT: Negative for nosebleeds.    Eyes: Negative for double vision.   Cardiovascular: Negative for chest pain, claudication, cyanosis, dyspnea on exertion, irregular heartbeat, leg swelling, near-syncope, orthopnea, palpitations, paroxysmal nocturnal dyspnea and syncope.   Respiratory: Negative for cough, hemoptysis and shortness of breath.    Hematologic/Lymphatic: Negative for bleeding problem.   Skin: Negative for rash.   Musculoskeletal: Negative for falls and myalgias.   Gastrointestinal: Negative for hematochezia, jaundice, melena, nausea and vomiting.   Genitourinary: Negative for hematuria.   Neurological: Negative for dizziness and seizures.   Psychiatric/Behavioral: Negative for altered mental status and memory loss.       Allergies   Allergen Reactions   • Tekturna [Aliskiren] Diarrhea         Current Outpatient Medications:   •  Acetaminophen (TYLENOL 8 HOUR ARTHRITIS PAIN PO), Take 1 tablet by mouth Daily., Disp: , Rfl:   •  amLODIPine (NORVASC) 10 MG tablet, Take 1 tablet by mouth Daily., Disp: 90 tablet, Rfl: 3  •  aspirin 81 MG EC tablet, Take 1 tablet by mouth Daily., Disp: , Rfl:   •  atorvastatin (LIPITOR) 20 MG tablet, Take 20 mg by mouth Daily., Disp: , Rfl:   •  clopidogrel (PLAVIX) 75 MG tablet, Take 1 tablet by mouth Daily., Disp: 30 tablet, Rfl: 3  •  diphenhydrAMINE (BENADRYL) 25 mg capsule, Take 12.5 mg by mouth Daily As Needed for Itching., Disp: , Rfl:   •  folic acid (FOLVITE) 1 MG tablet, Take 1 mg by mouth Daily., Disp: , Rfl:   •  losartan (COZAAR) 100 MG tablet, TAKE ONE TABLET BY MOUTH DAILY, Disp: 90 tablet, Rfl: 2  •  metoprolol tartrate (LOPRESSOR) 50 MG tablet, Take 1 tablet by mouth Every 12 (Twelve) Hours for 90 days., Disp: 60 tablet, Rfl: 2  •  Multiple Vitamins-Minerals (CENTRUM ADULTS) tablet, Take 1 tablet by mouth  "Daily., Disp: , Rfl:       Objective:     Vitals:    01/22/20 1027   BP: 138/68   Pulse: 61   Weight: 52.7 kg (116 lb 3.2 oz)   Height: 160 cm (63\")     Body mass index is 20.58 kg/m².    Physical Exam   Constitutional: She is oriented to person, place, and time. She appears well-developed and well-nourished.   HENT:   Head: Normocephalic.   Eyes: No scleral icterus.   Neck: No JVD present. No thyromegaly present.   Cardiovascular: Normal rate and regular rhythm. Exam reveals no gallop and no friction rub.   Murmur heard.   Harsh crescendo-decrescendo early systolic murmur is present with a grade of 2/6 at the upper right sternal border radiating to the neck.  Pulmonary/Chest: Effort normal and breath sounds normal. She has no wheezes. She has no rales.   Abdominal: Soft. There is no hepatosplenomegaly. There is no tenderness.   Musculoskeletal: Normal range of motion. She exhibits no edema.   Lymphadenopathy:     She has no cervical adenopathy.   Neurological: She is alert and oriented to person, place, and time.   Skin: Skin is warm and dry. No rash noted.   Psychiatric: She has a normal mood and affect.         ECG 12 Lead  Date/Time: 1/22/2020 11:17 AM  Performed by: Warren Tanner MD  Authorized by: Warren Tanner MD   Comparison: compared with previous ECG   Rhythm: sinus rhythm  Ectopy: atrial premature contractions  Other findings: non-specific ST-T wave changes    Clinical impression: abnormal EKG             Assessment:       Diagnosis Plan   1. Nonrheumatic aortic valve stenosis  XR Chest PA & Lateral   2. S/P TAVR (transcatheter aortic valve replacement)  XR Chest PA & Lateral   3. PVD (peripheral vascular disease) with claudication (CMS/HCC)  XR Chest PA & Lateral   4. Pleural effusion  XR Chest PA & Lateral          Plan:       She looks fantastic she is 1 month out from the carotid approach TAVR the valve looks great on echo she is really got better color she is got more activity that she can do " some really pleased with how she is doing on the echo we did see a little bit of fluid in her left chest some to send her over to get a chest x-ray but she does not really have symptoms of heart failure so unless it is a real large effusion probably not can I do anything we will just watch it.  I will have her come see us in 6 months    As always, it has been a pleasure to participate in your patient's care.      Sincerely,       Warren Tanner MD

## 2020-01-23 ENCOUNTER — TELEPHONE (OUTPATIENT)
Dept: CARDIOLOGY | Facility: CLINIC | Age: 85
End: 2020-01-23

## 2020-01-23 NOTE — TELEPHONE ENCOUNTER
Called and spoke with patient's daughter. Told her the CXR looks stable. There was no significant fluid. She will keep her scheduled follow up in 6 months.

## 2020-01-24 ENCOUNTER — TREATMENT (OUTPATIENT)
Dept: CARDIAC REHAB | Facility: HOSPITAL | Age: 85
End: 2020-01-24

## 2020-01-24 DIAGNOSIS — Z95.2 S/P TAVR (TRANSCATHETER AORTIC VALVE REPLACEMENT): Primary | ICD-10-CM

## 2020-01-24 PROCEDURE — 93798 PHYS/QHP OP CAR RHAB W/ECG: CPT

## 2020-01-27 ENCOUNTER — TREATMENT (OUTPATIENT)
Dept: CARDIAC REHAB | Facility: HOSPITAL | Age: 85
End: 2020-01-27

## 2020-01-27 DIAGNOSIS — Z95.2 S/P TAVR (TRANSCATHETER AORTIC VALVE REPLACEMENT): Primary | ICD-10-CM

## 2020-01-27 PROCEDURE — 93798 PHYS/QHP OP CAR RHAB W/ECG: CPT

## 2020-01-31 ENCOUNTER — TREATMENT (OUTPATIENT)
Dept: CARDIAC REHAB | Facility: HOSPITAL | Age: 85
End: 2020-01-31

## 2020-01-31 DIAGNOSIS — Z95.2 S/P TAVR (TRANSCATHETER AORTIC VALVE REPLACEMENT): Primary | ICD-10-CM

## 2020-01-31 PROCEDURE — 93798 PHYS/QHP OP CAR RHAB W/ECG: CPT

## 2020-02-03 ENCOUNTER — TREATMENT (OUTPATIENT)
Dept: CARDIAC REHAB | Facility: HOSPITAL | Age: 85
End: 2020-02-03

## 2020-02-03 DIAGNOSIS — Z95.2 S/P TAVR (TRANSCATHETER AORTIC VALVE REPLACEMENT): Primary | ICD-10-CM

## 2020-02-03 PROCEDURE — 93798 PHYS/QHP OP CAR RHAB W/ECG: CPT

## 2020-02-05 ENCOUNTER — APPOINTMENT (OUTPATIENT)
Dept: CARDIAC REHAB | Facility: HOSPITAL | Age: 85
End: 2020-02-05

## 2020-02-07 ENCOUNTER — APPOINTMENT (OUTPATIENT)
Dept: CARDIAC REHAB | Facility: HOSPITAL | Age: 85
End: 2020-02-07

## 2020-02-10 ENCOUNTER — APPOINTMENT (OUTPATIENT)
Dept: CARDIAC REHAB | Facility: HOSPITAL | Age: 85
End: 2020-02-10

## 2020-02-12 ENCOUNTER — APPOINTMENT (OUTPATIENT)
Dept: CARDIAC REHAB | Facility: HOSPITAL | Age: 85
End: 2020-02-12

## 2020-02-14 ENCOUNTER — APPOINTMENT (OUTPATIENT)
Dept: CARDIAC REHAB | Facility: HOSPITAL | Age: 85
End: 2020-02-14

## 2020-02-17 ENCOUNTER — APPOINTMENT (OUTPATIENT)
Dept: CARDIAC REHAB | Facility: HOSPITAL | Age: 85
End: 2020-02-17

## 2020-02-19 ENCOUNTER — APPOINTMENT (OUTPATIENT)
Dept: CARDIAC REHAB | Facility: HOSPITAL | Age: 85
End: 2020-02-19

## 2020-02-21 ENCOUNTER — APPOINTMENT (OUTPATIENT)
Dept: CARDIAC REHAB | Facility: HOSPITAL | Age: 85
End: 2020-02-21

## 2020-02-24 ENCOUNTER — APPOINTMENT (OUTPATIENT)
Dept: CARDIAC REHAB | Facility: HOSPITAL | Age: 85
End: 2020-02-24

## 2020-02-24 RX ORDER — ATORVASTATIN CALCIUM 20 MG/1
20 TABLET, FILM COATED ORAL DAILY
Qty: 90 TABLET | Refills: 0 | Status: SHIPPED | OUTPATIENT
Start: 2020-02-24 | End: 2020-05-26

## 2020-02-26 ENCOUNTER — APPOINTMENT (OUTPATIENT)
Dept: CARDIAC REHAB | Facility: HOSPITAL | Age: 85
End: 2020-02-26

## 2020-02-28 ENCOUNTER — APPOINTMENT (OUTPATIENT)
Dept: CARDIAC REHAB | Facility: HOSPITAL | Age: 85
End: 2020-02-28

## 2020-03-02 ENCOUNTER — APPOINTMENT (OUTPATIENT)
Dept: CARDIAC REHAB | Facility: HOSPITAL | Age: 85
End: 2020-03-02

## 2020-03-02 ENCOUNTER — OFFICE VISIT (OUTPATIENT)
Dept: INTERNAL MEDICINE | Age: 85
End: 2020-03-02

## 2020-03-02 VITALS
WEIGHT: 118.4 LBS | OXYGEN SATURATION: 99 % | HEIGHT: 63 IN | BODY MASS INDEX: 20.98 KG/M2 | DIASTOLIC BLOOD PRESSURE: 62 MMHG | TEMPERATURE: 97.5 F | SYSTOLIC BLOOD PRESSURE: 110 MMHG | HEART RATE: 64 BPM

## 2020-03-02 DIAGNOSIS — D64.9 LOW HEMOGLOBIN: ICD-10-CM

## 2020-03-02 DIAGNOSIS — I10 ESSENTIAL HYPERTENSION: Primary | ICD-10-CM

## 2020-03-02 DIAGNOSIS — E78.5 HYPERLIPIDEMIA, UNSPECIFIED HYPERLIPIDEMIA TYPE: ICD-10-CM

## 2020-03-02 DIAGNOSIS — I35.0 NONRHEUMATIC AORTIC VALVE STENOSIS: ICD-10-CM

## 2020-03-02 LAB
ALBUMIN SERPL-MCNC: 4.1 G/DL (ref 3.5–5.2)
ALBUMIN/GLOB SERPL: 1.9 G/DL
ALP SERPL-CCNC: 66 U/L (ref 39–117)
ALT SERPL-CCNC: 7 U/L (ref 1–33)
AST SERPL-CCNC: 14 U/L (ref 1–32)
BASOPHILS # BLD AUTO: ABNORMAL 10*3/UL
BASOPHILS # BLD MANUAL: 0.1 10*3/MM3 (ref 0–0.2)
BASOPHILS NFR BLD MANUAL: 1 % (ref 0–1.5)
BILIRUB SERPL-MCNC: <0.2 MG/DL (ref 0.2–1.2)
BUN SERPL-MCNC: 14 MG/DL (ref 8–23)
BUN/CREAT SERPL: 11.5 (ref 7–25)
CALCIUM SERPL-MCNC: 8.6 MG/DL (ref 8.6–10.5)
CHLORIDE SERPL-SCNC: 103 MMOL/L (ref 98–107)
CHOLEST SERPL-MCNC: 157 MG/DL (ref 0–200)
CHOLEST/HDLC SERPL: 3.2 {RATIO}
CO2 SERPL-SCNC: 23.4 MMOL/L (ref 22–29)
CREAT SERPL-MCNC: 1.22 MG/DL (ref 0.57–1)
DIFFERENTIAL COMMENT: ABNORMAL
EOSINOPHIL # BLD AUTO: ABNORMAL 10*3/UL
EOSINOPHIL NFR BLD AUTO: ABNORMAL %
ERYTHROCYTE [DISTWIDTH] IN BLOOD BY AUTOMATED COUNT: 14.1 % (ref 12.3–15.4)
GLOBULIN SER CALC-MCNC: 2.2 GM/DL
GLUCOSE SERPL-MCNC: 91 MG/DL (ref 65–99)
HCT VFR BLD AUTO: 27.6 % (ref 34–46.6)
HDLC SERPL-MCNC: 49 MG/DL (ref 40–60)
HGB BLD-MCNC: 8.7 G/DL (ref 12–15.9)
LDLC SERPL CALC-MCNC: 81 MG/DL (ref 0–100)
LYMPHOCYTES # BLD AUTO: ABNORMAL 10*3/UL
LYMPHOCYTES # BLD MANUAL: 0.31 10*3/MM3 (ref 0.7–3.1)
LYMPHOCYTES NFR BLD AUTO: ABNORMAL %
LYMPHOCYTES NFR BLD MANUAL: 3 % (ref 19.6–45.3)
MCH RBC QN AUTO: 23.3 PG (ref 26.6–33)
MCHC RBC AUTO-ENTMCNC: 31.5 G/DL (ref 31.5–35.7)
MCV RBC AUTO: 74 FL (ref 79–97)
MONOCYTES # BLD MANUAL: 0.31 10*3/MM3 (ref 0.1–0.9)
MONOCYTES NFR BLD AUTO: ABNORMAL %
MONOCYTES NFR BLD MANUAL: 3 % (ref 5–12)
NEUTROPHILS # BLD MANUAL: 9.55 10*3/MM3 (ref 1.7–7)
NEUTROPHILS NFR BLD AUTO: ABNORMAL %
NEUTROPHILS NFR BLD MANUAL: 93 % (ref 42.7–76)
PLATELET # BLD AUTO: 199 10*3/MM3 (ref 140–450)
PLATELET BLD QL SMEAR: ABNORMAL
POTASSIUM SERPL-SCNC: 4.8 MMOL/L (ref 3.5–5.2)
PROT SERPL-MCNC: 6.3 G/DL (ref 6–8.5)
RBC # BLD AUTO: 3.73 10*6/MM3 (ref 3.77–5.28)
RBC MORPH BLD: ABNORMAL
SODIUM SERPL-SCNC: 137 MMOL/L (ref 136–145)
TRIGL SERPL-MCNC: 136 MG/DL (ref 0–150)
VLDLC SERPL CALC-MCNC: 27.2 MG/DL (ref 5–40)
WBC # BLD AUTO: 10.27 10*3/MM3 (ref 3.4–10.8)

## 2020-03-02 PROCEDURE — 99214 OFFICE O/P EST MOD 30 MIN: CPT | Performed by: NURSE PRACTITIONER

## 2020-03-02 NOTE — PROGRESS NOTES
[facility] MEDICINE  LEVON Wharton / 84 y.o. / female  03/02/2020      ASSESSMENT & PLAN:    Problem List Items Addressed This Visit        Cardiovascular and Mediastinum    Essential hypertension - Primary    Relevant Medications    amLODIPine (NORVASC) 10 MG tablet    metoprolol tartrate (LOPRESSOR) 50 MG tablet    losartan (COZAAR) 100 MG tablet    Other Relevant Orders    Comprehensive Metabolic Panel    Hyperlipidemia    Relevant Medications    atorvastatin (LIPITOR) 20 MG tablet    Other Relevant Orders    Lipid Panel With / Chol / HDL Ratio    Nonrheumatic aortic valve stenosis    Overview     Added automatically from request for surgery [id number]         Relevant Medications    amLODIPine (NORVASC) 10 MG tablet    clopidogrel (PLAVIX) 75 MG tablet    metoprolol tartrate (LOPRESSOR) 50 MG tablet      Other Visit Diagnoses     Low hemoglobin        Relevant Orders    CBC & Differential        Orders Placed This Encounter   Procedures   • Comprehensive Metabolic Panel   • Lipid Panel With / Chol / HDL Ratio   • CBC & Differential     No orders of the defined types were placed in this encounter.      Summary/Discussion:    1. Essential hypertension  Blood pressure stable on current therapy, continue same.  Check labs today and adjust dosage of medication as necessary.  Follow-up 4 months.  Continue lifestyle modifications for high blood pressure, high cholesterol, and increased weight. Decrease/eliminate soda, caffeine, alcohol and overall caloric intake. Reduce carbohydrates and sweets in diet.  Continue to improve dietary habits with lean proteins, fresh vegetables, fruits, and nuts. Improve aerobic exercise: walking/biking/swimming daily as tolerated, recommend 30 minutes/day at least 5 days/week.     - Comprehensive Metabolic Panel    2. Nonrheumatic aortic valve stenosis  Stable s/p TAVR. No SOA, chest pain. Follow up with cardiology as scheduled.     3. Hyperlipidemia,  "unspecified hyperlipidemia type  Check non-fasting lipid panel today, adjust medication as necessary.     - Lipid Panel With / Chol / HDL Ratio    4. Low hemoglobin    - CBC & Differential        Return in about 6 months (around 9/2/2020) for Next scheduled follow up.    ____________________________________________________________________    VITALS:    Visit Vitals  /62   Pulse 64   Temp 97.5 °F (36.4 °C)   Ht 160 cm (62.99\")   Wt 53.7 kg (118 lb 6.4 oz)   LMP  (LMP Unknown)   SpO2 99%   BMI 20.98 kg/m²       BP Readings from Last 3 Encounters:   03/02/20 110/62   01/22/20 110/58   01/22/20 138/68     Wt Readings from Last 3 Encounters:   03/02/20 53.7 kg (118 lb 6.4 oz)   01/22/20 54.4 kg (120 lb)   01/22/20 52.7 kg (116 lb 3.2 oz)      Body mass index is 20.98 kg/m².    CC: Main reason(s) for today's visit: Establish Care      HPI:    Patient is a 84 y.o. female who is here to establish care. This is a NEW patient as well as a NEW problem to this examiner. She was a previous patient of Dr. Wiggins.     Hypertension: Patient here for follow-up of elevated blood pressure. She is not exercising and is adherent to low salt diet.  Blood pressure is well controlled at home. Cardiac symptoms none. Patient denies chest pain, chest pressure/discomfort, cough, dyspnea and lower extremity edema.  Cardiovascular risk factors: advanced age (older than 55 for men, 65 for women), dyslipidemia and hypertension. Use of agents associated with hypertension: none. History of target organ damage: heart failure, left ventricular hypertrophy and stroke.  She is 3 months s/p TAVR per Dr. Tanner and Dr. Yan, still attending cardiac rehab. Denies any chest pain, significant edema, home weights have been stable.     Hyperlipidemia: Patient presents with hyperlipidemia. Currently on atorvastatin 20mg daily.   Lab Results   Component Value Date    CHOL 125 07/25/2018    CHLPL 144 02/28/2019    CHLPL 138 11/19/2018    CHLPL 149 " 06/20/2018     Lab Results   Component Value Date    TRIG 72 02/28/2019    TRIG 87 11/19/2018    TRIG 101 07/25/2018     Lab Results   Component Value Date    HDL 62 (H) 02/28/2019    HDL 64 (H) 11/19/2018    HDL 45 07/25/2018     Lab Results   Component Value Date    LDL 68 02/28/2019    LDL 57 11/19/2018    LDL 60 07/25/2018             Patient Care Team:  Deanna Ortega APRN as PCP - General (Internal Medicine)  Alexis Wiggins MD (Inactive) as PCP - Claims Attributed  Warren Tanner MD as Consulting Physician (Cardiology)  Octaviano Yan MD as Surgeon (Cardiothoracic Surgery)  ____________________________________________________________________      REVIEW OF SYSTEMS    Review of Systems   Constitutional: Negative for activity change, appetite change and unexpected weight change.   HENT: Negative for tinnitus.    Eyes: Negative for visual disturbance.   Respiratory: Negative for cough, chest tightness and shortness of breath.    Cardiovascular: Negative for chest pain, palpitations and leg swelling.   Neurological: Negative for dizziness, light-headedness and headaches.       PHYSICAL EXAMINATION    Physical Exam   Constitutional: She is oriented to person, place, and time. Vital signs are normal. She appears well-developed and well-nourished. She is cooperative. She does not appear ill. No distress.   Cardiovascular: Normal rate, regular rhythm, S1 normal and S2 normal.   Murmur heard.   Systolic murmur is present.  Pulmonary/Chest: Effort normal and breath sounds normal. She has no decreased breath sounds. She has no wheezes. She has no rhonchi. She has no rales.   Neurological: She is alert and oriented to person, place, and time.   Skin: Skin is warm, dry and intact.   Psychiatric: She has a normal mood and affect. Her speech is normal and behavior is normal. Judgment and thought content normal. Cognition and memory are normal.   Nursing note and vitals reviewed.        REVIEWED DATA:    Labs:    Lab Results   Component Value Date     (L) 12/04/2019    K 4.9 12/04/2019    AST 14 12/03/2019    ALT 7 12/03/2019    BUN 11 12/04/2019    CREATININE 1.00 12/04/2019    CREATININE 1.07 (H) 12/03/2019    CREATININE 1.07 (H) 12/03/2019    EGFRIFNONA 53 (L) 12/04/2019    EGFRIFAFRI 50 (L) 08/28/2019       Lab Results   Component Value Date    GLUCOSE 86 12/04/2019    GLUCOSE 206 (H) 12/03/2019    GLUCOSE 199 (H) 12/03/2019    HGBA1C 5.38 11/29/2019    HGBA1C 5.37 06/27/2019    HGBA1C 4.90 07/25/2018       Lab Results   Component Value Date    LDL 68 02/28/2019    LDL 57 11/19/2018    LDL 60 07/25/2018    HDL 62 (H) 02/28/2019    TRIG 72 02/28/2019       Lab Results   Component Value Date    TSH 2.690 08/05/2017          Lab Results   Component Value Date    WBC 8.52 01/22/2020    HGB 9.5 (L) 01/22/2020    HGB 8.9 (L) 12/04/2019    HGB 9.5 (L) 12/03/2019     01/22/2020         Imaging:        Medical Tests:        Summary of old records / correspondence / consultant report:        Request outside records:        ______________________________________________________________________    ALLERGIES  Allergies   Allergen Reactions   • Tekturna [Aliskiren] Diarrhea        MEDICATIONS  Current Outpatient Medications on File Prior to Visit   Medication Sig   • Acetaminophen (TYLENOL 8 HOUR ARTHRITIS PAIN PO) Take 1 tablet by mouth Daily.   • amLODIPine (NORVASC) 10 MG tablet Take 1 tablet by mouth Daily.   • aspirin 81 MG EC tablet Take 1 tablet by mouth Daily.   • atorvastatin (LIPITOR) 20 MG tablet Take 1 tablet by mouth Daily.   • clopidogrel (PLAVIX) 75 MG tablet Take 1 tablet by mouth Daily.   • diphenhydrAMINE (BENADRYL) 25 mg capsule Take 12.5 mg by mouth Daily As Needed for Itching.   • folic acid (FOLVITE) 1 MG tablet Take 1 mg by mouth Daily.   • losartan (COZAAR) 100 MG tablet TAKE ONE TABLET BY MOUTH DAILY   • metoprolol tartrate (LOPRESSOR) 50 MG tablet Take 1 tablet by mouth Every 12 (Twelve) Hours  for 90 days.   • Multiple Vitamins-Minerals (CENTRUM ADULTS) tablet Take 1 tablet by mouth Daily.     No current facility-administered medications on file prior to visit.        PFSH:     The following portions of the patient's history were reviewed and updated as appropriate: Allergies / Current Medications / Past Medical History / Surgical History / Social History / Family History    PROBLEM LIST   Patient Active Problem List   Diagnosis   • Essential hypertension   • Hyperlipidemia   • Lung nodule   • History of right MCA stroke   • Stenosis of right internal carotid artery   • Abnormal chest x-ray   • Interstitial lung disease (CMS/HCC)   • Toxic encephalopathy due to anesthetics   • Weakness generalized   • Ascending aorta dilation (CMS/HCC)   • Nonrheumatic aortic valve stenosis   • Nonrheumatic mitral valve regurgitation   • Chronic diastolic congestive heart failure (CMS/HCC)   • PVD (peripheral vascular disease) with claudication (CMS/HCC)   • S/P TAVR (transcatheter aortic valve replacement)       PAST MEDICAL HISTORY  Past Medical History:   Diagnosis Date   • Acute right MCA stroke (CMS/HCC) 07/24/2018 2001, 2018   • Anesthesia complication     TOXIC ENCEPHALOPATHY   • Aortic stenosis    • Aortic valve stenosis    • Fracture, hip (CMS/HCC)     LEFT, CHIP ON TOP OF HIP D/T FALL 2 WEEKS POST OP   • Hemorrhoids 8/5/2018   • History of transfusion 2001    13 UNITS, HERE AT [facility]   • Hyperlipidemia    • Hypertension    • Hypokalemia    • 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    R LL            Dr FRANKIE Branch - suggested yearly CXR to Monitor   • Pressure sore     BUTTOCKS   • Pyelonephritis 4/16/2019   • Stenosis of right internal carotid artery 7/25/2018   • Subdural hematoma (CMS/HCC) 07/12/2018    Secondary to fall, JULY 2018   • Toxic encephalopathy 2019    POST OP LAST HIP SURGERY       SURGICAL HISTORY  Past Surgical History:   Procedure Laterality Date   • AORTIC VALVE REPAIR/REPLACEMENT N/A 12/3/2019     Procedure: TRACEY TRANSCAROTID TRANSCATHETER AORTIC VALVE REPLACEMENT;  Surgeon: Octaviano Yan MD;  Location: [facility] OR ;  Service: Cardiothoracic   • AORTIC VALVE REPAIR/REPLACEMENT N/A 12/3/2019    Procedure: Transcarotid Transcatheter Aortic Valve Replacement w/Intra Op TRACEY and Possible Open Bailout Surgery;  Surgeon: Warren Tanner MD;  Location: [facility] OR ;  Service: Cardiovascular   • CARDIAC CATHETERIZATION N/A 10/24/2019    Procedure: Coronary angiography;  Surgeon: Warren Tanner MD;  Location: [facility] CATH INVASIVE LOCATION;  Service: Cardiovascular   • CARDIAC CATHETERIZATION N/A 10/24/2019    Procedure: Left heart cath;  Surgeon: Warren Tanner MD;  Location: [facility] CATH INVASIVE LOCATION;  Service: Cardiovascular   • CARDIAC CATHETERIZATION N/A 10/24/2019    Procedure: Right heart cath;  Surgeon: Warren Tanner MD;  Location: [facility] CATH INVASIVE LOCATION;  Service: Cardiovascular   • CAROTID ENDARTERECTOMY Right 2018    Procedure: RT CAROTID ENDARTERECTOMY;  Surgeon: Inna Villarreal MD;  Location: [facility] OR;  Service: Vascular   • COLONOSCOPY N/A 2018    Adenomatous polyp.   Repeat .  Surgeon: Ken Tidwell MD;    • HYSTERECTOMY     • THYROIDECTOMY, PARTIAL     • TOTAL HIP ARTHROPLASTY Right    • TOTAL HIP ARTHROPLASTY Left 2019    Procedure: LEFT HIP ANTERIOR HIP WITH HANA TABLE;  Surgeon: Tiffani Pereira MD;  Location: [facility] MAIN OR;  Service: Orthopedics       SOCIAL HISTORY  Social History     Socioeconomic History   • Marital status:      Spouse name: Not on file   • Number of children: 4   • Years of education: 12   • Highest education level: High school graduate   Tobacco Use   • Smoking status: Former Smoker     Packs/day: 0.50     Years: 43.00     Pack years: 21.50     Types: Cigarettes     Start date:      Last attempt to quit:      Years since quittin.1   • Smokeless tobacco: Never Used    Substance and Sexual Activity   • Alcohol use: Not Currently     Alcohol/week: 7.0 standard drinks     Types: 7 Cans of beer per week   • Drug use: No   • Sexual activity: Defer       FAMILY HISTORY  Family History   Problem Relation Age of Onset   • Cancer Mother    • Dementia Father    • Hypertension Father    • Hypertension Daughter    • Cancer Daughter    • Malig Hyperthermia Neg Hx          **Dragon Disclaimer:   Much of this encounter note is an electronic transcription/translation of spoken language to printed text. The electronic translation of spoken language may permit erroneous, or at times, nonsensical words or phrases to be inadvertently transcribed. Although I have reviewed the note for such errors, some may still exist.

## 2020-03-03 DIAGNOSIS — R79.89 ABNORMAL CBC: ICD-10-CM

## 2020-03-03 DIAGNOSIS — N28.9 DECREASED RENAL FUNCTION: ICD-10-CM

## 2020-03-03 DIAGNOSIS — D50.9 MICROCYTIC ANEMIA: Primary | ICD-10-CM

## 2020-03-03 RX ORDER — FERROUS SULFATE 325(65) MG
325 TABLET ORAL
Qty: 270 TABLET | Refills: 1 | Status: SHIPPED | OUTPATIENT
Start: 2020-03-03 | End: 2020-12-22 | Stop reason: SDUPTHER

## 2020-03-04 ENCOUNTER — APPOINTMENT (OUTPATIENT)
Dept: CARDIAC REHAB | Facility: HOSPITAL | Age: 85
End: 2020-03-04

## 2020-03-06 ENCOUNTER — APPOINTMENT (OUTPATIENT)
Dept: CARDIAC REHAB | Facility: HOSPITAL | Age: 85
End: 2020-03-06

## 2020-03-17 ENCOUNTER — RESULTS ENCOUNTER (OUTPATIENT)
Dept: INTERNAL MEDICINE | Age: 85
End: 2020-03-17

## 2020-03-17 DIAGNOSIS — D50.9 MICROCYTIC ANEMIA: ICD-10-CM

## 2020-03-17 DIAGNOSIS — N28.9 DECREASED RENAL FUNCTION: ICD-10-CM

## 2020-03-17 DIAGNOSIS — R79.89 ABNORMAL CBC: ICD-10-CM

## 2020-03-20 ENCOUNTER — TELEPHONE (OUTPATIENT)
Dept: INTERNAL MEDICINE | Age: 85
End: 2020-03-20

## 2020-03-20 ENCOUNTER — RESULTS ENCOUNTER (OUTPATIENT)
Dept: INTERNAL MEDICINE | Age: 85
End: 2020-03-20

## 2020-03-20 DIAGNOSIS — N39.0 RECURRENT UTI: Primary | ICD-10-CM

## 2020-03-20 DIAGNOSIS — N39.0 RECURRENT UTI: ICD-10-CM

## 2020-03-20 NOTE — TELEPHONE ENCOUNTER
PTS DAUGHTER CALLED STATING PT IS VERY TIRED ALL THE TIME. PT STATES SHE IS TIRED FROM THE TIME CHANGE HOWEVER DAUGHTER STATES THAT SHE DOES NOT BELIEVE IT IS FROM THE TIME CHANGE. DAUGHTER STATES PT LOOKS PALE.     DAUGHTER STATES SHE HAS A HISTORY OF UTI AND WONDERS IF PT HAS A UTI. DAUGHTER STATES SHE IS UNABLE TO GO TO THE BATHROOM.    PLEASE ADVISE     PT CALL BACK   [phone]    DAUGHTER WANTS DR TO CALL HER DIRECTLY TO NOT CALL PT.

## 2020-03-20 NOTE — TELEPHONE ENCOUNTER
"Daughter states that mom denies any c/o UTI. Daughter stated \"I did not tell them she couldn't go to the bathroom\".  Daughter's concern mostly is that mom is very tired and wanted to know if this fatigue could be r/t a UTI since she does have a Hx of this diagnosis.  Pt is taking an Fe supplement r/t low Hgb.  Again, daughter questions if this level could be worse? MM    "

## 2020-03-20 NOTE — TELEPHONE ENCOUNTER
Daughter advised of the need for pt to be seen. Daughter agreed and again made another appt for pt.   Daughter stated pt refused to come in and is afraid to be around others at this time, but she does understand the need for the pt to be seen. ANGI

## 2020-03-20 NOTE — TELEPHONE ENCOUNTER
It's hard to tell. It could definitely be either or both of those issues (her anemia or UTI).     I had placed orders for her to have labs done which was 3/18 but this appt was cancelled.     It sounds like she needs to be seen for evaluation.

## 2020-03-20 NOTE — TELEPHONE ENCOUNTER
"Need more information.     Is she having any other symptoms other than \"being tired\"? I.e. Fever, cough, abdominal pain, etc.     Also, what does she mean by \"unable to go to bathroom\"? Is she physically unable to urinate and if so, for how long?     She would probably need to be seen for evaluation., but need more information.   "

## 2020-03-23 ENCOUNTER — OFFICE VISIT (OUTPATIENT)
Dept: INTERNAL MEDICINE | Age: 85
End: 2020-03-23

## 2020-03-23 VITALS
WEIGHT: 117.4 LBS | BODY MASS INDEX: 20.8 KG/M2 | SYSTOLIC BLOOD PRESSURE: 118 MMHG | OXYGEN SATURATION: 95 % | HEIGHT: 63 IN | TEMPERATURE: 97.3 F | DIASTOLIC BLOOD PRESSURE: 72 MMHG | HEART RATE: 72 BPM

## 2020-03-23 DIAGNOSIS — N28.9 DECREASED RENAL FUNCTION: ICD-10-CM

## 2020-03-23 DIAGNOSIS — R53.83 OTHER FATIGUE: ICD-10-CM

## 2020-03-23 DIAGNOSIS — D50.9 MICROCYTIC ANEMIA: Primary | ICD-10-CM

## 2020-03-23 LAB
BILIRUB BLD-MCNC: NEGATIVE MG/DL
CLARITY, POC: CLEAR
COLOR UR: YELLOW
GLUCOSE UR STRIP-MCNC: NEGATIVE MG/DL
KETONES UR QL: NEGATIVE
LEUKOCYTE EST, POC: ABNORMAL
NITRITE UR-MCNC: NEGATIVE MG/ML
PH UR: 5.5 [PH] (ref 5–8)
PROT UR STRIP-MCNC: ABNORMAL MG/DL
RBC # UR STRIP: ABNORMAL /UL
SP GR UR: 1.01 (ref 1–1.03)
UROBILINOGEN UR QL: NORMAL

## 2020-03-23 PROCEDURE — 99214 OFFICE O/P EST MOD 30 MIN: CPT | Performed by: NURSE PRACTITIONER

## 2020-03-23 PROCEDURE — 81003 URINALYSIS AUTO W/O SCOPE: CPT | Performed by: NURSE PRACTITIONER

## 2020-03-23 RX ORDER — DOCUSATE SODIUM 100 MG/1
200 CAPSULE, LIQUID FILLED ORAL DAILY
COMMUNITY
Start: 2020-03-23

## 2020-03-23 NOTE — PROGRESS NOTES
[facility] [place] INTERNAL MEDICINE  Deanna Wharton / 84 y.o. / female  03/23/2020      ASSESSMENT & PLAN:    Problem List Items Addressed This Visit     None      Visit Diagnoses     Microcytic anemia    -  Primary    Relevant Medications    docusate sodium (Colace) 100 MG capsule    Other Relevant Orders    CBC & Differential    Ferritin    Folate    Iron Profile    Vitamin B12    Decreased renal function        Relevant Orders    Comprehensive Metabolic Panel    Other fatigue        Relevant Orders    CBC & Differential    TSH+Free T4    Urinalysis With Culture If Indicated - Urine, Clean Catch    POCT urinalysis dipstick, automated (Completed)        Orders Placed This Encounter   Procedures   • TSH+Free T4   • Ferritin   • Folate   • Iron Profile   • Vitamin B12   • Comprehensive Metabolic Panel   • Urinalysis With Culture If Indicated - Urine, Clean Catch   • POCT urinalysis dipstick, automated   • CBC & Differential     New Medications Ordered This Visit   Medications   • docusate sodium (Colace) 100 MG capsule     Sig: Take 2 capsules by mouth Daily.       Summary/Discussion:    1. Microcytic anemia  Appears to be symptomatic CHUNG. Recheck CBC today with iron studies. Continue iron replacement. Will have patient's daughter return with FOBT.   Considering results of labs, may need to discuss anticoagulations options with cardiologist.   Further evaluation and treatment pending results of labs.     - CBC & Differential  - Ferritin  - Folate  - Iron Profile  - Vitamin B12  - docusate sodium (Colace) 100 MG capsule; Take 2 capsules by mouth Daily.    2. Decreased renal function    - Comprehensive Metabolic Panel    3. Other fatigue    - CBC & Differential  - TSH+Free T4  - Urinalysis With Culture If Indicated - Urine, Clean Catch  - POCT urinalysis dipstick, automated        No follow-ups on file.    ____________________________________________________________________    MEDICATIONS  Current  "Outpatient Medications   Medication Sig Dispense Refill   • Acetaminophen (TYLENOL 8 HOUR ARTHRITIS PAIN PO) Take 1 tablet by mouth Daily.     • amLODIPine (NORVASC) 10 MG tablet Take 1 tablet by mouth Daily. 90 tablet 3   • aspirin 81 MG EC tablet Take 1 tablet by mouth Daily.     • atorvastatin (LIPITOR) 20 MG tablet Take 1 tablet by mouth Daily. 90 tablet 0   • clopidogrel (PLAVIX) 75 MG tablet Take 1 tablet by mouth Daily. 30 tablet 3   • diphenhydrAMINE (BENADRYL) 25 mg capsule Take 12.5 mg by mouth Daily As Needed for Itching.     • ferrous sulfate 325 (65 FE) MG tablet Take 1 tablet by mouth 3 (Three) Times a Day With Meals. 270 tablet 1   • folic acid (FOLVITE) 1 MG tablet Take 1 mg by mouth Daily.     • losartan (COZAAR) 100 MG tablet TAKE ONE TABLET BY MOUTH DAILY 90 tablet 2   • Multiple Vitamins-Minerals (CENTRUM ADULTS) tablet Take 1 tablet by mouth Daily.     • docusate sodium (Colace) 100 MG capsule Take 2 capsules by mouth Daily.       No current facility-administered medications for this visit.           VITALS:    Visit Vitals  /72   Pulse 72   Temp 97.3 °F (36.3 °C)   Ht 160 cm (62.99\")   Wt 53.3 kg (117 lb 6.4 oz)   LMP  (LMP Unknown)   SpO2 95%   BMI 20.80 kg/m²       BP Readings from Last 3 Encounters:   03/23/20 118/72   03/02/20 110/62   01/22/20 110/58     Wt Readings from Last 3 Encounters:   03/23/20 53.3 kg (117 lb 6.4 oz)   03/02/20 53.7 kg (118 lb 6.4 oz)   01/22/20 54.4 kg (120 lb)      Body mass index is 20.8 kg/m².    CC:  Main reason(s) for today's visit: Anemia      HPI:     Fatigue: Patient complains of fatigue. Symptoms began several weeks ago. Sentinal symptom the patient feels fatigue began with: none. Symptoms of her fatigue have been feelings of depression and general malaise. Patient describes the following psychologic symptoms: depression.  Patient denies fever, significant change in weight, symptoms of arthritis, exercise intolerance, unusual rashes, cold " intolerance, constipation and change in hair texture., GI blood loss and witnessed or suspected sleep apnea. Symptoms have progressed to a point and plateaued. Severity has been struggles to carry out day to day responsibilities.. Previous visits for this problem: none.     Anemia: Patient presents for presents evaluation of anemia. Anemia was found by routine CBC.  It has been present for several months.  Associated signs & symptoms: dizziness/lightheadedness and fatigue. She has history of warfarin toxicity in the past. Daughter states patient recently put back on Plavix per her cardiologist in December, is also taking ASA 81mg daily. She was seen by me 2 weeks ago and started on Ferrous sulfate, reports has been taking BID x 1.5 weeks. Has not noted any  Bleeding or stool color change prior to starting iron. Denies any other obvious significant bleeding or bruising. No shortness of breath or palpitations.         Patient Care Team:  Deanna Ortega APRN as PCP - General (Internal Medicine)  Alexis Wiggins MD (Inactive) as PCP - Claims Attributed  Warren Tanner MD as Consulting Physician (Cardiology)  Octaviano Yan MD as Surgeon (Cardiothoracic Surgery)    ____________________________________________________________________    REVIEW OF SYSTEMS    Review of Systems   Constitutional: Positive for fatigue. Negative for activity change, appetite change and unexpected weight change.   HENT: Negative for tinnitus.    Eyes: Negative for visual disturbance.   Respiratory: Negative for cough, chest tightness and shortness of breath.    Cardiovascular: Negative for chest pain, palpitations and leg swelling.   Gastrointestinal: Negative for anal bleeding, blood in stool and constipation.   Neurological: Positive for light-headedness (orthostatic). Negative for dizziness and headaches.         PHYSICAL EXAMINATION    Physical Exam   Constitutional: She is oriented to person, place, and time. Vital signs are  normal. She appears well-developed and well-nourished. She is cooperative. She does not appear ill. No distress.   Cardiovascular: Normal rate, regular rhythm, S1 normal and S2 normal.   Murmur heard.  Pulmonary/Chest: Effort normal and breath sounds normal. She has no decreased breath sounds. She has no wheezes. She has no rhonchi. She has no rales.   Neurological: She is alert and oriented to person, place, and time.   Skin: Skin is warm, dry and intact.   Psychiatric: She has a normal mood and affect. Her speech is normal and behavior is normal. Judgment and thought content normal. Cognition and memory are normal.   Nursing note and vitals reviewed.      REVIEWED DATA:    Labs:     Lab Results   Component Value Date     03/02/2020    K 4.8 03/02/2020    AST 14 03/02/2020    ALT 7 03/02/2020    BUN 14 03/02/2020    CREATININE 1.22 (H) 03/02/2020    CREATININE 1.00 12/04/2019    CREATININE 1.07 (H) 12/03/2019    CREATININE 1.07 (H) 12/03/2019    EGFRIFNONA 42 (L) 03/02/2020    EGFRIFAFRI 51 (L) 03/02/2020       Lab Results   Component Value Date    HGBA1C 5.38 11/29/2019    HGBA1C 5.37 06/27/2019    HGBA1C 4.90 07/25/2018    GLUCOSE 86 12/04/2019    GLUCOSE 206 (H) 12/03/2019    GLUCOSE 199 (H) 12/03/2019       Lab Results   Component Value Date    LDL 81 03/02/2020    LDL 68 02/28/2019    LDL 57 11/19/2018    HDL 49 03/02/2020    HDL 62 (H) 02/28/2019    HDL 64 (H) 11/19/2018    TRIG 136 03/02/2020    TRIG 72 02/28/2019    TRIG 87 11/19/2018    CHOLHDLRATIO 3.20 03/02/2020       Lab Results   Component Value Date    TSH 2.690 08/05/2017       Lab Results   Component Value Date    WBC 10.27 03/02/2020    HGB 8.7 (L) 03/02/2020    HGB 9.5 (L) 01/22/2020    HGB 8.9 (L) 12/04/2019     03/02/2020       Lab Results   Component Value Date    GLUCOSEU Negative 12/02/2019    BLOODU Negative 12/02/2019    NITRITEU Negative 12/02/2019    LEUKOCYTESUR Moderate (2+) (A) 03/23/2020       Imaging:         Medical  Tests:         Summary of old records / correspondence / consultant report:         Request outside records:         ALLERGIES  Allergies   Allergen Reactions   • Tekturna [Aliskiren] Diarrhea        PFSH:     The following portions of the patient's history were reviewed and updated as appropriate: Allergies / Current Medications / Past Medical History / Surgical History / Social History / Family History    PROBLEM LIST   Patient Active Problem List   Diagnosis   • Essential hypertension   • Hyperlipidemia   • Lung nodule   • History of right MCA stroke   • Stenosis of right internal carotid artery   • Abnormal chest x-ray   • Interstitial lung disease (CMS/[facility])   • Toxic encephalopathy due to anesthetics   • Weakness generalized   • Ascending aorta dilation (CMS/[facility])   • Nonrheumatic aortic valve stenosis   • Nonrheumatic mitral valve regurgitation   • Chronic diastolic congestive heart failure (CMS/[facility])   • PVD (peripheral vascular disease) with claudication (CMS/[facility])   • S/P TAVR (transcatheter aortic valve replacement)       PAST MEDICAL HISTORY  Past Medical History:   Diagnosis Date   • Acute right MCA stroke (CMS/[facility]) 07/24/2018 2001, 2018   • Anesthesia complication     TOXIC ENCEPHALOPATHY   • Aortic stenosis    • Aortic valve stenosis    • Fracture, hip (CMS/[facility])     LEFT, CHIP ON TOP OF HIP D/T FALL 2 WEEKS POST OP   • Hemorrhoids 8/5/2018   • History of transfusion 2001    13 UNITS, HERE AT [facility]   • Hyperlipidemia    • Hypertension    • Hypokalemia    • 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    R LL            Dr FRANKIE Branch - suggested yearly CXR to Monitor   • Pressure sore     BUTTOCKS   • Pyelonephritis 4/16/2019   • Stenosis of right internal carotid artery 7/25/2018   • Subdural hematoma (CMS/[facility]) 07/12/2018    Secondary to fall, JULY 2018   • Toxic encephalopathy 2019    POST OP LAST HIP SURGERY       SURGICAL HISTORY  Past Surgical History:   Procedure Laterality Date   • AORTIC VALVE  REPAIR/REPLACEMENT N/A 12/3/2019    Procedure: TRACEY TRANSCAROTID TRANSCATHETER AORTIC VALVE REPLACEMENT;  Surgeon: Octaviano Yan MD;  Location: [facility] OR ;  Service: Cardiothoracic   • AORTIC VALVE REPAIR/REPLACEMENT N/A 12/3/2019    Procedure: Transcarotid Transcatheter Aortic Valve Replacement w/Intra Op TRACEY and Possible Open Bailout Surgery;  Surgeon: Warren Tanner MD;  Location: [facility] OR ;  Service: Cardiovascular   • CARDIAC CATHETERIZATION N/A 10/24/2019    Procedure: Coronary angiography;  Surgeon: Warren Tanner MD;  Location: [facility] CATH INVASIVE LOCATION;  Service: Cardiovascular   • CARDIAC CATHETERIZATION N/A 10/24/2019    Procedure: Left heart cath;  Surgeon: Warren Tanner MD;  Location: [facility] CATH INVASIVE LOCATION;  Service: Cardiovascular   • CARDIAC CATHETERIZATION N/A 10/24/2019    Procedure: Right heart cath;  Surgeon: Warren Tanner MD;  Location: [facility] CATH INVASIVE LOCATION;  Service: Cardiovascular   • CAROTID ENDARTERECTOMY Right 2018    Procedure: RT CAROTID ENDARTERECTOMY;  Surgeon: Inna Villarreal MD;  Location: [facility] OR;  Service: Vascular   • COLONOSCOPY N/A 2018    Adenomatous polyp.   Repeat .  Surgeon: Ken Tidwell MD;    • HYSTERECTOMY     • THYROIDECTOMY, PARTIAL     • TOTAL HIP ARTHROPLASTY Right    • TOTAL HIP ARTHROPLASTY Left 2019    Procedure: LEFT HIP ANTERIOR HIP WITH HANA TABLE;  Surgeon: Tiffani Pereira MD;  Location: [facility] OR;  Service: Orthopedics       SOCIAL HISTORY  Social History     Socioeconomic History   • Marital status:      Spouse name: Not on file   • Number of children: 4   • Years of education: 12   • Highest education level: High school graduate   Tobacco Use   • Smoking status: Former Smoker     Packs/day: 0.50     Years: 43.00     Pack years: 21.50     Types: Cigarettes     Start date:      Last attempt to quit:      Years since quittin.2   •  Smokeless tobacco: Never Used   Substance and Sexual Activity   • Alcohol use: Not Currently     Alcohol/week: 7.0 standard drinks     Types: 7 Cans of beer per week   • Drug use: No   • Sexual activity: Defer       FAMILY HISTORY  Family History   Problem Relation Age of Onset   • Cancer Mother    • Dementia Father    • Hypertension Father    • Hypertension Daughter    • Cancer Daughter    • Malig Hyperthermia Neg Hx          **Dragon Disclaimer:   Much of this encounter note is an electronic transcription/translation of spoken language to printed text. The electronic translation of spoken language may permit erroneous, or at times, nonsensical words or phrases to be inadvertently transcribed. Although I have reviewed the note for such errors, some may still exist.

## 2020-03-24 ENCOUNTER — TELEPHONE (OUTPATIENT)
Dept: INTERNAL MEDICINE | Age: 85
End: 2020-03-24

## 2020-03-24 NOTE — TELEPHONE ENCOUNTER
----- Message from Liz Mora MA sent at 3/24/2020  1:23 PM EDT -----  Check media   ----- Message -----  From: Deanna Ortega APRN  Sent: 3/24/2020  10:52 AM EDT  To: ANETA Avila:     Can you please ask Suzie in the lab to print out the labs that she has already on this patient? I know it is still preliminary because of the urine, but I specifically am interested in her CBC if it is resulted. Thanks.

## 2020-03-24 NOTE — TELEPHONE ENCOUNTER
Call patient.     Please let her know her anemia is getting better, up to 10.5% from 8.7%.   Have her continue iron supplement.     I am waiting for urine culture to come back, which should be in the next few days.

## 2020-03-25 LAB
ALBUMIN SERPL-MCNC: 3.9 G/DL (ref 3.5–5.2)
ALBUMIN/GLOB SERPL: 1.4 G/DL
ALP SERPL-CCNC: 68 U/L (ref 39–117)
ALT SERPL-CCNC: 11 U/L (ref 1–33)
APPEARANCE UR: CLEAR
AST SERPL-CCNC: 14 U/L (ref 1–32)
BACTERIA #/AREA URNS HPF: ABNORMAL /HPF
BACTERIA UR CULT: NORMAL
BACTERIA UR CULT: NORMAL
BASOPHILS # BLD AUTO: 0.02 10*3/MM3 (ref 0–0.2)
BASOPHILS NFR BLD AUTO: 0.2 % (ref 0–1.5)
BILIRUB SERPL-MCNC: 0.2 MG/DL (ref 0.2–1.2)
BILIRUB UR QL STRIP: NEGATIVE
BUN SERPL-MCNC: 10 MG/DL (ref 8–23)
BUN/CREAT SERPL: 8.5 (ref 7–25)
CALCIUM SERPL-MCNC: 8.8 MG/DL (ref 8.6–10.5)
CASTS URNS MICRO: ABNORMAL
CASTS URNS QL MICRO: PRESENT /LPF
CHLORIDE SERPL-SCNC: 101 MMOL/L (ref 98–107)
CO2 SERPL-SCNC: 20 MMOL/L (ref 22–29)
COLOR UR: YELLOW
CREAT SERPL-MCNC: 1.17 MG/DL (ref 0.57–1)
EOSINOPHIL # BLD AUTO: 0 10*3/MM3 (ref 0–0.4)
EOSINOPHIL NFR BLD AUTO: 0 % (ref 0.3–6.2)
EPI CELLS #/AREA URNS HPF: ABNORMAL /HPF (ref 0–10)
ERYTHROCYTE [DISTWIDTH] IN BLOOD BY AUTOMATED COUNT: 17.4 % (ref 12.3–15.4)
FERRITIN SERPL-MCNC: 55.8 NG/ML (ref 13–150)
FOLATE SERPL-MCNC: >20 NG/ML (ref 4.78–24.2)
GLOBULIN SER CALC-MCNC: 2.7 GM/DL
GLUCOSE SERPL-MCNC: 100 MG/DL (ref 65–99)
GLUCOSE UR QL: NEGATIVE
HCT VFR BLD AUTO: 33.3 % (ref 34–46.6)
HGB BLD-MCNC: 10.5 G/DL (ref 12–15.9)
HGB UR QL STRIP: NEGATIVE
IMM GRANULOCYTES # BLD AUTO: 0.02 10*3/MM3 (ref 0–0.05)
IMM GRANULOCYTES NFR BLD AUTO: 0.2 % (ref 0–0.5)
IRON SATN MFR SERPL: 27 % (ref 20–50)
IRON SERPL-MCNC: 102 MCG/DL (ref 37–145)
KETONES UR QL STRIP: NEGATIVE
LEUKOCYTE ESTERASE UR QL STRIP: ABNORMAL
LYMPHOCYTES # BLD AUTO: 0.93 10*3/MM3 (ref 0.7–3.1)
LYMPHOCYTES NFR BLD AUTO: 11.2 % (ref 19.6–45.3)
MCH RBC QN AUTO: 24.3 PG (ref 26.6–33)
MCHC RBC AUTO-ENTMCNC: 31.5 G/DL (ref 31.5–35.7)
MCV RBC AUTO: 77.1 FL (ref 79–97)
MICRO URNS: ABNORMAL
MONOCYTES # BLD AUTO: 0.75 10*3/MM3 (ref 0.1–0.9)
MONOCYTES NFR BLD AUTO: 9 % (ref 5–12)
MUCOUS THREADS URNS QL MICRO: PRESENT /HPF
NEUTROPHILS # BLD AUTO: 6.61 10*3/MM3 (ref 1.7–7)
NEUTROPHILS NFR BLD AUTO: 79.4 % (ref 42.7–76)
NITRITE UR QL STRIP: NEGATIVE
NRBC BLD AUTO-RTO: 0 /100 WBC (ref 0–0.2)
PH UR STRIP: 5.5 [PH] (ref 5–7.5)
PLATELET # BLD AUTO: 191 10*3/MM3 (ref 140–450)
POTASSIUM SERPL-SCNC: 4.6 MMOL/L (ref 3.5–5.2)
PROT SERPL-MCNC: 6.6 G/DL (ref 6–8.5)
PROT UR QL STRIP: ABNORMAL
RBC # BLD AUTO: 4.32 10*6/MM3 (ref 3.77–5.28)
RBC #/AREA URNS HPF: ABNORMAL /HPF (ref 0–2)
SODIUM SERPL-SCNC: 136 MMOL/L (ref 136–145)
SP GR UR: 1.01 (ref 1–1.03)
T4 FREE SERPL-MCNC: 1.47 NG/DL (ref 0.93–1.7)
TIBC SERPL-MCNC: 372 MCG/DL
TSH SERPL DL<=0.005 MIU/L-ACNC: 3.3 UIU/ML (ref 0.27–4.2)
UIBC SERPL-MCNC: 270 MCG/DL (ref 112–346)
URINALYSIS REFLEX: ABNORMAL
UROBILINOGEN UR STRIP-MCNC: 0.2 MG/DL (ref 0.2–1)
VIT B12 SERPL-MCNC: 316 PG/ML (ref 211–946)
WBC # BLD AUTO: 8.33 10*3/MM3 (ref 3.4–10.8)
WBC #/AREA URNS HPF: ABNORMAL /HPF (ref 0–5)

## 2020-04-02 DIAGNOSIS — I50.32 CHRONIC DIASTOLIC CONGESTIVE HEART FAILURE (HCC): ICD-10-CM

## 2020-04-02 DIAGNOSIS — I35.0 NONRHEUMATIC AORTIC VALVE STENOSIS: Primary | ICD-10-CM

## 2020-04-02 DIAGNOSIS — Z95.2 S/P TAVR (TRANSCATHETER AORTIC VALVE REPLACEMENT): ICD-10-CM

## 2020-04-03 ENCOUNTER — TELEPHONE (OUTPATIENT)
Dept: CARDIOLOGY | Facility: CLINIC | Age: 85
End: 2020-04-03

## 2020-04-03 NOTE — TELEPHONE ENCOUNTER
----- Message from Eva Randall RN sent at 2020  3:44 PM EDT -----  Regardin year tavr follow up  Plese schedule 1 Year TAVR TTE and visit with Dr. Tanner between 10/5/20 and 21.  Orders are in Epic.  Thanks!  LPR

## 2020-05-11 RX ORDER — CLOPIDOGREL BISULFATE 75 MG/1
TABLET ORAL
Qty: 30 TABLET | Refills: 1 | OUTPATIENT
Start: 2020-05-11

## 2020-05-12 RX ORDER — CLOPIDOGREL BISULFATE 75 MG/1
75 TABLET ORAL DAILY
Qty: 30 TABLET | Refills: 3 | Status: SHIPPED | OUTPATIENT
Start: 2020-05-12 | End: 2020-07-08 | Stop reason: SDUPTHER

## 2020-05-12 NOTE — TELEPHONE ENCOUNTER
PATIENT REQUESTED A REFILL ON:  clopidogrel (PLAVIX) 75 MG tablet    PATIENT CAN BE REACHED ON: [phone]    PHARMACY PREFERRED:[facility] [address] & ([place] [facility] - [phone] [facility] [phone] FX

## 2020-05-26 RX ORDER — ATORVASTATIN CALCIUM 20 MG/1
TABLET, FILM COATED ORAL
Qty: 90 TABLET | Refills: 0 | Status: SHIPPED | OUTPATIENT
Start: 2020-05-26 | End: 2020-07-08 | Stop reason: SDUPTHER

## 2020-07-08 DIAGNOSIS — I10 ESSENTIAL HYPERTENSION: ICD-10-CM

## 2020-07-08 RX ORDER — AMLODIPINE BESYLATE 10 MG/1
10 TABLET ORAL DAILY
Qty: 90 TABLET | Refills: 1 | Status: SHIPPED | OUTPATIENT
Start: 2020-07-08 | End: 2021-01-05 | Stop reason: ALTCHOICE

## 2020-07-08 RX ORDER — ATORVASTATIN CALCIUM 20 MG/1
20 TABLET, FILM COATED ORAL DAILY
Qty: 90 TABLET | Refills: 1 | Status: SHIPPED | OUTPATIENT
Start: 2020-07-08 | End: 2021-01-14

## 2020-07-08 RX ORDER — CLOPIDOGREL BISULFATE 75 MG/1
75 TABLET ORAL DAILY
Qty: 30 TABLET | Refills: 5 | Status: SHIPPED | OUTPATIENT
Start: 2020-07-08 | End: 2021-01-05

## 2020-07-08 RX ORDER — LOSARTAN POTASSIUM 100 MG/1
100 TABLET ORAL DAILY
Qty: 90 TABLET | Refills: 1 | Status: SHIPPED | OUTPATIENT
Start: 2020-07-08 | End: 2021-01-05

## 2020-07-08 NOTE — TELEPHONE ENCOUNTER
Caller: Marce Grullon    Relationship: Emergency Contact    Best call back number: [phone]  Medication needed:   Requested Prescriptions     Pending Prescriptions Disp Refills   • amLODIPine (NORVASC) 10 MG tablet 90 tablet 3     Sig: Take 1 tablet by mouth Daily.   • atorvastatin (LIPITOR) 20 MG tablet 90 tablet 0     Sig: Take 1 tablet by mouth Daily.   • clopidogrel (PLAVIX) 75 MG tablet 30 tablet 3     Sig: Take 1 tablet by mouth Daily.   • losartan (COZAAR) 100 MG tablet 90 tablet 2     Sig: Take 1 tablet by mouth Daily.       When do you need the refill by: BY THE END OF THE WEEK   What details did the patient provide when requesting the medication: PT HAS AT LEAST A WEEK LEFT.    PT IS ALSO NEEDING A NEW RX OF THE METOPROLOL TARTRATE 50 MG SENT TO THE PHARMACY.    Does the patient have less than a 3 day supply:  [] Yes  [x] No    What is the patient's preferred pharmacy: [facility]ACY #[id number] - [place], [place] - [address] - [phone]  - [phone] FX

## 2020-07-13 RX ORDER — METOPROLOL TARTRATE 50 MG/1
50 TABLET, FILM COATED ORAL 2 TIMES DAILY
COMMUNITY
End: 2020-07-15

## 2020-07-15 ENCOUNTER — OFFICE VISIT (OUTPATIENT)
Dept: CARDIOLOGY | Facility: CLINIC | Age: 85
End: 2020-07-15

## 2020-07-15 VITALS
SYSTOLIC BLOOD PRESSURE: 212 MMHG | DIASTOLIC BLOOD PRESSURE: 80 MMHG | HEIGHT: 62 IN | BODY MASS INDEX: 22.01 KG/M2 | WEIGHT: 119.6 LBS | HEART RATE: 75 BPM

## 2020-07-15 DIAGNOSIS — I10 ESSENTIAL HYPERTENSION: ICD-10-CM

## 2020-07-15 DIAGNOSIS — I77.810 ASCENDING AORTA DILATION (HCC): Primary | ICD-10-CM

## 2020-07-15 DIAGNOSIS — J84.9 INTERSTITIAL LUNG DISEASE (HCC): ICD-10-CM

## 2020-07-15 DIAGNOSIS — I35.0 NONRHEUMATIC AORTIC VALVE STENOSIS: ICD-10-CM

## 2020-07-15 DIAGNOSIS — I50.32 CHRONIC DIASTOLIC CONGESTIVE HEART FAILURE (HCC): ICD-10-CM

## 2020-07-15 DIAGNOSIS — Z95.2 S/P TAVR (TRANSCATHETER AORTIC VALVE REPLACEMENT): ICD-10-CM

## 2020-07-15 DIAGNOSIS — I73.9 PVD (PERIPHERAL VASCULAR DISEASE) WITH CLAUDICATION (HCC): ICD-10-CM

## 2020-07-15 PROCEDURE — 93000 ELECTROCARDIOGRAM COMPLETE: CPT | Performed by: INTERNAL MEDICINE

## 2020-07-15 PROCEDURE — 99214 OFFICE O/P EST MOD 30 MIN: CPT | Performed by: INTERNAL MEDICINE

## 2020-07-15 RX ORDER — METOPROLOL TARTRATE 50 MG/1
50 TABLET, FILM COATED ORAL 2 TIMES DAILY
Qty: 180 TABLET | Refills: 3 | Status: SHIPPED | OUTPATIENT
Start: 2020-07-15 | End: 2020-07-28 | Stop reason: SDUPTHER

## 2020-07-15 NOTE — PROGRESS NOTES
Date of Office Visit: 07/15/20  Encounter Provider: Warren Tanner MD  Place of Service: [facility] CARDIOLOGY  Patient Name: Gita Wharton  :1935  [id number]    Chief Complaint   Patient presents with   • Cardiac Valve Problem   :     HPI: Gita hWarton is a 84 y.o. female she had a carotid approach #23 sapient transaortic valve replacement in 2019.  We had to do her TAVR from the carotid because her leg arteries were pretty small.  She has done well since then she is gained a little bit of weight she is not as tired she is not short of breath.  The coronavirus is bothers her quite a bit is really changed her life but otherwise she is doing well now her blood pressure is very high today but she has been taking her medicines she says it is usually much better at home    Past Medical History:   Diagnosis Date   • Acute right MCA stroke (CMS/HCC) 2018,    • Anesthesia complication     TOXIC ENCEPHALOPATHY   • Aortic stenosis    • Aortic valve stenosis    • Fracture, hip (CMS/HCC)     LEFT, CHIP ON TOP OF HIP D/T FALL 2 WEEKS POST OP   • Hemorrhoids 2018   • History of transfusion 2001    13 UNITS, HERE AT [facility]   • Hyperlipidemia    • Hypertension    • Hypokalemia    • Lung granuloma (CMS/HCC) 2018    R LL            Dr FRANKIE Branch - suggested yearly CXR to Monitor   • Pressure sore     BUTTOCKS   • Pyelonephritis 2019   • Stenosis of right internal carotid artery 2018   • Subdural hematoma (CMS/HCC) 2018    Secondary to fall, 2018   • Toxic encephalopathy     POST OP LAST HIP SURGERY       Past Surgical History:   Procedure Laterality Date   • AORTIC VALVE REPAIR/REPLACEMENT N/A 12/3/2019    Procedure: TRACEY TRANSCAROTID TRANSCATHETER AORTIC VALVE REPLACEMENT;  Surgeon: Octaivano Yan MD;  Location: [facility] OR ;  Service: Cardiothoracic   • AORTIC VALVE REPAIR/REPLACEMENT N/A 12/3/2019     Procedure: Transcarotid Transcatheter Aortic Valve Replacement w/Intra Op TRACEY and Possible Open Bailout Surgery;  Surgeon: Warren Tanner MD;  Location: [facility] HYBRID OR ;  Service: Cardiovascular   • CARDIAC CATHETERIZATION N/A 10/24/2019    Procedure: Coronary angiography;  Surgeon: Warren Tanner MD;  [facility];  Service: Cardiovascular   • CARDIAC CATHETERIZATION N/A 10/24/2019    Procedure: Left heart cath;  Surgeon: Warren Tanner MD;  [facility];  Service: Cardiovascular   • CARDIAC CATHETERIZATION N/A 10/24/2019    Procedure: Right heart cath;  Surgeon: Warren Tanner MD;  [facility];  Service: Cardiovascular   • CAROTID ENDARTERECTOMY Right 2018    Procedure: RT CAROTID ENDARTERECTOMY;  Surgeon: Inna Villarreal MD;  Location: [facility] MAIN OR;  Service: Vascular   • COLONOSCOPY N/A 2018    Adenomatous polyp.   Repeat .  Surgeon: Ken Tidwell MD;    • HYSTERECTOMY     • THYROIDECTOMY, PARTIAL     • TOTAL HIP ARTHROPLASTY Right    • TOTAL HIP ARTHROPLASTY Left 2019    Procedure: LEFT HIP ANTERIOR HIP WITH HANA TABLE;  Surgeon: Tiffani Pereira MD;  Location: [facility] MAIN OR;  Service: Orthopedics       Social History     Socioeconomic History   • Marital status:      Spouse name: Not on file   • Number of children: 4   • Years of education: 12   • Highest education level: High school graduate   Tobacco Use   • Smoking status: Former Smoker     Packs/day: 0.50     Years: 43.00     Pack years: 21.50     Types: Cigarettes     Start date:      Last attempt to quit:      Years since quittin.5   • Smokeless tobacco: Never Used   Substance and Sexual Activity   • Alcohol use: Not Currently     Alcohol/week: 7.0 standard drinks     Types: 7 Cans of beer per week   • Drug use: No   • Sexual activity: Defer       Family History   Problem Relation Age of Onset   •  Cancer Mother    • Dementia Father    • Hypertension Father    • Hypertension Daughter    • Cancer Daughter    • Malig Hyperthermia Neg Hx        Review of Systems   Constitution: Negative for decreased appetite, fever, malaise/fatigue and weight loss.   HENT: Negative for nosebleeds.    Eyes: Negative for double vision.   Cardiovascular: Negative for chest pain, claudication, cyanosis, dyspnea on exertion, irregular heartbeat, leg swelling, near-syncope, orthopnea, palpitations, paroxysmal nocturnal dyspnea and syncope.   Respiratory: Negative for cough, hemoptysis and shortness of breath.    Hematologic/Lymphatic: Negative for bleeding problem.   Skin: Negative for rash.   Musculoskeletal: Negative for falls and myalgias.   Gastrointestinal: Negative for hematochezia, jaundice, melena, nausea and vomiting.   Genitourinary: Negative for hematuria.   Neurological: Negative for dizziness and seizures.   Psychiatric/Behavioral: Negative for altered mental status and memory loss.       Allergies   Allergen Reactions   • Tekturna [Aliskiren] Diarrhea         Current Outpatient Medications:   •  Acetaminophen (TYLENOL 8 HOUR ARTHRITIS PAIN PO), Take 1 tablet by mouth Daily., Disp: , Rfl:   •  amLODIPine (NORVASC) 10 MG tablet, Take 1 tablet by mouth Daily., Disp: 90 tablet, Rfl: 1  •  aspirin 81 MG EC tablet, Take 1 tablet by mouth Daily., Disp: , Rfl:   •  atorvastatin (LIPITOR) 20 MG tablet, Take 1 tablet by mouth Daily., Disp: 90 tablet, Rfl: 1  •  clopidogrel (PLAVIX) 75 MG tablet, Take 1 tablet by mouth Daily., Disp: 30 tablet, Rfl: 5  •  diphenhydrAMINE (BENADRYL) 25 mg capsule, Take 12.5 mg by mouth Daily As Needed for Itching., Disp: , Rfl:   •  docusate sodium (Colace) 100 MG capsule, Take 2 capsules by mouth Daily., Disp: , Rfl:   •  ferrous sulfate 325 (65 FE) MG tablet, Take 1 tablet by mouth 3 (Three) Times a Day With Meals., Disp: 270 tablet, Rfl: 1  •  folic acid (FOLVITE) 1 MG tablet, Take 1 mg by mouth  "Daily., Disp: , Rfl:   •  losartan (COZAAR) 100 MG tablet, Take 1 tablet by mouth Daily., Disp: 90 tablet, Rfl: 1  •  metoprolol tartrate (LOPRESSOR) 50 MG tablet, Take 50 mg by mouth 2 (Two) Times a Day., Disp: , Rfl:   •  Multiple Vitamins-Minerals (CENTRUM ADULTS) tablet, Take 1 tablet by mouth Daily., Disp: , Rfl:       Objective:     Vitals:    07/15/20 1424   BP: (!) 212/80   Pulse: 75   Weight: 54.3 kg (119 lb 9.6 oz)   Height: 157.5 cm (62\")     Body mass index is 21.88 kg/m².    Physical Exam   Constitutional: She is oriented to person, place, and time. She appears well-developed and well-nourished.   HENT:   Head: Normocephalic.   Eyes: No scleral icterus.   Neck: No JVD present. No thyromegaly present.   Cardiovascular: Normal rate and regular rhythm. Exam reveals no gallop and no friction rub.   Murmur heard.   Harsh crescendo-decrescendo early systolic murmur is present with a grade of 2/6 at the upper right sternal border radiating to the neck.  Pulmonary/Chest: Effort normal and breath sounds normal. She has no wheezes. She has no rales.   Abdominal: Soft. There is no hepatosplenomegaly. There is no tenderness.   Musculoskeletal: Normal range of motion. She exhibits no edema.   Lymphadenopathy:     She has no cervical adenopathy.   Neurological: She is alert and oriented to person, place, and time.   Skin: Skin is warm and dry. No rash noted.   Psychiatric: She has a normal mood and affect.         ECG 12 Lead  Date/Time: 7/15/2020 2:55 PM  Performed by: Warren Tanner MD  Authorized by: Warren Tanner MD   Comparison: compared with previous ECG   Similar to previous ECG  Rhythm: sinus rhythm    Clinical impression: normal ECG             Assessment:       Diagnosis Plan   1. Ascending aorta dilation (CMS/HCC)     2. Nonrheumatic aortic valve stenosis     3. Chronic diastolic congestive heart failure (CMS/HCC)     4. PVD (peripheral vascular disease) with claudication (CMS/HCC)     5. S/P TAVR " (transcatheter aortic valve replacement)     6. Essential hypertension            Plan:       I think her TAVR is doing really well and I think it is helped her quite a bit I am not can change anything from that standpoint we will have her come back in December because we need to echo her at that point been a year from her TAVR we also I would have her take an extra metoprolol today and see if we get her blood pressure down and then I will have her come back and see us if it stays high we could consider adding chlorthalidone to her regimen    As always, it has been a pleasure to participate in your patient's care.      Sincerely,       Warren Tanner MD

## 2020-07-28 RX ORDER — METOPROLOL TARTRATE 50 MG/1
50 TABLET, FILM COATED ORAL 2 TIMES DAILY
Qty: 180 TABLET | Refills: 3 | Status: SHIPPED | OUTPATIENT
Start: 2020-07-28 | End: 2021-02-08 | Stop reason: HOSPADM

## 2020-10-16 ENCOUNTER — HOSPITAL ENCOUNTER (OUTPATIENT)
Dept: CARDIOLOGY | Facility: HOSPITAL | Age: 85
End: 2020-10-16

## 2020-10-22 ENCOUNTER — TELEMEDICINE (OUTPATIENT)
Dept: INTERNAL MEDICINE | Age: 85
End: 2020-10-22

## 2020-10-22 ENCOUNTER — RESULTS ENCOUNTER (OUTPATIENT)
Dept: INTERNAL MEDICINE | Age: 85
End: 2020-10-22

## 2020-10-22 DIAGNOSIS — I10 ESSENTIAL HYPERTENSION: ICD-10-CM

## 2020-10-22 DIAGNOSIS — E89.0 HISTORY OF PARTIAL THYROIDECTOMY: ICD-10-CM

## 2020-10-22 DIAGNOSIS — D50.9 MICROCYTIC ANEMIA: ICD-10-CM

## 2020-10-22 DIAGNOSIS — I10 ESSENTIAL HYPERTENSION: Primary | ICD-10-CM

## 2020-10-22 DIAGNOSIS — E03.9 HYPOTHYROIDISM, UNSPECIFIED TYPE: ICD-10-CM

## 2020-10-22 PROCEDURE — 99214 OFFICE O/P EST MOD 30 MIN: CPT | Performed by: NURSE PRACTITIONER

## 2020-10-22 NOTE — PROGRESS NOTES
[facility] [place]INE  Deanna Wharton / 84 y.o. / female  10/22/2020      ASSESSMENT & PLAN:    Problem List Items Addressed This Visit        Cardiovascular and Mediastinum    Essential hypertension - Primary    Relevant Medications    amLODIPine (NORVASC) 10 MG tablet    losartan (COZAAR) 100 MG tablet    metoprolol tartrate (LOPRESSOR) 50 MG tablet    Other Relevant Orders    Comprehensive Metabolic Panel      Other Visit Diagnoses     Microcytic anemia        Relevant Orders    CBC & Differential    Ferritin    Vitamin B12    History of partial thyroidectomy        Relevant Orders    TSH Rfx On Abnormal To Free T4    Hypothyroidism, unspecified type        Relevant Orders    TSH Rfx On Abnormal To Free T4        Orders Placed This Encounter   Procedures   • Comprehensive Metabolic Panel   • TSH Rfx On Abnormal To Free T4   • Ferritin   • Vitamin B12   • CBC & Differential     No orders of the defined types were placed in this encounter.      Summary/Discussion:    This patient has consented to a telehealth visit via video. The visit was scheduled as a telehealth phone visit to comply with patient safety concerns in accordance with CDC recommendations.  All vitals recorded within this visit are reported by the patient.  I spent 16 minutes in total including but not limited to the 12 minutes spent in direct conversation with this patient.       1. Essential hypertension  Blood pressure stable on current therapy, continue same.  Order labs to be done in office at patient convenience.  Follow-up 6 months.    - Comprehensive Metabolic Panel; Future    2. Microcytic anemia  Lab orders placed to be done.     - CBC & Differential; Future  - Ferritin; Future  - Vitamin B12; Future    3. History of partial thyroidectomy    - TSH Rfx On Abnormal To Free T4; Future    4. Hypothyroidism, unspecified type    - TSH Rfx On Abnormal To Free T4; Future        No follow-ups on  file.  ____________________________________________________________________    MEDICATIONS  Current Outpatient Medications   Medication Sig Dispense Refill   • Acetaminophen (TYLENOL 8 HOUR ARTHRITIS PAIN PO) Take 1 tablet by mouth Daily.     • amLODIPine (NORVASC) 10 MG tablet Take 1 tablet by mouth Daily. 90 tablet 1   • aspirin 81 MG EC tablet Take 1 tablet by mouth Daily.     • atorvastatin (LIPITOR) 20 MG tablet Take 1 tablet by mouth Daily. 90 tablet 1   • clopidogrel (PLAVIX) 75 MG tablet Take 1 tablet by mouth Daily. 30 tablet 5   • diphenhydrAMINE (BENADRYL) 25 mg capsule Take 12.5 mg by mouth Daily As Needed for Itching.     • docusate sodium (Colace) 100 MG capsule Take 2 capsules by mouth Daily.     • ferrous sulfate 325 (65 FE) MG tablet Take 1 tablet by mouth 3 (Three) Times a Day With Meals. (Patient taking differently: Take 325 mg by mouth Daily With Breakfast.) 270 tablet 1   • folic acid (FOLVITE) 1 MG tablet Take 1 mg by mouth Daily.     • losartan (COZAAR) 100 MG tablet Take 1 tablet by mouth Daily. 90 tablet 1   • metoprolol tartrate (LOPRESSOR) 50 MG tablet Take 1 tablet by mouth 2 (Two) Times a Day. 180 tablet 3   • Multiple Vitamins-Minerals (CENTRUM ADULTS) tablet Take 1 tablet by mouth Daily.       No current facility-administered medications for this visit.        VITALS    Visit Vitals  LMP  (LMP Unknown)       BP Readings from Last 3 Encounters:   07/15/20 (!) 212/80   03/23/20 118/72   03/02/20 110/62     Wt Readings from Last 3 Encounters:   07/15/20 54.3 kg (119 lb 9.6 oz)   03/23/20 53.3 kg (117 lb 6.4 oz)   03/02/20 53.7 kg (118 lb 6.4 oz)      There is no height or weight on file to calculate BMI.    CC:  Main reason(s) for today's visit: Follow-up for hypertension    HPI:     This was an audio and video enabled telemedicine encounter.    Chronic essential hypertension:  Since prior visit: compliant with medication(s), checks blood pressure regularly, denies significant problems  with medication(s) and SBP < 140. Currently taking amlodipine (Norvase), losartran (Cozaar) and metoprolol (Lopressor, Toprol). She is not exercising, but is adherent to low sodium diet. BP readings at home are stable. She denies symptoms of chest pain/pressure, dyspnea, swelling, vision impairment. CVD risk factors include: advanced age (>55 for males, >65 for females), dyslipidemia, HTN,  and sedentary lifestyle. Use of agents associated with HTN: no tobacco use . Most recent in-office blood pressure readings:   BP Readings from Last 3 Encounters:   07/15/20 (!) 212/80   03/23/20 118/72   03/02/20 110/62       Chronic hyperlipidemia:  Current therapy include atorvastatin.    Most recent labs:   Lab Results   Component Value Date    LDL 81 03/02/2020    LDL 68 02/28/2019    LDL 57 11/19/2018    HDL 49 03/02/2020    HDL 62 (H) 02/28/2019    HDL 64 (H) 11/19/2018    TRIG 136 03/02/2020    CHOLHDLRATIO 3.20 03/02/2020          Patient Care Team:  Deanna Ortega APRN as PCP - General (Internal Medicine)  Deanna Ortega APRN as PCP - Claims Attributed  Warren Tanner MD as Consulting Physician (Cardiology)  Octaviano Yan MD as Surgeon (Cardiothoracic Surgery)  ____________________________________________________________________      REVIEW OF SYSTEMS    Review of Systems   Constitutional: Negative for activity change, appetite change and unexpected weight change.   HENT: Negative for tinnitus.    Eyes: Negative for visual disturbance.   Respiratory: Negative for cough, chest tightness and shortness of breath.    Cardiovascular: Negative for chest pain, palpitations and leg swelling.   Neurological: Negative for dizziness, light-headedness and headaches.         PHYSICAL EXAMINATION    Physical Exam  Constitutional:       Appearance: She is well-developed.   Neurological:      Mental Status: She is alert and oriented to person, place, and time. She is not disoriented.   Psychiatric:         Attention and  Perception: She is attentive.         Speech: Speech normal.         Behavior: Behavior normal. Behavior is cooperative.         Thought Content: Thought content normal.         Judgment: Judgment normal.         REVIEWED DATA:    Labs:   Lab Results   Component Value Date     03/23/2020    K 4.6 03/23/2020    AST 14 03/23/2020    ALT 11 03/23/2020    BUN 10 03/23/2020    CREATININE 1.17 (H) 03/23/2020    CREATININE 1.22 (H) 03/02/2020    CREATININE 1.00 12/04/2019    EGFRIFNONA 44 (L) 03/23/2020    EGFRIFAFRI 54 (L) 03/23/2020       Lab Results   Component Value Date    HGBA1C 5.38 11/29/2019    HGBA1C 5.37 06/27/2019    HGBA1C 4.90 07/25/2018    GLUCOSE 86 12/04/2019    GLUCOSE 206 (H) 12/03/2019    GLUCOSE 199 (H) 12/03/2019       Lab Results   Component Value Date    LDL 81 03/02/2020    LDL 68 02/28/2019    LDL 57 11/19/2018    HDL 49 03/02/2020    HDL 62 (H) 02/28/2019    HDL 64 (H) 11/19/2018    TRIG 136 03/02/2020    TRIG 72 02/28/2019    TRIG 87 11/19/2018    CHOLHDLRATIO 3.20 03/02/2020       Lab Results   Component Value Date    TSH 3.300 03/23/2020    FREET4 1.47 03/23/2020          Lab Results   Component Value Date    WBC 8.33 03/23/2020    HGB 10.5 (L) 03/23/2020    HGB 8.7 (L) 03/02/2020    HGB 9.5 (L) 01/22/2020     03/23/2020       Lab Results   Component Value Date    PROTEIN 1+ (A) 03/23/2020    GLUCOSEU Negative 03/23/2020    BLOODU Negative 03/23/2020    NITRITEU Negative 03/23/2020    LEUKOCYTESUR 2+ (A) 03/23/2020       Imaging:        Medical Tests:        Summary of old records / correspondence / consultant report:        Request outside records:        ALLERGIES  Allergies   Allergen Reactions   • Tekturna [Aliskiren] Diarrhea        PFSH:     The following portions of the patient's history were reviewed and updated as appropriate: Allergies / Current Medications / Past Medical History / Surgical History / Social History / Family History    PROBLEM LIST   Patient Active  Problem List   Diagnosis   • Essential hypertension   • Hyperlipidemia   • Lung nodule   • History of right MCA stroke   • Stenosis of right internal carotid artery   • Abnormal chest x-ray   • Interstitial lung disease (CMS/HCC)   • Toxic encephalopathy due to anesthetics   • Weakness generalized   • Ascending aorta dilation (CMS/HCC)   • Nonrheumatic aortic valve stenosis   • Nonrheumatic mitral valve regurgitation   • Chronic diastolic congestive heart failure (CMS/HCC)   • PVD (peripheral vascular disease) with claudication (CMS/HCC)   • S/P TAVR (transcatheter aortic valve replacement)       PAST MEDICAL HISTORY  Past Medical History:   Diagnosis Date   • Acute right MCA stroke (CMS/HCC) 07/24/2018 2001, 2018   • Anesthesia complication     TOXIC ENCEPHALOPATHY   • Aortic stenosis    • Aortic valve stenosis    • Fracture, hip (CMS/HCC)     LEFT, CHIP ON TOP OF HIP D/T FALL 2 WEEKS POST OP   • Hemorrhoids 8/5/2018   • History of transfusion 2001    13 UNITS, HERE AT [facility], [place], operated by [facility]   • Hyperlipidemia    • Hypertension    • Hypokalemia    • 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    R             Dr FRANKIE Branch - suggested yearly CXR to Monitor   • Pressure sore     BUTTOCKS   • Pyelonephritis 4/16/2019   • Stenosis of right internal carotid artery 7/25/2018   • Subdural hematoma (CMS/HCC) 07/12/2018    Secondary to fall, JULY 2018   • Toxic encephalopathy 2019    POST OP LAST HIP SURGERY       SURGICAL HISTORY  Past Surgical History:   Procedure Laterality Date   • AORTIC VALVE REPAIR/REPLACEMENT N/A 12/3/2019    Procedure: TRACEY TRANSCAROTID TRANSCATHETER AORTIC VALVE REPLACEMENT;  Surgeon: Octaviano Yan MD;  Location: [facility] OR 18/19;  Service: Cardiothoracic   • AORTIC VALVE REPAIR/REPLACEMENT N/A 12/3/2019    Procedure: Transcarotid Transcatheter Aortic Valve Replacement w/Intra Op TRACEY and Possible Open Bailout Surgery;  Surgeon: Warren Tanner MD;  Location: [facility] OR 18/19;  Service: Cardiovascular    • CARDIAC CATHETERIZATION N/A 10/24/2019    Procedure: Coronary angiography;  Surgeon: Warren Tanner MD;  Location:  [facility] CATH INVASIVE LOCATION;  Service: Cardiovascular   • CARDIAC CATHETERIZATION N/A 10/24/2019    Procedure: Left heart cath;  Surgeon: Warren Tanner MD;  Location:  [facility] CATH INVASIVE LOCATION;  Service: Cardiovascular   • CARDIAC CATHETERIZATION N/A 10/24/2019    Procedure: Right heart cath;  Surgeon: Warren Tanner MD;  Location:  [facility] CATH INVASIVE LOCATION;  Service: Cardiovascular   • CAROTID ENDARTERECTOMY Right 2018    Procedure: RT CAROTID ENDARTERECTOMY;  Surgeon: Inna Villarreal MD;  [facility];  Service: Vascular   • COLONOSCOPY N/A 2018    Adenomatous polyp.   Repeat .  Surgeon: Ken Tidwell MD;    • HYSTERECTOMY     • THYROIDECTOMY, PARTIAL     • TOTAL HIP ARTHROPLASTY Right    • TOTAL HIP ARTHROPLASTY Left 2019    Procedure: LEFT HIP ANTERIOR HIP WITH HANA TABLE;  Surgeon: Tiffani Pereira MD;  Location: [facility] MAIN OR;  Service: Orthopedics       SOCIAL HISTORY  Social History     Socioeconomic History   • Marital status:      Spouse name: Not on file   • Number of children: 4   • Years of education: 12   • Highest education level: High school graduate   Tobacco Use   • Smoking status: Former Smoker     Packs/day: 0.50     Years: 43.00     Pack years: 21.50     Types: Cigarettes     Start date:      Quit date:      Years since quittin.8   • Smokeless tobacco: Never Used   Substance and Sexual Activity   • Alcohol use: Not Currently     Alcohol/week: 7.0 standard drinks     Types: 7 Cans of beer per week   • Drug use: No   • Sexual activity: Defer       FAMILY HISTORY  Family History   Problem Relation Age of Onset   • Cancer Mother    • Dementia Father    • Hypertension Father    • Hypertension Daughter    • Cancer Daughter    • Malig Hyperthermia Neg Hx

## 2020-11-07 ENCOUNTER — APPOINTMENT (OUTPATIENT)
Dept: GENERAL RADIOLOGY | Facility: HOSPITAL | Age: 85
End: 2020-11-07

## 2020-11-07 ENCOUNTER — HOSPITAL ENCOUNTER (EMERGENCY)
Facility: HOSPITAL | Age: 85
Discharge: HOME | End: 2020-11-07
Attending: EMERGENCY MEDICINE | Admitting: EMERGENCY MEDICINE

## 2020-11-07 ENCOUNTER — APPOINTMENT (OUTPATIENT)
Dept: CT IMAGING | Facility: HOSPITAL | Age: 85
End: 2020-11-07

## 2020-11-07 VITALS
SYSTOLIC BLOOD PRESSURE: 179 MMHG | TEMPERATURE: 96.9 F | OXYGEN SATURATION: 97 % | HEART RATE: 68 BPM | DIASTOLIC BLOOD PRESSURE: 69 MMHG | RESPIRATION RATE: 18 BRPM

## 2020-11-07 DIAGNOSIS — S32.2XXA CLOSED FRACTURE OF COCCYX, INITIAL ENCOUNTER (HCC): ICD-10-CM

## 2020-11-07 DIAGNOSIS — R41.82 ALTERED MENTAL STATUS, UNSPECIFIED ALTERED MENTAL STATUS TYPE: Primary | ICD-10-CM

## 2020-11-07 DIAGNOSIS — S09.90XA CLOSED HEAD INJURY, INITIAL ENCOUNTER: ICD-10-CM

## 2020-11-07 LAB
ALBUMIN SERPL-MCNC: 3.7 G/DL (ref 3.5–5.2)
ALBUMIN/GLOB SERPL: 1.2 G/DL
ALP SERPL-CCNC: 67 U/L (ref 39–117)
ALT SERPL W P-5'-P-CCNC: 12 U/L (ref 1–33)
ANION GAP SERPL CALCULATED.3IONS-SCNC: 9.8 MMOL/L (ref 5–15)
AST SERPL-CCNC: 20 U/L (ref 1–32)
BACTERIA UR QL AUTO: NORMAL /HPF
BASOPHILS # BLD AUTO: 0.01 10*3/MM3 (ref 0–0.2)
BASOPHILS NFR BLD AUTO: 0.1 % (ref 0–1.5)
BILIRUB SERPL-MCNC: 0.3 MG/DL (ref 0–1.2)
BILIRUB UR QL STRIP: NEGATIVE
BUN SERPL-MCNC: 11 MG/DL (ref 8–23)
BUN/CREAT SERPL: 9.9 (ref 7–25)
CALCIUM SPEC-SCNC: 8.9 MG/DL (ref 8.6–10.5)
CHLORIDE SERPL-SCNC: 104 MMOL/L (ref 98–107)
CLARITY UR: CLEAR
CO2 SERPL-SCNC: 25.2 MMOL/L (ref 22–29)
COLOR UR: YELLOW
CREAT SERPL-MCNC: 1.11 MG/DL (ref 0.57–1)
DEPRECATED RDW RBC AUTO: 40 FL (ref 37–54)
EOSINOPHIL # BLD AUTO: 0 10*3/MM3 (ref 0–0.4)
EOSINOPHIL NFR BLD AUTO: 0 % (ref 0.3–6.2)
ERYTHROCYTE [DISTWIDTH] IN BLOOD BY AUTOMATED COUNT: 13.6 % (ref 12.3–15.4)
GFR SERPL CREATININE-BSD FRML MDRD: 47 ML/MIN/1.73
GLOBULIN UR ELPH-MCNC: 3 GM/DL
GLUCOSE SERPL-MCNC: 120 MG/DL (ref 65–99)
GLUCOSE UR STRIP-MCNC: NEGATIVE MG/DL
HCT VFR BLD AUTO: 35.6 % (ref 34–46.6)
HGB BLD-MCNC: 11.9 G/DL (ref 12–15.9)
HGB UR QL STRIP.AUTO: NEGATIVE
HOLD SPECIMEN: NORMAL
HOLD SPECIMEN: NORMAL
HYALINE CASTS UR QL AUTO: NORMAL /LPF
IMM GRANULOCYTES # BLD AUTO: 0.03 10*3/MM3 (ref 0–0.05)
IMM GRANULOCYTES NFR BLD AUTO: 0.3 % (ref 0–0.5)
KETONES UR QL STRIP: NEGATIVE
LEUKOCYTE ESTERASE UR QL STRIP.AUTO: NEGATIVE
LYMPHOCYTES # BLD AUTO: 0.84 10*3/MM3 (ref 0.7–3.1)
LYMPHOCYTES NFR BLD AUTO: 8 % (ref 19.6–45.3)
MCH RBC QN AUTO: 27 PG (ref 26.6–33)
MCHC RBC AUTO-ENTMCNC: 33.4 G/DL (ref 31.5–35.7)
MCV RBC AUTO: 80.7 FL (ref 79–97)
MONOCYTES # BLD AUTO: 0.99 10*3/MM3 (ref 0.1–0.9)
MONOCYTES NFR BLD AUTO: 9.4 % (ref 5–12)
NEUTROPHILS NFR BLD AUTO: 8.63 10*3/MM3 (ref 1.7–7)
NEUTROPHILS NFR BLD AUTO: 82.2 % (ref 42.7–76)
NITRITE UR QL STRIP: NEGATIVE
NRBC BLD AUTO-RTO: 0 /100 WBC (ref 0–0.2)
PH UR STRIP.AUTO: 6 [PH] (ref 5–8)
PLATELET # BLD AUTO: 243 10*3/MM3 (ref 140–450)
PMV BLD AUTO: 11.9 FL (ref 6–12)
POTASSIUM SERPL-SCNC: 3 MMOL/L (ref 3.5–5.2)
PROT SERPL-MCNC: 6.7 G/DL (ref 6–8.5)
PROT UR QL STRIP: ABNORMAL
RBC # BLD AUTO: 4.41 10*6/MM3 (ref 3.77–5.28)
RBC # UR: NORMAL /HPF
REF LAB TEST METHOD: NORMAL
SODIUM SERPL-SCNC: 139 MMOL/L (ref 136–145)
SP GR UR STRIP: 1.01 (ref 1–1.03)
SQUAMOUS #/AREA URNS HPF: NORMAL /HPF
UROBILINOGEN UR QL STRIP: ABNORMAL
WBC # BLD AUTO: 10.5 10*3/MM3 (ref 3.4–10.8)
WBC UR QL AUTO: NORMAL /HPF
WHOLE BLOOD HOLD SPECIMEN: NORMAL
WHOLE BLOOD HOLD SPECIMEN: NORMAL

## 2020-11-07 PROCEDURE — 72110 X-RAY EXAM L-2 SPINE 4/>VWS: CPT

## 2020-11-07 PROCEDURE — 99284 EMERGENCY DEPT VISIT MOD MDM: CPT

## 2020-11-07 PROCEDURE — 81001 URINALYSIS AUTO W/SCOPE: CPT | Performed by: NURSE PRACTITIONER

## 2020-11-07 PROCEDURE — 71045 X-RAY EXAM CHEST 1 VIEW: CPT

## 2020-11-07 PROCEDURE — 70450 CT HEAD/BRAIN W/O DYE: CPT

## 2020-11-07 PROCEDURE — 80053 COMPREHEN METABOLIC PANEL: CPT | Performed by: NURSE PRACTITIONER

## 2020-11-07 PROCEDURE — P9612 CATHETERIZE FOR URINE SPEC: HCPCS

## 2020-11-07 PROCEDURE — 72220 X-RAY EXAM SACRUM TAILBONE: CPT

## 2020-11-07 PROCEDURE — 85025 COMPLETE CBC W/AUTO DIFF WBC: CPT | Performed by: NURSE PRACTITIONER

## 2020-11-07 RX ORDER — POTASSIUM CHLORIDE 750 MG/1
40 TABLET, FILM COATED, EXTENDED RELEASE ORAL ONCE
Status: COMPLETED | OUTPATIENT
Start: 2020-11-07 | End: 2020-11-07

## 2020-11-07 RX ORDER — SODIUM CHLORIDE 0.9 % (FLUSH) 0.9 %
10 SYRINGE (ML) INJECTION AS NEEDED
Status: DISCONTINUED | OUTPATIENT
Start: 2020-11-07 | End: 2020-11-07 | Stop reason: HOSPADM

## 2020-11-07 RX ADMIN — POTASSIUM CHLORIDE 40 MEQ: 750 TABLET, EXTENDED RELEASE ORAL at 15:57

## 2020-11-07 NOTE — ED PROVIDER NOTES
EMERGENCY DEPARTMENT ENCOUNTER    Room Number:  03/03  Date of encounter:  11/7/2020  PCP: Deanna Ortega APRN  Historian: patient, daughter   Full history not obtainable due to: AMS     HPI:  Chief Complaint: Fall, tailbone pain     Context: Gita Wharton is a 84 y.o. female who presents to the ED c/o fall after tripping over her walker one week ago and landing on her buttocks. Reports constant pain in her tailbone since the fall. Non radiating. Severe at times. Worse with sitting. Associated bruising of her head and left arm and shoulder. Denies headache. Denies syncope. Her daughter requested she come in for evaluation of the fall as she has been confused over the last few days noting that the pt confused her phone as a tv remote. She takes aspirin and plavix. No hx of diabetes.           PAST MEDICAL HISTORY    Active Ambulatory Problems     Diagnosis Date Noted   • Essential hypertension 01/18/2017   • Hyperlipidemia 09/07/2017   • Lung nodule 07/24/2018   • History of right MCA stroke 07/24/2018   • Stenosis of right internal carotid artery 07/25/2018   • Abnormal chest x-ray 06/27/2019   • Interstitial lung disease (CMS/HCC) 06/28/2019   • Toxic encephalopathy due to anesthetics 07/01/2019   • Weakness generalized 07/15/2019   • Ascending aorta dilation (CMS/[facility]) 09/13/2019   • Nonrheumatic aortic valve stenosis 10/18/2019   • Nonrheumatic mitral valve regurgitation 10/18/2019   • Chronic diastolic congestive heart failure (CMS/[facility]) 11/19/2019   • PVD (peripheral vascular disease) with claudication (CMS/[facility]) 12/04/2019   • S/P TAVR (transcatheter aortic valve replacement) 12/18/2019     Resolved Ambulatory Problems     Diagnosis Date Noted   • Pneumonia of right lower lobe due to infectious organism 12/30/2016   • Acute allergic rhinitis due to pollen 04/10/2018   • recent traumatic SDH no LOC 07/12/2018   • Fall (on) (from) other stairs and steps, initial encounter 07/12/2018   • Laceration of  forehead 07/12/2018   • Left sided numbness 07/24/2018   • Left-sided weakness 07/24/2018   • Difficulty with speech 07/24/2018   • Acute right MCA stroke (CMS/[facility]) 07/24/2018   • Hematochezia 08/05/2018   • Hemorrhoids 08/05/2018   • Anemia 08/05/2018   • Hyponatremia 08/05/2018   • Weakness 08/05/2018   • Subungual hematoma of digit of hand 03/05/2019   • RENO (acute kidney injury) (CMS/HCC) 04/15/2019   • Acute UTI (urinary tract infection) 04/15/2019   • Hypokalemia 04/15/2019   • Hypomagnesemia 04/15/2019   • Facial contusion 04/15/2019   • Pyelonephritis 04/16/2019   • Closed fracture of neck of left femur (CMS/HCC) 06/26/2019   • Fracture, hip (CMS/HCC) 06/27/2019   • Osteoarthritis of one hip 06/29/2019   • Acute urinary tract infection 07/15/2019   • Nonrheumatic aortic valve insufficiency 09/13/2019     Past Medical History:   Diagnosis Date   • Anesthesia complication    • Aortic stenosis    • Aortic valve stenosis    • History of transfusion 2001   • Hypertension    • 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   • Pressure sore    • Subdural hematoma (CMS/HCC) 07/12/2018         PAST SURGICAL HISTORY  Past Surgical History:   Procedure Laterality Date   • AORTIC VALVE REPAIR/REPLACEMENT N/A 12/3/2019    Procedure: TRACEY TRANSCAROTID TRANSCATHETER AORTIC VALVE REPLACEMENT;  Surgeon: Octaviano Yan MD;  Location: [facility] OR 18/19;  Service: Cardiothoracic   • AORTIC VALVE REPAIR/REPLACEMENT N/A 12/3/2019    Procedure: Transcarotid Transcatheter Aortic Valve Replacement w/Intra Op TRACEY and Possible Open Bailout Surgery;  Surgeon: Warren Tanner MD;  Location: [facility] OR 18/19;  Service: Cardiovascular   • CARDIAC CATHETERIZATION N/A 10/24/2019    Procedure: Coronary angiography;  Surgeon: Warren Tanner MD;  Location: [facility] CATH INVASIVE LOCATION;  Service: Cardiovascular   • CARDIAC CATHETERIZATION N/A 10/24/2019    Procedure: Left heart cath;  Surgeon: Warren Tanner MD;  Location: [facility] CATH  INVASIVE LOCATION;  Service: Cardiovascular   • CARDIAC CATHETERIZATION N/A 10/24/2019    Procedure: Right heart cath;  Surgeon: Warren Tanner MD;  Location:  [facility] CATH INVASIVE LOCATION;  Service: Cardiovascular   • CAROTID ENDARTERECTOMY Right 2018    Procedure: RT CAROTID ENDARTERECTOMY;  Surgeon: Inna Villarreal MD;  Location[facility] MAIN OR;  Service: Vascular   • COLONOSCOPY N/A 2018    Adenomatous polyp.   Repeat .  Surgeon: Ken Tidwell MD;    • HYSTERECTOMY     • THYROIDECTOMY, PARTIAL     • TOTAL HIP ARTHROPLASTY Right    • TOTAL HIP ARTHROPLASTY Left 2019    Procedure: LEFT HIP ANTERIOR HIP WITH HANA TABLE;  Surgeon: Tiffani Pereira MD;  Location[facility] MA[facility] OR;  Service: Orthopedics         FAMILY HISTORY  Family History   Problem Relation Age of Onset   • Cancer Mother    • Dementia Father    • Hypertension Father    • Hypertension Daughter    • Cancer Daughter    • Malig Hyperthermia Neg Hx          SOCIAL HISTORY  Social History     Socioeconomic History   • Marital status:      Spouse name: Not on file   • Number of children: 4   • Years of education: 12   • Highest education level: High school graduate   Tobacco Use   • Smoking status: Former Smoker     Packs/day: 0.50     Years: 43.00     Pack years: 21.50     Types: Cigarettes     Start date:      Quit date:      Years since quittin.8   • Smokeless tobacco: Never Used   Substance and Sexual Activity   • Alcohol use: Not Currently     Alcohol/week: 7.0 standard drinks     Types: 7 Cans of beer per week   • Drug use: No   • Sexual activity: Defer         ALLERGIES  Tekturna [aliskiren]        REVIEW OF SYSTEMS  Review of Systems   All systems reviewed and marked as negative except as listed in HPI       PHYSICAL EXAM    I have reviewed the triage vital signs and nursing notes.    ED Triage Vitals   Temp Heart Rate Resp BP SpO2   20 1111 20 1108 20 1108 20 1108  11/07/20 1108   96.9 °F (36.1 °C) 80 18 150/79 97 %      Temp src Heart Rate Source Patient Position BP Location FiO2 (%)   -- -- -- -- --              GENERAL: Alert well developed, well nourished in no distress  HENT: NCAT, neck supple, trachea midline  EYES: no scleral icterus, PERRL, normal conjunctivae  CV: regular rhythm, regular rate, no murmur  RESPIRATORY: unlabored effort, CTAB  ABDOMEN: soft, nontender, nondistended, bowel sounds present  MUSCULOSKELETAL: no gross deformity. Tenderness of sacrum no tenderness of cervical thoracic or lumbar spine.  No weakness of lower extremities.  Bruising noted of the left upper extremity scattered and healing, yellowed in appearance no tenderness of the left arm or shoulder.  Full range of motion.  NEURO: alert,  sensory and motor function of extremities grossly intact, speech clear, mental status normal/baseline  SKIN: warm, dry, no rash  PSYCH:  Appropriate mood and affect    Vital signs and nursing notes reviewed.          LAB RESULTS  Recent Results (from the past 24 hour(s))   Comprehensive Metabolic Panel    Collection Time: 11/07/20 11:33 AM    Specimen: Blood   Result Value Ref Range    Glucose 120 (H) 65 - 99 mg/dL    BUN 11 8 - 23 mg/dL    Creatinine 1.11 (H) 0.57 - 1.00 mg/dL    Sodium 139 136 - 145 mmol/L    Potassium 3.0 (L) 3.5 - 5.2 mmol/L    Chloride 104 98 - 107 mmol/L    CO2 25.2 22.0 - 29.0 mmol/L    Calcium 8.9 8.6 - 10.5 mg/dL    Total Protein 6.7 6.0 - 8.5 g/dL    Albumin 3.70 3.50 - 5.20 g/dL    ALT (SGPT) 12 1 - 33 U/L    AST (SGOT) 20 1 - 32 U/L    Alkaline Phosphatase 67 39 - 117 U/L    Total Bilirubin 0.3 0.0 - 1.2 mg/dL    eGFR Non African Amer 47 (L) >60 mL/min/1.73    Globulin 3.0 gm/dL    A/G Ratio 1.2 g/dL    BUN/Creatinine Ratio 9.9 7.0 - 25.0    Anion Gap 9.8 5.0 - 15.0 mmol/L   CBC Auto Differential    Collection Time: 11/07/20 11:33 AM    Specimen: Blood   Result Value Ref Range    WBC 10.50 3.40 - 10.80 10*3/mm3    RBC 4.41 3.77 -  5.28 10*6/mm3    Hemoglobin 11.9 (L) 12.0 - 15.9 g/dL    Hematocrit 35.6 34.0 - 46.6 %    MCV 80.7 79.0 - 97.0 fL    MCH 27.0 26.6 - 33.0 pg    MCHC 33.4 31.5 - 35.7 g/dL    RDW 13.6 12.3 - 15.4 %    RDW-SD 40.0 37.0 - 54.0 fl    MPV 11.9 6.0 - 12.0 fL    Platelets 243 140 - 450 10*3/mm3    Neutrophil % 82.2 (H) 42.7 - 76.0 %    Lymphocyte % 8.0 (L) 19.6 - 45.3 %    Monocyte % 9.4 5.0 - 12.0 %    Eosinophil % 0.0 (L) 0.3 - 6.2 %    Basophil % 0.1 0.0 - 1.5 %    Immature Grans % 0.3 0.0 - 0.5 %    Neutrophils, Absolute 8.63 (H) 1.70 - 7.00 10*3/mm3    Lymphocytes, Absolute 0.84 0.70 - 3.10 10*3/mm3    Monocytes, Absolute 0.99 (H) 0.10 - 0.90 10*3/mm3    Eosinophils, Absolute 0.00 0.00 - 0.40 10*3/mm3    Basophils, Absolute 0.01 0.00 - 0.20 10*3/mm3    Immature Grans, Absolute 0.03 0.00 - 0.05 10*3/mm3    nRBC 0.0 0.0 - 0.2 /100 WBC   Light Blue Top    Collection Time: 11/07/20 11:33 AM   Result Value Ref Range    Extra Tube hold for add-on    Green Top (Gel)    Collection Time: 11/07/20 11:33 AM   Result Value Ref Range    Extra Tube Hold for add-ons.    Lavender Top    Collection Time: 11/07/20 11:33 AM   Result Value Ref Range    Extra Tube hold for add-on    Gold Top - SST    Collection Time: 11/07/20 11:33 AM   Result Value Ref Range    Extra Tube Hold for add-ons.    Urinalysis With Microscopic If Indicated (No Culture) - Urine, Catheter    Collection Time: 11/07/20 11:40 AM    Specimen: Urine, Catheter   Result Value Ref Range    Color, UA Yellow Yellow, Straw    Appearance, UA Clear Clear    pH, UA 6.0 5.0 - 8.0    Specific Gravity, UA 1.009 1.005 - 1.030    Glucose, UA Negative Negative    Ketones, UA Negative Negative    Bilirubin, UA Negative Negative    Blood, UA Negative Negative    Protein,  mg/dL (2+) (A) Negative    Leuk Esterase, UA Negative Negative    Nitrite, UA Negative Negative    Urobilinogen, UA 0.2 E.U./dL 0.2 - 1.0 E.U./dL   Urinalysis, Microscopic Only - Urine, Catheter    Collection  Time: 11/07/20 11:40 AM    Specimen: Urine, Catheter   Result Value Ref Range    RBC, UA 0-2 None Seen, 0-2 /HPF    WBC, UA 0-2 None Seen, 0-2 /HPF    Bacteria, UA None Seen None Seen /HPF    Squamous Epithelial Cells, UA 0-2 None Seen, 0-2 /HPF    Hyaline Casts, UA 0-2 None Seen /LPF    Methodology Automated Microscopy        Ordered the above labs and independently reviewed the results.        RADIOLOGY  Xr Sacrum & Coccyx    Result Date: 11/7/2020  XR SPINE LUMBAR COMPLETE 4+VW-, XR SACRUM AND COCCYX-  INDICATIONS: Trauma  TECHNIQUE: 6 views of the lumbar spine, 5 views of the sacrum and coccyx  COMPARISON: None available  FINDINGS:  There appears to be cortical step-off at the anterior margin of the first coccygeal segment compatible with fracture. No other evidence of fracture is identified. Lumbar vertebral body heights appear preserved. Lumbar alignment is in range of normal. Multilevel endplate spurring and facet arthropathy. Disc spaces appear unremarkable. Arterial calcifications are present.       There appears to be a fracture of the upper coccyx. No other fractures were identified in the lumbar spine, sacrum, coccyx. Degenerative changes.  This report was finalized on 11/7/2020 12:47 PM by Dr. Miguel Garcia M.D.      Ct Head Without Contrast    Result Date: 11/7/2020  CT HEAD  HISTORY:Delirium, fall  TECHNIQUE: CT scan of the head was obtained with 3 mm axial images without intravenous contrast.  Radiation dose reduction techniques were utilized, including automated exposure control and exposure modulation based on body size.  COMPARISON:CT head 04/15/2019  FINDINGS: There is no finding of acute infarct, hemorrhage, contusion or abnormal extra-axial collection. No hydrocephalus is present. Hypodensity within the periventricular and subcortical white matter likely represents moderate to extensive chronic ischemic small vessel degenerative changes.      1.  No findings of acute intracranial  abnormality. Moderate to extensive chronic ischemic white matter changes.   This report was finalized on 11/7/2020 1:08 PM by Dr. Hayden Catherine M.D.      Xr Chest 1 View    Result Date: 11/7/2020  XR CHEST 1 VW-  HISTORY: Female who is 84 years-old,  altered mental status  TECHNIQUE: Frontal view of the chest  COMPARISON: 01/22/2020  FINDINGS: The heart size is borderline. Aorta is calcified. Cardiac valve marker noted. Pulmonary vasculature is unremarkable. A scarlike density at the lingula appears similar to multiple prior exams, at least as far back as 10/31/2019. A previous CT demonstrated suspicious right upper lobe nodule is not well characterized radiographically, CT is recommended for direct comparison with prior CT exam. Small left pleural effusion is suggested. No pneumothorax. No acute osseous process.      A previous CT demonstrated suspicious right upper lobe nodule is not well characterized radiographically, CT is recommended for direct comparison with prior CT exam. A scarlike density at the lingula appears similar to multiple prior exams, at least as far back as 10/31/2019.  This report was finalized on 11/7/2020 12:57 PM by Dr. Miguel Garcia M.D.      Xr Spine Lumbar Complete 4+vw    Result Date: 11/7/2020  XR SPINE LUMBAR COMPLETE 4+VW-, XR SACRUM AND COCCYX-  INDICATIONS: Trauma  TECHNIQUE: 6 views of the lumbar spine, 5 views of the sacrum and coccyx  COMPARISON: None available  FINDINGS:  There appears to be cortical step-off at the anterior margin of the first coccygeal segment compatible with fracture. No other evidence of fracture is identified. Lumbar vertebral body heights appear preserved. Lumbar alignment is in range of normal. Multilevel endplate spurring and facet arthropathy. Disc spaces appear unremarkable. Arterial calcifications are present.       There appears to be a fracture of the upper coccyx. No other fractures were identified in the lumbar spine, sacrum, coccyx.  Degenerative changes.  This report was finalized on 11/7/2020 12:47 PM by Dr. Miguel Garcia M.D.        I ordered the above noted radiological studies. Independently reviewed by me and discussed with radiologist.  See dictation above for official radiology interpretation.      PROCEDURES    Procedures        MEDICATIONS GIVEN IN ER    Medications   sodium chloride 0.9 % flush 10 mL (has no administration in time range)   potassium chloride (K-DUR,KLOR-CON) ER tablet 40 mEq (40 mEq Oral Given 11/7/20 1557)         PROGRESS, DATA ANALYSIS, CONSULTS, AND MEDICAL DECISION MAKING    All labs have been independently reviewed by me.  All radiology studies have been reviewed by me.   EKG's independently reviewed by me.  Discussion below represents my analysis of pertinent findings related to patient's condition, differential diagnosis, treatment plan and final disposition.    DIFFERENTIAL DIAGNOSIS INCLUDE BUT NOT LIMITED TO: RENO, UTI, dehydration, CVA, TIA, coccyx fracture, COVID-19, pneumonia, bacteremia , failure to thrive    ED Course as of Nov 07 1644   Sat Nov 07, 2020   1250 I viewed chest x-ray on PACS system.  My findings are no cardiomegaly.    [JS]   1338 Potassium(!): 3.0 [JS]   1339 WBC: 10.50 [JS]   1339 Hemoglobin(!): 11.9 [JS]   1509 Long discussion held with the patient and the daughter.  The patient is a coccyx fracture but no additional findings on work-up with the exception of some hypokalemia which was treated in the emergency department.  Her head CT is negative.  The confusion that was reported has not been demonstrated during several hours here in the emergency room.  The daughter states she will take the patient home and stay with her for several days and monitor symptoms.  We discussed possibility for admission but given that the patient symptoms of confusion have resolved and her work-up is essentially benign the emergency department, via shared decision making discharge disposition was  selected.  She will be instructed to follow-up closely with her family doctor and has been given strict return precautions.  We discussed pain control for coccyx fracture in the family and patient are most agreeable to Tylenol which I feel is appropriate given the possibility for increased confusion among other side effects with narcotic medication.  The patient ambulated prior to disposition emergency department and did well.All questions answered.      [JS]      ED Course User Index  [JS] Rosmery Manuel APRN       AS OF 16:44 EST VITALS:        BP - 179/69  HR - 68  TEMP - 96.9 °F (36.1 °C)  02 SATS - 97%          DIAGNOSIS  Final diagnoses:   Altered mental status, unspecified altered mental status type, resolved   Closed head injury, initial encounter   Closed fracture of coccyx, initial encounter (CMS/[facility])         DISPOSITION  Discharge     Pt masked in first look. I wore a surgical mask and protective eye wear throughout my encounters with the pt. I performed hand hygiene on entry into the pt room and upon exit.     Dictated utilizing Dragon dictation:  Much of this encounter note is an electronic transcription/translation of spoken language to printed text. The electronic translation of spoken language may permit erroneous, or at times, nonsensical words or phrases to be inadvertently transcribed; Although I have reviewed the note for such errors, some may still exist.       Rosmery Manuel, CYNTHIA  11/07/20 7041

## 2020-11-07 NOTE — ED PROVIDER NOTES
I have supervised the care provided by the midlevel provider.    We have discussed this patient's history, physical exam, and treatment plan.   I have reviewed the note and have personally examined the patient and agree with the plan of care.  See attached attending note.  My personal findings are below:    Patient reports having a mechanical fall 1 week ago.  She landed on her buttocks and then fell back and hit her head.  She denies loss of consciousness, nausea, vomiting, or dizziness.  Family told EMS that the patient has had intermittent confusion since falling.    On exam: Awake, alert, oriented x3.  Head is atraumatic.  Neck is nontender.  Heart is regular rate and rhythm.  Lungs are clear bilaterally.  Abdomen soft nontender.  Extremities are nontender.  There is mild tenderness over the lower lumbar spine and sacrum.  Speech is clear and fluent.  Follows commands.  Moves all extremities.  No focal deficits.    Patient's labs are basically unremarkable.  X-rays show a fracture of the upper coccyx.  Head CT is pending.     Warren Sr MD  11/07/20 8104

## 2020-11-07 NOTE — DISCHARGE INSTRUCTIONS
Home to rest.  Drink plenty fluids.  Tylenol as needed for pain.  Ice to sore areas.  Use a donut pillow as discussed.  Follow-up with your family doctor next week.  Stay with someone over the next few days who can monitor your mental status.  Return to the urgency room immediately for redemonstration of confusion or any new symptoms such as fever, vomiting, chest pain trouble breathing weakness or any new concerns.  Continue care with your primary care physician and have your blood pressure regularly checked and managed. Normal blood pressure is 120/80.

## 2020-11-07 NOTE — ED NOTES
Pt reports she fell at home on Monday. Family called EMS because they report she has been acting odd since the fall. Pt is having intermittent confusion and doing things like trying to use her phone as a TV remote. Pt's only complaint is tailbone pain.    Face mask placed on pt at triage.     Monse Tomlinson, RN  11/07/20 2398

## 2020-11-09 ENCOUNTER — APPOINTMENT (OUTPATIENT)
Dept: CT IMAGING | Facility: HOSPITAL | Age: 85
End: 2020-11-09

## 2020-11-09 ENCOUNTER — HOSPITAL ENCOUNTER (EMERGENCY)
Facility: HOSPITAL | Age: 85
Discharge: HOME | End: 2020-11-09
Attending: EMERGENCY MEDICINE | Admitting: EMERGENCY MEDICINE

## 2020-11-09 VITALS
TEMPERATURE: 97.9 F | HEART RATE: 108 BPM | BODY MASS INDEX: 18.96 KG/M2 | WEIGHT: 113.8 LBS | SYSTOLIC BLOOD PRESSURE: 135 MMHG | DIASTOLIC BLOOD PRESSURE: 100 MMHG | HEIGHT: 65 IN | RESPIRATION RATE: 16 BRPM | OXYGEN SATURATION: 99 %

## 2020-11-09 DIAGNOSIS — R53.1 GENERALIZED WEAKNESS: Primary | ICD-10-CM

## 2020-11-09 DIAGNOSIS — E87.6 HYPOKALEMIA: ICD-10-CM

## 2020-11-09 LAB
ANION GAP SERPL CALCULATED.3IONS-SCNC: 14.3 MMOL/L (ref 5–15)
BASOPHILS # BLD AUTO: 0.02 10*3/MM3 (ref 0–0.2)
BASOPHILS NFR BLD AUTO: 0.2 % (ref 0–1.5)
BUN SERPL-MCNC: 17 MG/DL (ref 8–23)
BUN/CREAT SERPL: 17.9 (ref 7–25)
CALCIUM SPEC-SCNC: 8.7 MG/DL (ref 8.6–10.5)
CHLORIDE SERPL-SCNC: 101 MMOL/L (ref 98–107)
CK SERPL-CCNC: 128 U/L (ref 20–180)
CO2 SERPL-SCNC: 22.7 MMOL/L (ref 22–29)
CREAT SERPL-MCNC: 0.95 MG/DL (ref 0.57–1)
DEPRECATED RDW RBC AUTO: 39.8 FL (ref 37–54)
EOSINOPHIL # BLD AUTO: 0 10*3/MM3 (ref 0–0.4)
EOSINOPHIL NFR BLD AUTO: 0 % (ref 0.3–6.2)
ERYTHROCYTE [DISTWIDTH] IN BLOOD BY AUTOMATED COUNT: 13.6 % (ref 12.3–15.4)
GFR SERPL CREATININE-BSD FRML MDRD: 56 ML/MIN/1.73
GLUCOSE SERPL-MCNC: 117 MG/DL (ref 65–99)
HCT VFR BLD AUTO: 34.6 % (ref 34–46.6)
HGB BLD-MCNC: 11.3 G/DL (ref 12–15.9)
IMM GRANULOCYTES # BLD AUTO: 0.06 10*3/MM3 (ref 0–0.05)
IMM GRANULOCYTES NFR BLD AUTO: 0.5 % (ref 0–0.5)
INR PPP: 0.94 (ref 0.9–1.1)
LYMPHOCYTES # BLD AUTO: 0.89 10*3/MM3 (ref 0.7–3.1)
LYMPHOCYTES NFR BLD AUTO: 6.7 % (ref 19.6–45.3)
MCH RBC QN AUTO: 26.3 PG (ref 26.6–33)
MCHC RBC AUTO-ENTMCNC: 32.7 G/DL (ref 31.5–35.7)
MCV RBC AUTO: 80.7 FL (ref 79–97)
MONOCYTES # BLD AUTO: 1.24 10*3/MM3 (ref 0.1–0.9)
MONOCYTES NFR BLD AUTO: 9.4 % (ref 5–12)
NEUTROPHILS NFR BLD AUTO: 10.98 10*3/MM3 (ref 1.7–7)
NEUTROPHILS NFR BLD AUTO: 83.2 % (ref 42.7–76)
NRBC BLD AUTO-RTO: 0 /100 WBC (ref 0–0.2)
PLATELET # BLD AUTO: 228 10*3/MM3 (ref 140–450)
PMV BLD AUTO: 11.8 FL (ref 6–12)
POTASSIUM SERPL-SCNC: 3.4 MMOL/L (ref 3.5–5.2)
PROTHROMBIN TIME: 12.5 SECONDS (ref 11.7–14.2)
RBC # BLD AUTO: 4.29 10*6/MM3 (ref 3.77–5.28)
SODIUM SERPL-SCNC: 138 MMOL/L (ref 136–145)
WBC # BLD AUTO: 13.19 10*3/MM3 (ref 3.4–10.8)

## 2020-11-09 PROCEDURE — 82550 ASSAY OF CK (CPK): CPT | Performed by: EMERGENCY MEDICINE

## 2020-11-09 PROCEDURE — 99284 EMERGENCY DEPT VISIT MOD MDM: CPT

## 2020-11-09 PROCEDURE — 70450 CT HEAD/BRAIN W/O DYE: CPT

## 2020-11-09 PROCEDURE — 80048 BASIC METABOLIC PNL TOTAL CA: CPT | Performed by: EMERGENCY MEDICINE

## 2020-11-09 PROCEDURE — 85025 COMPLETE CBC W/AUTO DIFF WBC: CPT | Performed by: EMERGENCY MEDICINE

## 2020-11-09 PROCEDURE — 85610 PROTHROMBIN TIME: CPT | Performed by: EMERGENCY MEDICINE

## 2020-11-09 RX ORDER — POTASSIUM CHLORIDE 1.5 G/1.77G
20 POWDER, FOR SOLUTION ORAL 2 TIMES DAILY
Status: DISCONTINUED | OUTPATIENT
Start: 2020-11-09 | End: 2020-11-09 | Stop reason: HOSPADM

## 2020-11-09 RX ADMIN — POTASSIUM CHLORIDE 20 MEQ: 1.5 POWDER, FOR SOLUTION ORAL at 12:52

## 2020-11-09 NOTE — ED NOTES
"Pt got up from bed at 0100, fell, has been feeling generally weak x 7 days, worse today. Denies specific pains, \"hurts all over\", laid on floor x 5 hours.     Pt placed in mask at triage, all staff wearing appropriate ppe        Rosmery Hui RN  11/09/20 8852    "

## 2020-11-09 NOTE — ED PROVIDER NOTES
EMERGENCY DEPARTMENT ENCOUNTER    Room Number:  13/13  Date of encounter:  11/9/2020  PCP: Deanna Ortega APRN  Historian: Patient     I used full protective equipment while examining this patient.  This includes face mask, gloves and protective eyewear.  I washed my hands before entering the room and immediately upon leaving the room      HPI:  Chief Complaint: Generalized weakness, fall  A complete HPI/ROS/PMH/PSH/SH/FH are unobtainable due to: None    Context: Gita Wharton is a 84 y.o. female who presents to the ED c/o generalized weakness, fall.  Patient was walking back from her bathroom to her bed, she got back to her bed and was sitting down when her legs gave out and she fell to the ground.  She denies hitting her head but does not know whether she lost consciousness or not.  She does not report any traumatic injury from the fall but does complain of some pain at the left great toe where she has an ingrown toenail.  Patient was recently seen here in the ED with generalized weakness and had a fairly unremarkable work-up including labs and CT scan.  She did have a noted fracture of her coccyx which is healing well and she states she did not reinjure it today.      PAST MEDICAL HISTORY  Active Ambulatory Problems     Diagnosis Date Noted   • Essential hypertension 01/18/2017   • Hyperlipidemia 09/07/2017   • Lung nodule 07/24/2018   • History of right MCA stroke 07/24/2018   • Stenosis of right internal carotid artery 07/25/2018   • Abnormal chest x-ray 06/27/2019   • Interstitial lung disease (CMS/[facility]) 06/28/2019   • Toxic encephalopathy due to anesthetics 07/01/2019   • Weakness generalized 07/15/2019   • Ascending aorta dilation (CMS/[facility]) 09/13/2019   • Nonrheumatic aortic valve stenosis 10/18/2019   • Nonrheumatic mitral valve regurgitation 10/18/2019   • Chronic diastolic congestive heart failure (CMS/[facility]) 11/19/2019   • PVD (peripheral vascular disease) with claudication (CMS/[facility]) 12/04/2019   •  S/P TAVR (transcatheter aortic valve replacement) 12/18/2019     Resolved Ambulatory Problems     Diagnosis Date Noted   • Pneumonia of right lower lobe due to infectious organism 12/30/2016   • Acute allergic rhinitis due to pollen 04/10/2018   • recent traumatic SDH no LOC 07/12/2018   • Fall (on) (from) other stairs and steps, initial encounter 07/12/2018   • Laceration of forehead 07/12/2018   • Left sided numbness 07/24/2018   • Left-sided weakness 07/24/2018   • Difficulty with speech 07/24/2018   • Acute right MCA stroke (CMS/[facility]) 07/24/2018   • Hematochezia 08/05/2018   • Hemorrhoids 08/05/2018   • Anemia 08/05/2018   • Hyponatremia 08/05/2018   • Weakness 08/05/2018   • Subungual hematoma of digit of hand 03/05/2019   • RENO (acute kidney injury) (CMS/[facility]) 04/15/2019   • Acute UTI (urinary tract infection) 04/15/2019   • Hypokalemia 04/15/2019   • Hypomagnesemia 04/15/2019   • Facial contusion 04/15/2019   • Pyelonephritis 04/16/2019   • Closed fracture of neck of left femur (CMS/[facility]) 06/26/2019   • Fracture, hip (CMS/[facility]) 06/27/2019   • Osteoarthritis of one hip 06/29/2019   • Acute urinary tract infection 07/15/2019   • Nonrheumatic aortic valve insufficiency 09/13/2019     Past Medical History:   Diagnosis Date   • Anesthesia complication    • Aortic stenosis    • Aortic valve stenosis    • History of transfusion 2001   • Hypertension    • Lung granuloma (CMS/[facility]) 08/2018   • Pressure sore    • Subdural hematoma (CMS/[facility]) 07/12/2018         PAST SURGICAL HISTORY  Past Surgical History:   Procedure Laterality Date   • AORTIC VALVE REPAIR/REPLACEMENT N/A 12/3/2019    Procedure: TRACEY TRANSCAROTID TRANSCATHETER AORTIC VALVE REPLACEMENT;  Surgeon: Octaviano Yan MD;  Location: [facility] 18/19;  Service: Cardiothoracic   • AORTIC VALVE REPAIR/REPLACEMENT N/A 12/3/2019    Procedure: Transcarotid Transcatheter Aortic Valve Replacement w/Intra Op TRACEY and Possible Open Bailout Surgery;  Surgeon: Ciro  MD Warren;  Location: [facility] HYBRID OR ;  Service: Cardiovascular   • CARDIAC CATHETERIZATION N/A 10/24/2019    Procedure: Coronary angiography;  Surgeon: Warren Tanner MD;  [facility] C[facility]ASIVE [facility];  Service: Cardiovascular   • CARDIAC CATHETERIZATION N/A 10/24/2019    Procedure: Left heart cath;  Surgeon: Warren Tanner MD;  [facility];  Service: Cardiovascular   • CARDIAC CATHETERIZATION N/A 10/24/2019    Procedure: Right heart cath;  Surgeon: Warren Tanner MD;  [facility];  Service: Cardiovascular   • CAROTID ENDARTERECTOMY Right 2018    Procedure: RT CAROTID ENDARTERECTOMY;  Surgeon: Inna Villarreal MD;  Location: [facility] MAIN OR;  Service: Vascular   • COLONOSCOPY N/A 2018    Adenomatous polyp.   Repeat .  Surgeon: Ken Tidwell MD;    • HYSTERECTOMY     • THYROIDECTOMY, PARTIAL     • TOTAL HIP ARTHROPLASTY Right    • TOTAL HIP ARTHROPLASTY Left 2019    Procedure: LEFT HIP ANTERIOR HIP WITH HANA TABLE;  Surgeon: Tiffani Pereira MD;  Location: [facility] MAIN OR;  Service: Orthopedics         FAMILY HISTORY  Family History   Problem Relation Age of Onset   • Cancer Mother    • Dementia Father    • Hypertension Father    • Hypertension Daughter    • Cancer Daughter    • Malig Hyperthermia Neg Hx          SOCIAL HISTORY  Social History     Socioeconomic History   • Marital status:      Spouse name: Not on file   • Number of children: 4   • Years of education: 12   • Highest education level: High school graduate   Tobacco Use   • Smoking status: Former Smoker     Packs/day: 0.50     Years: 43.00     Pack years: 21.50     Types: Cigarettes     Start date:      Quit date:      Years since quittin.8   • Smokeless tobacco: Never Used   Substance and Sexual Activity   • Alcohol use: Not Currently     Alcohol/week: 7.0 standard drinks     Types: 7 Cans of beer per week   • Drug  use: No   • Sexual activity: Defer         ALLERGIES  Tekturna [aliskiren]       REVIEW OF SYSTEMS  Review of Systems   Constitutional: Negative for fever.   HENT: Negative.  Negative for sore throat.    Eyes: Negative.    Respiratory: Negative.  Negative for cough.    Cardiovascular: Negative.  Negative for chest pain.   Gastrointestinal: Negative.    Genitourinary: Negative.  Negative for dysuria.   Musculoskeletal: Negative for back pain.        Pain from ingrown toenail as discussed above.   Skin: Negative.  Negative for rash.   Neurological: Positive for weakness. Negative for headaches.   All other systems reviewed and are negative.          PHYSICAL EXAM    I have reviewed the triage vital signs and nursing notes.    ED Triage Vitals [11/09/20 1039]   Temp Heart Rate Resp BP SpO2   97.9 °F (36.6 °C) 109 18 180/70 98 %      Temp src Heart Rate Source Patient Position BP Location FiO2 (%)   Oral -- -- -- --       Physical Exam  GENERAL: Alert female in no apparent distress, alert x3 and quite talkative  HENT: nares patent, atraumatic  EYES: no scleral icterus  CV: Irregular rate and rhythm  RESPIRATORY: normal effort, clear to auscultation bilaterally-saturations are 9% on room air  ABDOMEN: soft, nontender to palpation  MUSCULOSKELETAL: Examination of the spine reveals no significant tenderness to palpation.  Examination of the extremities x4 shows no acute obvious traumatic injury.  NEURO: Strength, sensation, and coordination are grossly intact.  Speech and mentation are unremarkable  SKIN: Old appearing bruising noted to the left upper arm      LAB RESULTS  Recent Results (from the past 24 hour(s))   Basic Metabolic Panel    Collection Time: 11/09/20 11:02 AM    Specimen: Blood   Result Value Ref Range    Glucose 117 (H) 65 - 99 mg/dL    BUN 17 8 - 23 mg/dL    Creatinine 0.95 0.57 - 1.00 mg/dL    Sodium 138 136 - 145 mmol/L    Potassium 3.4 (L) 3.5 - 5.2 mmol/L    Chloride 101 98 - 107 mmol/L    CO2 22.7  22.0 - 29.0 mmol/L    Calcium 8.7 8.6 - 10.5 mg/dL    eGFR Non African Amer 56 (L) >60 mL/min/1.73    BUN/Creatinine Ratio 17.9 7.0 - 25.0    Anion Gap 14.3 5.0 - 15.0 mmol/L   Protime-INR    Collection Time: 11/09/20 11:02 AM    Specimen: Blood   Result Value Ref Range    Protime 12.5 11.7 - 14.2 Seconds    INR 0.94 0.90 - 1.10   CK    Collection Time: 11/09/20 11:02 AM    Specimen: Blood   Result Value Ref Range    Creatine Kinase 128 20 - 180 U/L   CBC Auto Differential    Collection Time: 11/09/20 11:02 AM    Specimen: Blood   Result Value Ref Range    WBC 13.19 (H) 3.40 - 10.80 10*3/mm3    RBC 4.29 3.77 - 5.28 10*6/mm3    Hemoglobin 11.3 (L) 12.0 - 15.9 g/dL    Hematocrit 34.6 34.0 - 46.6 %    MCV 80.7 79.0 - 97.0 fL    MCH 26.3 (L) 26.6 - 33.0 pg    MCHC 32.7 31.5 - 35.7 g/dL    RDW 13.6 12.3 - 15.4 %    RDW-SD 39.8 37.0 - 54.0 fl    MPV 11.8 6.0 - 12.0 fL    Platelets 228 140 - 450 10*3/mm3    Neutrophil % 83.2 (H) 42.7 - 76.0 %    Lymphocyte % 6.7 (L) 19.6 - 45.3 %    Monocyte % 9.4 5.0 - 12.0 %    Eosinophil % 0.0 (L) 0.3 - 6.2 %    Basophil % 0.2 0.0 - 1.5 %    Immature Grans % 0.5 0.0 - 0.5 %    Neutrophils, Absolute 10.98 (H) 1.70 - 7.00 10*3/mm3    Lymphocytes, Absolute 0.89 0.70 - 3.10 10*3/mm3    Monocytes, Absolute 1.24 (H) 0.10 - 0.90 10*3/mm3    Eosinophils, Absolute 0.00 0.00 - 0.40 10*3/mm3    Basophils, Absolute 0.02 0.00 - 0.20 10*3/mm3    Immature Grans, Absolute 0.06 (H) 0.00 - 0.05 10*3/mm3    nRBC 0.0 0.0 - 0.2 /100 WBC       Ordered the above labs and independently reviewed the results.      RADIOLOGY  No Radiology Exams Resulted Within Past 24 Hours    CT head-discussed results with Dr. Monroe who reports no acute traumatic abnormality.    I ordered the above noted radiological studies. Reviewed by me and discussed with radiologist.  See dictation for official radiology interpretation.      PROCEDURES  Procedures      MEDICATIONS GIVEN IN ER    Medications   potassium chloride (KLOR-CON)  packet 20 mEq (has no administration in time range)         PROGRESS, DATA ANALYSIS, CONSULTS, AND MEDICAL DECISION MAKING    All labs have been independently reviewed by me.  All radiology studies have been reviewed by me and discussed with radiologist dictating the report.   EKG's independently viewed and interpreted by me.  Discussion below represents my analysis of pertinent findings related to patient's condition, differential diagnosis, treatment plan and final disposition.      ED Course as of Nov 09 1247   Mon Nov 09, 2020   1106 84-year-old who lost her balance and fell to the ground.  She was down for approximately 3-1/2 hours before family could get to her.  I see no evidence of acute traumatic injury but she is anticoagulated and may have hit her head so we will get CT scan of the head.  We will repeat labs including creatinine kinase to assess for possible rhabdomyolysis.    [DB]   1107 MDM-reviewed prior medical records and saw the patient was seen and worked up here in the ED on 11/7.  I also reviewed her prior office visits and note that she had a TAVR in December 2019 by Dr. Tanner.    [DB]   1241 Labs reviewed and notable for normal creatinine kinase.  There is a potassium of 3.4 and normal BUN and creatinine.  There is mildly elevated white count of 13.2 which is probably reactive and not felt to represent acute infection.    [DB]   1241 Impression-at this point patient has generalized weakness with benign vitals, I see no indication for admission at this point, will ask patient to follow-up with primary care provider.    [DB]   1244 I discussed results of testing with patient in the room.  She states she plans to stay with her daughter over the next several days.  I asked her to good care of her body which would include eating a good diet and drinking plenty of fluids.  If she continues to have problems she should follow-up with her primary care provider, JEANNE donahue.    [DB]   1245  Potassium level is found to be slightly low at 3.4.  This is improved from several days ago when it was 3.0.  We will go ahead and give a 20 mill equivalents dose of potassium.    [DB]   1247 I discussed CT scan of the head with Dr. Monroe who reports no acute traumatic abnormality.    [DB]      ED Course User Index  [DB] Meliton Rich MD       AS OF 12:47 EST VITALS:    BP - 178/82  HR - 109  TEMP - 97.9 °F (36.6 °C) (Oral)  O2 SATS - 99%      DIAGNOSIS  Final diagnoses:   Generalized weakness   Hypokalemia         DISPOSITION  DISCHARGE    Patient discharged in stable condition.    Reviewed implications of results, diagnosis, meds, responsibility to follow up, warning signs and symptoms of possible worsening, potential complications and reasons to return to ER, including increased chest pain, shortness of breath, fever or as needed.    Patient/Family voiced understanding of above instructions.    Discussed plan for discharge, as there is no emergent indication for admission. Patient referred to primary care provider for BP management due to today's BP. Pt/family is agreeable and understands need for follow up and repeat testing.  Pt is aware that discharge does not mean that nothing is wrong but it indicates no emergency is present that requires admission and they must continue care with follow-up as given below or physician of their choice.     FOLLOW-UP  Deanna Ortega, APRN  [address] [id number] [phone]    In 1 week  If Not Better         Medication List      Changed    ferrous sulfate 325 (65 FE) MG tablet  Take 1 tablet by mouth 3 (Three) Times a Day With Meals.  What changed: when to take this                   Meliton Rich MD  11/09/20 3814

## 2020-11-10 ENCOUNTER — TELEPHONE (OUTPATIENT)
Dept: CARDIOLOGY | Facility: CLINIC | Age: 85
End: 2020-11-10

## 2020-11-10 ENCOUNTER — TELEPHONE (OUTPATIENT)
Dept: INTERNAL MEDICINE | Age: 85
End: 2020-11-10

## 2020-11-10 NOTE — TELEPHONE ENCOUNTER
Went to ER yest.left leg swollen, Some SOB, not her self, going on for a week now, falls asleep all the time  Wonders if this is CHF,  In /70 yest   Going to PCP Friday 11:15  Metoprolol 50 1 po bid  Amlodipine 10 1 q d  Losartan 100m g 1 qd    [phone]

## 2020-11-10 NOTE — TELEPHONE ENCOUNTER
"If she has fallen twice and now is \"unable to get out of the house\" it sounds like the lady needs to be evaluated at the emergency room for fractures.  My suggestion would be to get her an ambulance to take her to the hospital ER for further evaluation.  There is not much else to advise other than the fact that the patient needs to be evaluated.  "

## 2020-11-10 NOTE — TELEPHONE ENCOUNTER
PT DAUGHTER IS REQUESTING A CALL BACK TO DISCUSS THE PT. SHE HAS FALLEN TWICE OVER THE WEEKEND. THE PT HAS AN APPT FOR LABS THIS WEEK BUT SHE IS NOT ABLE TO GET HER OUT OF THE HOUSE NOW.     CALL BACK [phone]

## 2020-11-10 NOTE — TELEPHONE ENCOUNTER
Spoke c daughter and she was advised of recent lab result dictated by Dr. Call and ER. Pt has been made f/u appt to discuss these concerns. MM

## 2020-11-10 NOTE — TELEPHONE ENCOUNTER
Per her daughter pt went to ER for 2 times had falling     Has small fracture of coccyx    Not sure regarding labs if she need home health     Said her potassium is low, advise

## 2020-11-10 NOTE — TELEPHONE ENCOUNTER
"Potassium recorded at slightly low level of 3.4 on November 9.  If she is not currently taking potassium I would suggest over-the-counter potassium gluconate at a dose of 550 mg-1 daily.  This is usually marketed under a brand name of \"nature made\".  If she has a fracture of her coccyx as described there is not much to do in the home setting other than have her sit on a cushion or inflatable rubber ring that can be purchased at the pharmacy and try to keep her as comfortable as possible.  Extra strength Tylenol-1 or 2 tablets every 4 hours may be of some help to relieve discomfort.  "

## 2020-11-11 DIAGNOSIS — Z95.2 S/P TAVR (TRANSCATHETER AORTIC VALVE REPLACEMENT): Primary | ICD-10-CM

## 2020-11-11 NOTE — TELEPHONE ENCOUNTER
I called and this lady seems like she is in trouble she has been in the ER twice and not any better still seems weak blood pressure is high there is a little bit of confusion I do not think she can wait till see me on Tuesday I would love it if Cherelle could see her on Friday at 9.  I think she probably needs an echo and BNP she may need to be admitted it is possible that its her blood pressure but I just do not know

## 2020-11-12 DIAGNOSIS — I50.32 CHRONIC DIASTOLIC CONGESTIVE HEART FAILURE (HCC): Primary | ICD-10-CM

## 2020-11-12 NOTE — PROGRESS NOTES
Date of Office Visit: 2020  Encounter Provider: CYNTHIA Baltazar  Place of Service: [facility] CARDIOLOGY  Patient Name: Gita Wharton  :1935    Chief Complaint   Patient presents with   • Coronary Artery Disease     1 YR FOLLOW UP   • Congestive Heart Failure   :     HPI: Gita Wharton is a 84 y.o. female, known to me, who presents today for follow-up.  Old records have been obtained and reviewed by me.  She is a patient of Dr. Tanner's with a past cardiac history significant for hypertension and aortic valve stenosis status post TAVR in 2019.  In , she had a subdural hematoma following a fall. She was also diagnosed with a MCA stroke and subsequently found to have severe carotid disease for which she underwent a right endarterectomy.     She was last seen in the office by Dr. Tanner on 7/15/2020 at which time she was doing well with no complaints of angina or heart failure.  Her blood pressure was elevated but was reportedly well controlled at home.  She was advised to follow-up with Dr. Tanner in December.   She has been to the ER twice recently.  On 2020, she presented with complaints of generalized weakness and fall.  She was noted to have a fracture of her coccyx but other work-up was overall unremarkable.  Then on 2020 she presented back to the ED with weakness and another fall.  Evidently she had fallen and her family was unable to get to her for 3.5 hours.  She was not sure whether or not she lost consciousness.  CT of the head revealed no acute abnormalities.  BMP revealed stable kidney function and electrolytes.  Her potassium was mildly decreased at 3.4.  She was discharged with instructions to follow up with her PCP.  She called the office two days ago for continued symptoms including a swollen leg, some shortness of air, falling asleep all of the time, and overall just not feeling herself.   Ultimately, she was  "scheduled to see me today.   Overall, she is not doing well.  She is with her daughter today who is very concerned.  Apparently her first fall was 3 days before her first ER presentation.  According to the daughter, she has not been the same since.  The daughter describes it as a \"sudden switch\".  She has been very weak.  She has had left arm numbness, tingling, and coolness since her stroke 2 years ago.  However, she feels this has also been worse the last few weeks.  She had an episode 2 nights ago of tongue numbness and confusion along with speech changes.  The entire event lasted for about 10 minutes.  She has left lower extremity swelling and tenderness.  Evidently this is chronic but has also been worse the last 2 weeks.  There is no swelling or pain on the right lower extremity.  She feels she has been more short of breath with exertion.  She denies any PND or orthopnea.  She has actually lost weight.  She denies any chest pain or palpitations.  She has dizziness and lightheadedness with position changes.  Of note, the daughter reports that in the ambulance on the way to the emergency room the paramedic mention something about her being in atrial fibrillation.    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    R LL            Dr FRANKIE Branch - suggested yearly CXR to Monitor   • Pressure sore     BUTTOCKS   • Pyelonephritis 4/16/2019   • Stenosis of right internal carotid artery 7/25/2018   • Subdural hematoma (CMS/HCC) 07/12/2018    Secondary to fall, JULY 2018   • Toxic encephalopathy 2019    POST OP " LAST HIP SURGERY       Past Surgical History:   Procedure Laterality Date   • AORTIC VALVE REPAIR/REPLACEMENT N/A 12/3/2019    Procedure: TRACEY TRANSCAROTID TRANSCATHETER AORTIC VALVE REPLACEMENT;  Surgeon: Octaviano Yan MD;  Location: [facility] OR 18/19;  Service: Cardiothoracic   • AORTIC VALVE REPAIR/REPLACEMENT N/A 12/3/2019    Procedure: Transcarotid Transcatheter Aortic Valve Replacement w/Intra Op TRACEY and Possible Open Bailout Surgery;  Surgeon: Warren Tanner MD;  Location: [facility] OR 18/19;  Service: Cardiovascular   • CARDIAC CATHETERIZATION N/A 10/24/2019    Procedure: Coronary angiography;  Surgeon: Warren Tanner MD;  Location: [facility] CATH INVASIVE LOCATION;  Service: Cardiovascular   • CARDIAC CATHETERIZATION N/A 10/24/2019    Procedure: Left heart cath;  Surgeon: Warren Tanner MD;  Location: [facility] CATH INVASIVE LOCATION;  Service: Cardiovascular   • CARDIAC CATHETERIZATION N/A 10/24/2019    Procedure: Right heart cath;  Surgeon: Warren Tanner MD;  Location: [facility] CATH INVASIVE LOCATION;  Service: Cardiovascular   • CAROTID ENDARTERECTOMY Right 7/26/2018    Procedure: RT CAROTID ENDARTERECTOMY;  Surgeon: Inna Villarreal MD;  Location: [facility] MAIN OR;  Service: Vascular   • COLONOSCOPY N/A 8/7/2018    Adenomatous polyp.   Repeat 2021.  Surgeon: Ken Tidwell MD;    • HYSTERECTOMY     • THYROIDECTOMY, PARTIAL     • TOTAL HIP ARTHROPLASTY Right    • TOTAL HIP ARTHROPLASTY Left 6/30/2019    Procedure: LEFT HIP ANTERIOR HIP WITH HANA TABLE;  Surgeon: Tiffani Pereira MD;  Location: [facility] MAIN OR;  Service: Orthopedics       Social History     Socioeconomic History   • Marital status:      Spouse name: Not on file   • Number of children: 4   • Years of education: 12   • Highest education level: High school graduate   Tobacco Use   • Smoking status: Former Smoker     Packs/day: 0.50     Years: 43.00     Pack years: 21.50     Types: Cigarettes     Start  date:      Quit date:      Years since quittin.8   • Smokeless tobacco: Never Used   • Tobacco comment: CAFFEINE USE: 1 CUP TEA  AND PEPSI DAILY   Substance and Sexual Activity   • Alcohol use: Not Currently     Alcohol/week: 7.0 standard drinks     Types: 7 Cans of beer per week   • Drug use: No   • Sexual activity: Defer       Family History   Problem Relation Age of Onset   • Cancer Mother    • Dementia Father    • Hypertension Father    • Hypertension Daughter    • Cancer Daughter    • Malig Hyperthermia Neg Hx        Review of Systems   Constitution: Positive for malaise/fatigue. Negative for chills and fever.   Cardiovascular: Positive for dyspnea on exertion and leg swelling (Left lower extremity). Negative for chest pain, near-syncope, orthopnea, palpitations, paroxysmal nocturnal dyspnea and syncope.   Respiratory: Negative for cough and shortness of breath.    Musculoskeletal: Positive for falls. Negative for joint pain, joint swelling and myalgias.   Gastrointestinal: Negative for abdominal pain, diarrhea, melena, nausea and vomiting.   Genitourinary: Negative for frequency and hematuria.   Neurological: Positive for light-headedness, loss of balance, numbness, paresthesias and weakness. Negative for seizures.        Numbness, tingling, and pain of left lower extremity.   Allergic/Immunologic: Negative.    All other systems reviewed and are negative.      Allergies   Allergen Reactions   • Tekturna [Aliskiren] Diarrhea         Current Outpatient Medications:   •  Acetaminophen (TYLENOL 8 HOUR ARTHRITIS PAIN PO), Take 1 tablet by mouth Daily., Disp: , Rfl:   •  amLODIPine (NORVASC) 10 MG tablet, Take 1 tablet by mouth Daily., Disp: 90 tablet, Rfl: 1  •  aspirin 81 MG EC tablet, Take 1 tablet by mouth Daily., Disp: , Rfl:   •  atorvastatin (LIPITOR) 20 MG tablet, Take 1 tablet by mouth Daily., Disp: 90 tablet, Rfl: 1  •  clopidogrel (PLAVIX) 75 MG tablet, Take 1 tablet by mouth Daily., Disp: 30  "tablet, Rfl: 5  •  diphenhydrAMINE (BENADRYL) 25 mg capsule, Take 12.5 mg by mouth Daily As Needed for Itching., Disp: , Rfl:   •  docusate sodium (Colace) 100 MG capsule, Take 2 capsules by mouth Daily., Disp: , Rfl:   •  ferrous sulfate 325 (65 FE) MG tablet, Take 1 tablet by mouth 3 (Three) Times a Day With Meals. (Patient taking differently: Take 325 mg by mouth Daily With Breakfast.), Disp: 270 tablet, Rfl: 1  •  folic acid (FOLVITE) 1 MG tablet, Take 1 mg by mouth Daily., Disp: , Rfl:   •  losartan (COZAAR) 100 MG tablet, Take 1 tablet by mouth Daily., Disp: 90 tablet, Rfl: 1  •  metoprolol tartrate (LOPRESSOR) 50 MG tablet, Take 1 tablet by mouth 2 (Two) Times a Day., Disp: 180 tablet, Rfl: 3  •  Multiple Vitamins-Minerals (CENTRUM ADULTS) tablet, Take 1 tablet by mouth Daily., Disp: , Rfl:       Objective:     Vitals:    11/13/20 0834 11/13/20 0845   BP: 138/56 132/56   BP Location: Right arm Left arm   Patient Position: Sitting Sitting   Pulse: 70    Resp: 18    SpO2: 96%    Weight: 50.4 kg (111 lb 3.2 oz)    Height: 165.1 cm (65\")      Body mass index is 18.5 kg/m².    PHYSICAL EXAM:    Constitutional:       General: Not in acute distress.     Appearance: Well-developed. Not diaphoretic.   Eyes:      Pupils: Pupils are equal, round, and reactive to light.   HENT:      Head: Normocephalic and atraumatic.   Neck:      Thyroid: No thyromegaly.      Vascular: No JVD.   Pulmonary:      Effort: Pulmonary effort is normal. No respiratory distress.      Breath sounds: Normal breath sounds.   Cardiovascular:      Normal rate. Frequent ectopic beats. Regular rhythm.      Murmurs: There is a harsh midsystolic murmur at the URSB, radiating to the neck.   Pulses:     Intact distal pulses.   Edema:     Peripheral edema (Mild LLE) present.  Abdominal:      General: Bowel sounds are normal. There is no distension.      Palpations: Abdomen is soft. There is no hepatomegaly or splenomegaly.      Tenderness: There is no " abdominal tenderness.   Musculoskeletal: Normal range of motion.   Skin:     General: Skin is warm and dry.      Findings: Erythema present.      Comments: Left food is erythematous    Neurological:      Mental Status: Alert and oriented to person, place, and time.   Psychiatric:         Behavior: Behavior normal.         Judgment: Judgment normal.           ECG 12 Lead    Date/Time: 11/13/2020 9:01 AM  Performed by: Cherelle Vela APRN  Authorized by: Cherelle Vela APRN   Comparison: compared with previous ECG from 7/15/2020  Similar to previous ECG  Rhythm: sinus rhythm  Ectopy: atrial premature contractions  Rate: normal  BPM: 64  T inversion: V1 and V2    Clinical impression: abnormal EKG  Comments: Indication: Status post TAVR              Assessment:       Diagnosis Plan   1. S/P TAVR (transcatheter aortic valve replacement)  ECG 12 Lead   2. Weakness generalized     3. Essential hypertension     4. Swelling of left lower extremity  Duplex Venous Lower Extremity - Bilateral CAR   5. Stroke-like symptoms  MRI brain w wo contrast    Duplex Carotid Ultrasound CAR   6. Premature atrial contractions  Holter Monitor - 72 Hour Up To 21 Days   7. Dyspnea on exertion  proBNP   8. History of carotid endarterectomy  Duplex Carotid Ultrasound CAR   9. Carotid bruit, unspecified laterality  Duplex Carotid Ultrasound CAR     Orders Placed This Encounter   Procedures   • MRI brain w wo contrast     Standing Status:   Future     Standing Expiration Date:   11/13/2021   • proBNP     Standing Status:   Future     Number of Occurrences:   1     Standing Expiration Date:   11/13/2021   • Holter Monitor - 72 Hour Up To 21 Days     Standing Status:   Future     Number of Occurrences:   1     Standing Expiration Date:   11/13/2021     Scheduling Instructions:      14 day zio     Order Specific Question:   Reason for exam?     Answer:   AFib   • ECG 12 Lead     This order was created via procedure documentation           Plan:       1.  Status post TAVR in December 2019.  She is going to have an echocardiogram today over at the hospital to look at her valve and LV function.  She is having some shortness of breath with exertion.  I will check a proBNP to assess for acute heart failure, although she appears euvolemic.       2.  Generalized weakness/strokelike symptoms.  I am a little concerned about another stroke event.  Although her symptoms of confusion and speech changes have resolved, she remains very weak.  I educated the patient and her daughter on the importance of seeking emergency medical attention for any development of neurological symptoms concerning for a stroke.  I discussed with Dr. Tanner and we are going to order an MRI of the brain.        3.  Hypertension.  Her blood pressure is actually very well controlled today.      4.  Frequent PACs.  She is having frequent PACs on her EKG today.  She has a history of atrial fibrillation following the TAVR and was evidently told she was in atrial fibrillation in the ambulance.  I will order a ZIO for further evaluation.  She is not an ideal candidate for anticoagulation due to history of frequent falls and subdural hematoma.        5.  Left lower extremity swelling and tenderness.  I will check a lower extremity Doppler to rule out a DVT.      6.  History of right carotid endarterectomy.  Her last carotid ultrasound was in October 2019 which revealed normal right ICA and mild stenosis of the left ICA.  I will check another carotid ultrasound given her new stroke-like symptoms.  Unsure if the sounds I am hearing in her neck are a bruit or referred from her valve.        There is a lot to sort out here.  Overall I think she is stable.  She is not in any acute distress.  I discussed the plan of care with Dr. Cartagena.  We are going to start by getting her echocardiogram today to assess her valve.  We are also going to place a ZIO. She was already educated on the importance of  seeking immediate medical attention for recurrent symptoms.  Further recommendations will be made pending the results of the above.      As always, it has been a pleasure to participate in your patient's care.      Sincerely,         CYNTHIA Goel

## 2020-11-12 NOTE — TELEPHONE ENCOUNTER
You can add her on to my schedule tomorrow morning at 9, or even at 8:30 if she can make it by then.  Would be great if we could get the echo moved up to tomorrow also.  See if we can do that please.

## 2020-11-13 ENCOUNTER — HOSPITAL ENCOUNTER (OUTPATIENT)
Dept: CARDIOLOGY | Facility: HOSPITAL | Age: 85
Discharge: HOME | End: 2020-11-13

## 2020-11-13 ENCOUNTER — TELEPHONE (OUTPATIENT)
Dept: CARDIOLOGY | Facility: CLINIC | Age: 85
End: 2020-11-13

## 2020-11-13 ENCOUNTER — LAB (OUTPATIENT)
Dept: LAB | Facility: HOSPITAL | Age: 85
End: 2020-11-13

## 2020-11-13 ENCOUNTER — OFFICE VISIT (OUTPATIENT)
Dept: CARDIOLOGY | Facility: CLINIC | Age: 85
End: 2020-11-13

## 2020-11-13 VITALS
DIASTOLIC BLOOD PRESSURE: 56 MMHG | HEART RATE: 70 BPM | BODY MASS INDEX: 18.53 KG/M2 | WEIGHT: 111.2 LBS | HEIGHT: 65 IN | SYSTOLIC BLOOD PRESSURE: 132 MMHG | OXYGEN SATURATION: 96 % | RESPIRATION RATE: 18 BRPM

## 2020-11-13 VITALS
BODY MASS INDEX: 18.49 KG/M2 | HEART RATE: 67 BPM | DIASTOLIC BLOOD PRESSURE: 52 MMHG | WEIGHT: 111 LBS | SYSTOLIC BLOOD PRESSURE: 169 MMHG | HEIGHT: 65 IN

## 2020-11-13 DIAGNOSIS — I10 ESSENTIAL HYPERTENSION: ICD-10-CM

## 2020-11-13 DIAGNOSIS — I49.1 PREMATURE ATRIAL CONTRACTIONS: ICD-10-CM

## 2020-11-13 DIAGNOSIS — R29.90 STROKE-LIKE SYMPTOMS: ICD-10-CM

## 2020-11-13 DIAGNOSIS — M79.89 SWELLING OF LEFT LOWER EXTREMITY: ICD-10-CM

## 2020-11-13 DIAGNOSIS — Z95.2 S/P TAVR (TRANSCATHETER AORTIC VALVE REPLACEMENT): Primary | ICD-10-CM

## 2020-11-13 DIAGNOSIS — R06.09 DYSPNEA ON EXERTION: ICD-10-CM

## 2020-11-13 DIAGNOSIS — I50.32 CHRONIC DIASTOLIC CONGESTIVE HEART FAILURE (HCC): ICD-10-CM

## 2020-11-13 DIAGNOSIS — Z98.890 HISTORY OF CAROTID ENDARTERECTOMY: ICD-10-CM

## 2020-11-13 DIAGNOSIS — R09.89 CAROTID BRUIT, UNSPECIFIED LATERALITY: ICD-10-CM

## 2020-11-13 DIAGNOSIS — R53.1 WEAKNESS GENERALIZED: ICD-10-CM

## 2020-11-13 DIAGNOSIS — I50.32 CHRONIC DIASTOLIC CONGESTIVE HEART FAILURE (HCC): Primary | ICD-10-CM

## 2020-11-13 DIAGNOSIS — I35.0 NONRHEUMATIC AORTIC VALVE STENOSIS: ICD-10-CM

## 2020-11-13 DIAGNOSIS — Z95.2 S/P TAVR (TRANSCATHETER AORTIC VALVE REPLACEMENT): ICD-10-CM

## 2020-11-13 LAB
AORTIC DIMENSIONLESS INDEX: 0.6 (DI)
ASCENDING AORTA: 3.1 CM
BH CV ECHO MEAS - ACS: 1.4 CM
BH CV ECHO MEAS - AO MAX PG (FULL): 12.3 MMHG
BH CV ECHO MEAS - AO MAX PG: 16.3 MMHG
BH CV ECHO MEAS - AO MEAN PG (FULL): 5 MMHG
BH CV ECHO MEAS - AO MEAN PG: 7 MMHG
BH CV ECHO MEAS - AO ROOT AREA (BSA CORRECTED): 1.2
BH CV ECHO MEAS - AO ROOT AREA: 2.5 CM^2
BH CV ECHO MEAS - AO ROOT DIAM: 1.8 CM
BH CV ECHO MEAS - AO V2 MAX: 202 CM/SEC
BH CV ECHO MEAS - AO V2 MEAN: 125 CM/SEC
BH CV ECHO MEAS - AO V2 VTI: 45.5 CM
BH CV ECHO MEAS - ASC AORTA: 3.1 CM
BH CV ECHO MEAS - AVA(I,A): 1.3 CM^2
BH CV ECHO MEAS - AVA(I,D): 1.3 CM^2
BH CV ECHO MEAS - AVA(V,A): 1.1 CM^2
BH CV ECHO MEAS - AVA(V,D): 1.1 CM^2
BH CV ECHO MEAS - BSA(HAYCOCK): 1.5 M^2
BH CV ECHO MEAS - BSA: 1.5 M^2
BH CV ECHO MEAS - BZI_BMI: 18.5 KILOGRAMS/M^2
BH CV ECHO MEAS - BZI_METRIC_HEIGHT: 165.1 CM
BH CV ECHO MEAS - BZI_METRIC_WEIGHT: 50.3 KG
BH CV ECHO MEAS - EDV(CUBED): 46.7 ML
BH CV ECHO MEAS - EDV(MOD-SP2): 55 ML
BH CV ECHO MEAS - EDV(MOD-SP4): 63 ML
BH CV ECHO MEAS - EDV(TEICH): 54.4 ML
BH CV ECHO MEAS - EF(CUBED): 70.4 %
BH CV ECHO MEAS - EF(MOD-BP): 75.2 %
BH CV ECHO MEAS - EF(MOD-SP2): 76.4 %
BH CV ECHO MEAS - EF(MOD-SP4): 76.2 %
BH CV ECHO MEAS - EF(TEICH): 63 %
BH CV ECHO MEAS - ESV(CUBED): 13.8 ML
BH CV ECHO MEAS - ESV(MOD-SP2): 13 ML
BH CV ECHO MEAS - ESV(MOD-SP4): 15 ML
BH CV ECHO MEAS - ESV(TEICH): 20.2 ML
BH CV ECHO MEAS - FS: 33.3 %
BH CV ECHO MEAS - IVS/LVPW: 1.1
BH CV ECHO MEAS - IVSD: 1.3 CM
BH CV ECHO MEAS - LAT PEAK E' VEL: 6.5 CM/SEC
BH CV ECHO MEAS - LV DIASTOLIC VOL/BSA (35-75): 40.9 ML/M^2
BH CV ECHO MEAS - LV MASS(C)D: 150.6 GRAMS
BH CV ECHO MEAS - LV MASS(C)DI: 97.8 GRAMS/M^2
BH CV ECHO MEAS - LV MAX PG: 4 MMHG
BH CV ECHO MEAS - LV MEAN PG: 2 MMHG
BH CV ECHO MEAS - LV SYSTOLIC VOL/BSA (12-30): 9.7 ML/M^2
BH CV ECHO MEAS - LV V1 MAX: 100 CM/SEC
BH CV ECHO MEAS - LV V1 MEAN: 68.9 CM/SEC
BH CV ECHO MEAS - LV V1 VTI: 26.6 CM
BH CV ECHO MEAS - LVIDD: 3.6 CM
BH CV ECHO MEAS - LVIDS: 2.4 CM
BH CV ECHO MEAS - LVLD AP2: 7.3 CM
BH CV ECHO MEAS - LVLD AP4: 7.4 CM
BH CV ECHO MEAS - LVLS AP2: 5.7 CM
BH CV ECHO MEAS - LVLS AP4: 6.2 CM
BH CV ECHO MEAS - LVOT AREA (M): 2.3 CM^2
BH CV ECHO MEAS - LVOT AREA: 2.3 CM^2
BH CV ECHO MEAS - LVOT DIAM: 1.7 CM
BH CV ECHO MEAS - LVPWD: 1.2 CM
BH CV ECHO MEAS - MED PEAK E' VEL: 7.9 CM/SEC
BH CV ECHO MEAS - MV A DUR: 0.14 SEC
BH CV ECHO MEAS - MV A MAX VEL: 102 CM/SEC
BH CV ECHO MEAS - MV DEC SLOPE: 483 CM/SEC^2
BH CV ECHO MEAS - MV DEC TIME: 203 SEC
BH CV ECHO MEAS - MV E MAX VEL: 68.4 CM/SEC
BH CV ECHO MEAS - MV E/A: 0.67
BH CV ECHO MEAS - MV MAX PG: 6.9 MMHG
BH CV ECHO MEAS - MV MEAN PG: 3 MMHG
BH CV ECHO MEAS - MV P1/2T MAX VEL: 107 CM/SEC
BH CV ECHO MEAS - MV P1/2T: 64.9 MSEC
BH CV ECHO MEAS - MV V2 MAX: 131 CM/SEC
BH CV ECHO MEAS - MV V2 MEAN: 74.2 CM/SEC
BH CV ECHO MEAS - MV V2 VTI: 27.6 CM
BH CV ECHO MEAS - MVA P1/2T LCG: 2.1 CM^2
BH CV ECHO MEAS - MVA(P1/2T): 3.4 CM^2
BH CV ECHO MEAS - MVA(VTI): 2.2 CM^2
BH CV ECHO MEAS - PA ACC TIME: 0.09 SEC
BH CV ECHO MEAS - PA MAX PG (FULL): 1.1 MMHG
BH CV ECHO MEAS - PA MAX PG: 3.5 MMHG
BH CV ECHO MEAS - PA PR(ACCEL): 39.9 MMHG
BH CV ECHO MEAS - PA V2 MAX: 93.7 CM/SEC
BH CV ECHO MEAS - RV MAX PG: 2.4 MMHG
BH CV ECHO MEAS - RV MEAN PG: 1 MMHG
BH CV ECHO MEAS - RV V1 MAX: 76.9 CM/SEC
BH CV ECHO MEAS - RV V1 MEAN: 53.1 CM/SEC
BH CV ECHO MEAS - RV V1 VTI: 15.7 CM
BH CV ECHO MEAS - SI(AO): 75.2 ML/M^2
BH CV ECHO MEAS - SI(CUBED): 21.3 ML/M^2
BH CV ECHO MEAS - SI(LVOT): 39.2 ML/M^2
BH CV ECHO MEAS - SI(MOD-SP2): 27.3 ML/M^2
BH CV ECHO MEAS - SI(MOD-SP4): 31.2 ML/M^2
BH CV ECHO MEAS - SI(TEICH): 22.3 ML/M^2
BH CV ECHO MEAS - SV(AO): 115.8 ML
BH CV ECHO MEAS - SV(CUBED): 32.8 ML
BH CV ECHO MEAS - SV(LVOT): 60.4 ML
BH CV ECHO MEAS - SV(MOD-SP2): 42 ML
BH CV ECHO MEAS - SV(MOD-SP4): 48 ML
BH CV ECHO MEAS - SV(TEICH): 34.3 ML
BH CV ECHO MEAS - TAPSE (>1.6): 2.1 CM
BH CV ECHO MEASUREMENTS AVERAGE E/E' RATIO: 9.5
BH CV LOW VAS LEFT LESSER SAPH VESSEL: 1
BH CV LOWER VASCULAR LEFT COMMON FEMORAL AUGMENT: NORMAL
BH CV LOWER VASCULAR LEFT COMMON FEMORAL COMPETENT: NORMAL
BH CV LOWER VASCULAR LEFT COMMON FEMORAL COMPRESS: NORMAL
BH CV LOWER VASCULAR LEFT COMMON FEMORAL PHASIC: NORMAL
BH CV LOWER VASCULAR LEFT COMMON FEMORAL SPONT: NORMAL
BH CV LOWER VASCULAR LEFT DISTAL FEMORAL COMPRESS: NORMAL
BH CV LOWER VASCULAR LEFT GASTRONEMIUS COMPRESS: NORMAL
BH CV LOWER VASCULAR LEFT GREATER SAPH AK COMPRESS: NORMAL
BH CV LOWER VASCULAR LEFT GREATER SAPH BK COMPRESS: NORMAL
BH CV LOWER VASCULAR LEFT LESSER SAPH COMPRESS: NORMAL
BH CV LOWER VASCULAR LEFT LESSER SAPH THROMBUS: NORMAL
BH CV LOWER VASCULAR LEFT MID FEMORAL AUGMENT: NORMAL
BH CV LOWER VASCULAR LEFT MID FEMORAL COMPETENT: NORMAL
BH CV LOWER VASCULAR LEFT MID FEMORAL COMPRESS: NORMAL
BH CV LOWER VASCULAR LEFT MID FEMORAL PHASIC: NORMAL
BH CV LOWER VASCULAR LEFT MID FEMORAL SPONT: NORMAL
BH CV LOWER VASCULAR LEFT PERONEAL COMPRESS: NORMAL
BH CV LOWER VASCULAR LEFT POPLITEAL AUGMENT: NORMAL
BH CV LOWER VASCULAR LEFT POPLITEAL COMPETENT: NORMAL
BH CV LOWER VASCULAR LEFT POPLITEAL COMPRESS: NORMAL
BH CV LOWER VASCULAR LEFT POPLITEAL PHASIC: NORMAL
BH CV LOWER VASCULAR LEFT POPLITEAL SPONT: NORMAL
BH CV LOWER VASCULAR LEFT POSTERIOR TIBIAL COMPRESS: NORMAL
BH CV LOWER VASCULAR LEFT PROFUNDA FEMORAL COMPRESS: NORMAL
BH CV LOWER VASCULAR LEFT PROXIMAL FEMORAL COMPRESS: NORMAL
BH CV LOWER VASCULAR LEFT SAPHENOFEMORAL JUNCTION COMPRESS: NORMAL
BH CV LOWER VASCULAR RIGHT COMMON FEMORAL AUGMENT: NORMAL
BH CV LOWER VASCULAR RIGHT COMMON FEMORAL COMPETENT: NORMAL
BH CV LOWER VASCULAR RIGHT COMMON FEMORAL COMPRESS: NORMAL
BH CV LOWER VASCULAR RIGHT COMMON FEMORAL PHASIC: NORMAL
BH CV LOWER VASCULAR RIGHT COMMON FEMORAL SPONT: NORMAL
BH CV LOWER VASCULAR RIGHT DISTAL FEMORAL COMPRESS: NORMAL
BH CV LOWER VASCULAR RIGHT GASTRONEMIUS COMPRESS: NORMAL
BH CV LOWER VASCULAR RIGHT GREATER SAPH AK COMPRESS: NORMAL
BH CV LOWER VASCULAR RIGHT GREATER SAPH BK COMPRESS: NORMAL
BH CV LOWER VASCULAR RIGHT LESSER SAPH COMPRESS: NORMAL
BH CV LOWER VASCULAR RIGHT MID FEMORAL AUGMENT: NORMAL
BH CV LOWER VASCULAR RIGHT MID FEMORAL COMPETENT: NORMAL
BH CV LOWER VASCULAR RIGHT MID FEMORAL COMPRESS: NORMAL
BH CV LOWER VASCULAR RIGHT MID FEMORAL PHASIC: NORMAL
BH CV LOWER VASCULAR RIGHT MID FEMORAL SPONT: NORMAL
BH CV LOWER VASCULAR RIGHT PERONEAL COMPRESS: NORMAL
BH CV LOWER VASCULAR RIGHT POPLITEAL AUGMENT: NORMAL
BH CV LOWER VASCULAR RIGHT POPLITEAL COMPETENT: NORMAL
BH CV LOWER VASCULAR RIGHT POPLITEAL COMPRESS: NORMAL
BH CV LOWER VASCULAR RIGHT POPLITEAL PHASIC: NORMAL
BH CV LOWER VASCULAR RIGHT POPLITEAL SPONT: NORMAL
BH CV LOWER VASCULAR RIGHT POSTERIOR TIBIAL COMPRESS: NORMAL
BH CV LOWER VASCULAR RIGHT PROFUNDA FEMORAL COMPRESS: NORMAL
BH CV LOWER VASCULAR RIGHT PROXIMAL FEMORAL COMPRESS: NORMAL
BH CV LOWER VASCULAR RIGHT SAPHENOFEMORAL JUNCTION COMPRESS: NORMAL
BH CV VAS BP LEFT ARM: NORMAL MMHG
BH CV XLRA - RV BASE: 2.3 CM
BH CV XLRA - RV LENGTH: 5.8 CM
BH CV XLRA - RV MID: 1.5 CM
BH CV XLRA - TDI S': 12.6 CM/SEC
LEFT ATRIUM VOLUME INDEX: 29.5 ML/M2
NT-PROBNP SERPL-MCNC: 2294 PG/ML (ref 0–1800)
STJ: 2 CM

## 2020-11-13 PROCEDURE — 93306 TTE W/DOPPLER COMPLETE: CPT

## 2020-11-13 PROCEDURE — 93306 TTE W/DOPPLER COMPLETE: CPT | Performed by: INTERNAL MEDICINE

## 2020-11-13 PROCEDURE — 83880 ASSAY OF NATRIURETIC PEPTIDE: CPT

## 2020-11-13 PROCEDURE — 93000 ELECTROCARDIOGRAM COMPLETE: CPT | Performed by: NURSE PRACTITIONER

## 2020-11-13 PROCEDURE — 99214 OFFICE O/P EST MOD 30 MIN: CPT | Performed by: NURSE PRACTITIONER

## 2020-11-13 PROCEDURE — 93970 EXTREMITY STUDY: CPT

## 2020-11-13 PROCEDURE — 36415 COLL VENOUS BLD VENIPUNCTURE: CPT

## 2020-11-13 PROCEDURE — 0296T HC EXT ECG > 48HR TO 21 DAY RCRD W/CONECT INTL RCRD: CPT

## 2020-11-13 RX ORDER — FUROSEMIDE 40 MG/1
40 TABLET ORAL DAILY
Qty: 30 TABLET | Refills: 3 | Status: SHIPPED | OUTPATIENT
Start: 2020-11-13 | End: 2020-12-03 | Stop reason: SDUPTHER

## 2020-11-13 RX ORDER — POTASSIUM CHLORIDE 750 MG/1
20 TABLET, FILM COATED, EXTENDED RELEASE ORAL DAILY
Qty: 60 TABLET | Refills: 3 | Status: SHIPPED | OUTPATIENT
Start: 2020-11-13 | End: 2021-01-05 | Stop reason: ALTCHOICE

## 2020-11-13 NOTE — TELEPHONE ENCOUNTER
Cherelle    Vascular called preliminary results for her LE doppler. She is negative for DVT.    Thank you,    Tina Garcia RN  [place] [facility] [place]

## 2020-11-13 NOTE — TELEPHONE ENCOUNTER
Her echocardiogram revealed a well-functioning valve and normal LV function.  Her proBNP is elevated, and I am going to start her on 40 mg Lasix daily and check a BMP next week.  I also discussed the plan of care with Dr. Tanner.  We are recommending an MRI of the brain as well as a carotid ultrasound.  I spoke with Letty to inform her of all of this.  She verbalized understanding.      Lion, patient needs scheduled for an MRI of the brain as well as a carotid ultrasound.

## 2020-11-16 ENCOUNTER — TELEPHONE (OUTPATIENT)
Dept: CARDIOLOGY | Facility: CLINIC | Age: 85
End: 2020-11-16

## 2020-11-16 NOTE — TELEPHONE ENCOUNTER
Patient's daughter called today. They currently have her testing scheduled over at the hospital on 12/8. Her daughter wanted to know if this was okay, or if it should be sooner. She stated that the patient had another episode with her speech last night and she just is not comfortable waiting until December to have the tests done. If you are wanting this done sooner, I will be happy to call scheduling at the hospital to have them work her in sooner.     Thanks  Leslie

## 2020-11-16 NOTE — TELEPHONE ENCOUNTER
I spoke with Patti in scheduling at the hospital and she relayed that there are no openings for an MRI before 12/8/20. I spoke with the patient's daughter and told her that they will be called if they get a cancellation before 12/8. She also agreed to take her to the ER if she sees that the patient has anymore episodes or symptoms of stroke.     Thanks  Leslie

## 2020-11-16 NOTE — TELEPHONE ENCOUNTER
Yes I would like for the testing to be completed sooner.  However, if she is having recurrent symptoms she needs to be evaluated in the emergency room.  I explained this to the daughter on Friday.  Please reiterate to the daughter that if she has another episode of speech difficulty or any other symptoms concerning for stroke she needs to go to the ER.

## 2020-11-17 ENCOUNTER — DOCUMENTATION (OUTPATIENT)
Dept: INTERNAL MEDICINE | Age: 85
End: 2020-11-17

## 2020-11-17 ENCOUNTER — TELEPHONE (OUTPATIENT)
Dept: CARDIOLOGY | Facility: CLINIC | Age: 85
End: 2020-11-17

## 2020-11-17 NOTE — TELEPHONE ENCOUNTER
I spoke with CYNTHIA James to update her on everything.  We are recommending the patient follow up with her while we await the results of the pending tests. I called and spoke with Letty and informed her of this.  She will call to make an appointment today.

## 2020-11-17 NOTE — PROGRESS NOTES
Contacted per Cherelle MARIE with cardiology office regarding patient.   Apparently patient has had multiple falls and ER visits recently and family reports worsening mental status changes, confusion.   Patient was seen in cardiology office 11/13, had normal echocardiogram.   CYNTHIA concerned about patient's neurologic status, has ordered MRI brain and carotid US, but unable to schedule until Dec.   Patient's family has been advised to take her to ER for repeat acute episodes of MS change/ confusion to r/o recurrent stroke.   I have asked NP in her communication to family to recommend scheduling an appointment with me since I have not seen her in office face to face since March 2020.

## 2020-11-18 ENCOUNTER — TELEPHONE (OUTPATIENT)
Dept: CARDIOLOGY | Facility: CLINIC | Age: 85
End: 2020-11-18

## 2020-11-18 NOTE — TELEPHONE ENCOUNTER
Marce-Daughter  [phone]    Marce L/FRANKIE re: if pt needs to fast for labs? Please advise of recommendations.    KP Parisi

## 2020-11-19 ENCOUNTER — LAB (OUTPATIENT)
Dept: LAB | Facility: HOSPITAL | Age: 85
End: 2020-11-19

## 2020-11-19 ENCOUNTER — TELEPHONE (OUTPATIENT)
Dept: CARDIOLOGY | Facility: CLINIC | Age: 85
End: 2020-11-19

## 2020-11-19 DIAGNOSIS — I50.32 CHRONIC DIASTOLIC CONGESTIVE HEART FAILURE (HCC): ICD-10-CM

## 2020-11-19 LAB
ANION GAP SERPL CALCULATED.3IONS-SCNC: 11.7 MMOL/L (ref 5–15)
BUN SERPL-MCNC: 20 MG/DL (ref 8–23)
BUN/CREAT SERPL: 16.3 (ref 7–25)
CALCIUM SPEC-SCNC: 8.4 MG/DL (ref 8.6–10.5)
CHLORIDE SERPL-SCNC: 103 MMOL/L (ref 98–107)
CO2 SERPL-SCNC: 25.3 MMOL/L (ref 22–29)
CREAT SERPL-MCNC: 1.23 MG/DL (ref 0.57–1)
GFR SERPL CREATININE-BSD FRML MDRD: 42 ML/MIN/1.73
GLUCOSE SERPL-MCNC: 102 MG/DL (ref 65–99)
POTASSIUM SERPL-SCNC: 4.3 MMOL/L (ref 3.5–5.2)
SODIUM SERPL-SCNC: 140 MMOL/L (ref 136–145)

## 2020-11-19 PROCEDURE — 80048 BASIC METABOLIC PNL TOTAL CA: CPT

## 2020-11-19 PROCEDURE — 36415 COLL VENOUS BLD VENIPUNCTURE: CPT

## 2020-11-19 NOTE — TELEPHONE ENCOUNTER
Advised pt's daughter, Marce of stable labs and that there will be no change to her regimen at this time. Marce verbalized appreciation.    KP Parisi

## 2020-11-19 NOTE — TELEPHONE ENCOUNTER
----- Message from CYNTHIA Rose sent at 11/19/2020 10:15 AM EST -----  Her labs are stable.  Please let her know to continue same.

## 2020-12-02 ENCOUNTER — TELEPHONE (OUTPATIENT)
Dept: CARDIOLOGY | Facility: CLINIC | Age: 85
End: 2020-12-02

## 2020-12-02 NOTE — TELEPHONE ENCOUNTER
Pt's daughter called the office this morning. Pt has weakness, SOA and short term memory loss. I asked that she come to the ER. Her daughter verbalized understanding.    Tina Garcia RN  [place] [facility] [place]

## 2020-12-03 ENCOUNTER — APPOINTMENT (OUTPATIENT)
Dept: GENERAL RADIOLOGY | Facility: HOSPITAL | Age: 85
End: 2020-12-03

## 2020-12-03 ENCOUNTER — HOSPITAL ENCOUNTER (EMERGENCY)
Facility: HOSPITAL | Age: 85
Discharge: HOME | End: 2020-12-03
Attending: EMERGENCY MEDICINE | Admitting: EMERGENCY MEDICINE

## 2020-12-03 VITALS
HEART RATE: 72 BPM | SYSTOLIC BLOOD PRESSURE: 153 MMHG | OXYGEN SATURATION: 100 % | TEMPERATURE: 98.3 F | RESPIRATION RATE: 17 BRPM | BODY MASS INDEX: 20.2 KG/M2 | DIASTOLIC BLOOD PRESSURE: 64 MMHG | WEIGHT: 114 LBS | HEIGHT: 63 IN

## 2020-12-03 DIAGNOSIS — D72.829 LEUKOCYTOSIS, UNSPECIFIED TYPE: ICD-10-CM

## 2020-12-03 DIAGNOSIS — R53.1 EPISODE OF GENERALIZED WEAKNESS: Primary | ICD-10-CM

## 2020-12-03 LAB
ALBUMIN SERPL-MCNC: 3.5 G/DL (ref 3.5–5.2)
ALBUMIN/GLOB SERPL: 1.1 G/DL
ALP SERPL-CCNC: 80 U/L (ref 39–117)
ALT SERPL W P-5'-P-CCNC: 10 U/L (ref 1–33)
ANION GAP SERPL CALCULATED.3IONS-SCNC: 10.1 MMOL/L (ref 5–15)
AST SERPL-CCNC: 17 U/L (ref 1–32)
BASOPHILS # BLD AUTO: 0.03 10*3/MM3 (ref 0–0.2)
BASOPHILS NFR BLD AUTO: 0.2 % (ref 0–1.5)
BILIRUB SERPL-MCNC: 0.3 MG/DL (ref 0–1.2)
BILIRUB UR QL STRIP: NEGATIVE
BUN SERPL-MCNC: 25 MG/DL (ref 8–23)
BUN/CREAT SERPL: 18.7 (ref 7–25)
CALCIUM SPEC-SCNC: 8.3 MG/DL (ref 8.6–10.5)
CHLORIDE SERPL-SCNC: 99 MMOL/L (ref 98–107)
CLARITY UR: CLEAR
CO2 SERPL-SCNC: 22.9 MMOL/L (ref 22–29)
COLOR UR: YELLOW
CREAT SERPL-MCNC: 1.34 MG/DL (ref 0.57–1)
DEPRECATED RDW RBC AUTO: 44.2 FL (ref 37–54)
EOSINOPHIL # BLD AUTO: 0.07 10*3/MM3 (ref 0–0.4)
EOSINOPHIL NFR BLD AUTO: 0.4 % (ref 0.3–6.2)
ERYTHROCYTE [DISTWIDTH] IN BLOOD BY AUTOMATED COUNT: 14.5 % (ref 12.3–15.4)
GFR SERPL CREATININE-BSD FRML MDRD: 38 ML/MIN/1.73
GLOBULIN UR ELPH-MCNC: 3.1 GM/DL
GLUCOSE SERPL-MCNC: 109 MG/DL (ref 65–99)
GLUCOSE UR STRIP-MCNC: NEGATIVE MG/DL
HCT VFR BLD AUTO: 31.2 % (ref 34–46.6)
HGB BLD-MCNC: 10 G/DL (ref 12–15.9)
HGB UR QL STRIP.AUTO: NEGATIVE
HOLD SPECIMEN: NORMAL
HOLD SPECIMEN: NORMAL
IMM GRANULOCYTES # BLD AUTO: 0.1 10*3/MM3 (ref 0–0.05)
IMM GRANULOCYTES NFR BLD AUTO: 0.6 % (ref 0–0.5)
KETONES UR QL STRIP: NEGATIVE
LEUKOCYTE ESTERASE UR QL STRIP.AUTO: NEGATIVE
LYMPHOCYTES # BLD AUTO: 0.83 10*3/MM3 (ref 0.7–3.1)
LYMPHOCYTES NFR BLD AUTO: 5.1 % (ref 19.6–45.3)
MAGNESIUM SERPL-MCNC: 2.2 MG/DL (ref 1.6–2.4)
MCH RBC QN AUTO: 26.7 PG (ref 26.6–33)
MCHC RBC AUTO-ENTMCNC: 32.1 G/DL (ref 31.5–35.7)
MCV RBC AUTO: 83.4 FL (ref 79–97)
MONOCYTES # BLD AUTO: 1.58 10*3/MM3 (ref 0.1–0.9)
MONOCYTES NFR BLD AUTO: 9.7 % (ref 5–12)
NEUTROPHILS NFR BLD AUTO: 13.67 10*3/MM3 (ref 1.7–7)
NEUTROPHILS NFR BLD AUTO: 84 % (ref 42.7–76)
NITRITE UR QL STRIP: NEGATIVE
NRBC BLD AUTO-RTO: 0 /100 WBC (ref 0–0.2)
PH UR STRIP.AUTO: <=5 [PH] (ref 5–8)
PLATELET # BLD AUTO: 247 10*3/MM3 (ref 140–450)
PMV BLD AUTO: 12.2 FL (ref 6–12)
POTASSIUM SERPL-SCNC: 4.3 MMOL/L (ref 3.5–5.2)
PROT SERPL-MCNC: 6.6 G/DL (ref 6–8.5)
PROT UR QL STRIP: NEGATIVE
RBC # BLD AUTO: 3.74 10*6/MM3 (ref 3.77–5.28)
SODIUM SERPL-SCNC: 132 MMOL/L (ref 136–145)
SP GR UR STRIP: 1.01 (ref 1–1.03)
TROPONIN T SERPL-MCNC: <0.01 NG/ML (ref 0–0.03)
UROBILINOGEN UR QL STRIP: NORMAL
WBC # BLD AUTO: 16.28 10*3/MM3 (ref 3.4–10.8)
WHOLE BLOOD HOLD SPECIMEN: NORMAL
WHOLE BLOOD HOLD SPECIMEN: NORMAL

## 2020-12-03 PROCEDURE — 85025 COMPLETE CBC W/AUTO DIFF WBC: CPT | Performed by: EMERGENCY MEDICINE

## 2020-12-03 PROCEDURE — 80053 COMPREHEN METABOLIC PANEL: CPT | Performed by: EMERGENCY MEDICINE

## 2020-12-03 PROCEDURE — 83735 ASSAY OF MAGNESIUM: CPT | Performed by: EMERGENCY MEDICINE

## 2020-12-03 PROCEDURE — 93005 ELECTROCARDIOGRAM TRACING: CPT

## 2020-12-03 PROCEDURE — 71045 X-RAY EXAM CHEST 1 VIEW: CPT

## 2020-12-03 PROCEDURE — 84484 ASSAY OF TROPONIN QUANT: CPT | Performed by: EMERGENCY MEDICINE

## 2020-12-03 PROCEDURE — 81003 URINALYSIS AUTO W/O SCOPE: CPT | Performed by: EMERGENCY MEDICINE

## 2020-12-03 PROCEDURE — 99284 EMERGENCY DEPT VISIT MOD MDM: CPT

## 2020-12-03 PROCEDURE — 93010 ELECTROCARDIOGRAM REPORT: CPT | Performed by: INTERNAL MEDICINE

## 2020-12-03 PROCEDURE — P9612 CATHETERIZE FOR URINE SPEC: HCPCS

## 2020-12-03 RX ORDER — FUROSEMIDE 40 MG/1
20 TABLET ORAL DAILY
Qty: 30 TABLET | Refills: 0 | Status: SHIPPED | OUTPATIENT
Start: 2020-12-03 | End: 2021-01-05

## 2020-12-03 RX ORDER — SODIUM CHLORIDE 0.9 % (FLUSH) 0.9 %
10 SYRINGE (ML) INJECTION AS NEEDED
Status: DISCONTINUED | OUTPATIENT
Start: 2020-12-03 | End: 2020-12-03 | Stop reason: HOSPADM

## 2020-12-03 NOTE — ED NOTES
Pt states she has been feeling fatigue the past few days. Denies any soa or cp. This rn wearing mask and goggles. Pt wearing mask during encounter.        Kailyn Hercuels, ORTEGA  12/03/20 [id number]

## 2020-12-03 NOTE — ED NOTES
"Patient to er from home per ems with c/o increasing weakness over the last three weeks.\" patient reported this is worse in the am.\" EMS reported they noticed patients heart rate was in the 40's. Patient is on blood thinners. Patient alert x 4 at base line. Patient has mask on in triage along with staff.      Felipe Singh RN  12/03/20 [id number]    "

## 2020-12-03 NOTE — TELEPHONE ENCOUNTER
This patient's daughter called back again today. They didn't go to the ED yesterday. Today, she continues with the weakness, soa and memory loss/slight confusion.  /51 and daughter states that she is worse today than yesterday.    I instructed them to go on to the ED for a full workup. She agreed and will bring the patient in.      Thank you,  Page Garcia RN  [facility]  Triage

## 2020-12-03 NOTE — ED PROVIDER NOTES
EMERGENCY DEPARTMENT ENCOUNTER    Room Number:  30/30  Date seen:  12/3/2020  PCP: Deanna Ortega APRN  Historian: Patient      HPI:  Chief Complaint: Leg weakness  A complete HPI/ROS/PMH/PSH/SH/FH are unobtainable due to: Nothing  Context: Gita Wharton is a 84 y.o. female who presents to the ED c/o weakness in her legs that has been worse in the morning and has been present for the last 3 days.  She reports that typically when she gets up and gets her breakfast she begins to feel better.  Today she was too weak to get up and get her breakfast made.  She reports that she felt like her legs were going to give out on her.  She denies chest pain or shortness of breath during my interview, however telephone communication with her cardiologist office over the last 2 days had reported some shortness of air as well as confusion for her daughter.  Patient reports that 2 nights ago she did have several loose, pasty stools.  She also reports that her blood pressure had been running a little bit low at home today with systolic around 100.  She denies fever or chills.  She denies speech or vision disturbance.  No sensory changes.  EMS also reports that patient's heart rate was in the 40s during transport.  Review of patient's chart reveals also that she was recently started on Lasix.            PAST MEDICAL HISTORY  Active Ambulatory Problems     Diagnosis Date Noted   • Essential hypertension 01/18/2017   • Hyperlipidemia 09/07/2017   • Lung nodule 07/24/2018   • History of right MCA stroke 07/24/2018   • Stenosis of right internal carotid artery 07/25/2018   • Abnormal chest x-ray 06/27/2019   • Interstitial lung disease (CMS/HCC) 06/28/2019   • Toxic encephalopathy due to anesthetics 07/01/2019   • Weakness generalized 07/15/2019   • Ascending aorta dilation (CMS/HCC) 09/13/2019   • Nonrheumatic aortic valve stenosis 10/18/2019   • Nonrheumatic mitral valve regurgitation 10/18/2019   • Chronic diastolic congestive  heart failure (CMS/[facility]) 11/19/2019   • PVD (peripheral vascular disease) with claudication (CMS/[facility]) 12/04/2019   • S/P TAVR (transcatheter aortic valve replacement) 12/18/2019   • Premature atrial contractions 11/13/2020     Resolved Ambulatory Problems     Diagnosis Date Noted   • Pneumonia of right lower lobe due to infectious organism 12/30/2016   • Acute allergic rhinitis due to pollen 04/10/2018   • recent traumatic SDH no LOC 07/12/2018   • Fall (on) (from) other stairs and steps, initial encounter 07/12/2018   • Laceration of forehead 07/12/2018   • Left sided numbness 07/24/2018   • Left-sided weakness 07/24/2018   • Difficulty with speech 07/24/2018   • Acute right MCA stroke (CMS/[facility]) 07/24/2018   • Hematochezia 08/05/2018   • Hemorrhoids 08/05/2018   • Anemia 08/05/2018   • Hyponatremia 08/05/2018   • Weakness 08/05/2018   • Subungual hematoma of digit of hand 03/05/2019   • RENO (acute kidney injury) (CMS/[facility]) 04/15/2019   • Acute UTI (urinary tract infection) 04/15/2019   • Hypokalemia 04/15/2019   • Hypomagnesemia 04/15/2019   • Facial contusion 04/15/2019   • Pyelonephritis 04/16/2019   • Closed fracture of neck of left femur (CMS/[facility]) 06/26/2019   • Fracture, hip (CMS/[facility]) 06/27/2019   • Osteoarthritis of one hip 06/29/2019   • Acute urinary tract infection 07/15/2019   • Nonrheumatic aortic valve insufficiency 09/13/2019     Past Medical History:   Diagnosis Date   • Anesthesia complication    • Aortic stenosis    • Aortic valve stenosis    • History of transfusion 2001   • Hypertension    • Lung granuloma (CMS/[facility]) 08/2018   • Pressure sore    • Subdural hematoma (CMS/[facility]) 07/12/2018         PAST SURGICAL HISTORY  Past Surgical History:   Procedure Laterality Date   • AORTIC VALVE REPAIR/REPLACEMENT N/A 12/3/2019    Procedure: TRACEY TRANSCAROTID TRANSCATHETER AORTIC VALVE REPLACEMENT;  Surgeon: Octaviano Yan MD;  Location: [facility] 18/19;  Service: Cardiothoracic   • AORTIC VALVE  REPAIR/REPLACEMENT N/A 12/3/2019    Procedure: Transcarotid Transcatheter Aortic Valve Replacement w/Intra Op TRACEY and Possible Open Bailout Surgery;  Surgeon: Warren Tanner MD;  Location: [facility] OR ;  Service: Cardiovascular   • CARDIAC CATHETERIZATION N/A 10/24/2019    Procedure: Coronary angiography;  Surgeon: Warren Tanner MD;  Location: [facility] CATH INVASIVE LOCATION;  Service: Cardiovascular   • CARDIAC CATHETERIZATION N/A 10/24/2019    Procedure: Left heart cath;  Surgeon: Warren Tanner MD;  Location: [facility] CATH INVASIVE LOCATION;  Service: Cardiovascular   • CARDIAC CATHETERIZATION N/A 10/24/2019    Procedure: Right heart cath;  Surgeon: Warren Tanner MD;  Location: [facility] CATH INVASIVE LOCATION;  Service: Cardiovascular   • CAROTID ENDARTERECTOMY Right 2018    Procedure: RT CAROTID ENDARTERECTOMY;  Surgeon: Inna Villarreal MD;  Location: [facility] MAIN OR;  Service: Vascular   • COLONOSCOPY N/A 2018    Adenomatous polyp.   Repeat .  Surgeon: Ken Tidwell MD;    • HYSTERECTOMY     • THYROIDECTOMY, PARTIAL     • TOTAL HIP ARTHROPLASTY Right    • TOTAL HIP ARTHROPLASTY Left 2019    Procedure: LEFT HIP ANTERIOR HIP WITH HANA TABLE;  Surgeon: Tiffani Pereira MD;  Location: [facility] MAIN OR;  Service: Orthopedics         FAMILY HISTORY  Family History   Problem Relation Age of Onset   • Cancer Mother    • Dementia Father    • Hypertension Father    • Hypertension Daughter    • Cancer Daughter    • Malig Hyperthermia Neg Hx          SOCIAL HISTORY  Social History     Socioeconomic History   • Marital status:      Spouse name: Not on file   • Number of children: 4   • Years of education: 12   • Highest education level: High school graduate   Tobacco Use   • Smoking status: Former Smoker     Packs/day: 0.50     Years: 43.00     Pack years: 21.50     Types: Cigarettes     Start date:      Quit date:      Years since quittin.9   • Smokeless  tobacco: Never Used   • Tobacco comment: CAFFEINE USE: 1 CUP TEA  AND PEPSI DAILY   Substance and Sexual Activity   • Alcohol use: Not Currently     Alcohol/week: 7.0 standard drinks     Types: 7 Cans of beer per week   • Drug use: No   • Sexual activity: Defer         ALLERGIES  Tekturna [aliskiren]        REVIEW OF SYSTEMS  Review of Systems   Review of all 14 systems is negative other than stated in the HPI above.      PHYSICAL EXAM  ED Triage Vitals   Temp Heart Rate Resp BP SpO2   12/03/20 1234 12/03/20 1234 12/03/20 1247 12/03/20 1234 12/03/20 1234   98.3 °F (36.8 °C) 52 17 134/42 100 %      Temp src Heart Rate Source Patient Position BP Location FiO2 (%)   12/03/20 1234 12/03/20 1247 12/03/20 1247 -- --   Oral Monitor Lying           GENERAL: Awake and alert, no acute distress  HENT: nares patent  EYES: no scleral icterus, pupils 3 mm reactive bilaterally  CV: regular rhythm, normal rate  RESPIRATORY: normal effort, lungs clear to auscultation bilaterally  ABDOMEN: soft, nondistended, nontender throughout  MUSCULOSKELETAL: no deformity, there is mild swelling of the right lower extremity compared to the left with no erythema, no warmth.  NEURO: alert, moves all extremities, follows commands  PSYCH:  calm, cooperative  SKIN: warm, dry    Vital signs and nursing notes reviewed.          LAB RESULTS  Recent Results (from the past 24 hour(s))   ECG 12 Lead    Collection Time: 12/03/20 12:42 PM   Result Value Ref Range    QT Interval 417 ms   Comprehensive Metabolic Panel    Collection Time: 12/03/20 12:48 PM    Specimen: Blood   Result Value Ref Range    Glucose 109 (H) 65 - 99 mg/dL    BUN 25 (H) 8 - 23 mg/dL    Creatinine 1.34 (H) 0.57 - 1.00 mg/dL    Sodium 132 (L) 136 - 145 mmol/L    Potassium 4.3 3.5 - 5.2 mmol/L    Chloride 99 98 - 107 mmol/L    CO2 22.9 22.0 - 29.0 mmol/L    Calcium 8.3 (L) 8.6 - 10.5 mg/dL    Total Protein 6.6 6.0 - 8.5 g/dL    Albumin 3.50 3.50 - 5.20 g/dL    ALT (SGPT) 10 1 - 33 U/L     AST (SGOT) 17 1 - 32 U/L    Alkaline Phosphatase 80 39 - 117 U/L    Total Bilirubin 0.3 0.0 - 1.2 mg/dL    eGFR Non African Amer 38 (L) >60 mL/min/1.73    Globulin 3.1 gm/dL    A/G Ratio 1.1 g/dL    BUN/Creatinine Ratio 18.7 7.0 - 25.0    Anion Gap 10.1 5.0 - 15.0 mmol/L   Troponin    Collection Time: 12/03/20 12:48 PM    Specimen: Blood   Result Value Ref Range    Troponin T <0.010 0.000 - 0.030 ng/mL   Magnesium    Collection Time: 12/03/20 12:48 PM    Specimen: Blood   Result Value Ref Range    Magnesium 2.2 1.6 - 2.4 mg/dL   Light Blue Top    Collection Time: 12/03/20 12:48 PM   Result Value Ref Range    Extra Tube hold for add-on    Green Top (Gel)    Collection Time: 12/03/20 12:48 PM   Result Value Ref Range    Extra Tube Hold for add-ons.    Lavender Top    Collection Time: 12/03/20 12:48 PM   Result Value Ref Range    Extra Tube hold for add-on    Gold Top - SST    Collection Time: 12/03/20 12:48 PM   Result Value Ref Range    Extra Tube Hold for add-ons.    CBC Auto Differential    Collection Time: 12/03/20 12:48 PM    Specimen: Blood   Result Value Ref Range    WBC 16.28 (H) 3.40 - 10.80 10*3/mm3    RBC 3.74 (L) 3.77 - 5.28 10*6/mm3    Hemoglobin 10.0 (L) 12.0 - 15.9 g/dL    Hematocrit 31.2 (L) 34.0 - 46.6 %    MCV 83.4 79.0 - 97.0 fL    MCH 26.7 26.6 - 33.0 pg    MCHC 32.1 31.5 - 35.7 g/dL    RDW 14.5 12.3 - 15.4 %    RDW-SD 44.2 37.0 - 54.0 fl    MPV 12.2 (H) 6.0 - 12.0 fL    Platelets 247 140 - 450 10*3/mm3    Neutrophil % 84.0 (H) 42.7 - 76.0 %    Lymphocyte % 5.1 (L) 19.6 - 45.3 %    Monocyte % 9.7 5.0 - 12.0 %    Eosinophil % 0.4 0.3 - 6.2 %    Basophil % 0.2 0.0 - 1.5 %    Immature Grans % 0.6 (H) 0.0 - 0.5 %    Neutrophils, Absolute 13.67 (H) 1.70 - 7.00 10*3/mm3    Lymphocytes, Absolute 0.83 0.70 - 3.10 10*3/mm3    Monocytes, Absolute 1.58 (H) 0.10 - 0.90 10*3/mm3    Eosinophils, Absolute 0.07 0.00 - 0.40 10*3/mm3    Basophils, Absolute 0.03 0.00 - 0.20 10*3/mm3    Immature Grans, Absolute 0.10  (H) 0.00 - 0.05 10*3/mm3    nRBC 0.0 0.0 - 0.2 /100 WBC   Urinalysis With Culture If Indicated - Urine, Catheter    Collection Time: 12/03/20  1:59 PM    Specimen: Urine, Catheter   Result Value Ref Range    Color, UA Yellow Yellow, Straw    Appearance, UA Clear Clear    pH, UA <=5.0 5.0 - 8.0    Specific Gravity, UA 1.014 1.005 - 1.030    Glucose, UA Negative Negative    Ketones, UA Negative Negative    Bilirubin, UA Negative Negative    Blood, UA Negative Negative    Protein, UA Negative Negative    Leuk Esterase, UA Negative Negative    Nitrite, UA Negative Negative    Urobilinogen, UA 0.2 E.U./dL 0.2 - 1.0 E.U./dL       Ordered the above labs and reviewed the results.        RADIOLOGY  Xr Chest 1 View    Result Date: 12/3/2020  XR CHEST 1 VW-  HISTORY: Female who is 84 years-old,  weakness  TECHNIQUE: Frontal view of the chest  COMPARISON: 11/07/2020  FINDINGS: The heart size is borderline. Cardiac valve marker is noted. Aorta is calcified. Pulmonary vasculature is unremarkable. A scarlike density at the lingula is again noted, and again appears similar to multiple prior exams, at least as far back as 10/31/2019. A previous CT demonstrated suspicious right upper lobe nodule is faintly suggested but not well characterized radiographically, CT is again recommended for direct comparison with prior CT exam. No acute infiltrates are noted. No pleural effusions or pneumothorax. Otherwise stable.      A previous CT demonstrated suspicious right upper lobe nodule is not well characterized radiographically, CT is recommended for direct comparison with prior CT exam. A scarlike density at the lingula is again demonstrated, as described. No acute infiltrates are noted. Borderline heart size.  This report was finalized on 12/3/2020 1:14 PM by Dr. Miguel Garcia M.D.        Ordered the above noted radiological studies. Reviewed by me in PACS.            PROCEDURES  Procedures              MEDICATIONS GIVEN IN  ER  Medications - No data to display                MEDICAL DECISION MAKING, PROGRESS, and CONSULTS    All labs have been independently reviewed by me.  All radiology studies have been reviewed by me and discussed with radiologist dictating the report.   EKG's independently viewed and interpreted by me.  Discussion below represents my analysis of pertinent findings related to patient's condition, differential diagnosis, treatment plan and final disposition.    ED Course as of Dec 03 2031   Thu Dec 03, 2020   1332 WBC(!): 16.28 [JR]   1332 Magnesium: 2.2 [JR]   1332 Troponin T: <0.010 [JR]   1332 Creatinine(!): 1.34 [JR]   1332 Sodium(!): 132 [JR]   1411 Leukocytes, UA: Negative [JR]   1411 Nitrite, UA: Negative [JR]   1514 EKG          EKG time: 1242  Rhythm/Rate: Sinus bradycardia, 56  P waves and [place]: Normal  QRS, axis: Borderline left axis  ST and T waves: T wave inversions in leads V1 through V4    Interpreted Contemporaneously by me, independently viewed  Compared to previous tracing 12/5/2019 the SVT has resolved          [JR]   1529 Medical records reviewed: Patient has had some leg swelling for several weeks and had negative bilateral lower extremity venous Doppler study on 11/13/2020.  It appears that her cardiologist nurse practitioner started her on Lasix 40 mg daily on November 13 due to an elevated proBNP.  Her cardiologist is also ordered a brain MRI which is scheduled for 12/8/2020.    [JR]   1529 Patient recently completed a 14-day Zio patch and has mailed this and but results are not yet available in patient's chart.    [JR]   1617 I discussed patient's presentation today with Dr. Tanner, her cardiologist.  Given she has had some low blood pressure readings at home, he recommended reducing her home Lasix to 20 mg daily and having her follow-up closely in the office.    [JR]   1628 I spoke with patient's daughter, Letty, by telephone and updated her regarding laboratory and imaging studies today as  well as plan to discharge home with reduction of Lasix dose to 20 mg daily.    [JR]      ED Course User Index  [JR] Lucas Reynoso MD              I wore a surgical mask, gloves, and eye protection during this patient encounter.  Patient also wearing a surgical mask.  Hand hygeine performed before and after seeing the patient.    DIAGNOSIS  Final diagnoses:   Episode of generalized weakness   Leukocytosis, unspecified type         DISPOSITION  DISCHARGE    Patient discharged in stable condition.    Reviewed implications of results, diagnosis, meds, responsibility to follow up, warning signs and symptoms of possible worsening, potential complications and reasons to return to ER.    Patient/Family voiced understanding of above instructions.    Discussed plan for discharge, as there is no emergent indication for admission. Patient referred to primary care provider for BP management due to today's BP. Pt/family is agreeable and understands need for follow up and repeat testing.  Pt is aware that discharge does not mean that nothing is wrong but it indicates no emergency is present that requires admission and they must continue care with follow-up as given below or physician of their choice.     FOLLOW-UP  Deanna Ortega, APRN  [address] [address]  [phone]    Schedule an appointment as soon as possible for a visit            Medication List      Changed    ferrous sulfate 325 (65 FE) MG tablet  Take 1 tablet by mouth 3 (Three) Times a Day With Meals.  What changed: when to take this     furosemide 40 MG tablet  Commonly known as: LASIX  Take 0.5 tablets by mouth Daily.  What changed: how much to take           Where to Get Your Medications      These medications were sent to [facility]/pharmacy #[id number] - [place], [place] - [address] - [phone] [facility] [phone]   [address] [id number]    Phone: [phone]   · furosemide 40 MG tablet                    Latest Documented Vital Signs:  As of 20:31 EST  BP- 153/64 HR- 72 Temp- 98.3 °F (36.8 °C) (Oral) O2 sat- 100%        --    Please note that portions of this were completed with a voice recognition program.          Lucas Reynoso MD  12/03/20 2032

## 2020-12-04 LAB — QT INTERVAL: 417 MS

## 2020-12-08 ENCOUNTER — HOSPITAL ENCOUNTER (OUTPATIENT)
Dept: CARDIOLOGY | Facility: HOSPITAL | Age: 85
Discharge: HOME | End: 2020-12-08

## 2020-12-08 ENCOUNTER — HOSPITAL ENCOUNTER (OUTPATIENT)
Dept: MRI IMAGING | Facility: HOSPITAL | Age: 85
Discharge: HOME | End: 2020-12-08

## 2020-12-08 VITALS — DIASTOLIC BLOOD PRESSURE: 60 MMHG | SYSTOLIC BLOOD PRESSURE: 100 MMHG

## 2020-12-08 DIAGNOSIS — Z98.890 HISTORY OF CAROTID ENDARTERECTOMY: ICD-10-CM

## 2020-12-08 DIAGNOSIS — R29.90 STROKE-LIKE SYMPTOMS: ICD-10-CM

## 2020-12-08 DIAGNOSIS — R09.89 CAROTID BRUIT, UNSPECIFIED LATERALITY: ICD-10-CM

## 2020-12-08 DIAGNOSIS — Z95.2 S/P TAVR (TRANSCATHETER AORTIC VALVE REPLACEMENT): ICD-10-CM

## 2020-12-08 LAB
BH CV XLRA MEAS LEFT DIST CCA EDV: -9.4 CM/SEC
BH CV XLRA MEAS LEFT DIST CCA PSV: -67.6 CM/SEC
BH CV XLRA MEAS LEFT DIST ICA EDV: -12.9 CM/SEC
BH CV XLRA MEAS LEFT DIST ICA PSV: -93.2 CM/SEC
BH CV XLRA MEAS LEFT ICA/CCA RATIO: 1.59
BH CV XLRA MEAS LEFT MID ICA EDV: 11.4 CM/SEC
BH CV XLRA MEAS LEFT MID ICA PSV: 80.5 CM/SEC
BH CV XLRA MEAS LEFT PROX CCA EDV: 8.2 CM/SEC
BH CV XLRA MEAS LEFT PROX CCA PSV: 76.8 CM/SEC
BH CV XLRA MEAS LEFT PROX ECA PSV: -202 CM/SEC
BH CV XLRA MEAS LEFT PROX ICA EDV: 8.6 CM/SEC
BH CV XLRA MEAS LEFT PROX ICA PSV: 108 CM/SEC
BH CV XLRA MEAS LEFT PROX SCLA PSV: 277 CM/SEC
BH CV XLRA MEAS LEFT VERTEBRAL A EDV: -12.9 CM/SEC
BH CV XLRA MEAS LEFT VERTEBRAL A PSV: -72.1 CM/SEC
BH CV XLRA MEAS RIGHT DIST CCA EDV: -7.9 CM/SEC
BH CV XLRA MEAS RIGHT DIST CCA PSV: -56.6 CM/SEC
BH CV XLRA MEAS RIGHT DIST ICA EDV: -14.5 CM/SEC
BH CV XLRA MEAS RIGHT DIST ICA PSV: -55 CM/SEC
BH CV XLRA MEAS RIGHT ICA/CCA RATIO: 1.21
BH CV XLRA MEAS RIGHT MID ICA EDV: -14.9 CM/SEC
BH CV XLRA MEAS RIGHT MID ICA PSV: -68.8 CM/SEC
BH CV XLRA MEAS RIGHT PROX CCA PSV: 57.4 CM/SEC
BH CV XLRA MEAS RIGHT PROX ECA PSV: -94.3 CM/SEC
BH CV XLRA MEAS RIGHT PROX ICA EDV: -13.4 CM/SEC
BH CV XLRA MEAS RIGHT PROX ICA PSV: -61.3 CM/SEC
BH CV XLRA MEAS RIGHT PROX SCLA PSV: 138 CM/SEC
BH CV XLRA MEAS RIGHT VERTEBRAL A EDV: -7.9 CM/SEC
BH CV XLRA MEAS RIGHT VERTEBRAL A PSV: -69.9 CM/SEC
LEFT ARM BP: NORMAL MMHG
RIGHT ARM BP: NORMAL MMHG

## 2020-12-08 PROCEDURE — 93356 MYOCRD STRAIN IMG SPCKL TRCK: CPT | Performed by: INTERNAL MEDICINE

## 2020-12-08 PROCEDURE — A9577 INJ MULTIHANCE: HCPCS | Performed by: NURSE PRACTITIONER

## 2020-12-08 PROCEDURE — 0 GADOBENATE DIMEGLUMINE 529 MG/ML SOLUTION: Performed by: NURSE PRACTITIONER

## 2020-12-08 PROCEDURE — 93880 EXTRACRANIAL BILAT STUDY: CPT

## 2020-12-08 PROCEDURE — 93356 MYOCRD STRAIN IMG SPCKL TRCK: CPT

## 2020-12-08 PROCEDURE — 93306 TTE W/DOPPLER COMPLETE: CPT | Performed by: INTERNAL MEDICINE

## 2020-12-08 PROCEDURE — 93306 TTE W/DOPPLER COMPLETE: CPT

## 2020-12-08 PROCEDURE — 70553 MRI BRAIN STEM W/O & W/DYE: CPT

## 2020-12-08 RX ADMIN — GADOBENATE DIMEGLUMINE 10 ML: 529 INJECTION, SOLUTION INTRAVENOUS at 08:20

## 2020-12-09 ENCOUNTER — TELEPHONE (OUTPATIENT)
Dept: CARDIOLOGY | Facility: CLINIC | Age: 85
End: 2020-12-09

## 2020-12-09 LAB
AORTIC DIMENSIONLESS INDEX: 0.8 (DI)
ASCENDING AORTA: 3.6 CM
BH CV ECHO AV AORTIC VALVE AT ACCEL TIME CALCULATED: NORMAL MSEC
BH CV ECHO MEAS - ACS: 1.3 CM
BH CV ECHO MEAS - AO MAX PG (FULL): -0.39 MMHG
BH CV ECHO MEAS - AO MAX PG: 17.8 MMHG
BH CV ECHO MEAS - AO MEAN PG (FULL): 3.3 MMHG
BH CV ECHO MEAS - AO MEAN PG: 7.8 MMHG
BH CV ECHO MEAS - AO ROOT AREA (BSA CORRECTED): 1.5
BH CV ECHO MEAS - AO ROOT AREA: 4 CM^2
BH CV ECHO MEAS - AO ROOT DIAM: 2.3 CM
BH CV ECHO MEAS - AO V2 MAX: 211 CM/SEC
BH CV ECHO MEAS - AO V2 MEAN: 126.2 CM/SEC
BH CV ECHO MEAS - AO V2 VTI: 50.7 CM
BH CV ECHO MEAS - ASC AORTA: 3.6 CM
BH CV ECHO MEAS - AT: 60 SEC
BH CV ECHO MEAS - AVA(I,A): 1.8 CM^2
BH CV ECHO MEAS - AVA(I,D): 1.8 CM^2
BH CV ECHO MEAS - AVA(V,A): 3 CM^2
BH CV ECHO MEAS - AVA(V,D): 3 CM^2
BH CV ECHO MEAS - BSA(HAYCOCK): 1.5 M^2
BH CV ECHO MEAS - BSA: 1.5 M^2
BH CV ECHO MEAS - BZI_BMI: 20.2 KILOGRAMS/M^2
BH CV ECHO MEAS - BZI_METRIC_HEIGHT: 160 CM
BH CV ECHO MEAS - BZI_METRIC_WEIGHT: 51.7 KG
BH CV ECHO MEAS - EDV(MOD-SP2): 44 ML
BH CV ECHO MEAS - EDV(MOD-SP4): 56 ML
BH CV ECHO MEAS - EDV(TEICH): 70.8 ML
BH CV ECHO MEAS - EF(CUBED): 77.9 %
BH CV ECHO MEAS - EF(MOD-BP): 60.4 %
BH CV ECHO MEAS - EF(MOD-SP2): 61.4 %
BH CV ECHO MEAS - EF(MOD-SP4): 60.7 %
BH CV ECHO MEAS - EF(TEICH): 70.6 %
BH CV ECHO MEAS - ESV(MOD-SP2): 17 ML
BH CV ECHO MEAS - ESV(MOD-SP4): 22 ML
BH CV ECHO MEAS - ESV(TEICH): 20.8 ML
BH CV ECHO MEAS - FS: 39.6 %
BH CV ECHO MEAS - IVS/LVPW: 1.2
BH CV ECHO MEAS - IVSD: 1.3 CM
BH CV ECHO MEAS - LAT PEAK E' VEL: 6.5 CM/SEC
BH CV ECHO MEAS - LV DIASTOLIC VOL/BSA (35-75): 36.8 ML/M^2
BH CV ECHO MEAS - LV MASS(C)D: 156.3 GRAMS
BH CV ECHO MEAS - LV MASS(C)DI: 102.7 GRAMS/M^2
BH CV ECHO MEAS - LV MAX PG: 18.2 MMHG
BH CV ECHO MEAS - LV MEAN PG: 4.5 MMHG
BH CV ECHO MEAS - LV SYSTOLIC VOL/BSA (12-30): 14.4 ML/M^2
BH CV ECHO MEAS - LV V1 MAX: 213.3 CM/SEC
BH CV ECHO MEAS - LV V1 MEAN: 102.3 CM/SEC
BH CV ECHO MEAS - LV V1 VTI: 31.4 CM
BH CV ECHO MEAS - LVIDD: 4 CM
BH CV ECHO MEAS - LVIDS: 2.4 CM
BH CV ECHO MEAS - LVLD AP2: 7.5 CM
BH CV ECHO MEAS - LVLD AP4: 7.6 CM
BH CV ECHO MEAS - LVLS AP2: 6.2 CM
BH CV ECHO MEAS - LVLS AP4: 6.2 CM
BH CV ECHO MEAS - LVOT AREA (M): 2.8 CM^2
BH CV ECHO MEAS - LVOT AREA: 3 CM^2
BH CV ECHO MEAS - LVOT DIAM: 1.9 CM
BH CV ECHO MEAS - LVPWD: 1 CM
BH CV ECHO MEAS - MED PEAK E' VEL: 5.1 CM/SEC
BH CV ECHO MEAS - MR MAX PG: 70.8 MMHG
BH CV ECHO MEAS - MR MAX VEL: 420.8 CM/SEC
BH CV ECHO MEAS - MV A DUR: 0.12 SEC
BH CV ECHO MEAS - MV A MAX VEL: 121.3 CM/SEC
BH CV ECHO MEAS - MV DEC SLOPE: 306.4 CM/SEC^2
BH CV ECHO MEAS - MV DEC TIME: 0.33 SEC
BH CV ECHO MEAS - MV E MAX VEL: 87.8 CM/SEC
BH CV ECHO MEAS - MV E/A: 0.72
BH CV ECHO MEAS - MV MAX PG: 6.4 MMHG
BH CV ECHO MEAS - MV MEAN PG: 2.3 MMHG
BH CV ECHO MEAS - MV P1/2T MAX VEL: 96.2 CM/SEC
BH CV ECHO MEAS - MV P1/2T: 91.9 MSEC
BH CV ECHO MEAS - MV V2 MAX: 126.4 CM/SEC
BH CV ECHO MEAS - MV V2 MEAN: 71.7 CM/SEC
BH CV ECHO MEAS - MV V2 VTI: 30.5 CM
BH CV ECHO MEAS - MVA P1/2T LCG: 2.3 CM^2
BH CV ECHO MEAS - MVA(P1/2T): 2.4 CM^2
BH CV ECHO MEAS - MVA(VTI): 3.1 CM^2
BH CV ECHO MEAS - PA ACC TIME: 0.13 SEC
BH CV ECHO MEAS - PA MAX PG (FULL): 1 MMHG
BH CV ECHO MEAS - PA MAX PG: 3.4 MMHG
BH CV ECHO MEAS - PA PR(ACCEL): 18.4 MMHG
BH CV ECHO MEAS - PA V2 MAX: 91.5 CM/SEC
BH CV ECHO MEAS - PAPD(PI EDV): 7 MMHG
BH CV ECHO MEAS - PI END-D VEL: 135.7 CM/SEC
BH CV ECHO MEAS - PULM A REVS DUR: 0.13 SEC
BH CV ECHO MEAS - PULM A REVS VEL: 33.8 CM/SEC
BH CV ECHO MEAS - PULM DIAS VEL: 28.7 CM/SEC
BH CV ECHO MEAS - PULM S/D: 1.6
BH CV ECHO MEAS - PULM SYS VEL: 44.6 CM/SEC
BH CV ECHO MEAS - PV RVPEPC: 10 SEC
BH CV ECHO MEAS - PVA(V,A): 2.6 CM^2
BH CV ECHO MEAS - PVA(V,D): 2.6 CM^2
BH CV ECHO MEAS - QP/QS: 0.58
BH CV ECHO MEAS - RAP SYSTOLE: 3 MMHG
BH CV ECHO MEAS - RV MAX PG: 2.4 MMHG
BH CV ECHO MEAS - RV MEAN PG: 1.3 MMHG
BH CV ECHO MEAS - RV V1 MAX: 76.7 CM/SEC
BH CV ECHO MEAS - RV V1 MEAN: 53.2 CM/SEC
BH CV ECHO MEAS - RV V1 VTI: 17.6 CM
BH CV ECHO MEAS - RVOT AREA: 3.1 CM^2
BH CV ECHO MEAS - RVOT DIAM: 2 CM
BH CV ECHO MEAS - RVSP: 36 MMHG
BH CV ECHO MEAS - SI(AO): 132.3 ML/M^2
BH CV ECHO MEAS - SI(CUBED): 33.2 ML/M^2
BH CV ECHO MEAS - SI(LVOT): 61.5 ML/M^2
BH CV ECHO MEAS - SI(MOD-SP2): 17.7 ML/M^2
BH CV ECHO MEAS - SI(MOD-SP4): 22.3 ML/M^2
BH CV ECHO MEAS - SI(TEICH): 32.8 ML/M^2
BH CV ECHO MEAS - SUP REN AO DIAM: 2.2 CM
BH CV ECHO MEAS - SV(AO): 201.5 ML
BH CV ECHO MEAS - SV(CUBED): 50.6 ML
BH CV ECHO MEAS - SV(LVOT): 93.6 ML
BH CV ECHO MEAS - SV(MOD-SP2): 27 ML
BH CV ECHO MEAS - SV(MOD-SP4): 34 ML
BH CV ECHO MEAS - SV(RVOT): 54.5 ML
BH CV ECHO MEAS - SV(TEICH): 50 ML
BH CV ECHO MEAS - TAPSE (>1.6): 1.8 CM
BH CV ECHO MEAS - TR MAX VEL: 286.4 CM/SEC
BH CV ECHO MEASUREMENTS AVERAGE E/E' RATIO: 15.14
BH CV XLRA - RV BASE: 2.5 CM
BH CV XLRA - RV LENGTH: 5.6 CM
BH CV XLRA - RV MID: 1.7 CM
BH CV XLRA - TDI S': 13.6 CM/SEC
LEFT ATRIUM VOLUME INDEX: 24 ML/M2
LV EF 2D ECHO EST: 60 %
MAXIMAL PREDICTED HEART RATE: 137 BPM
SINUS: 3.2 CM
STJ: 3.1 CM
STRESS TARGET HR: 116 BPM

## 2020-12-09 NOTE — TELEPHONE ENCOUNTER
I spoke with Letty.  Evidently since the Lasix dose was decreased, her blood pressure has been much better.  It has been in the 130s systolic.  Additionally, she reports that her leg weakness has also improved and that she is getting better each day.  She has been staying with her daughter.    I discussed the plan of care and reviewed all of her test results with Dr. Tanner.  From a cardiac standpoint, we think she is stable.  The only results I do not yet have are the Holter monitor.  I will call her once those become available.  She has been trying to make an appointment with her PCP but is having difficulty.  I will send a message to her PCP.

## 2020-12-09 NOTE — TELEPHONE ENCOUNTER
Pt had several tests done and is waiting for results c/o continued weakness. Daughter stated pt is getting nervous and ask her to call us. Please advise    DA

## 2020-12-09 NOTE — TELEPHONE ENCOUNTER
This patient's daughter has been trying to schedule an appointment with your office but has had a terrible time getting through.  Can someone please call her to arrange this?  Thank you!

## 2020-12-10 NOTE — TELEPHONE ENCOUNTER
Will someone contact her to schedule a follow up (in office)? This will need to be a 30 minute appointment please. Thanks.

## 2020-12-11 PROCEDURE — 0298T HOLTER MONITOR - 72 HOUR UP TO 21 DAY: CPT | Performed by: INTERNAL MEDICINE

## 2020-12-14 ENCOUNTER — OFFICE VISIT (OUTPATIENT)
Dept: INTERNAL MEDICINE | Age: 85
End: 2020-12-14

## 2020-12-14 VITALS
HEART RATE: 64 BPM | HEIGHT: 63 IN | BODY MASS INDEX: 19.91 KG/M2 | WEIGHT: 112.4 LBS | TEMPERATURE: 97.1 F | DIASTOLIC BLOOD PRESSURE: 38 MMHG | OXYGEN SATURATION: 99 % | SYSTOLIC BLOOD PRESSURE: 92 MMHG

## 2020-12-14 DIAGNOSIS — R29.898 WEAKNESS OF BOTH LOWER EXTREMITIES: ICD-10-CM

## 2020-12-14 DIAGNOSIS — I95.9 HYPOTENSION, UNSPECIFIED HYPOTENSION TYPE: Primary | ICD-10-CM

## 2020-12-14 PROCEDURE — 99213 OFFICE O/P EST LOW 20 MIN: CPT | Performed by: NURSE PRACTITIONER

## 2020-12-14 NOTE — PATIENT INSTRUCTIONS
1) HOLD amlodipine, losartan, lasix, and potassium.   Call office tomorrow with blood pressure readings.

## 2020-12-14 NOTE — PROGRESS NOTES
"[facility] [place] INTERNAL MEDICINE  CYNTHIA Melloey / 85 y.o. / female  12/14/2020      VITAL SIGNS     Visit Vitals  BP (!) 92/38   Pulse 64   Temp 97.1 °F (36.2 °C) (Temporal)   Ht 160 cm (63\")   Wt 51 kg (112 lb 6.4 oz)   LMP  (LMP Unknown)   SpO2 99%   Breastfeeding No   BMI 19.91 kg/m²       BP Readings from Last 3 Encounters:   12/14/20 (!) 92/38   12/08/20 100/60   12/03/20 153/64     Wt Readings from Last 3 Encounters:   12/14/20 51 kg (112 lb 6.4 oz)   12/03/20 51.7 kg (114 lb)   11/13/20 50.3 kg (111 lb)     Body mass index is 19.91 kg/m².      MEDICATIONS     Current Outpatient Medications   Medication Sig Dispense Refill   • Acetaminophen (TYLENOL 8 HOUR ARTHRITIS PAIN PO) Take 1 tablet by mouth Daily.     • amLODIPine (NORVASC) 10 MG tablet Take 1 tablet by mouth Daily. 90 tablet 1   • aspirin 81 MG EC tablet Take 1 tablet by mouth Daily.     • atorvastatin (LIPITOR) 20 MG tablet Take 1 tablet by mouth Daily. 90 tablet 1   • clopidogrel (PLAVIX) 75 MG tablet Take 1 tablet by mouth Daily. 30 tablet 5   • diphenhydrAMINE (BENADRYL) 25 mg capsule Take 12.5 mg by mouth Daily As Needed for Itching.     • ferrous sulfate 325 (65 FE) MG tablet Take 1 tablet by mouth 3 (Three) Times a Day With Meals. (Patient taking differently: Take 325 mg by mouth Daily With Breakfast.) 270 tablet 1   • furosemide (LASIX) 40 MG tablet Take 0.5 tablets by mouth Daily. (Patient taking differently: Take 10 mg by mouth Daily.) 30 tablet 0   • losartan (COZAAR) 100 MG tablet Take 1 tablet by mouth Daily. 90 tablet 1   • metoprolol tartrate (LOPRESSOR) 50 MG tablet Take 1 tablet by mouth 2 (Two) Times a Day. 180 tablet 3   • Multiple Vitamins-Minerals (CENTRUM ADULTS) tablet Take 1 tablet by mouth Daily.     • potassium chloride 10 MEQ CR tablet Take 2 tablets by mouth Daily. (Patient taking differently: Take 10 mEq by mouth Daily.) 60 tablet 3   • docusate sodium (Colace) 100 MG capsule Take 2 capsules by mouth " Daily.     • folic acid (FOLVITE) 1 MG tablet Take 1 mg by mouth Daily.       No current facility-administered medications for this visit.        CHIEF COMPLAINT     Follow-up (ER) and Extremity Weakness      ASSESSMENT & PLAN     Problem List Items Addressed This Visit     None      Visit Diagnoses     Hypotension, unspecified hypotension type    -  Primary    Weakness of both lower extremities            No orders of the defined types were placed in this encounter.    No orders of the defined types were placed in this encounter.      Summary/Discussion:  • Plan to hold amlodipine, losartan, lasix/potassium with current hypotension and symptoms. Daughter is educated to take BP twice tonight and twice in the morning and call the office with results. Plan for office visit on Thursday for BP follow-up along with repeat CBC and CMP.   • Sent staff message to Cherelle MARIE with cardiology who is following up with this patient. Informed of current holding of meds and need for cardiology follow-up.     Next Appointment with me: Visit date not found    Return in about 4 days (around 12/18/2020).    ______________________________________________________________________    HISTORY OF PRESENT ILLNESS     Patient presented to [facility] ER on 12/03/2020 for complaint of leg weakness in her legs that was worse in the morning. This weakness at the time was going on for 3 days. Daughter spoke to cardiology and voiced some SOA along with memory loss. Her blood pressure had also been running low at home with systolic in 100s. HR was in the 40s during transport. Blood pressure at time of ED visit was 134/42. Xray was performed that revealed overall stable chest views with recommendation of comparison CT for suspicious right upper lung nodule previously seen on prior CT as well. Urinalysis was normal with normal mag and troponin. WBC were elevated at 16. Anemia seems to be near her baseline. She had previously  "been started on lasix 40mg on 11/13 due to bilateral lower extremity swelling and elevated proBNP. Patient was discharged home on 20mg of lasix daily after discussion with patient's cardiologist.     Patient's daughter discussed with cardiology NP that BP has been \"much better\" with smaller lasix dose on 12/09. Today she is taking 10mg of lasix. Her daughter informs me that she becomes dizzy when standing, and has been over the past couple of weeks. Blood pressure is currently 90/38. She is taking lasix 10mg, amlodipine 10mg daily, losartan 100mg daily, and metoprolol BID. HR 64 today. She denies any increased leg swelling, SOA, chest pain, or heart palpitations. No lightheadedness when remaining seated. Daughter does admit to some memory loss, but no acute confusion.     Patient Care Team:  Deanna Ortega APRN as PCP - General (Internal Medicine)  Warren Tanner MD as Consulting Physician (Cardiology)  Octaviano Yan MD as Surgeon (Cardiothoracic Surgery)      REVIEW OF SYSTEMS     Review of Systems   Respiratory: Negative.    Cardiovascular: Negative.    Gastrointestinal: Negative.    Genitourinary: Negative.    Neurological: Positive for dizziness and weakness (bilateral lower extremities). Negative for light-headedness.   Psychiatric/Behavioral: Negative.      PHYSICAL EXAMINATION     Physical Exam  Constitutional:       General: She is not in acute distress.     Appearance: Normal appearance.   Eyes:      Pupils: Pupils are equal, round, and reactive to light.   Cardiovascular:      Rate and Rhythm: Normal rate and regular rhythm.      Pulses: Normal pulses.      Heart sounds: Normal heart sounds.   Pulmonary:      Effort: Pulmonary effort is normal.      Breath sounds: Normal breath sounds.   Musculoskeletal:      Right lower leg: Edema present.      Left lower leg: Edema present.   Neurological:      Mental Status: She is alert and oriented to person, place, and time.   Psychiatric:         Thought " Content: Thought content normal.         Judgment: Judgment normal.       REVIEWED DATA     Labs:     Lab Results   Component Value Date     (L) 12/03/2020    K 4.3 12/03/2020    CALCIUM 8.3 (L) 12/03/2020    AST 17 12/03/2020    ALT 10 12/03/2020    BUN 25 (H) 12/03/2020    CREATININE 1.34 (H) 12/03/2020    CREATININE 1.23 (H) 11/19/2020    CREATININE 0.95 11/09/2020    EGFRIFNONA 38 (L) 12/03/2020    EGFRIFAFRI 54 (L) 03/23/2020       Lab Results   Component Value Date    HGBA1C 5.38 11/29/2019    HGBA1C 5.37 06/27/2019    HGBA1C 4.90 07/25/2018     (H) 03/23/2020    GLU 91 03/02/2020     (H) 08/28/2019       Lab Results   Component Value Date    LDL 81 03/02/2020    LDL 68 02/28/2019    LDL 57 11/19/2018    HDL 49 03/02/2020    HDL 62 (H) 02/28/2019    HDL 64 (H) 11/19/2018    TRIG 136 03/02/2020    TRIG 72 02/28/2019    TRIG 87 11/19/2018    CHOLHDLRATIO 3.20 03/02/2020       Lab Results   Component Value Date    TSH 3.300 03/23/2020    FREET4 1.47 03/23/2020       Lab Results   Component Value Date    WBC 16.28 (H) 12/03/2020    HGB 10.0 (L) 12/03/2020    HGB 11.3 (L) 11/09/2020    HGB 11.9 (L) 11/07/2020     12/03/2020       Lab Results   Component Value Date    PROTEIN 1+ (A) 03/23/2020    GLUCOSEU Negative 12/03/2020    BLOODU Negative 12/03/2020    NITRITEU Negative 12/03/2020    LEUKOCYTESUR Negative 12/03/2020       Imaging:         Medical Tests:         Summary of old records / correspondence / consultant report:         Request outside records:         ALLERGIES  Allergies   Allergen Reactions   • Tekturna [Aliskiren] Diarrhea        PFSH:     The following portions of the patient's history were reviewed and updated as appropriate: Allergies / Current Medications / Past Medical History / Surgical History / Social History / Family History    PROBLEM LIST   Patient Active Problem List   Diagnosis   • Essential hypertension   • Hyperlipidemia   • Lung nodule   • History of right  MCA stroke   • Stenosis of right internal carotid artery   • Abnormal chest x-ray   • Interstitial lung disease (CMS/HCC)   • Toxic encephalopathy due to anesthetics   • Weakness generalized   • Ascending aorta dilation (CMS/HCC)   • Nonrheumatic aortic valve stenosis   • Nonrheumatic mitral valve regurgitation   • Chronic diastolic congestive heart failure (CMS/HCC)   • PVD (peripheral vascular disease) with claudication (CMS/HCC)   • S/P TAVR (transcatheter aortic valve replacement)   • Premature atrial contractions       PAST MEDICAL HISTORY  Past Medical History:   Diagnosis Date   • Acute right MCA stroke (CMS/HCC) 07/24/2018 2001, 2018   • Anesthesia complication     TOXIC ENCEPHALOPATHY   • Aortic stenosis    • Aortic valve stenosis    • Fracture, hip (CMS/HCC)     LEFT, CHIP ON TOP OF HIP D/T FALL 2 WEEKS POST OP   • Hemorrhoids 8/5/2018   • History of transfusion 2001    13 UNITS, HERE AT [facility]   • Hyperlipidemia    • Hypertension    • Hypokalemia    • 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    R LL            Dr FRANKIE Branch - suggested yearly CXR to Monitor   • Pressure sore     BUTTOCKS   • Pyelonephritis 4/16/2019   • Stenosis of right internal carotid artery 7/25/2018   • Subdural hematoma (CMS/HCC) 07/12/2018    Secondary to fall, JULY 2018   • Toxic encephalopathy 2019    POST OP LAST HIP SURGERY       SURGICAL HISTORY  Past Surgical History:   Procedure Laterality Date   • AORTIC VALVE REPAIR/REPLACEMENT N/A 12/3/2019    Procedure: TRACEY TRANSCAROTID TRANSCATHETER AORTIC VALVE REPLACEMENT;  Surgeon: Octaviano Yan MD;  Location: [facility] OR 18/19;  Service: Cardiothoracic   • AORTIC VALVE REPAIR/REPLACEMENT N/A 12/3/2019    Procedure: Transcarotid Transcatheter Aortic Valve Replacement w/Intra Op TRACEY and Possible Open Bailout Surgery;  Surgeon: Warren Tanner MD;  Location: [facility] OR 18/19;  Service: Cardiovascular   • CARDIAC CATHETERIZATION N/A 10/24/2019    Procedure: Coronary  angiography;  Surgeon: Warren Tanner MD;  Location:  [facility] CATH INVASIVE LOCATION;  Service: Cardiovascular   • CARDIAC CATHETERIZATION N/A 10/24/2019    Procedure: Left heart cath;  Surgeon: Warren Tanner MD;  Location:  [facility] CATH INVASIVE LOCATION;  Service: Cardiovascular   • CARDIAC CATHETERIZATION N/A 10/24/2019    Procedure: Right heart cath;  Surgeon: Warren Tanner MD;  Location:  [facility] CATH INVASIVE LOCATION;  Service: Cardiovascular   • CAROTID ENDARTERECTOMY Right 2018    Procedure: RT CAROTID ENDARTERECTOMY;  Surgeon: Inna Villarreal MD;  [facility];  Service: Vascular   • COLONOSCOPY N/A 2018    Adenomatous polyp.   Repeat .  Surgeon: Ken Tidwell MD;    • HYSTERECTOMY     • THYROIDECTOMY, PARTIAL     • TOTAL HIP ARTHROPLASTY Right    • TOTAL HIP ARTHROPLASTY Left 2019    Procedure: LEFT HIP ANTERIOR HIP WITH HANA TABLE;  Surgeon: Tiffani Pereira MD;  Location: [facility] MAIN OR;  Service: Orthopedics       SOCIAL HISTORY  Social History     Socioeconomic History   • Marital status:      Spouse name: Not on file   • Number of children: 4   • Years of education: 12   • Highest education level: High school graduate   Tobacco Use   • Smoking status: Former Smoker     Packs/day: 0.50     Years: 43.00     Pack years: 21.50     Types: Cigarettes     Start date:      Quit date:      Years since quittin.9   • Smokeless tobacco: Never Used   • Tobacco comment: CAFFEINE USE: 1 CUP TEA  AND PEPSI DAILY   Substance and Sexual Activity   • Alcohol use: Not Currently     Alcohol/week: 7.0 standard drinks     Types: 7 Cans of beer per week   • Drug use: No   • Sexual activity: Defer       FAMILY HISTORY  Family History   Problem Relation Age of Onset   • Cancer Mother    • Dementia Father    • Hypertension Father    • Hypertension Daughter    • Cancer Daughter    • Malig Hyperthermia Neg Hx          Examiner was wearing KN95 mask, face  shield and exam gloves during the entire duration of the visit. Patient was masked the entire time.   Minimum social distance of 6 ft maintained entire visit except if physical contact was necessary as documented.     **Dragon Disclaimer:   Much of this encounter note is an electronic transcription/translation of spoken language to printed text. The electronic translation of spoken language may permit erroneous, or at times, nonsensical words or phrases to be inadvertently transcribed. Although I have reviewed the note for such errors, some may still exist.

## 2020-12-15 ENCOUNTER — TELEPHONE (OUTPATIENT)
Dept: CARDIOLOGY | Facility: CLINIC | Age: 85
End: 2020-12-15

## 2020-12-15 ENCOUNTER — TELEPHONE (OUTPATIENT)
Dept: INTERNAL MEDICINE | Age: 85
End: 2020-12-15

## 2020-12-15 NOTE — TELEPHONE ENCOUNTER
I spoke with her regarding the Holter results which were overall unremarkable.  I am also going to get her scheduled to see me sometime after [place].    Please schedule her for an appointment with me after Christmas on a day that Dr. Tanner is in the office.

## 2020-12-15 NOTE — TELEPHONE ENCOUNTER
AC STATES THAT THEY STOPPED ALL BLOOD PRESSURE MEDICATION AND SHE IS CALLING TO REPORT THE PATIENT'S MOST RECENT READINGS    12/14 @ 6:10P - 151/53  12/14 @ 7:12P - 127/51  12/15 @ 9:00A - 128/47  12/15 @ 10:00A - 134/50    PLEASE ADVISE ON WHAT THEY SHOULD DO NOW    AC CAN BE REACHED AT  [phone]

## 2020-12-15 NOTE — TELEPHONE ENCOUNTER
Letty notified of dictation and POC. Letty verbalized understanding and will bring log in thurs. MM

## 2020-12-17 ENCOUNTER — TELEPHONE (OUTPATIENT)
Dept: CARDIOLOGY | Facility: CLINIC | Age: 85
End: 2020-12-17

## 2020-12-17 ENCOUNTER — OFFICE VISIT (OUTPATIENT)
Dept: INTERNAL MEDICINE | Age: 85
End: 2020-12-17

## 2020-12-17 VITALS
HEIGHT: 63 IN | HEART RATE: 55 BPM | BODY MASS INDEX: 20.38 KG/M2 | OXYGEN SATURATION: 99 % | DIASTOLIC BLOOD PRESSURE: 42 MMHG | TEMPERATURE: 96.8 F | SYSTOLIC BLOOD PRESSURE: 98 MMHG | WEIGHT: 115 LBS

## 2020-12-17 DIAGNOSIS — R00.1 BRADYCARDIA: Primary | ICD-10-CM

## 2020-12-17 DIAGNOSIS — I95.9 HYPOTENSION, UNSPECIFIED HYPOTENSION TYPE: ICD-10-CM

## 2020-12-17 LAB
ALBUMIN SERPL-MCNC: 3.3 G/DL (ref 3.5–5.2)
ALBUMIN/GLOB SERPL: 1.3 G/DL
ALP SERPL-CCNC: 91 U/L (ref 39–117)
ALT SERPL-CCNC: 12 U/L (ref 1–33)
AST SERPL-CCNC: 18 U/L (ref 1–32)
BASOPHILS # BLD AUTO: 0.01 10*3/MM3 (ref 0–0.2)
BASOPHILS NFR BLD AUTO: 0.1 % (ref 0–1.5)
BILIRUB SERPL-MCNC: 0.2 MG/DL (ref 0–1.2)
BUN SERPL-MCNC: 16 MG/DL (ref 8–23)
BUN/CREAT SERPL: 14.5 (ref 7–25)
CALCIUM SERPL-MCNC: 8.2 MG/DL (ref 8.6–10.5)
CHLORIDE SERPL-SCNC: 104 MMOL/L (ref 98–107)
CO2 SERPL-SCNC: 23.7 MMOL/L (ref 22–29)
CREAT SERPL-MCNC: 1.1 MG/DL (ref 0.57–1)
EOSINOPHIL # BLD AUTO: 0.01 10*3/MM3 (ref 0–0.4)
EOSINOPHIL NFR BLD AUTO: 0.1 % (ref 0.3–6.2)
ERYTHROCYTE [DISTWIDTH] IN BLOOD BY AUTOMATED COUNT: 14.5 % (ref 12.3–15.4)
GLOBULIN SER CALC-MCNC: 2.6 GM/DL
GLUCOSE SERPL-MCNC: 87 MG/DL (ref 65–99)
HCT VFR BLD AUTO: 29.1 % (ref 34–46.6)
HGB BLD-MCNC: 9.4 G/DL (ref 12–15.9)
IMM GRANULOCYTES # BLD AUTO: 0.06 10*3/MM3 (ref 0–0.05)
IMM GRANULOCYTES NFR BLD AUTO: 0.6 % (ref 0–0.5)
LYMPHOCYTES # BLD AUTO: 1.12 10*3/MM3 (ref 0.7–3.1)
LYMPHOCYTES NFR BLD AUTO: 11.9 % (ref 19.6–45.3)
MCH RBC QN AUTO: 27.9 PG (ref 26.6–33)
MCHC RBC AUTO-ENTMCNC: 32.3 G/DL (ref 31.5–35.7)
MCV RBC AUTO: 86.4 FL (ref 79–97)
MONOCYTES # BLD AUTO: 0.69 10*3/MM3 (ref 0.1–0.9)
MONOCYTES NFR BLD AUTO: 7.3 % (ref 5–12)
NEUTROPHILS # BLD AUTO: 7.55 10*3/MM3 (ref 1.7–7)
NEUTROPHILS NFR BLD AUTO: 80 % (ref 42.7–76)
NRBC BLD AUTO-RTO: 0 /100 WBC (ref 0–0.2)
PLATELET # BLD AUTO: 239 10*3/MM3 (ref 140–450)
POTASSIUM SERPL-SCNC: 4.8 MMOL/L (ref 3.5–5.2)
PROT SERPL-MCNC: 5.9 G/DL (ref 6–8.5)
RBC # BLD AUTO: 3.37 10*6/MM3 (ref 3.77–5.28)
SODIUM SERPL-SCNC: 134 MMOL/L (ref 136–145)
T4 FREE SERPL-MCNC: 1.09 NG/DL (ref 0.93–1.7)
TSH SERPL DL<=0.005 MIU/L-ACNC: 4.7 UIU/ML (ref 0.27–4.2)
WBC # BLD AUTO: 9.44 10*3/MM3 (ref 3.4–10.8)

## 2020-12-17 PROCEDURE — 93000 ELECTROCARDIOGRAM COMPLETE: CPT | Performed by: NURSE PRACTITIONER

## 2020-12-17 PROCEDURE — 99213 OFFICE O/P EST LOW 20 MIN: CPT | Performed by: NURSE PRACTITIONER

## 2020-12-17 NOTE — TELEPHONE ENCOUNTER
----- Message from CYNTHIA Mello sent at 12/17/2020  9:58 AM EST -----  I had the pleasure to again see Gita Wharton for PCP Deanna MARIE.     On Monday we completed a ED follow-up appointment after she presented to [facility] ER on 12/03/2020 for complaint of leg weakness that was worse in the morning. This weakness at the time was going on for 3 days. Daughter spoke to your team and voiced some SOA along with memory loss. Her blood pressure had also been running low at home with systolic in 100s. HR was in the 40s during transport. Blood pressure at time of ED visit was 134/42. She had previously been started on lasix 40mg on 11/13 due to bilateral lower extremity swelling and elevated proBNP. Patient was discharged home on 20mg of lasix daily after discussion with your team.     Patient's daughter discussed with Cherelle MARIE that BP has been much better with smaller lasix dose of 10mg on 12/09. On Monday's visit her daughter informs me that she becomes dizzy when standing, and has been over the past couple of weeks. Blood pressure was 90/38. She was taking lasix 10mg, amlodipine 10mg daily, losartan 100mg daily, and metoprolol BID. HR 64. She denied any increased leg swelling, SOA, chest pain, or heart palpitations. No lightheadedness when remaining seated. Daughter does admit to some memory loss, but no acute confusion. On Monday's visit I stopped her amlodipine, losartan, and lasix/potassium.     On follow-up today, blood pressure was still 98/42. HR on EKG 43. I am rechecking her CBC and CMP today. She previously had mild hyponatremia and small decrease in Hgb. I am also giving her an occult stool kit to rule out other causes of hypotension. I had EKG completed today and there are some inverted t waves and changes in r waves that differ from previous EKGs. I have asked the patient to call your office because I think she warrants a visit with your team. I have asked her with her  current HR of 43 to stop metoprolol at this time in addition to lasix, amlodipine, and losartan. Her right leg swelling is baseline.     Thank you for your collaboration with Gita Wharton.   Chris MARIE

## 2020-12-17 NOTE — PROGRESS NOTES
"[facility] [place], [place] INTERNAL MEDICINE  CYNTHIA Melloey / 85 y.o. / female  12/17/2020      VITAL SIGNS     Visit Vitals  BP 98/42   Pulse 55   Temp 96.8 °F (36 °C) (Temporal)   Ht 160 cm (63\")   Wt 52.2 kg (115 lb)   LMP  (LMP Unknown)   SpO2 99%   BMI 20.37 kg/m²       BP Readings from Last 3 Encounters:   12/17/20 98/42   12/14/20 (!) 92/38   12/08/20 100/60     Wt Readings from Last 3 Encounters:   12/17/20 52.2 kg (115 lb)   12/14/20 51 kg (112 lb 6.4 oz)   12/03/20 51.7 kg (114 lb)     Body mass index is 20.37 kg/m².      MEDICATIONS     Current Outpatient Medications   Medication Sig Dispense Refill   • Acetaminophen (TYLENOL 8 HOUR ARTHRITIS PAIN PO) Take 1 tablet by mouth Daily.     • aspirin 81 MG EC tablet Take 1 tablet by mouth Daily.     • atorvastatin (LIPITOR) 20 MG tablet Take 1 tablet by mouth Daily. 90 tablet 1   • clopidogrel (PLAVIX) 75 MG tablet Take 1 tablet by mouth Daily. 30 tablet 5   • diphenhydrAMINE (BENADRYL) 25 mg capsule Take 12.5 mg by mouth Daily As Needed for Itching.     • metoprolol tartrate (LOPRESSOR) 50 MG tablet Take 1 tablet by mouth 2 (Two) Times a Day. 180 tablet 3   • Multiple Vitamins-Minerals (CENTRUM ADULTS) tablet Take 1 tablet by mouth Daily.     • amLODIPine (NORVASC) 10 MG tablet Take 1 tablet by mouth Daily. 90 tablet 1   • docusate sodium (Colace) 100 MG capsule Take 2 capsules by mouth Daily.     • ferrous sulfate 325 (65 FE) MG tablet Take 1 tablet by mouth 3 (Three) Times a Day With Meals. (Patient taking differently: Take 325 mg by mouth Daily With Breakfast.) 270 tablet 1   • folic acid (FOLVITE) 1 MG tablet Take 1 mg by mouth Daily.     • furosemide (LASIX) 40 MG tablet Take 0.5 tablets by mouth Daily. (Patient taking differently: Take 10 mg by mouth Daily.) 30 tablet 0   • losartan (COZAAR) 100 MG tablet Take 1 tablet by mouth Daily. 90 tablet 1   • potassium chloride 10 MEQ CR tablet Take 2 tablets by mouth Daily. (Patient taking differently: " Take 10 mEq by mouth Daily.) 60 tablet 3     No current facility-administered medications for this visit.        CHIEF COMPLAINT     Hypotension (f/u)      ASSESSMENT & PLAN     Problem List Items Addressed This Visit     None      Visit Diagnoses     Bradycardia    -  Primary    Relevant Orders    CBC & Differential    Comprehensive Metabolic Panel    TSH+Free T4    Hypotension, unspecified hypotension type        Relevant Orders    CBC & Differential    Comprehensive Metabolic Panel    TSH+Free T4        Orders Placed This Encounter   Procedures   • Comprehensive Metabolic Panel   • TSH+Free T4   • ECG 12 Lead   • CBC & Differential     No orders of the defined types were placed in this encounter.      Summary/Discussion:  • Small drop in hemoglobin and hematocrit from November to December repeat CBC today to determine blood loss anemia as cause of hypotension and bradycardia.  She is also provided an occult stool kit that the daughter voices understanding that she will bring in stool sample is obtained.  Recheck CMP with discontinuation of Lasix along with hyponatremia on last CMP.  Thyroid check at the request of the patient.  Patient's daughter and myself have reached out to cardiology and believe she warrants further evaluation from their standpoint.  • EKG obtained which did show some inversion of T waves and some abnormalities of R waves that were different compared to previous EKGs starting in November.  Bradycardia with heart rate of 43.  With current heart rate discontinue metoprolol 50 mg twice daily at this time.  Hold losartan, amlodipine, and Lasix.  • Follow-up in 1 week to monitor for any worsening hypotension and bradycardia.    Next Appointment with me: 12/28/2020    Return in about 1 week (around 12/24/2020).    _____________________________________________________________________________________    HISTORY OF PRESENT ILLNESS     Patient presents for follow-up from Monday because she was found to  have hypotension with BP 90/38.  She was taken off her losartan 100 mg, amlodipine 10 mg, Lasix 10 mg along with potassium.  She was taking her blood pressure at home which ranged from 120-150/40 to 50s her home monitoring wrist cuff.  She brought her wrist cuff in today and was compared to our blood pressure cuff which did not seem to match.  Unlikely that these home readings are accurate.  After discontinuation of 3 medications her blood pressure is still reading 98/42 today.  She seems to have slightly more energy today according to her daughter, but is still not at her baseline.  With discontinuation of Lasix right leg swelling is at baseline and left leg is negative for swelling or edema.     She denies any blood in her stool, dark stools, nausea, diarrhea, or vomiting.  She denies any shortness of air heart palpitations.  She does continue to complain of some weakness in her lower extremities.    Patient Care Team:  Deanna Ortega APRN as PCP - General (Internal Medicine)  Warren Tanner MD as Consulting Physician (Cardiology)  Octaviano Yan MD as Surgeon (Cardiothoracic Surgery)      REVIEW OF SYSTEMS     Review of Systems  Respiratory: Negative.    Cardiovascular: Negative.    Gastrointestinal: Negative.    Genitourinary: Negative.    Neurological: Positive for dizziness and weakness (bilateral lower extremities). Negative for light-headedness.   Psychiatric/Behavioral: Negative.        PHYSICAL EXAMINATION     Physical Exam  Constitutional:       General: She is not in acute distress.     Appearance: Normal appearance.   Eyes:      Pupils: Pupils are equal, round, and reactive to light.   Cardiovascular:      Rate and Rhythm: Normal rate and regular rhythm.      Pulses: Normal pulses.      Heart sounds: Normal heart sounds.   Pulmonary:      Effort: Pulmonary effort is normal.      Breath sounds: Normal breath sounds.   Musculoskeletal:      Right lower leg: Edema 1+present.      Left lower leg:  neg edema   Neurological:      Mental Status: She is alert and oriented to person, place, and time.   Psychiatric:         Thought Content: Thought content normal.         Judgment: Judgment normal.     REVIEWED DATA     Labs:     Lab Results   Component Value Date     (L) 12/03/2020    K 4.3 12/03/2020    CALCIUM 8.3 (L) 12/03/2020    AST 17 12/03/2020    ALT 10 12/03/2020    BUN 25 (H) 12/03/2020    CREATININE 1.34 (H) 12/03/2020    CREATININE 1.23 (H) 11/19/2020    CREATININE 0.95 11/09/2020    EGFRIFNONA 38 (L) 12/03/2020    EGFRIFAFRI 54 (L) 03/23/2020       Lab Results   Component Value Date    HGBA1C 5.38 11/29/2019    HGBA1C 5.37 06/27/2019    HGBA1C 4.90 07/25/2018     (H) 03/23/2020    GLU 91 03/02/2020     (H) 08/28/2019       Lab Results   Component Value Date    LDL 81 03/02/2020    LDL 68 02/28/2019    LDL 57 11/19/2018    HDL 49 03/02/2020    HDL 62 (H) 02/28/2019    HDL 64 (H) 11/19/2018    TRIG 136 03/02/2020    TRIG 72 02/28/2019    TRIG 87 11/19/2018    CHOLHDLRATIO 3.20 03/02/2020       Lab Results   Component Value Date    TSH 3.300 03/23/2020    FREET4 1.47 03/23/2020       Lab Results   Component Value Date    WBC 16.28 (H) 12/03/2020    HGB 10.0 (L) 12/03/2020    HGB 11.3 (L) 11/09/2020    HGB 11.9 (L) 11/07/2020     12/03/2020       Lab Results   Component Value Date    PROTEIN 1+ (A) 03/23/2020    GLUCOSEU Negative 12/03/2020    BLOODU Negative 12/03/2020    NITRITEU Negative 12/03/2020    LEUKOCYTESUR Negative 12/03/2020       Imaging:         Medical Tests:     ECG 12 Lead    Date/Time: 12/17/2020 11:54 AM  Performed by: Chris Pederson APRN  Authorized by: Chris Pederson APRN   Rhythm: sinus bradycardia  Rate: bradycardic  QRS axis: left  Other findings: T wave abnormality    Clinical impression: abnormal EKG          Summary of old records / correspondence / consultant report:         Request outside records:         ALLERGIES  Allergies   Allergen Reactions   •  Tekturna [Aliskiren] Diarrhea        [facility]:     The following portions of the patient's history were reviewed and updated as appropriate: Allergies / Current Medications / Past Medical History / Surgical History / Social History / Family History    PROBLEM LIST   Patient Active Problem List   Diagnosis   • Essential hypertension   • Hyperlipidemia   • Lung nodule   • History of right MCA stroke   • Stenosis of right internal carotid artery   • Abnormal chest x-ray   • Interstitial lung disease (CMS/HCC)   • Toxic encephalopathy due to anesthetics   • Weakness generalized   • Ascending aorta dilation (CMS/HCC)   • Nonrheumatic aortic valve stenosis   • Nonrheumatic mitral valve regurgitation   • Chronic diastolic congestive heart failure (CMS/HCC)   • PVD (peripheral vascular disease) with claudication (CMS/HCC)   • S/P TAVR (transcatheter aortic valve replacement)   • Premature atrial contractions       PAST MEDICAL HISTORY  Past Medical History:   Diagnosis Date   • Acute right MCA stroke (CMS/HCC) 07/24/2018 2001, 2018   • Anesthesia complication     TOXIC ENCEPHALOPATHY   • Aortic stenosis    • Aortic valve stenosis    • Fracture, hip (CMS/[facility])     LEFT, CHIP ON TOP OF HIP D/T FALL 2 WEEKS POST OP   • Hemorrhoids 8/5/2018   • History of transfusion 2001    13 UNITS, HERE AT [facility]   • Hyperlipidemia    • Hypertension    • Hypokalemia    • 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    R LL            Dr FRANKIE Branch - suggested yearly CXR to Monitor   • Pressure sore     BUTTOCKS   • Pyelonephritis 4/16/2019   • Stenosis of right internal carotid artery 7/25/2018   • Subdural hematoma (CMS/HCC) 07/12/2018    Secondary to fall, JULY 2018   • Toxic encephalopathy 2019    POST OP LAST HIP SURGERY       SURGICAL HISTORY  Past Surgical History:   Procedure Laterality Date   • AORTIC VALVE REPAIR/REPLACEMENT N/A 12/3/2019    Procedure: TRACEY TRANSCAROTID TRANSCATHETER AORTIC VALVE REPLACEMENT;  Surgeon: Octaviano Yan MD;   Location: [facility] OR ;  Service: Cardiothoracic   • AORTIC VALVE REPAIR/REPLACEMENT N/A 12/3/2019    Procedure: Transcarotid Transcatheter Aortic Valve Replacement w/Intra Op TRACEY and Possible Open Bailout Surgery;  Surgeon: Warren Tanner MD;  Location: [facility] OR ;  Service: Cardiovascular   • CARDIAC CATHETERIZATION N/A 10/24/2019    Procedure: Coronary angiography;  Surgeon: Warren Tanner MD;  Location: [facility] CATH INVASIVE LOCATION;  Service: Cardiovascular   • CARDIAC CATHETERIZATION N/A 10/24/2019    Procedure: Left heart cath;  Surgeon: Warren Tanner MD;  Location: [facility] CATH INVASIVE LOCATION;  Service: Cardiovascular   • CARDIAC CATHETERIZATION N/A 10/24/2019    Procedure: Right heart cath;  Surgeon: Warren Tanner MD;  Location: [facility] CATH INVASIVE LOCATION;  Service: Cardiovascular   • CAROTID ENDARTERECTOMY Right 2018    Procedure: RT CAROTID ENDARTERECTOMY;  Surgeon: Inna Villarreal MD;  Location: [facility];  Service: Vascular   • COLONOSCOPY N/A 2018    Adenomatous polyp.   Repeat .  Surgeon: Ken Tidwell MD;    • HYSTERECTOMY     • THYROIDECTOMY, PARTIAL     • TOTAL HIP ARTHROPLASTY Right    • TOTAL HIP ARTHROPLASTY Left 2019    Procedure: LEFT HIP ANTERIOR HIP WITH HANA TABLE;  Surgeon: Tiffani Pereira MD;  Location: [facility];  Service: Orthopedics       SOCIAL HISTORY  Social History     Socioeconomic History   • Marital status:      Spouse name: Not on file   • Number of children: 4   • Years of education: 12   • Highest education level: High school graduate   Tobacco Use   • Smoking status: Former Smoker     Packs/day: 0.50     Years: 43.00     Pack years: 21.50     Types: Cigarettes     Start date:      Quit date:      Years since quittin.9   • Smokeless tobacco: Never Used   • Tobacco comment: CAFFEINE USE: 1 CUP TEA  AND PEPSI DAILY   Substance and Sexual Activity   • Alcohol use: Not  Currently     Alcohol/week: 7.0 standard drinks     Types: 7 Cans of beer per week   • Drug use: No   • Sexual activity: Defer       FAMILY HISTORY  Family History   Problem Relation Age of Onset   • Cancer Mother    • Dementia Father    • Hypertension Father    • Hypertension Daughter    • Cancer Daughter    • Malig Hyperthermia Neg Hx          Examiner was wearing KN95 mask, face shield and exam gloves during the entire duration of the visit. Patient was masked the entire time.   Minimum social distance of 6 ft maintained entire visit except if physical contact was necessary as documented.     **Dragon Disclaimer:   Much of this encounter note is an electronic transcription/translation of spoken language to printed text. The electronic translation of spoken language may permit erroneous, or at times, nonsensical words or phrases to be inadvertently transcribed. Although I have reviewed the note for such errors, some may still exist.

## 2020-12-17 NOTE — TELEPHONE ENCOUNTER
Please move appointment up, preferably in the next week or 2.  Please schedule on a day that Dr. Tanner is in the office if possible.

## 2020-12-18 ENCOUNTER — TELEPHONE (OUTPATIENT)
Dept: INTERNAL MEDICINE | Age: 85
End: 2020-12-18

## 2020-12-18 DIAGNOSIS — D50.9 MICROCYTIC ANEMIA: Primary | ICD-10-CM

## 2020-12-18 NOTE — TELEPHONE ENCOUNTER
Please contact patient.     Labs show that hemoglobin continues to drop, down to 9.4 from 11.3 about 1 month ago.     Her thyroid is also slightly low.     Considering that patient fell (and sounds like she hit her head since she has facial bruising), I would recommend someone take her to the ER to be evaluated (she is on Plavix and there is concern that head trauma/injury could cause bleed).     Also, considering her recent very low Blood pressures they will be able to re-evaluate this and determine if she needs any additional urgent treatment.     I would advise that after ER evaluation that we continue to work on getting her to bring us an occult stool sample. We will probably need to check some additional labs (such as iron studies, etc) but I want her acute issues and possible head injury addressed first. Thanks.

## 2020-12-18 NOTE — TELEPHONE ENCOUNTER
ACKALEB HILL (DAUGHTER)M CALLED TO ADVISE THAT PATIENT FELL IN BATHROOM LAST NIGHT.    SHE IS NOT SURE IF SHE FAINTED. PATIENT DID NOT HIT HEAD, BUT HAS BRUISES ON CHEST, ARM, UNDER CHIN, AND CHEEK    AC ALSO ASKED IF LABS WERE BACK YET. CAN YOU PLEASE CALL WITH RESULTS    [phone]

## 2020-12-21 NOTE — TELEPHONE ENCOUNTER
I assume that she has been holding all 4 BP medications at this time.     If that is the case, then yes, hold all BP medications except for metoprolol.     Continue to monitor BP at home.   Have her call Chris Wednesday with updated BP readings through Wed.

## 2020-12-21 NOTE — TELEPHONE ENCOUNTER
Pt will be in at 1320 for labs. Daughter verbalized understanding for restarting metoprolol, but questions if she needs to hold losartan along with lasix and  Norvasc  Denied new onset of edema in ext. MM

## 2020-12-21 NOTE — TELEPHONE ENCOUNTER
Please contact family member.     I would like to have some repeat blood work done too if possible. Orders are in to repeat CBC and iron level.     I would also go ahead and have her restart her metoprolol (but continue to hold her furosemide and amlodipine).     Is she having a lot of swelling since her water pill has been held?

## 2020-12-21 NOTE — TELEPHONE ENCOUNTER
Please contact patient to get update on her status.     Daughter was advised to take her to the ER on Friday due to fall/facial bruising and concern about potential head injury on blood thinner. I see no record in the chart of any ER visit.     How is she doing now?   How have blood pressures been over the weekend?     Hemoglobin was lower at last check, is she still taking iron supplement?

## 2020-12-21 NOTE — TELEPHONE ENCOUNTER
Reached out to daughter. Daughter had to call in sons because pt refused to go to hospital. Family was/is with pt. C/O weakness, bruising noted from under chin, clavicle, and arm areas. Daughter c/o possible dementia during this time.    B/P readings as follows;  12/20/2020  evening 128/78    Lunch  156/81    Breakfast 148/73  12/19/2020 evening 116/78    lunch  109/78    breakfast 195/83  12/18/2020 Evening 132/62    Lunch  173/80    Breakfast 128/78    Not currently taking Iron Supp. Trying to bring in stool sample today if possible.

## 2020-12-22 ENCOUNTER — TELEPHONE (OUTPATIENT)
Dept: INTERNAL MEDICINE | Age: 85
End: 2020-12-22

## 2020-12-22 DIAGNOSIS — D50.9 MICROCYTIC ANEMIA: ICD-10-CM

## 2020-12-22 DIAGNOSIS — I95.9 HYPOTENSION, UNSPECIFIED HYPOTENSION TYPE: Primary | ICD-10-CM

## 2020-12-22 LAB
BASOPHILS # BLD AUTO: 0 10*3/MM3 (ref 0–0.2)
BASOPHILS NFR BLD AUTO: 0 % (ref 0–1.5)
DEVELOPER EXPIRATION DATE: NORMAL
DEVELOPER LOT NUMBER: NORMAL
EOSINOPHIL # BLD AUTO: 0 10*3/MM3 (ref 0–0.4)
EOSINOPHIL NFR BLD AUTO: 0 % (ref 0.3–6.2)
ERYTHROCYTE [DISTWIDTH] IN BLOOD BY AUTOMATED COUNT: 14.5 % (ref 12.3–15.4)
EXPIRATION DATE: NORMAL
FECAL OCCULT BLOOD SCREEN, POC: NEGATIVE
FERRITIN SERPL-MCNC: 75.4 NG/ML (ref 13–150)
HCT VFR BLD AUTO: 28 % (ref 34–46.6)
HGB BLD-MCNC: 9.2 G/DL (ref 12–15.9)
IMM GRANULOCYTES # BLD AUTO: 0.03 10*3/MM3 (ref 0–0.05)
IMM GRANULOCYTES NFR BLD AUTO: 0.4 % (ref 0–0.5)
LYMPHOCYTES # BLD AUTO: 0.8 10*3/MM3 (ref 0.7–3.1)
LYMPHOCYTES NFR BLD AUTO: 11 % (ref 19.6–45.3)
Lab: NORMAL
MCH RBC QN AUTO: 27.7 PG (ref 26.6–33)
MCHC RBC AUTO-ENTMCNC: 32.9 G/DL (ref 31.5–35.7)
MCV RBC AUTO: 84.3 FL (ref 79–97)
MONOCYTES # BLD AUTO: 0.52 10*3/MM3 (ref 0.1–0.9)
MONOCYTES NFR BLD AUTO: 7.2 % (ref 5–12)
NEGATIVE CONTROL: NEGATIVE
NEUTROPHILS # BLD AUTO: 5.91 10*3/MM3 (ref 1.7–7)
NEUTROPHILS NFR BLD AUTO: 81.4 % (ref 42.7–76)
NRBC BLD AUTO-RTO: 0 /100 WBC (ref 0–0.2)
PLATELET # BLD AUTO: 224 10*3/MM3 (ref 140–450)
POSITIVE CONTROL: POSITIVE
RBC # BLD AUTO: 3.32 10*6/MM3 (ref 3.77–5.28)
WBC # BLD AUTO: 7.26 10*3/MM3 (ref 3.4–10.8)

## 2020-12-22 PROCEDURE — 82270 OCCULT BLOOD FECES: CPT | Performed by: NURSE PRACTITIONER

## 2020-12-22 RX ORDER — FERROUS SULFATE 325(65) MG
325 TABLET ORAL
Qty: 90 TABLET | Refills: 0 | Status: SHIPPED | OUTPATIENT
Start: 2020-12-22

## 2020-12-22 NOTE — TELEPHONE ENCOUNTER
Daughter notified of results and updated POC. Daughter verbalized understanding and will try and get stool kit to office today. ANGI

## 2020-12-22 NOTE — TELEPHONE ENCOUNTER
Caller: Marce Grullon    Relationship: Emergency Contact    Best call back number: [phone]     What medication are you requesting: ANTIBIOTIC    What are your current symptoms: SWELLING FROM HAVING HER TOENAIL REMOVED AT POD[facility]    How long have you been experiencing symptoms: 5 DAYS   Have you had these symptoms before:    [] Yes  [x] No    Have you been treated for these symptoms before:   [] Yes  [x] No    If a prescription is needed, what is your preferred pharmacy and phone number: [facility] #[id number] - [place], [place] - [address] - [phone]  - [phone] FX     Additional notes:   PATIENTS DAUGHTER STATED THAT SINCE SHE HAS BEEN OFF OF THE ANTIBIOTIC THAT IT HAS STARTED SWELLING AGAIN. SHE STATED THAT IT FEELS WARM TO THE TOUCH , SHE STATED THAT SHE CALLED THE PODIATRIST FIRST, THEY WERE NOT IN.     PLEASE ADVISE

## 2020-12-23 ENCOUNTER — TELEPHONE (OUTPATIENT)
Dept: INTERNAL MEDICINE | Age: 85
End: 2020-12-23

## 2020-12-23 RX ORDER — CEPHALEXIN 500 MG/1
500 CAPSULE ORAL 2 TIMES DAILY
Qty: 14 CAPSULE | Refills: 0 | Status: SHIPPED | OUTPATIENT
Start: 2020-12-23 | End: 2020-12-30

## 2020-12-23 NOTE — TELEPHONE ENCOUNTER
PATIENT'S CALLED BACK FOR DEVANTE. SHE WANTED TO KNOW WHAT ANTIBIOTIC SHE WAS TAKING. SHE WAS TAKING CELFLEX 500 MG 1 PILL TWICE A DAY    PLEASE ADVISE  [phone]

## 2020-12-23 NOTE — TELEPHONE ENCOUNTER
Contact daughter.  What is the antibiotic and dosage that she was put on?   I will refill it until her podiatrist gets back in office or she sees Chris next week.

## 2020-12-28 ENCOUNTER — OFFICE VISIT (OUTPATIENT)
Dept: INTERNAL MEDICINE | Age: 85
End: 2020-12-28

## 2020-12-28 VITALS
HEIGHT: 63 IN | DIASTOLIC BLOOD PRESSURE: 44 MMHG | BODY MASS INDEX: 20.59 KG/M2 | HEART RATE: 63 BPM | OXYGEN SATURATION: 99 % | WEIGHT: 116.2 LBS | SYSTOLIC BLOOD PRESSURE: 102 MMHG | TEMPERATURE: 96.8 F

## 2020-12-28 DIAGNOSIS — I95.9 HYPOTENSION, UNSPECIFIED HYPOTENSION TYPE: ICD-10-CM

## 2020-12-28 DIAGNOSIS — D64.9 ANEMIA, UNSPECIFIED TYPE: Primary | ICD-10-CM

## 2020-12-28 PROCEDURE — 99213 OFFICE O/P EST LOW 20 MIN: CPT | Performed by: NURSE PRACTITIONER

## 2020-12-28 NOTE — PROGRESS NOTES
"[facility] [place] INTERNAL MEDICINE  CYNTHIA Melloey / 85 y.o. / female  12/28/2020      VITAL SIGNS     Visit Vitals  /44   Pulse 63   Temp 96.8 °F (36 °C) (Temporal)   Ht 160 cm (63\")   Wt 52.7 kg (116 lb 3.2 oz)   LMP  (LMP Unknown)   SpO2 99%   BMI 20.58 kg/m²       BP Readings from Last 3 Encounters:   12/28/20 102/44   12/17/20 98/42   12/14/20 (!) 92/38     Wt Readings from Last 3 Encounters:   12/28/20 52.7 kg (116 lb 3.2 oz)   12/17/20 52.2 kg (115 lb)   12/14/20 51 kg (112 lb 6.4 oz)     Body mass index is 20.58 kg/m².      MEDICATIONS     Current Outpatient Medications   Medication Sig Dispense Refill   • Acetaminophen (TYLENOL 8 HOUR ARTHRITIS PAIN PO) Take 1 tablet by mouth Daily.     • aspirin 81 MG EC tablet Take 1 tablet by mouth Daily.     • atorvastatin (LIPITOR) 20 MG tablet Take 1 tablet by mouth Daily. 90 tablet 1   • cephalexin (Keflex) 500 MG capsule Take 1 capsule by mouth 2 (Two) Times a Day for 7 days. 14 capsule 0   • clopidogrel (PLAVIX) 75 MG tablet Take 1 tablet by mouth Daily. 30 tablet 5   • diphenhydrAMINE (BENADRYL) 25 mg capsule Take 12.5 mg by mouth Daily As Needed for Itching.     • docusate sodium (Colace) 100 MG capsule Take 2 capsules by mouth Daily.     • ferrous sulfate 325 (65 FE) MG tablet Take 1 tablet by mouth Daily With Breakfast. 90 tablet 0   • folic acid (FOLVITE) 1 MG tablet Take 1 mg by mouth Daily.     • metoprolol tartrate (LOPRESSOR) 50 MG tablet Take 1 tablet by mouth 2 (Two) Times a Day. 180 tablet 3   • Multiple Vitamins-Minerals (CENTRUM ADULTS) tablet Take 1 tablet by mouth Daily.     • amLODIPine (NORVASC) 10 MG tablet Take 1 tablet by mouth Daily. 90 tablet 1   • furosemide (LASIX) 40 MG tablet Take 0.5 tablets by mouth Daily. (Patient taking differently: Take 10 mg by mouth Daily.) 30 tablet 0   • losartan (COZAAR) 100 MG tablet Take 1 tablet by mouth Daily. 90 tablet 1   • potassium chloride 10 MEQ CR tablet Take 2 tablets by mouth " Daily. (Patient taking differently: Take 10 mEq by mouth Daily.) 60 tablet 3     No current facility-administered medications for this visit.        CHIEF COMPLAINT     Follow-up (1 wk f/u, bradycardia, lab results)      ASSESSMENT & PLAN     Problem List Items Addressed This Visit     None      Visit Diagnoses     Anemia, unspecified type    -  Primary    Relevant Orders    CBC & Differential    Vitamin B12    Hypotension, unspecified hypotension type            Orders Placed This Encounter   Procedures   • Vitamin B12   • CBC & Differential     No orders of the defined types were placed in this encounter.      Summary/Discussion:  • Obtain additional CBC along with B12 with significant drop in Hgb and low normal B12 on last draw.   • She is to continue to monitor her blood pressure closely at least twice daily. She is instructed to bring her BP cuff and readings to her cardiologist visit in the beginning of January.   • Follow-up with PCP in 2 weeks after cardiology appointment to further monitor hypotension and anemia.     Next Appointment with me: Visit date not found    Return in about 2 weeks (around 1/11/2021).    ________________________________________________________________________    HISTORY OF PRESENT ILLNESS     Patient presents for follow-up regarding hypotension. Today BP is higher at 102/44 with HR at 3. She is currently only taking metoprolol and holding lasix, amlodipine, and losartan. She and her daughter have obtained a [employer] arm cuff blood pressure monitoring system for home use, but readings are inconsistent with office readings. They were unable to bring home BP cuff or readings today. Daughter describes home readings as 140s/60-70s with lowest diastolic beings 38. She continues to have slightly more energy, but is still not at her baselines. Occult stool was negative. She denies any shortness of air heart palpitations.  She does continue to complain of some weakness in her lower  extremities.     Patient Care Team:  Deanna Ortega APRN as PCP - General (Internal Medicine)  Warren Tanner MD as Consulting Physician (Cardiology)  Octaviano Yan MD as Surgeon (Cardiothoracic Surgery)      REVIEW OF SYSTEMS     Review of Systems  Review of Systems  Respiratory: Negative.    Cardiovascular: Negative.    Gastrointestinal: Negative.    Genitourinary: Negative.    Neurological: Positive weakness (bilateral lower extremities). Negative for light-headedness.   Psychiatric/Behavioral: Negative.      PHYSICAL EXAMINATION     Physical Exam  Physical Exam  Constitutional:       General: She is not in acute distress.     Appearance: Normal appearance.   Eyes:      Pupils: Pupils are equal, round, and reactive to light.   Cardiovascular:      Rate and Rhythm: Normal rate and regular rhythm.      Pulses: Normal pulses.      Heart sounds: Normal heart sounds.   Pulmonary:      Effort: Pulmonary effort is normal.      Breath sounds: Normal breath sounds.   Musculoskeletal:      Right lower leg: Edema 1+present. Baseline swelling      Left lower leg: neg edema   Neurological:      Mental Status: She is alert and oriented to person, place, and time.   Psychiatric:         Thought Content: Thought content normal.         Judgment: Judgment normal.     REVIEWED DATA     Labs:     Lab Results   Component Value Date     (L) 12/17/2020    K 4.8 12/17/2020    CALCIUM 8.2 (L) 12/17/2020    AST 18 12/17/2020    ALT 12 12/17/2020    BUN 16 12/17/2020    CREATININE 1.10 (H) 12/17/2020    CREATININE 1.34 (H) 12/03/2020    CREATININE 1.23 (H) 11/19/2020    EGFRIFNONA 47 (L) 12/17/2020    EGFRIFAFRI 57 (L) 12/17/2020       Lab Results   Component Value Date    HGBA1C 5.38 11/29/2019    HGBA1C 5.37 06/27/2019    HGBA1C 4.90 07/25/2018    GLU 87 12/17/2020     (H) 03/23/2020    GLU 91 03/02/2020       Lab Results   Component Value Date    LDL 81 03/02/2020    LDL 68 02/28/2019    LDL 57 11/19/2018    HDL  49 03/02/2020    HDL 62 (H) 02/28/2019    HDL 64 (H) 11/19/2018    TRIG 136 03/02/2020    TRIG 72 02/28/2019    TRIG 87 11/19/2018    CHOLHDLRATIO 3.20 03/02/2020       Lab Results   Component Value Date    TSH 4.700 (H) 12/17/2020    FREET4 1.09 12/17/2020       Lab Results   Component Value Date    WBC 7.26 12/21/2020    HGB 9.2 (L) 12/21/2020    HGB 9.4 (L) 12/17/2020    HGB 10.0 (L) 12/03/2020     12/21/2020       Lab Results   Component Value Date    PROTEIN 1+ (A) 03/23/2020    GLUCOSEU Negative 12/03/2020    BLOODU Negative 12/03/2020    NITRITEU Negative 12/03/2020    LEUKOCYTESUR Negative 12/03/2020       Imaging:         Medical Tests:         Summary of old records / correspondence / consultant report:         Request outside records:         ALLERGIES  Allergies   Allergen Reactions   • Tekturna [Aliskiren] Diarrhea        PFSH:     The following portions of the patient's history were reviewed and updated as appropriate: Allergies / Current Medications / Past Medical History / Surgical History / Social History / Family History    PROBLEM LIST   Patient Active Problem List   Diagnosis   • Essential hypertension   • Hyperlipidemia   • Lung nodule   • History of right MCA stroke   • Stenosis of right internal carotid artery   • Abnormal chest x-ray   • Interstitial lung disease (CMS/HCC)   • Toxic encephalopathy due to anesthetics   • Weakness generalized   • Ascending aorta dilation (CMS/HCC)   • Nonrheumatic aortic valve stenosis   • Nonrheumatic mitral valve regurgitation   • Chronic diastolic congestive heart failure (CMS/HCC)   • PVD (peripheral vascular disease) with claudication (CMS/HCC)   • S/P TAVR (transcatheter aortic valve replacement)   • Premature atrial contractions       PAST MEDICAL HISTORY  Past Medical History:   Diagnosis Date   • Acute right MCA stroke (CMS/HCC) 07/24/2018 2001, 2018   • Anesthesia complication     TOXIC ENCEPHALOPATHY   • Aortic stenosis    • Aortic valve  stenosis    • Fracture, hip (CMS/HCC)     LEFT, CHIP ON TOP OF HIP D/T FALL 2 WEEKS POST OP   • Hemorrhoids 8/5/2018   • History of transfusion 2001    13 UNITS, HERE AT [facility]   • Hyperlipidemia    • Hypertension    • Hypokalemia    • 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    R LL            Dr FRANKIE Branch - suggested yearly CXR to Monitor   • Pressure sore     BUTTOCKS   • Pyelonephritis 4/16/2019   • Stenosis of right internal carotid artery 7/25/2018   • Subdural hematoma (CMS/HCC) 07/12/2018    Secondary to fall, JULY 2018   • Toxic encephalopathy 2019    POST OP LAST HIP SURGERY       SURGICAL HISTORY  Past Surgical History:   Procedure Laterality Date   • AORTIC VALVE REPAIR/REPLACEMENT N/A 12/3/2019    Procedure: TRACEY TRANSCAROTID TRANSCATHETER AORTIC VALVE REPLACEMENT;  Surgeon: Octaviano Yan MD;  Location: [facility] OR 18/19;  Service: Cardiothoracic   • AORTIC VALVE REPAIR/REPLACEMENT N/A 12/3/2019    Procedure: Transcarotid Transcatheter Aortic Valve Replacement w/Intra Op TRACEY and Possible Open Bailout Surgery;  Surgeon: Warren Tanner MD;  Location: [facility] OR 18/19;  Service: Cardiovascular   • CARDIAC CATHETERIZATION N/A 10/24/2019    Procedure: Coronary angiography;  Surgeon: Warren Tanner MD;  Location: [facility] CATH INVASIVE LOCATION;  Service: Cardiovascular   • CARDIAC CATHETERIZATION N/A 10/24/2019    Procedure: Left heart cath;  Surgeon: Warren Tanner MD;  Location: [facility] CATH INVASIVE LOCATION;  Service: Cardiovascular   • CARDIAC CATHETERIZATION N/A 10/24/2019    Procedure: Right heart cath;  Surgeon: Warren Tanner MD;  Location: [facility] CATH INVASIVE LOCATION;  Service: Cardiovascular   • CAROTID ENDARTERECTOMY Right 7/26/2018    Procedure: RT CAROTID ENDARTERECTOMY;  Surgeon: Inna Villarreal MD;  Location: [facility] OR;  Service: Vascular   • COLONOSCOPY N/A 8/7/2018    Adenomatous polyp.   Repeat 2021.  Surgeon: Ken Tidwell MD;    • HYSTERECTOMY     •  THYROIDECTOMY, PARTIAL     • TOTAL HIP ARTHROPLASTY Right    • TOTAL HIP ARTHROPLASTY Left 2019    Procedure: LEFT HIP ANTERIOR HIP WITH HANA TABLE;  Surgeon: Tiffani Pereira MD;  Location: [facility];  Service: Orthopedics       SOCIAL HISTORY  Social History     Socioeconomic History   • Marital status:      Spouse name: Not on file   • Number of children: 4   • Years of education: 12   • Highest education level: High school graduate   Tobacco Use   • Smoking status: Former Smoker     Packs/day: 0.50     Years: 43.00     Pack years: 21.50     Types: Cigarettes     Start date:      Quit date:      Years since quittin.0   • Smokeless tobacco: Never Used   • Tobacco comment: CAFFEINE USE: 1 CUP TEA  AND PEPSI DAILY   Substance and Sexual Activity   • Alcohol use: Not Currently     Alcohol/week: 7.0 standard drinks     Types: 7 Cans of beer per week   • Drug use: No   • Sexual activity: Defer       FAMILY HISTORY  Family History   Problem Relation Age of Onset   • Cancer Mother    • Dementia Father    • Hypertension Father    • Hypertension Daughter    • Cancer Daughter    • Malig Hyperthermia Neg Hx          Examiner was wearing KN95 mask, face shield and exam gloves during the entire duration of the visit. Patient was masked the entire time.   Minimum social distance of 6 ft maintained entire visit except if physical contact was necessary as documented.     **Dragon Disclaimer:   Much of this encounter note is an electronic transcription/translation of spoken language to printed text. The electronic translation of spoken language may permit erroneous, or at times, nonsensical words or phrases to be inadvertently transcribed. Although I have reviewed the note for such errors, some may still exist.

## 2020-12-29 LAB
BASOPHILS # BLD AUTO: 0.02 10*3/MM3 (ref 0–0.2)
BASOPHILS NFR BLD AUTO: 0.3 % (ref 0–1.5)
EOSINOPHIL # BLD AUTO: 0.01 10*3/MM3 (ref 0–0.4)
EOSINOPHIL NFR BLD AUTO: 0.1 % (ref 0.3–6.2)
ERYTHROCYTE [DISTWIDTH] IN BLOOD BY AUTOMATED COUNT: 14.2 % (ref 12.3–15.4)
HCT VFR BLD AUTO: 28.7 % (ref 34–46.6)
HGB BLD-MCNC: 9.2 G/DL (ref 12–15.9)
IMM GRANULOCYTES # BLD AUTO: 0.04 10*3/MM3 (ref 0–0.05)
IMM GRANULOCYTES NFR BLD AUTO: 0.5 % (ref 0–0.5)
LYMPHOCYTES # BLD AUTO: 0.75 10*3/MM3 (ref 0.7–3.1)
LYMPHOCYTES NFR BLD AUTO: 10.1 % (ref 19.6–45.3)
MCH RBC QN AUTO: 27.3 PG (ref 26.6–33)
MCHC RBC AUTO-ENTMCNC: 32.1 G/DL (ref 31.5–35.7)
MCV RBC AUTO: 85.2 FL (ref 79–97)
MONOCYTES # BLD AUTO: 0.69 10*3/MM3 (ref 0.1–0.9)
MONOCYTES NFR BLD AUTO: 9.3 % (ref 5–12)
NEUTROPHILS # BLD AUTO: 5.94 10*3/MM3 (ref 1.7–7)
NEUTROPHILS NFR BLD AUTO: 79.7 % (ref 42.7–76)
NRBC BLD AUTO-RTO: 0 /100 WBC (ref 0–0.2)
PLATELET # BLD AUTO: 310 10*3/MM3 (ref 140–450)
RBC # BLD AUTO: 3.37 10*6/MM3 (ref 3.77–5.28)
VIT B12 SERPL-MCNC: 611 PG/ML (ref 211–946)
WBC # BLD AUTO: 7.45 10*3/MM3 (ref 3.4–10.8)

## 2021-01-04 ENCOUNTER — TELEPHONE (OUTPATIENT)
Dept: INTERNAL MEDICINE | Age: 86
End: 2021-01-04

## 2021-01-04 NOTE — TELEPHONE ENCOUNTER
Provider: LEVON LAM  Caller: AC HILL  Relationship to Patient: DAUGHTER  Pharmacy [facility]  Phone Number: [phone]    Reason for Call: PATIENT'S DAUGHTER CALLED TO TELL DOCTOR GENARO PATIENT'S BLOOD PRESSURE HAS ELEVATED QUICKLY SINCE SHE WAS TOLD TO STOP TAKING ALL HYPERTENSION MEDICATION. BLOOD PRESSURE READ 199/96 AT 2:30PM TODAY.  PLEASE ADVISE IMMEDIATELY.     When was the patient last seen: 12/29/2020

## 2021-01-05 ENCOUNTER — OFFICE VISIT (OUTPATIENT)
Dept: CARDIOLOGY | Facility: CLINIC | Age: 86
End: 2021-01-05

## 2021-01-05 VITALS
WEIGHT: 117.8 LBS | RESPIRATION RATE: 18 BRPM | BODY MASS INDEX: 20.87 KG/M2 | HEART RATE: 66 BPM | DIASTOLIC BLOOD PRESSURE: 85 MMHG | HEIGHT: 63 IN | OXYGEN SATURATION: 98 % | SYSTOLIC BLOOD PRESSURE: 160 MMHG

## 2021-01-05 DIAGNOSIS — Z95.2 S/P TAVR (TRANSCATHETER AORTIC VALVE REPLACEMENT): ICD-10-CM

## 2021-01-05 DIAGNOSIS — R60.0 BILATERAL LOWER EXTREMITY EDEMA: ICD-10-CM

## 2021-01-05 DIAGNOSIS — I10 ESSENTIAL HYPERTENSION: Primary | ICD-10-CM

## 2021-01-05 PROBLEM — I95.0 IDIOPATHIC HYPOTENSION: Status: ACTIVE | Noted: 2021-01-05

## 2021-01-05 PROCEDURE — 99214 OFFICE O/P EST MOD 30 MIN: CPT | Performed by: NURSE PRACTITIONER

## 2021-01-05 PROCEDURE — 93000 ELECTROCARDIOGRAM COMPLETE: CPT | Performed by: NURSE PRACTITIONER

## 2021-01-05 RX ORDER — LOSARTAN POTASSIUM 100 MG/1
100 TABLET ORAL DAILY
Qty: 30 TABLET | Refills: 3 | Status: SHIPPED | OUTPATIENT
Start: 2021-01-05 | End: 2021-02-08 | Stop reason: HOSPADM

## 2021-01-05 RX ORDER — CLOPIDOGREL BISULFATE 75 MG/1
TABLET ORAL
Qty: 90 TABLET | Refills: 1 | Status: SHIPPED | OUTPATIENT
Start: 2021-01-05 | End: 2021-02-08 | Stop reason: HOSPADM

## 2021-01-05 RX ORDER — FUROSEMIDE 20 MG/1
20 TABLET ORAL DAILY
Qty: 30 TABLET | Refills: 3 | Status: SHIPPED | OUTPATIENT
Start: 2021-01-05 | End: 2021-02-08 | Stop reason: HOSPADM

## 2021-01-05 NOTE — PROGRESS NOTES
Date of Office Visit: 2021  Encounter Provider: CYNTHIA Baltazar  Place of Service: [facility] CARDIOLOGY  Patient Name: Gita Wharton  :1935    Chief Complaint   Patient presents with   • Hypertension     FOLLOW UP   :     HPI: Gita Wharton is a 85 y.o. female who presents today for follow-up.  Old records been obtained and reviewed by me.  She is a patient of Dr. Tanner's with a past cardiac history significant for hypertension and aortic stenosis status post TAVR (in 2019).  In , she suffered a subdural hematoma following a fall.  She was diagnosed with a MCA stroke and found to have severe carotid disease for which she underwent a right endarterectomy.   In 2020, she had multiple ER presentations for generalized weakness and falls.  CT of the head revealed no acute abnormalities.  She was subsequently seen in our office for complaints of leg swelling, shortness of breath, and just not feeling herself.  She ended up undergoing a pretty extensive evaluation.  She had an echocardiogram revealing normal LV function, a well-functioning TAVR valve with peak and mean gradients within defined limits, and no other significant valvular abnormalities.  Lower extremity Doppler revealed no evidence of DVT.  She did have chronic left lower extremity superficial thrombophlebitis in the small saphenous.  She wore a Holter monitor revealing sinus rhythm with an average heart rate of 67, 108 episodes of SVT with the longest lasting 18 seconds, and a mild burden (1.6%) of PACs.  She underwent a carotid ultrasound revealing a normal right and mild stenosis of the left.  Subsequently, she returned to the ER for continued complaints of weakness and her Lasix was decreased for low blood pressure.  We felt her cardiac status was stable and recommended she follow-up in 6 months.   Since that time she has been following closely with her PCP for low blood  pressure.  Ultimately, all of her antihypertensive medications were held and she was advised to follow-up in our office.   Overall she feels well.  She denies any chest pain, shortness of breath, palpitations, symptoms, or syncope.  She denies any bleeding to Cold-Eeze or melena.  She is currently living with her daughter but is hoping to return to her own home soon.  She has been having a terrible time with her blood pressure.  At home it has been anywhere from the 160s to the 200s systolic.  Additionally, she is having lower extremity swelling, greater on the right than the left.  She is very concerned about her anemia and is asking all kinds of questions about pernicious anemia specifically.      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    R LL            Dr FRANKIE Branch - suggested yearly CXR to Monitor   • Pressure sore     BUTTOCKS   • Pyelonephritis 4/16/2019   • Stenosis of right internal carotid artery 7/25/2018   • Subdural hematoma (CMS/HCC) 07/12/2018    Secondary to fall, JULY 2018   • Toxic encephalopathy 2019    POST OP LAST HIP SURGERY       Past Surgical History:   Procedure Laterality Date   • AORTIC VALVE REPAIR/REPLACEMENT N/A 12/3/2019    Procedure: TRACEY TRANSCAROTID TRANSCATHETER AORTIC VALVE REPLACEMENT;  Surgeon: Octaviano Yan MD;  Location: [facility] OR 18/19;  Service: Cardiothoracic   • AORTIC VALVE REPAIR/REPLACEMENT N/A 12/3/2019    Procedure: Transcarotid Transcatheter Aortic Valve Replacement w/Intra Op TRACEY and Possible Open Bailout Surgery;  Surgeon: Warren Tanner  MD;  Location: [facility] OR ;  Service: Cardiovascular   • CARDIAC CATHETERIZATION N/A 10/24/2019    Procedure: Coronary angiography;  Surgeon: Warren Tanner MD;  Location: [facility] CATH INVASIVE LOCATION;  Service: Cardiovascular   • CARDIAC CATHETERIZATION N/A 10/24/2019    Procedure: Left heart cath;  Surgeon: Warren Tanner MD;  Location: [facility] CATH INVASIVE LOCATION;  Service: Cardiovascular   • CARDIAC CATHETERIZATION N/A 10/24/2019    Procedure: Right heart cath;  Surgeon: Warren Tanner MD;  Location: [facility] CATH INVASIVE LOCATION;  Service: Cardiovascular   • CAROTID ENDARTERECTOMY Right 2018    Procedure: RT CAROTID ENDARTERECTOMY;  Surgeon: Inna Villarreal MD;  Location: [facility] MAIN OR;  Service: Vascular   • COLONOSCOPY N/A 2018    Adenomatous polyp.   Repeat .  Surgeon: Ken Tidwell MD;    • HYSTERECTOMY     • THYROIDECTOMY, PARTIAL     • TOTAL HIP ARTHROPLASTY Right    • TOTAL HIP ARTHROPLASTY Left 2019    Procedure: LEFT HIP ANTERIOR HIP WITH HANA TABLE;  Surgeon: Tiffani Pereira MD;  Location: [facility] MAIN OR;  Service: Orthopedics       Social History     Socioeconomic History   • Marital status:      Spouse name: Not on file   • Number of children: 4   • Years of education: 12   • Highest education level: High school graduate   Tobacco Use   • Smoking status: Former Smoker     Packs/day: 0.50     Years: 43.00     Pack years: 21.50     Types: Cigarettes     Start date:      Quit date:      Years since quittin.0   • Smokeless tobacco: Never Used   • Tobacco comment: CAFFEINE USE: 1 CUP TEA  AND 2 PEPSI DAILY   Substance and Sexual Activity   • Alcohol use: Not Currently     Alcohol/week: 7.0 standard drinks     Types: 7 Cans of beer per week   • Drug use: No   • Sexual activity: Defer       Family History   Problem Relation Age of Onset   • Cancer Mother    • Dementia Father    • Hypertension Father    • Hypertension Daughter   "  • Cancer Daughter    • Malig Hyperthermia Neg Hx        Review of Systems   Cardiovascular: Positive for leg swelling. Negative for chest pain, dyspnea on exertion, orthopnea, palpitations and paroxysmal nocturnal dyspnea.   Hematologic/Lymphatic: Negative for bleeding problem.   Musculoskeletal: Negative for falls.   Gastrointestinal: Negative for melena.       Allergies   Allergen Reactions   • Tekturna [Aliskiren] Diarrhea         Current Outpatient Medications:   •  Acetaminophen (TYLENOL 8 HOUR ARTHRITIS PAIN PO), Take 1 tablet by mouth Daily., Disp: , Rfl:   •  aspirin 81 MG EC tablet, Take 1 tablet by mouth Daily., Disp: , Rfl:   •  atorvastatin (LIPITOR) 20 MG tablet, Take 1 tablet by mouth Daily., Disp: 90 tablet, Rfl: 1  •  clopidogrel (PLAVIX) 75 MG tablet, TAKE 1 TABLET BY MOUTH EVERY DAY, Disp: 90 tablet, Rfl: 1  •  diphenhydrAMINE (BENADRYL) 25 mg capsule, Take 12.5 mg by mouth Daily As Needed for Itching., Disp: , Rfl:   •  docusate sodium (Colace) 100 MG capsule, Take 2 capsules by mouth Daily., Disp: , Rfl:   •  ferrous sulfate 325 (65 FE) MG tablet, Take 1 tablet by mouth Daily With Breakfast., Disp: 90 tablet, Rfl: 0  •  metoprolol tartrate (LOPRESSOR) 50 MG tablet, Take 1 tablet by mouth 2 (Two) Times a Day., Disp: 180 tablet, Rfl: 3  •  Multiple Vitamins-Minerals (CENTRUM ADULTS) tablet, Take 1 tablet by mouth Daily., Disp: , Rfl:   •  furosemide (LASIX) 20 MG tablet, Take 1 tablet by mouth Daily., Disp: 30 tablet, Rfl: 3  •  losartan (Cozaar) 100 MG tablet, Take 1 tablet by mouth Daily., Disp: 30 tablet, Rfl: 3      Objective:     Vitals:    01/05/21 1303 01/05/21 1320 01/05/21 1321   BP: 162/68 162/68 160/85   BP Location: Right arm Left arm    Patient Position: Sitting Sitting    Pulse: 66     Resp: 18     SpO2: 98%     Weight: 53.4 kg (117 lb 12.8 oz)     Height: 160 cm (63\")       Body mass index is 20.87 kg/m².    PHYSICAL EXAM:    Pulmonary:      Effort: Pulmonary effort is normal.      " Breath sounds: Normal breath sounds.   Cardiovascular:      Bradycardia present. Regular rhythm.      Murmurs: There is a grade 1/6 harsh midsystolic murmur at the URSB, radiating to the neck.      No gallop. No click. No rub.   Edema:     Peripheral edema (BLE R > L) present.          ECG 12 Lead    Date/Time: 1/5/2021 1:29 PM  Performed by: Cherelle Vela APRN  Authorized by: Cherelle Vela APRN   Comparison: compared with previous ECG from 11/13/2020  Similar to previous ECG  Rhythm: sinus bradycardia  Rate: bradycardic  BPM: 57  T inversion: V2  Other findings: non-specific ST-T wave changes    Clinical impression: abnormal EKG  Comments: Indication: Hypertension              Assessment:       Diagnosis Plan   1. Essential hypertension  ECG 12 Lead    Basic Metabolic Panel   2. Bilateral lower extremity edema  Basic Metabolic Panel   3. S/P TAVR (transcatheter aortic valve replacement)       Orders Placed This Encounter   Procedures   • Basic Metabolic Panel     Standing Status:   Future     Standing Expiration Date:   1/5/2022   • ECG 12 Lead     This order was created via procedure documentation          Plan:       Overall I think she is stable.  She denies any symptoms of angina or heart failure.  Recent echocardiogram revealed normal LV function and a well-functioning TAVR valve.  Her blood pressure is not well controlled.  I discussed the plan of care with Dr. Tanner.  I am going to restart losartan 100 mg daily as well as Lasix 20 mg daily.  She will return in 2 weeks for a BMP and a follow-up appointment with me.  I had a discussion with her regarding management of her blood pressure.  It is important moving forward that only one person is making changes to her blood pressure medication.  She agrees and understands.  Regarding her anemia, I recommended she follow-up with her PCP.  Further recommendations will be made when she follows up with me in 2 weeks.      As always, it has been a  pleasure to participate in your patient's care.      Sincerely,         CYNTHIA Goel

## 2021-01-05 NOTE — TELEPHONE ENCOUNTER
Contact patient.     If she has not already done so, have her take her losartan 100mg today.     Since she is seeing cardiology today in office, they can recheck her BP and decide what needs to be changed/restarted/etc.

## 2021-01-05 NOTE — TELEPHONE ENCOUNTER
PATIENT'S DAUGHTER CALLED TO LET LEVON LAM KNOW PATIENT'S BLOOD PRESSURE /96 /74 LAST NIGHT SO SHE GAVE HER ONE LOSARTIN 100MG TO BRING IT DOWN /85.    PATIENT'S BLOOD PRESSURE /94. PATIENT HAS A CARDIOLOGIST APPOINTMENT THIS AFTERNOON AT 1PM BUT PATIENT'S DAUGHTER NEEDS ADVICE ON WHAT TO DO ABOUT PATIENT'S BLOOD PRESSURE UNTIL THEY RETURN TO PCP NEXT WEEK.    PLEASE ADVISE.    CONTACT: [phone] (H)

## 2021-01-07 ENCOUNTER — TELEPHONE (OUTPATIENT)
Dept: INTERNAL MEDICINE | Age: 86
End: 2021-01-07

## 2021-01-07 NOTE — TELEPHONE ENCOUNTER
PATIENT HAD AN APPT WITH THE CARDIOLOGIST AND HE IS HAVING THEM FOLLOW UP IN TWO WEEKS. THEY ADDED BP MEDS BACK TO THE PATIENTS REGIMEN AND THEY ARE GOING TO DO LABS ON THE 21. AND THEN FOLLOWING UP WITH HIM.  DO THEY NEED TO COME IN TO SEE YOU ON THE 14? OR CAN THEY CANCEL IT.  PLEASE CALL [phone]

## 2021-01-14 ENCOUNTER — OFFICE VISIT (OUTPATIENT)
Dept: INTERNAL MEDICINE | Age: 86
End: 2021-01-14

## 2021-01-14 VITALS
TEMPERATURE: 97.1 F | HEIGHT: 63 IN | DIASTOLIC BLOOD PRESSURE: 70 MMHG | OXYGEN SATURATION: 98 % | HEART RATE: 70 BPM | WEIGHT: 117 LBS | SYSTOLIC BLOOD PRESSURE: 140 MMHG | BODY MASS INDEX: 20.73 KG/M2

## 2021-01-14 DIAGNOSIS — I10 ESSENTIAL (PRIMARY) HYPERTENSION: ICD-10-CM

## 2021-01-14 DIAGNOSIS — D64.9 ANEMIA, UNSPECIFIED TYPE: ICD-10-CM

## 2021-01-14 DIAGNOSIS — R41.3 MEMORY CHANGES: ICD-10-CM

## 2021-01-14 DIAGNOSIS — I10 ESSENTIAL HYPERTENSION: Primary | ICD-10-CM

## 2021-01-14 DIAGNOSIS — R53.83 OTHER FATIGUE: ICD-10-CM

## 2021-01-14 PROCEDURE — 99214 OFFICE O/P EST MOD 30 MIN: CPT | Performed by: NURSE PRACTITIONER

## 2021-01-14 RX ORDER — ATORVASTATIN CALCIUM 20 MG/1
TABLET, FILM COATED ORAL
Qty: 90 TABLET | Refills: 1 | Status: SHIPPED | OUTPATIENT
Start: 2021-01-14

## 2021-01-14 NOTE — PROGRESS NOTES
"[facility] INTERNAL MEDICINE  Deanna Wharton / 85 y.o. / female  01/14/2021      ASSESSMENT & PLAN:    Problem List Items Addressed This Visit        Cardiac and Vasculature    Essential hypertension - Primary    Relevant Medications    metoprolol tartrate (LOPRESSOR) 50 MG tablet    furosemide (LASIX) 20 MG tablet    losartan (Cozaar) 100 MG tablet    Other Relevant Orders    Basic metabolic panel      Other Visit Diagnoses     Anemia, unspecified type        Relevant Orders    CBC & Differential    Ferritin    Other fatigue        Relevant Orders    Basic metabolic panel    CBC & Differential    TSH+Free T4    Ferritin    Memory changes            Orders Placed This Encounter   Procedures   • Basic metabolic panel   • TSH+Free T4   • Ferritin   • CBC & Differential     No orders of the defined types were placed in this encounter.      Summary/Discussion:    1. Essential hypertension  Blood pressure is much better.  Will defer all management of antihypertensives to cardiology.    - Basic metabolic panel    2. Anemia, unspecified type  Recheck CBC and iron levels today.  Recent B12 level within normal limits.    May need to consider referral to hematology pending results.   Possible anemia chronic disease r/t other comorbidities. Family history of pernicious anemia, will attempt to add on homocysteine, MMA levels.     - CBC & Differential  - Ferritin    3. Other fatigue    - Basic metabolic panel  - CBC & Differential  - TSH+Free T4  - Ferritin    4. Memory changes  Discussed with daughter and patient recommendations for formal neuropsychiatry evaluation.  At this time, both patient and daughter would like to \"put this on the back burner \"but will consider referral\" if no other cause found for her recent memory changes.         No follow-ups on file.    ____________________________________________________________________    MEDICATIONS  Current Outpatient Medications   Medication Sig Dispense " "Refill   • Acetaminophen (TYLENOL 8 HOUR ARTHRITIS PAIN PO) Take 1 tablet by mouth Daily.     • aspirin 81 MG EC tablet Take 1 tablet by mouth Daily.     • atorvastatin (LIPITOR) 20 MG tablet TAKE 1 TABLET BY MOUTH EVERY DAY 90 tablet 1   • clopidogrel (PLAVIX) 75 MG tablet TAKE 1 TABLET BY MOUTH EVERY DAY 90 tablet 1   • diphenhydrAMINE (BENADRYL) 25 mg capsule Take 12.5 mg by mouth Daily As Needed for Itching.     • docusate sodium (Colace) 100 MG capsule Take 2 capsules by mouth Daily.     • ferrous sulfate 325 (65 FE) MG tablet Take 1 tablet by mouth Daily With Breakfast. 90 tablet 0   • furosemide (LASIX) 20 MG tablet Take 1 tablet by mouth Daily. 30 tablet 3   • losartan (Cozaar) 100 MG tablet Take 1 tablet by mouth Daily. 30 tablet 3   • metoprolol tartrate (LOPRESSOR) 50 MG tablet Take 1 tablet by mouth 2 (Two) Times a Day. 180 tablet 3   • Multiple Vitamins-Minerals (CENTRUM ADULTS) tablet Take 1 tablet by mouth Daily.       No current facility-administered medications for this visit.           VITALS:    Visit Vitals  /70   Pulse 70   Temp 97.1 °F (36.2 °C) (Temporal)   Ht 160 cm (62.99\")   Wt 53.1 kg (117 lb)   LMP  (LMP Unknown)   SpO2 98%   BMI 20.73 kg/m²       BP Readings from Last 3 Encounters:   01/14/21 140/70   01/05/21 160/85   12/28/20 102/44     Wt Readings from Last 3 Encounters:   01/14/21 53.1 kg (117 lb)   01/05/21 53.4 kg (117 lb 12.8 oz)   12/28/20 52.7 kg (116 lb 3.2 oz)      Body mass index is 20.73 kg/m².    CC:  Main reason(s) for today's visit: Anemia and Hypertension      HPI:     Patient presents today in office with daughter to follow-up with me regarding recent medical issues.  She has been seen by another nurse practitioner in my office multiple times in the past few weeks due to issues with her blood pressure as well as anemia.    She was recently seen by cardiology and hypertension medications adjusted, reports BP at home much better. Follow up scheduled next week. " "    Anemia: Patient presents for presents evaluation of anemia. Anemia was found by routine CBC.  It has been present for several years.  Associated signs & symptoms: family history of anemia, fatigue.  Patient reports that multiple family members have a history of pernicious anemia and required injectable B12 supplementation.  She has had recent iron, B12 levels checked.  Ferritin and B12 are within acceptable range, although low normal on her B12.  She is currently taking B12 supplementation.  She has not noticed any blood in stool.  She had a negative occult stool test recently.   Reports \"every time I go into the hospital, my blood level drops\".     Daughter did vocalize some's concerns about patient having some subacute short-term memory loss.  Patient does live alone, although for the past few weeks she has been living with her daughter.  She is eager to get back into her own home and back to her normal routines.  Family history positive for dementia in her father, although that is not known if this is Alzheimer's type dementia.   She did have a recent MRI as ordered by cardiology for memory changes and recurrent falls, which showed some chronic changes related to previous stroke, but nothing acute.     Patient Care Team:  Deanna Ortega APRN as PCP - General (Internal Medicine)  Warren Tanner MD as Consulting Physician (Cardiology)  Octaviano Yan MD as Surgeon (Cardiothoracic Surgery)    ____________________________________________________________________    REVIEW OF SYSTEMS    Review of Systems   Constitutional: Positive for fatigue. Negative for activity change, appetite change and unexpected weight change.   HENT: Negative for tinnitus.    Eyes: Negative for visual disturbance.   Respiratory: Negative for cough, chest tightness and shortness of breath.    Cardiovascular: Negative for chest pain, palpitations and leg swelling.   Neurological: Negative for dizziness, light-headedness and " headaches.   Psychiatric/Behavioral: Negative for confusion, self-injury, sleep disturbance and suicidal ideas. The patient is not hyperactive.         Memory changes noted per daughter         PHYSICAL EXAMINATION    Physical Exam  Vitals signs and nursing note reviewed.   Constitutional:       Appearance: She is well-developed.   Neurological:      Mental Status: She is alert and oriented to person, place, and time. She is not disoriented.   Psychiatric:         Attention and Perception: She is attentive.         Speech: Speech normal.         Behavior: Behavior normal. Behavior is cooperative.         Thought Content: Thought content normal.         Judgment: Judgment normal.         REVIEWED DATA:    Labs:     Lab Results   Component Value Date     (L) 12/17/2020    K 4.8 12/17/2020    AST 18 12/17/2020    ALT 12 12/17/2020    BUN 16 12/17/2020    CREATININE 1.10 (H) 12/17/2020    CREATININE 1.34 (H) 12/03/2020    CREATININE 1.23 (H) 11/19/2020    EGFRIFNONA 47 (L) 12/17/2020    EGFRIFAFRI 57 (L) 12/17/2020       Lab Results   Component Value Date    HGBA1C 5.38 11/29/2019    HGBA1C 5.37 06/27/2019    HGBA1C 4.90 07/25/2018    GLUCOSE 109 (H) 12/03/2020    GLUCOSE 102 (H) 11/19/2020    GLUCOSE 117 (H) 11/09/2020       Lab Results   Component Value Date    LDL 81 03/02/2020    LDL 68 02/28/2019    LDL 57 11/19/2018    HDL 49 03/02/2020    HDL 62 (H) 02/28/2019    HDL 64 (H) 11/19/2018    TRIG 136 03/02/2020    TRIG 72 02/28/2019    TRIG 87 11/19/2018    CHOLHDLRATIO 3.20 03/02/2020       Lab Results   Component Value Date    TSH 4.700 (H) 12/17/2020    FREET4 1.09 12/17/2020       Lab Results   Component Value Date    WBC 7.45 12/29/2020    HGB 9.2 (L) 12/29/2020    HGB 9.2 (L) 12/21/2020    HGB 9.4 (L) 12/17/2020     12/29/2020       Lab Results   Component Value Date    PROTEIN 1+ (A) 03/23/2020    GLUCOSEU Negative 12/03/2020    BLOODU Negative 12/03/2020    NITRITEU Negative 12/03/2020     LEUKOCYTESUR Negative 12/03/2020       Imaging:     MRI OF THE BRAIN WITH AND WITHOUT CONTRAST     CLINICAL HISTORY: Multiple falls, left arm numbness, tingling, and  coolness.     MRI of the brain is obtained with sagittal T1, axial pre and  postgadolinium T1, coronal postgadolinium T1, axial FLAIR, axial T2,  axial diffusion, and axial gradient echo images.     Comparison is made to previous MRI of the brain dated 07/24/2018.     FINDINGS: There are moderate changes of chronic small vessel ischemic  phenomena. Similar findings are noted on the prior examination.  Additionally, on the prior study, there were multiple areas of acute  infarction seen within the superior aspects of the right frontal and  parietal lobes within the watershed zone of the right MCA and SANCHEZ.  Additional punctate areas were seen within the right occipital lobe  within the watershed zone of the right MCA and PCA. These areas of  punctate infarction have evolved to a chronic state in the interval  since the prior exam. Additionally, there are chronic infarcts seen  within both cerebellar hemispheres which were also identified on the  prior exam. Old lacunar disease is seen within the posterior aspect of  the right basis pontis again unchanged in appearance.     The previously identified subdural collection lateral to the right  cerebral hemisphere is no longer identified. There are no abnormal areas  of restricted diffusion. There are no abnormal areas of susceptibility  artifact. There are no abnormal areas of contrast enhancement. Midline  intracranial anatomy is within normal limits. The major intracranial  flow related signal voids are within normal limits.     Incidental note is made of bilateral mastoid effusions which were also  identified on the prior exam.     IMPRESSION:     No acute intracranial pathology is evident. Again noted are moderate  changes of chronic small vessel ischemic phenomena which were similarly  identified on the  prior exam. On the prior study, there were multiple  punctate areas of acute infarction seen within the watershed zone of the  right MCA and SANCHEZ as well as the right MCA and PCA within the right  frontal, parietal, and occipital lobes. These infarcts have evolved to a  chronic state. Old lacunar disease is again identified within the right  aspect of the basis pontis. Again noted are multiple subcentimeter  chronic infarcts within the cerebellar hemispheres.     This report was finalized on 12/8/2020 11:20 AM by Dr. Gonzalez Prince,    Medical Tests:         Summary of old records / correspondence / consultant report:         Request outside records:         ALLERGIES  Allergies   Allergen Reactions   • Tekturna [Aliskiren] Diarrhea        PFSH:     The following portions of the patient's history were reviewed and updated as appropriate: Allergies / Current Medications / Past Medical History / Surgical History / Social History / Family History    PROBLEM LIST   Patient Active Problem List   Diagnosis   • Essential hypertension   • Hyperlipidemia   • Lung nodule   • History of right MCA stroke   • Stenosis of right internal carotid artery   • Abnormal chest x-ray   • Interstitial lung disease (CMS/HCC)   • Toxic encephalopathy due to anesthetics   • Weakness generalized   • Ascending aorta dilation (CMS/HCC)   • Nonrheumatic aortic valve stenosis   • Nonrheumatic mitral valve regurgitation   • Chronic diastolic congestive heart failure (CMS/HCC)   • PVD (peripheral vascular disease) with claudication (CMS/HCC)   • S/P TAVR (transcatheter aortic valve replacement)   • Premature atrial contractions   • Idiopathic hypotension   • Bilateral lower extremity edema       PAST MEDICAL HISTORY  Past Medical History:   Diagnosis Date   • Acute right MCA stroke (CMS/HCC) 07/24/2018 2001, 2018   • Anesthesia complication     TOXIC ENCEPHALOPATHY   • Aortic stenosis    • Aortic valve stenosis    • Fracture, hip (CMS/HCC)      LEFT, CHIP ON TOP OF HIP D/T FALL 2 WEEKS POST OP   • Hemorrhoids 8/5/2018   • History of transfusion 2001    13 UNITS, HERE AT [facility]   • Hyperlipidemia    • Hypertension    • Hypokalemia    • 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    R LL            Dr FRANKIE Branch - suggested yearly CXR to Monitor   • Pressure sore     BUTTOCKS   • Pyelonephritis 4/16/2019   • Stenosis of right internal carotid artery 7/25/2018   • Subdural hematoma (CMS/HCC) 07/12/2018    Secondary to fall, JULY 2018   • Toxic encephalopathy 2019    POST OP LAST HIP SURGERY       SURGICAL HISTORY  Past Surgical History:   Procedure Laterality Date   • AORTIC VALVE REPAIR/REPLACEMENT N/A 12/3/2019    Procedure: TRACEY TRANSCAROTID TRANSCATHETER AORTIC VALVE REPLACEMENT;  Surgeon: Octaviano Yan MD;  Location: [facility] OR 18/19;  Service: Cardiothoracic   • AORTIC VALVE REPAIR/REPLACEMENT N/A 12/3/2019    Procedure: Transcarotid Transcatheter Aortic Valve Replacement w/Intra Op TRACEY and Possible Open Bailout Surgery;  Surgeon: Warren Tanner MD;  Location: [facility] OR 18/19;  Service: Cardiovascular   • CARDIAC CATHETERIZATION N/A 10/24/2019    Procedure: Coronary angiography;  Surgeon: Warren Tanner MD;  Location: [facility] CATH INVASIVE LOCATION;  Service: Cardiovascular   • CARDIAC CATHETERIZATION N/A 10/24/2019    Procedure: Left heart cath;  Surgeon: Warren Tanner MD;  Location: [facility] CATH INVASIVE LOCATION;  Service: Cardiovascular   • CARDIAC CATHETERIZATION N/A 10/24/2019    Procedure: Right heart cath;  Surgeon: Warren Tanner MD;  Location: [facility] CATH INVASIVE LOCATION;  Service: Cardiovascular   • CAROTID ENDARTERECTOMY Right 7/26/2018    Procedure: RT CAROTID ENDARTERECTOMY;  Surgeon: Inna Villarreal MD;  Location: [facility] MAIN OR;  Service: Vascular   • COLONOSCOPY N/A 8/7/2018    Adenomatous polyp.   Repeat 2021.  Surgeon: Ken Tidwell MD;    • HYSTERECTOMY     • THYROIDECTOMY, PARTIAL     • TOTAL HIP  ARTHROPLASTY Right    • TOTAL HIP ARTHROPLASTY Left 2019    Procedure: LEFT HIP ANTERIOR HIP WITH HANA TABLE;  Surgeon: Tiffani Pereira MD;  Location: [facility];  Service: Orthopedics       SOCIAL HISTORY  Social History     Socioeconomic History   • Marital status:      Spouse name: Not on file   • Number of children: 4   • Years of education: 12   • Highest education level: High school graduate   Tobacco Use   • Smoking status: Former Smoker     Packs/day: 0.50     Years: 43.00     Pack years: 21.50     Types: Cigarettes     Start date:      Quit date:      Years since quittin.0   • Smokeless tobacco: Never Used   • Tobacco comment: CAFFEINE USE: 1 CUP TEA  AND 2 PEPSI DAILY   Substance and Sexual Activity   • Alcohol use: Not Currently     Alcohol/week: 7.0 standard drinks     Types: 7 Cans of beer per week   • Drug use: No   • Sexual activity: Defer       FAMILY HISTORY  Family History   Problem Relation Age of Onset   • Cancer Mother    • Dementia Father    • Hypertension Father    • Hypertension Daughter    • Cancer Daughter    • Malig Hyperthermia Neg Hx          **Dragon Disclaimer:   Much of this encounter note is an electronic transcription/translation of spoken language to printed text. The electronic translation of spoken language may permit erroneous, or at times, nonsensical words or phrases to be inadvertently transcribed. Although I have reviewed the note for such errors, some may still exist.

## 2021-01-20 LAB
BASOPHILS # BLD AUTO: 0.02 10*3/MM3 (ref 0–0.2)
BASOPHILS NFR BLD AUTO: 0.2 % (ref 0–1.5)
BUN SERPL-MCNC: 21 MG/DL (ref 8–23)
BUN/CREAT SERPL: 19.6 (ref 7–25)
CALCIUM SERPL-MCNC: 9.2 MG/DL (ref 8.6–10.5)
CHLORIDE SERPL-SCNC: 101 MMOL/L (ref 98–107)
CO2 SERPL-SCNC: 25.8 MMOL/L (ref 22–29)
CREAT SERPL-MCNC: 1.07 MG/DL (ref 0.57–1)
EOSINOPHIL # BLD AUTO: 0.02 10*3/MM3 (ref 0–0.4)
EOSINOPHIL NFR BLD AUTO: 0.2 % (ref 0.3–6.2)
ERYTHROCYTE [DISTWIDTH] IN BLOOD BY AUTOMATED COUNT: 13.5 % (ref 12.3–15.4)
FERRITIN SERPL-MCNC: 70.7 NG/ML (ref 13–150)
GLUCOSE SERPL-MCNC: 95 MG/DL (ref 65–99)
HCT VFR BLD AUTO: 32.4 % (ref 34–46.6)
HGB BLD-MCNC: 10.5 G/DL (ref 12–15.9)
IMM GRANULOCYTES # BLD AUTO: 0.05 10*3/MM3 (ref 0–0.05)
IMM GRANULOCYTES NFR BLD AUTO: 0.5 % (ref 0–0.5)
LYMPHOCYTES # BLD AUTO: 0.95 10*3/MM3 (ref 0.7–3.1)
LYMPHOCYTES NFR BLD AUTO: 9.9 % (ref 19.6–45.3)
Lab: NORMAL
MCH RBC QN AUTO: 27.9 PG (ref 26.6–33)
MCHC RBC AUTO-ENTMCNC: 32.4 G/DL (ref 31.5–35.7)
MCV RBC AUTO: 86.2 FL (ref 79–97)
METHYLMALONATE SERPL-SCNC: 312 NMOL/L (ref 0–378)
MONOCYTES # BLD AUTO: 0.79 10*3/MM3 (ref 0.1–0.9)
MONOCYTES NFR BLD AUTO: 8.3 % (ref 5–12)
NEUTROPHILS # BLD AUTO: 7.73 10*3/MM3 (ref 1.7–7)
NEUTROPHILS NFR BLD AUTO: 80.9 % (ref 42.7–76)
NRBC BLD AUTO-RTO: 0 /100 WBC (ref 0–0.2)
PLATELET # BLD AUTO: 268 10*3/MM3 (ref 140–450)
POTASSIUM SERPL-SCNC: 4.8 MMOL/L (ref 3.5–5.2)
RBC # BLD AUTO: 3.76 10*6/MM3 (ref 3.77–5.28)
SODIUM SERPL-SCNC: 138 MMOL/L (ref 136–145)
T4 FREE SERPL-MCNC: 1.25 NG/DL (ref 0.93–1.7)
TSH SERPL DL<=0.005 MIU/L-ACNC: 3.83 UIU/ML (ref 0.27–4.2)
WBC # BLD AUTO: 9.56 10*3/MM3 (ref 3.4–10.8)

## 2021-01-21 ENCOUNTER — APPOINTMENT (OUTPATIENT)
Dept: CARDIOLOGY | Facility: HOSPITAL | Age: 86
End: 2021-01-21

## 2021-01-21 ENCOUNTER — HOSPITAL ENCOUNTER (INPATIENT)
Facility: HOSPITAL | Age: 86
LOS: 17 days | Discharge: NURSING, REHAB OR LONG-TERM CARE | End: 2021-02-08
Attending: EMERGENCY MEDICINE | Admitting: HOSPITALIST

## 2021-01-21 ENCOUNTER — APPOINTMENT (OUTPATIENT)
Dept: CT IMAGING | Facility: HOSPITAL | Age: 86
End: 2021-01-21

## 2021-01-21 DIAGNOSIS — S22.42XA CLOSED FRACTURE OF MULTIPLE RIBS OF LEFT SIDE, INITIAL ENCOUNTER: ICD-10-CM
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
## 2021-01-21 DIAGNOSIS — S32.010A COMPRESSION FRACTURE OF L1 VERTEBRA, INITIAL ENCOUNTER (HCC): ICD-10-CM

## 2021-01-21 DIAGNOSIS — S30.1XXA CONTUSION OF FLANK, INITIAL ENCOUNTER: ICD-10-CM

## 2021-01-21 PROBLEM — J90 PLEURAL EFFUSION ON LEFT: Status: ACTIVE | Noted: 2021-01-21

## 2021-01-21 PROBLEM — D72.829 LEUKOCYTOSIS: Status: ACTIVE | Noted: 2021-01-21

## 2021-01-21 PROBLEM — R60.0 EDEMA LEG: Status: ACTIVE | Noted: 2021-01-21
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
## 2021-01-21 LAB
ALBUMIN SERPL-MCNC: 3.5 G/DL (ref 3.5–5.2)
ALBUMIN/GLOB SERPL: 1.1 G/DL
ALP SERPL-CCNC: 104 U/L (ref 39–117)
ALT SERPL W P-5'-P-CCNC: 8 U/L (ref 1–33)
ANION GAP SERPL CALCULATED.3IONS-SCNC: 9.7 MMOL/L (ref 5–15)
AST SERPL-CCNC: 15 U/L (ref 1–32)
BACTERIA UR QL AUTO: NORMAL /HPF
BASOPHILS # BLD AUTO: 0.03 10*3/MM3 (ref 0–0.2)
BASOPHILS NFR BLD AUTO: 0.3 % (ref 0–1.5)
BILIRUB SERPL-MCNC: 0.2 MG/DL (ref 0–1.2)
BILIRUB UR QL STRIP: NEGATIVE
BUN SERPL-MCNC: 24 MG/DL (ref 8–23)
BUN/CREAT SERPL: 21.1 (ref 7–25)
CALCIUM SPEC-SCNC: 8.6 MG/DL (ref 8.6–10.5)
CHLORIDE SERPL-SCNC: 101 MMOL/L (ref 98–107)
CLARITY UR: CLEAR
CO2 SERPL-SCNC: 26.3 MMOL/L (ref 22–29)
COLOR UR: YELLOW
CREAT SERPL-MCNC: 1.14 MG/DL (ref 0.57–1)
DEPRECATED RDW RBC AUTO: 41.8 FL (ref 37–54)
EOSINOPHIL # BLD AUTO: 0.04 10*3/MM3 (ref 0–0.4)
EOSINOPHIL NFR BLD AUTO: 0.3 % (ref 0.3–6.2)
ERYTHROCYTE [DISTWIDTH] IN BLOOD BY AUTOMATED COUNT: 13.5 % (ref 12.3–15.4)
GFR SERPL CREATININE-BSD FRML MDRD: 45 ML/MIN/1.73
GLOBULIN UR ELPH-MCNC: 3.2 GM/DL
GLUCOSE SERPL-MCNC: 101 MG/DL (ref 65–99)
GLUCOSE UR STRIP-MCNC: NEGATIVE MG/DL
HCT VFR BLD AUTO: 34 % (ref 34–46.6)
HGB BLD-MCNC: 10.7 G/DL (ref 12–15.9)
HGB UR QL STRIP.AUTO: NEGATIVE
HYALINE CASTS UR QL AUTO: NORMAL /LPF
IMM GRANULOCYTES # BLD AUTO: 0.07 10*3/MM3 (ref 0–0.05)
IMM GRANULOCYTES NFR BLD AUTO: 0.6 % (ref 0–0.5)
INR PPP: 0.96 (ref 0.9–1.1)
KETONES UR QL STRIP: NEGATIVE
LEUKOCYTE ESTERASE UR QL STRIP.AUTO: NEGATIVE
LIPASE SERPL-CCNC: 27 U/L (ref 13–60)
LYMPHOCYTES # BLD AUTO: 0.89 10*3/MM3 (ref 0.7–3.1)
LYMPHOCYTES NFR BLD AUTO: 7.5 % (ref 19.6–45.3)
MCH RBC QN AUTO: 26.6 PG (ref 26.6–33)
MCHC RBC AUTO-ENTMCNC: 31.5 G/DL (ref 31.5–35.7)
MCV RBC AUTO: 84.6 FL (ref 79–97)
MONOCYTES # BLD AUTO: 0.87 10*3/MM3 (ref 0.1–0.9)
MONOCYTES NFR BLD AUTO: 7.3 % (ref 5–12)
NEUTROPHILS NFR BLD AUTO: 10.01 10*3/MM3 (ref 1.7–7)
NEUTROPHILS NFR BLD AUTO: 84 % (ref 42.7–76)
NITRITE UR QL STRIP: NEGATIVE
NRBC BLD AUTO-RTO: 0 /100 WBC (ref 0–0.2)
PH UR STRIP.AUTO: 6.5 [PH] (ref 5–8)
PLATELET # BLD AUTO: 258 10*3/MM3 (ref 140–450)
PMV BLD AUTO: 11.6 FL (ref 6–12)
POTASSIUM SERPL-SCNC: 4.4 MMOL/L (ref 3.5–5.2)
PROT SERPL-MCNC: 6.7 G/DL (ref 6–8.5)
PROT UR QL STRIP: ABNORMAL
PROTHROMBIN TIME: 12.6 SECONDS (ref 11.7–14.2)
QT INTERVAL: 360 MS
RBC # BLD AUTO: 4.02 10*6/MM3 (ref 3.77–5.28)
RBC # UR: NORMAL /HPF
REF LAB TEST METHOD: NORMAL
SARS-COV-2 ORF1AB RESP QL NAA+PROBE: NOT DETECTED
SODIUM SERPL-SCNC: 137 MMOL/L (ref 136–145)
SP GR UR STRIP: 1.01 (ref 1–1.03)
SQUAMOUS #/AREA URNS HPF: NORMAL /HPF
TROPONIN T SERPL-MCNC: <0.01 NG/ML (ref 0–0.03)
UROBILINOGEN UR QL STRIP: ABNORMAL
WBC # BLD AUTO: 11.91 10*3/MM3 (ref 3.4–10.8)
WBC UR QL AUTO: NORMAL /HPF

## 2021-01-21 PROCEDURE — 74177 CT ABD & PELVIS W/CONTRAST: CPT

## 2021-01-21 PROCEDURE — 80053 COMPREHEN METABOLIC PANEL: CPT | Performed by: NURSE PRACTITIONER

## 2021-01-21 PROCEDURE — 70450 CT HEAD/BRAIN W/O DYE: CPT

## 2021-01-21 PROCEDURE — 93971 EXTREMITY STUDY: CPT

## 2021-01-21 PROCEDURE — 25010000002 MORPHINE PER 10 MG: Performed by: NURSE PRACTITIONER

## 2021-01-21 PROCEDURE — 25010000002 ONDANSETRON PER 1 MG: Performed by: NURSE PRACTITIONER

## 2021-01-21 PROCEDURE — G0378 HOSPITAL OBSERVATION PER HR: HCPCS

## 2021-01-21 PROCEDURE — 25010000002 MORPHINE PER 10 MG: Performed by: HOSPITALIST

## 2021-01-21 PROCEDURE — 93010 ELECTROCARDIOGRAM REPORT: CPT | Performed by: INTERNAL MEDICINE

## 2021-01-21 PROCEDURE — 99285 EMERGENCY DEPT VISIT HI MDM: CPT

## 2021-01-21 PROCEDURE — 93005 ELECTROCARDIOGRAM TRACING: CPT | Performed by: NURSE PRACTITIONER

## 2021-01-21 PROCEDURE — 25010000002 IOPAMIDOL 61 % SOLUTION: Performed by: EMERGENCY MEDICINE

## 2021-01-21 PROCEDURE — 25010000002 HYDRALAZINE PER 20 MG: Performed by: NURSE PRACTITIONER

## 2021-01-21 PROCEDURE — 85610 PROTHROMBIN TIME: CPT | Performed by: NURSE PRACTITIONER

## 2021-01-21 PROCEDURE — 85025 COMPLETE CBC W/AUTO DIFF WBC: CPT | Performed by: NURSE PRACTITIONER

## 2021-01-21 PROCEDURE — 84484 ASSAY OF TROPONIN QUANT: CPT | Performed by: NURSE PRACTITIONER

## 2021-01-21 PROCEDURE — 83690 ASSAY OF LIPASE: CPT | Performed by: NURSE PRACTITIONER

## 2021-01-21 PROCEDURE — U0004 COV-19 TEST NON-CDC HGH THRU: HCPCS | Performed by: NURSE PRACTITIONER

## 2021-01-21 PROCEDURE — P9612 CATHETERIZE FOR URINE SPEC: HCPCS

## 2021-01-21 PROCEDURE — 71260 CT THORAX DX C+: CPT

## 2021-01-21 PROCEDURE — 81001 URINALYSIS AUTO W/SCOPE: CPT | Performed by: NURSE PRACTITIONER

## 2021-01-21 RX ORDER — ONDANSETRON 4 MG/1
4 TABLET, FILM COATED ORAL EVERY 6 HOURS PRN
Status: DISCONTINUED | OUTPATIENT
Start: 2021-01-21 | End: 2021-02-08 | Stop reason: HOSPADM

## 2021-01-21 RX ORDER — DOCUSATE SODIUM 100 MG/1
100 CAPSULE, LIQUID FILLED ORAL 2 TIMES DAILY
Status: DISCONTINUED | OUTPATIENT
Start: 2021-01-21 | End: 2021-02-01

## 2021-01-21 RX ORDER — AMLODIPINE BESYLATE 5 MG/1
5 TABLET ORAL
Status: DISCONTINUED | OUTPATIENT
Start: 2021-01-21 | End: 2021-02-03

## 2021-01-21 RX ORDER — LOSARTAN POTASSIUM 100 MG/1
100 TABLET ORAL DAILY
Status: DISCONTINUED | OUTPATIENT
Start: 2021-01-21 | End: 2021-01-24

## 2021-01-21 RX ORDER — SODIUM CHLORIDE 0.9 % (FLUSH) 0.9 %
10 SYRINGE (ML) INJECTION EVERY 12 HOURS SCHEDULED
Status: DISCONTINUED | OUTPATIENT
Start: 2021-01-21 | End: 2021-02-08 | Stop reason: HOSPADM

## 2021-01-21 RX ORDER — ONDANSETRON 2 MG/ML
4 INJECTION INTRAMUSCULAR; INTRAVENOUS EVERY 6 HOURS PRN
Status: DISCONTINUED | OUTPATIENT
Start: 2021-01-21 | End: 2021-02-08 | Stop reason: HOSPADM

## 2021-01-21 RX ORDER — LIDOCAINE 50 MG/G
1 PATCH TOPICAL ONCE
Status: COMPLETED | OUTPATIENT
Start: 2021-01-21 | End: 2021-01-21

## 2021-01-21 RX ORDER — ACETAMINOPHEN 325 MG/1
650 TABLET ORAL EVERY 4 HOURS PRN
Status: DISCONTINUED | OUTPATIENT
Start: 2021-01-21 | End: 2021-01-22

## 2021-01-21 RX ORDER — FUROSEMIDE 20 MG/1
20 TABLET ORAL DAILY
Status: DISCONTINUED | OUTPATIENT
Start: 2021-01-21 | End: 2021-01-23

## 2021-01-21 RX ORDER — ATORVASTATIN CALCIUM 20 MG/1
20 TABLET, FILM COATED ORAL DAILY
Status: DISCONTINUED | OUTPATIENT
Start: 2021-01-21 | End: 2021-02-08 | Stop reason: HOSPADM

## 2021-01-21 RX ORDER — FERROUS SULFATE 325(65) MG
325 TABLET ORAL
Status: DISCONTINUED | OUTPATIENT
Start: 2021-01-22 | End: 2021-02-08 | Stop reason: HOSPADM

## 2021-01-21 RX ORDER — SODIUM CHLORIDE 0.9 % (FLUSH) 0.9 %
10 SYRINGE (ML) INJECTION AS NEEDED
Status: DISCONTINUED | OUTPATIENT
Start: 2021-01-21 | End: 2021-01-21

## 2021-01-21 RX ORDER — METOPROLOL TARTRATE 50 MG/1
50 TABLET, FILM COATED ORAL 2 TIMES DAILY
Status: DISCONTINUED | OUTPATIENT
Start: 2021-01-21 | End: 2021-01-23

## 2021-01-21 RX ORDER — HYDRALAZINE HYDROCHLORIDE 20 MG/ML
10 INJECTION INTRAMUSCULAR; INTRAVENOUS EVERY 6 HOURS PRN
Status: DISCONTINUED | OUTPATIENT
Start: 2021-01-21 | End: 2021-02-08 | Stop reason: HOSPADM

## 2021-01-21 RX ORDER — MULTIPLE VITAMINS W/ MINERALS TAB 9MG-400MCG
1 TAB ORAL DAILY
Status: DISCONTINUED | OUTPATIENT
Start: 2021-01-21 | End: 2021-02-08 | Stop reason: HOSPADM

## 2021-01-21 RX ORDER — MORPHINE SULFATE 2 MG/ML
2 INJECTION, SOLUTION INTRAMUSCULAR; INTRAVENOUS ONCE
Status: COMPLETED | OUTPATIENT
Start: 2021-01-21 | End: 2021-01-21

## 2021-01-21 RX ORDER — ONDANSETRON 2 MG/ML
4 INJECTION INTRAMUSCULAR; INTRAVENOUS ONCE
Status: COMPLETED | OUTPATIENT
Start: 2021-01-21 | End: 2021-01-21

## 2021-01-21 RX ORDER — HYDROCODONE BITARTRATE AND ACETAMINOPHEN 5; 325 MG/1; MG/1
1 TABLET ORAL EVERY 4 HOURS PRN
Status: DISCONTINUED | OUTPATIENT
Start: 2021-01-21 | End: 2021-01-22

## 2021-01-21 RX ORDER — MORPHINE SULFATE 2 MG/ML
1 INJECTION, SOLUTION INTRAMUSCULAR; INTRAVENOUS
Status: DISCONTINUED | OUTPATIENT
Start: 2021-01-21 | End: 2021-02-08 | Stop reason: HOSPADM

## 2021-01-21 RX ADMIN — SODIUM CHLORIDE, PRESERVATIVE FREE 10 ML: 5 INJECTION INTRAVENOUS at 11:58

## 2021-01-21 RX ADMIN — ATORVASTATIN CALCIUM 20 MG: 20 TABLET, FILM COATED ORAL at 18:14

## 2021-01-21 RX ADMIN — DOCUSATE SODIUM 100 MG: 100 CAPSULE ORAL at 22:22

## 2021-01-21 RX ADMIN — MORPHINE SULFATE 2 MG: 2 INJECTION, SOLUTION INTRAMUSCULAR; INTRAVENOUS at 06:56

## 2021-01-21 RX ADMIN — MORPHINE SULFATE 1 MG: 2 INJECTION, SOLUTION INTRAMUSCULAR; INTRAVENOUS at 18:53

## 2021-01-21 RX ADMIN — LIDOCAINE 1 PATCH: 50 PATCH TOPICAL at 08:38

## 2021-01-21 RX ADMIN — ACETAMINOPHEN 650 MG: 325 TABLET, FILM COATED ORAL at 11:59

## 2021-01-21 RX ADMIN — HYDROCODONE BITARTRATE AND ACETAMINOPHEN 1 TABLET: 5; 325 TABLET ORAL at 22:21

## 2021-01-21 RX ADMIN — HYDROCODONE BITARTRATE AND ACETAMINOPHEN 1 TABLET: 5; 325 TABLET ORAL at 16:03

## 2021-01-21 RX ADMIN — METOPROLOL TARTRATE 50 MG: 50 TABLET, FILM COATED ORAL at 22:21

## 2021-01-21 RX ADMIN — IOPAMIDOL 85 ML: 612 INJECTION, SOLUTION INTRAVENOUS at 08:02

## 2021-01-21 RX ADMIN — DOCUSATE SODIUM 100 MG: 100 CAPSULE ORAL at 11:57

## 2021-01-21 RX ADMIN — METOPROLOL TARTRATE 50 MG: 25 TABLET, FILM COATED ORAL at 13:14

## 2021-01-21 RX ADMIN — ONDANSETRON 4 MG: 2 INJECTION INTRAMUSCULAR; INTRAVENOUS at 06:55

## 2021-01-21 RX ADMIN — LOSARTAN POTASSIUM 100 MG: 100 TABLET, FILM COATED ORAL at 18:14

## 2021-01-21 RX ADMIN — MULTIPLE VITAMINS W/ MINERALS TAB 1 TABLET: TAB at 18:13

## 2021-01-21 RX ADMIN — AMLODIPINE BESYLATE 5 MG: 5 TABLET ORAL at 18:13

## 2021-01-21 RX ADMIN — HYDRALAZINE HYDROCHLORIDE 10 MG: 20 INJECTION, SOLUTION INTRAMUSCULAR; INTRAVENOUS at 16:50

## 2021-01-21 RX ADMIN — ONDANSETRON 4 MG: 2 INJECTION INTRAMUSCULAR; INTRAVENOUS at 11:56

## 2021-01-21 RX ADMIN — FUROSEMIDE 20 MG: 20 TABLET ORAL at 18:13

## 2021-01-21 RX ADMIN — SODIUM CHLORIDE, PRESERVATIVE FREE 10 ML: 5 INJECTION INTRAVENOUS at 21:00

## 2021-01-22 ENCOUNTER — TELEPHONE (OUTPATIENT)
Dept: INTERNAL MEDICINE | Age: 86
End: 2021-01-22

## 2021-01-22 ENCOUNTER — APPOINTMENT (OUTPATIENT)
Dept: GENERAL RADIOLOGY | Facility: HOSPITAL | Age: 86
End: 2021-01-22

## 2021-01-22 PROBLEM — R41.82 AMS (ALTERED MENTAL STATUS): Status: ACTIVE | Noted: 2021-01-22

## 2021-01-22 PROBLEM — J96.01 ACUTE RESPIRATORY FAILURE WITH HYPOXIA (HCC): Status: ACTIVE | Noted: 2021-01-22
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
## 2021-01-22 LAB
ANION GAP SERPL CALCULATED.3IONS-SCNC: 8.5 MMOL/L (ref 5–15)
ARTERIAL PATENCY WRIST A: POSITIVE
ATMOSPHERIC PRESS: 751.7 MMHG
BASE EXCESS BLDA CALC-SCNC: 0.6 MMOL/L (ref 0–2)
BDY SITE: ABNORMAL
BH CV LOWER VASCULAR LEFT COMMON FEMORAL AUGMENT: NORMAL
BH CV LOWER VASCULAR LEFT COMMON FEMORAL COMPETENT: NORMAL
BH CV LOWER VASCULAR LEFT COMMON FEMORAL COMPRESS: NORMAL
BH CV LOWER VASCULAR LEFT COMMON FEMORAL PHASIC: NORMAL
BH CV LOWER VASCULAR LEFT COMMON FEMORAL SPONT: NORMAL
BH CV LOWER VASCULAR RIGHT COMMON FEMORAL AUGMENT: NORMAL
BH CV LOWER VASCULAR RIGHT COMMON FEMORAL COMPETENT: NORMAL
BH CV LOWER VASCULAR RIGHT COMMON FEMORAL COMPRESS: NORMAL
BH CV LOWER VASCULAR RIGHT COMMON FEMORAL PHASIC: NORMAL
BH CV LOWER VASCULAR RIGHT COMMON FEMORAL SPONT: NORMAL
BH CV LOWER VASCULAR RIGHT DISTAL FEMORAL COMPRESS: NORMAL
BH CV LOWER VASCULAR RIGHT GASTRONEMIUS COMPRESS: NORMAL
BH CV LOWER VASCULAR RIGHT GREATER SAPH AK COMPRESS: NORMAL
BH CV LOWER VASCULAR RIGHT GREATER SAPH BK COMPRESS: NORMAL
BH CV LOWER VASCULAR RIGHT LESSER SAPH COMPRESS: NORMAL
BH CV LOWER VASCULAR RIGHT MID FEMORAL AUGMENT: NORMAL
BH CV LOWER VASCULAR RIGHT MID FEMORAL COMPETENT: NORMAL
BH CV LOWER VASCULAR RIGHT MID FEMORAL COMPRESS: NORMAL
BH CV LOWER VASCULAR RIGHT MID FEMORAL PHASIC: NORMAL
BH CV LOWER VASCULAR RIGHT MID FEMORAL SPONT: NORMAL
BH CV LOWER VASCULAR RIGHT PERONEAL COMPRESS: NORMAL
BH CV LOWER VASCULAR RIGHT POPLITEAL AUGMENT: NORMAL
BH CV LOWER VASCULAR RIGHT POPLITEAL COMPETENT: NORMAL
BH CV LOWER VASCULAR RIGHT POPLITEAL COMPRESS: NORMAL
BH CV LOWER VASCULAR RIGHT POPLITEAL PHASIC: NORMAL
BH CV LOWER VASCULAR RIGHT POPLITEAL SPONT: NORMAL
BH CV LOWER VASCULAR RIGHT POSTERIOR TIBIAL COMPRESS: NORMAL
BH CV LOWER VASCULAR RIGHT PROFUNDA FEMORAL COMPRESS: NORMAL
BH CV LOWER VASCULAR RIGHT PROXIMAL FEMORAL COMPRESS: NORMAL
BH CV LOWER VASCULAR RIGHT SAPHENOFEMORAL JUNCTION COMPRESS: NORMAL
BUN SERPL-MCNC: 20 MG/DL (ref 8–23)
BUN/CREAT SERPL: 16.7 (ref 7–25)
CALCIUM SPEC-SCNC: 8.3 MG/DL (ref 8.6–10.5)
CHLORIDE SERPL-SCNC: 100 MMOL/L (ref 98–107)
CO2 SERPL-SCNC: 23.5 MMOL/L (ref 22–29)
CREAT SERPL-MCNC: 1.2 MG/DL (ref 0.57–1)
DEPRECATED RDW RBC AUTO: 40.6 FL (ref 37–54)
ERYTHROCYTE [DISTWIDTH] IN BLOOD BY AUTOMATED COUNT: 13.4 % (ref 12.3–15.4)
GFR SERPL CREATININE-BSD FRML MDRD: 43 ML/MIN/1.73
GLUCOSE SERPL-MCNC: 93 MG/DL (ref 65–99)
HCO3 BLDA-SCNC: 24.9 MMOL/L (ref 22–28)
HCT VFR BLD AUTO: 29.8 % (ref 34–46.6)
HGB BLD-MCNC: 9.4 G/DL (ref 12–15.9)
INHALED O2 CONCENTRATION: 21 %
MCH RBC QN AUTO: 26.6 PG (ref 26.6–33)
MCHC RBC AUTO-ENTMCNC: 31.5 G/DL (ref 31.5–35.7)
MCV RBC AUTO: 84.2 FL (ref 79–97)
MODALITY: ABNORMAL
O2 A-A PPRESDIFF RESPIRATORY: 0.6 MMHG
PCO2 BLDA: 38 MM HG (ref 35–45)
PH BLDA: 7.42 PH UNITS (ref 7.35–7.45)
PLATELET # BLD AUTO: 241 10*3/MM3 (ref 140–450)
PMV BLD AUTO: 11.7 FL (ref 6–12)
PO2 BLDA: 65.9 MM HG (ref 80–100)
POTASSIUM SERPL-SCNC: 4.4 MMOL/L (ref 3.5–5.2)
RBC # BLD AUTO: 3.54 10*6/MM3 (ref 3.77–5.28)
SAO2 % BLDCOA: 93.2 % (ref 92–99)
SODIUM SERPL-SCNC: 132 MMOL/L (ref 136–145)
WBC # BLD AUTO: 11.68 10*3/MM3 (ref 3.4–10.8)

## 2021-01-22 PROCEDURE — 25010000002 MORPHINE PER 10 MG: Performed by: HOSPITALIST

## 2021-01-22 PROCEDURE — 82803 BLOOD GASES ANY COMBINATION: CPT

## 2021-01-22 PROCEDURE — 85027 COMPLETE CBC AUTOMATED: CPT | Performed by: HOSPITALIST

## 2021-01-22 PROCEDURE — 80048 BASIC METABOLIC PNL TOTAL CA: CPT | Performed by: HOSPITALIST

## 2021-01-22 PROCEDURE — 36415 COLL VENOUS BLD VENIPUNCTURE: CPT | Performed by: HOSPITALIST

## 2021-01-22 PROCEDURE — 25010000002 KETOROLAC TROMETHAMINE PER 15 MG: Performed by: NURSE PRACTITIONER

## 2021-01-22 PROCEDURE — 36600 WITHDRAWAL OF ARTERIAL BLOOD: CPT

## 2021-01-22 PROCEDURE — 71045 X-RAY EXAM CHEST 1 VIEW: CPT

## 2021-01-22 PROCEDURE — 99233 SBSQ HOSP IP/OBS HIGH 50: CPT | Performed by: NURSE PRACTITIONER

## 2021-01-22 RX ORDER — CYCLOBENZAPRINE HCL 10 MG
10 TABLET ORAL 3 TIMES DAILY PRN
Status: DISCONTINUED | OUTPATIENT
Start: 2021-01-22 | End: 2021-02-08 | Stop reason: HOSPADM

## 2021-01-22 RX ORDER — KETOROLAC TROMETHAMINE 15 MG/ML
15 INJECTION, SOLUTION INTRAMUSCULAR; INTRAVENOUS ONCE
Status: COMPLETED | OUTPATIENT
Start: 2021-01-22 | End: 2021-01-22

## 2021-01-22 RX ORDER — KETOROLAC TROMETHAMINE 15 MG/ML
15 INJECTION, SOLUTION INTRAMUSCULAR; INTRAVENOUS EVERY 6 HOURS PRN
Status: DISPENSED | OUTPATIENT
Start: 2021-01-22 | End: 2021-01-25

## 2021-01-22 RX ORDER — ACETAMINOPHEN 500 MG
1000 TABLET ORAL 3 TIMES DAILY
Status: DISCONTINUED | OUTPATIENT
Start: 2021-01-22 | End: 2021-02-08 | Stop reason: HOSPADM

## 2021-01-22 RX ORDER — LIDOCAINE 50 MG/G
1 PATCH TOPICAL
Status: DISCONTINUED | OUTPATIENT
Start: 2021-01-22 | End: 2021-02-08 | Stop reason: HOSPADM

## 2021-01-22 RX ORDER — OXYCODONE HYDROCHLORIDE 5 MG/1
5 TABLET ORAL EVERY 4 HOURS PRN
Status: DISPENSED | OUTPATIENT
Start: 2021-01-22 | End: 2021-01-29

## 2021-01-22 RX ADMIN — KETOROLAC TROMETHAMINE 15 MG: 15 INJECTION, SOLUTION INTRAMUSCULAR; INTRAVENOUS at 04:33

## 2021-01-22 RX ADMIN — SODIUM CHLORIDE, PRESERVATIVE FREE 10 ML: 5 INJECTION INTRAVENOUS at 21:47

## 2021-01-22 RX ADMIN — HYDROCODONE BITARTRATE AND ACETAMINOPHEN 1 TABLET: 5; 325 TABLET ORAL at 02:24

## 2021-01-22 RX ADMIN — AMLODIPINE BESYLATE 5 MG: 5 TABLET ORAL at 09:26

## 2021-01-22 RX ADMIN — METOPROLOL TARTRATE 50 MG: 50 TABLET, FILM COATED ORAL at 09:27

## 2021-01-22 RX ADMIN — MORPHINE SULFATE 1 MG: 2 INJECTION, SOLUTION INTRAMUSCULAR; INTRAVENOUS at 02:34

## 2021-01-22 RX ADMIN — ATORVASTATIN CALCIUM 20 MG: 20 TABLET, FILM COATED ORAL at 09:26

## 2021-01-22 RX ADMIN — LOSARTAN POTASSIUM 100 MG: 100 TABLET, FILM COATED ORAL at 09:27

## 2021-01-22 RX ADMIN — MORPHINE SULFATE 1 MG: 2 INJECTION, SOLUTION INTRAMUSCULAR; INTRAVENOUS at 00:41

## 2021-01-22 RX ADMIN — METOPROLOL TARTRATE 50 MG: 50 TABLET, FILM COATED ORAL at 21:14

## 2021-01-22 RX ADMIN — DOCUSATE SODIUM 100 MG: 100 CAPSULE ORAL at 09:26

## 2021-01-22 RX ADMIN — SODIUM CHLORIDE, PRESERVATIVE FREE 10 ML: 5 INJECTION INTRAVENOUS at 09:28

## 2021-01-22 RX ADMIN — ACETAMINOPHEN 650 MG: 325 TABLET, FILM COATED ORAL at 04:34

## 2021-01-22 RX ADMIN — FERROUS SULFATE TAB 325 MG (65 MG ELEMENTAL FE) 325 MG: 325 (65 FE) TAB at 09:26

## 2021-01-22 RX ADMIN — MULTIPLE VITAMINS W/ MINERALS TAB 1 TABLET: TAB at 09:27

## 2021-01-22 RX ADMIN — FUROSEMIDE 20 MG: 20 TABLET ORAL at 09:27

## 2021-01-22 RX ADMIN — ACETAMINOPHEN 1000 MG: 500 TABLET, FILM COATED ORAL at 21:14

## 2021-01-22 RX ADMIN — DOCUSATE SODIUM 100 MG: 100 CAPSULE ORAL at 21:14

## 2021-01-22 RX ADMIN — CYCLOBENZAPRINE 10 MG: 10 TABLET, FILM COATED ORAL at 00:42

## 2021-01-22 NOTE — TELEPHONE ENCOUNTER
Contact patient.     Unfortunately I cannot do anything specifically for patient while she is in hospital as I do not have inpatient privileges.     I would recommend expressing concerns to the nurses again, she may want to consider escalating her concerns and asking for the nurse manager if she is still not receiving an adequate or appropriate response.

## 2021-01-22 NOTE — TELEPHONE ENCOUNTER
PATIENTS DAUGHTER CALLED AND STATED THAT SHE IS CONCERNED FOR THE PATIENT SHE IS CURRENTLY IN THE HOSPITAL WITH TWO BROKEN RIBS AND SEEMS TO BE CONFUSED. THE DAUGHTER HAS REACHED OUT TO THE NURSING STAFF AT THE HOSPITAL AND IS NOT GETTING ANY RESPONSE. SHE WOULD LIKE FOR A CALL BACK TO DISCUSS HER CONCERNS AND ANY SUGGESTIONS THAT LEVON LAM MAY HAVE FOR HER.    STATED THAT SHE IS VERY UPSET.        CALL BACK [phone]

## 2021-01-23 PROCEDURE — 94799 UNLISTED PULMONARY SVC/PX: CPT

## 2021-01-23 PROCEDURE — 25010000002 FUROSEMIDE PER 20 MG: Performed by: HOSPITALIST

## 2021-01-23 RX ORDER — OLANZAPINE 10 MG/1
2.5 INJECTION, POWDER, LYOPHILIZED, FOR SOLUTION INTRAMUSCULAR EVERY 8 HOURS PRN
Status: DISCONTINUED | OUTPATIENT
Start: 2021-01-23 | End: 2021-02-08 | Stop reason: HOSPADM

## 2021-01-23 RX ORDER — OLANZAPINE 10 MG/1
2.5 INJECTION, POWDER, LYOPHILIZED, FOR SOLUTION INTRAMUSCULAR ONCE
Status: COMPLETED | OUTPATIENT
Start: 2021-01-23 | End: 2021-01-23

## 2021-01-23 RX ORDER — FUROSEMIDE 10 MG/ML
20 INJECTION INTRAMUSCULAR; INTRAVENOUS
Status: DISCONTINUED | OUTPATIENT
Start: 2021-01-23 | End: 2021-01-24

## 2021-01-23 RX ORDER — CLOPIDOGREL BISULFATE 75 MG/1
75 TABLET ORAL DAILY
Status: DISCONTINUED | OUTPATIENT
Start: 2021-01-23 | End: 2021-02-03

## 2021-01-23 RX ORDER — ASPIRIN 81 MG/1
81 TABLET ORAL DAILY
Status: DISCONTINUED | OUTPATIENT
Start: 2021-01-23 | End: 2021-02-03

## 2021-01-23 RX ADMIN — SODIUM CHLORIDE, PRESERVATIVE FREE 10 ML: 5 INJECTION INTRAVENOUS at 20:02

## 2021-01-23 RX ADMIN — OLANZAPINE 2.5 MG: 10 INJECTION, POWDER, FOR SOLUTION INTRAMUSCULAR at 09:56

## 2021-01-23 RX ADMIN — METOROPROLOL TARTRATE 2.5 MG: 5 INJECTION, SOLUTION INTRAVENOUS at 13:43

## 2021-01-23 RX ADMIN — METOROPROLOL TARTRATE 2.5 MG: 5 INJECTION, SOLUTION INTRAVENOUS at 19:51

## 2021-01-23 RX ADMIN — ACETAMINOPHEN 1000 MG: 500 TABLET, FILM COATED ORAL at 20:01

## 2021-01-23 RX ADMIN — FUROSEMIDE 20 MG: 10 INJECTION, SOLUTION INTRAMUSCULAR; INTRAVENOUS at 13:42

## 2021-01-24 ENCOUNTER — APPOINTMENT (OUTPATIENT)
Dept: CT IMAGING | Facility: HOSPITAL | Age: 86
End: 2021-01-24

## 2021-01-24 LAB
ANION GAP SERPL CALCULATED.3IONS-SCNC: 10.3 MMOL/L (ref 5–15)
BACTERIA UR QL AUTO: ABNORMAL /HPF
BILIRUB UR QL STRIP: NEGATIVE
BUN SERPL-MCNC: 30 MG/DL (ref 8–23)
BUN/CREAT SERPL: 20.4 (ref 7–25)
CALCIUM SPEC-SCNC: 8.7 MG/DL (ref 8.6–10.5)
CHLORIDE SERPL-SCNC: 100 MMOL/L (ref 98–107)
CLARITY UR: ABNORMAL
CO2 SERPL-SCNC: 23.7 MMOL/L (ref 22–29)
COLOR UR: YELLOW
CREAT SERPL-MCNC: 1.47 MG/DL (ref 0.57–1)
GFR SERPL CREATININE-BSD FRML MDRD: 34 ML/MIN/1.73
GLUCOSE SERPL-MCNC: 87 MG/DL (ref 65–99)
GLUCOSE UR STRIP-MCNC: NEGATIVE MG/DL
HCT VFR BLD AUTO: 30.6 % (ref 34–46.6)
HGB BLD-MCNC: 9.9 G/DL (ref 12–15.9)
HGB UR QL STRIP.AUTO: ABNORMAL
HYALINE CASTS UR QL AUTO: ABNORMAL /LPF
KETONES UR QL STRIP: ABNORMAL
LEUKOCYTE ESTERASE UR QL STRIP.AUTO: ABNORMAL
NITRITE UR QL STRIP: NEGATIVE
PH UR STRIP.AUTO: 5.5 [PH] (ref 5–8)
POTASSIUM SERPL-SCNC: 4.7 MMOL/L (ref 3.5–5.2)
PROT UR QL STRIP: ABNORMAL
RBC # UR: ABNORMAL /HPF
REF LAB TEST METHOD: ABNORMAL
SODIUM SERPL-SCNC: 134 MMOL/L (ref 136–145)
SP GR UR STRIP: >=1.03 (ref 1–1.03)
SQUAMOUS #/AREA URNS HPF: ABNORMAL /HPF
UROBILINOGEN UR QL STRIP: ABNORMAL
WBC UR QL AUTO: ABNORMAL /HPF

## 2021-01-24 PROCEDURE — 85018 HEMOGLOBIN: CPT | Performed by: HOSPITALIST

## 2021-01-24 PROCEDURE — 87088 URINE BACTERIA CULTURE: CPT | Performed by: HOSPITALIST

## 2021-01-24 PROCEDURE — 80048 BASIC METABOLIC PNL TOTAL CA: CPT | Performed by: HOSPITALIST

## 2021-01-24 PROCEDURE — 81001 URINALYSIS AUTO W/SCOPE: CPT | Performed by: HOSPITALIST

## 2021-01-24 PROCEDURE — 25010000002 KETOROLAC TROMETHAMINE PER 15 MG: Performed by: NURSE PRACTITIONER

## 2021-01-24 PROCEDURE — 70450 CT HEAD/BRAIN W/O DYE: CPT

## 2021-01-24 PROCEDURE — 97530 THERAPEUTIC ACTIVITIES: CPT

## 2021-01-24 PROCEDURE — 87086 URINE CULTURE/COLONY COUNT: CPT | Performed by: HOSPITALIST

## 2021-01-24 PROCEDURE — 85014 HEMATOCRIT: CPT | Performed by: HOSPITALIST

## 2021-01-24 PROCEDURE — 97162 PT EVAL MOD COMPLEX 30 MIN: CPT

## 2021-01-24 PROCEDURE — 87186 SC STD MICRODIL/AGAR DIL: CPT | Performed by: HOSPITALIST

## 2021-01-24 RX ORDER — METOPROLOL TARTRATE 50 MG/1
50 TABLET, FILM COATED ORAL 2 TIMES DAILY
Status: DISCONTINUED | OUTPATIENT
Start: 2021-01-24 | End: 2021-01-24

## 2021-01-24 RX ADMIN — METOPROLOL TARTRATE 75 MG: 25 TABLET, FILM COATED ORAL at 20:22

## 2021-01-24 RX ADMIN — METOROPROLOL TARTRATE 2.5 MG: 5 INJECTION, SOLUTION INTRAVENOUS at 06:37

## 2021-01-24 RX ADMIN — SODIUM CHLORIDE, PRESERVATIVE FREE 10 ML: 5 INJECTION INTRAVENOUS at 08:24

## 2021-01-24 RX ADMIN — LIDOCAINE 1 PATCH: 50 PATCH CUTANEOUS at 08:19

## 2021-01-24 RX ADMIN — DOCUSATE SODIUM 100 MG: 100 CAPSULE ORAL at 08:19

## 2021-01-24 RX ADMIN — KETOROLAC TROMETHAMINE 15 MG: 15 INJECTION, SOLUTION INTRAMUSCULAR; INTRAVENOUS at 13:10

## 2021-01-24 RX ADMIN — ATORVASTATIN CALCIUM 20 MG: 20 TABLET, FILM COATED ORAL at 08:19

## 2021-01-24 RX ADMIN — AMLODIPINE BESYLATE 5 MG: 5 TABLET ORAL at 08:19

## 2021-01-24 RX ADMIN — SODIUM CHLORIDE, PRESERVATIVE FREE 10 ML: 5 INJECTION INTRAVENOUS at 20:23

## 2021-01-24 RX ADMIN — FERROUS SULFATE TAB 325 MG (65 MG ELEMENTAL FE) 325 MG: 325 (65 FE) TAB at 08:19

## 2021-01-24 RX ADMIN — ACETAMINOPHEN 1000 MG: 500 TABLET, FILM COATED ORAL at 08:19

## 2021-01-24 RX ADMIN — DOCUSATE SODIUM 100 MG: 100 CAPSULE ORAL at 20:22

## 2021-01-24 RX ADMIN — ACETAMINOPHEN 1000 MG: 500 TABLET, FILM COATED ORAL at 20:22

## 2021-01-24 RX ADMIN — LOSARTAN POTASSIUM 100 MG: 100 TABLET, FILM COATED ORAL at 08:19

## 2021-01-24 RX ADMIN — CLOPIDOGREL 75 MG: 75 TABLET, FILM COATED ORAL at 08:19

## 2021-01-24 RX ADMIN — METOROPROLOL TARTRATE 2.5 MG: 5 INJECTION, SOLUTION INTRAVENOUS at 13:11

## 2021-01-24 RX ADMIN — ASPIRIN 81 MG: 81 TABLET, COATED ORAL at 08:19

## 2021-01-24 RX ADMIN — MULTIPLE VITAMINS W/ MINERALS TAB 1 TABLET: TAB at 08:19

## 2021-01-24 RX ADMIN — ACETAMINOPHEN 1000 MG: 500 TABLET, FILM COATED ORAL at 16:38

## 2021-01-25 LAB
ANION GAP SERPL CALCULATED.3IONS-SCNC: 10.8 MMOL/L (ref 5–15)
BUN SERPL-MCNC: 34 MG/DL (ref 8–23)
BUN/CREAT SERPL: 22.7 (ref 7–25)
CALCIUM SPEC-SCNC: 8.7 MG/DL (ref 8.6–10.5)
CHLORIDE SERPL-SCNC: 95 MMOL/L (ref 98–107)
CO2 SERPL-SCNC: 22.2 MMOL/L (ref 22–29)
CREAT SERPL-MCNC: 1.5 MG/DL (ref 0.57–1)
DEPRECATED RDW RBC AUTO: 41.3 FL (ref 37–54)
ERYTHROCYTE [DISTWIDTH] IN BLOOD BY AUTOMATED COUNT: 13.2 % (ref 12.3–15.4)
GFR SERPL CREATININE-BSD FRML MDRD: 33 ML/MIN/1.73
GLUCOSE SERPL-MCNC: 92 MG/DL (ref 65–99)
HCT VFR BLD AUTO: 34.2 % (ref 34–46.6)
HGB BLD-MCNC: 10.8 G/DL (ref 12–15.9)
MCH RBC QN AUTO: 27.3 PG (ref 26.6–33)
MCHC RBC AUTO-ENTMCNC: 31.6 G/DL (ref 31.5–35.7)
MCV RBC AUTO: 86.4 FL (ref 79–97)
OSMOLALITY SERPL: 289 MOSM/KG (ref 280–301)
OSMOLALITY UR: 318 MOSM/KG
PLATELET # BLD AUTO: 282 10*3/MM3 (ref 140–450)
PMV BLD AUTO: 12.1 FL (ref 6–12)
POTASSIUM SERPL-SCNC: 5.2 MMOL/L (ref 3.5–5.2)
RBC # BLD AUTO: 3.96 10*6/MM3 (ref 3.77–5.28)
SODIUM SERPL-SCNC: 128 MMOL/L (ref 136–145)
SODIUM UR-SCNC: 43 MMOL/L
WBC # BLD AUTO: 13.07 10*3/MM3 (ref 3.4–10.8)

## 2021-01-25 PROCEDURE — 83930 ASSAY OF BLOOD OSMOLALITY: CPT | Performed by: NURSE PRACTITIONER

## 2021-01-25 PROCEDURE — 80048 BASIC METABOLIC PNL TOTAL CA: CPT | Performed by: HOSPITALIST

## 2021-01-25 PROCEDURE — 83935 ASSAY OF URINE OSMOLALITY: CPT | Performed by: NURSE PRACTITIONER

## 2021-01-25 PROCEDURE — 85027 COMPLETE CBC AUTOMATED: CPT | Performed by: HOSPITALIST

## 2021-01-25 PROCEDURE — 84300 ASSAY OF URINE SODIUM: CPT | Performed by: NURSE PRACTITIONER

## 2021-01-25 PROCEDURE — 97110 THERAPEUTIC EXERCISES: CPT

## 2021-01-25 PROCEDURE — 25010000002 CEFTRIAXONE PER 250 MG: Performed by: HOSPITALIST

## 2021-01-25 RX ORDER — OLANZAPINE 10 MG/1
10 TABLET, ORALLY DISINTEGRATING ORAL NIGHTLY
Status: DISCONTINUED | OUTPATIENT
Start: 2021-01-25 | End: 2021-02-01

## 2021-01-25 RX ORDER — CEFTRIAXONE SODIUM 1 G/50ML
1 INJECTION, SOLUTION INTRAVENOUS EVERY 24 HOURS
Status: COMPLETED | OUTPATIENT
Start: 2021-01-25 | End: 2021-01-29

## 2021-01-25 RX ORDER — OLANZAPINE 2.5 MG/1
2.5 TABLET ORAL ONCE
Status: COMPLETED | OUTPATIENT
Start: 2021-01-25 | End: 2021-01-25

## 2021-01-25 RX ADMIN — METOPROLOL TARTRATE 75 MG: 25 TABLET, FILM COATED ORAL at 20:38

## 2021-01-25 RX ADMIN — ACETAMINOPHEN 1000 MG: 500 TABLET, FILM COATED ORAL at 20:40

## 2021-01-25 RX ADMIN — ACETAMINOPHEN 1000 MG: 500 TABLET, FILM COATED ORAL at 17:12

## 2021-01-25 RX ADMIN — AMLODIPINE BESYLATE 5 MG: 5 TABLET ORAL at 08:17

## 2021-01-25 RX ADMIN — ASPIRIN 81 MG: 81 TABLET, COATED ORAL at 08:16

## 2021-01-25 RX ADMIN — LIDOCAINE 1 PATCH: 50 PATCH CUTANEOUS at 10:00

## 2021-01-25 RX ADMIN — MULTIPLE VITAMINS W/ MINERALS TAB 1 TABLET: TAB at 08:17

## 2021-01-25 RX ADMIN — FERROUS SULFATE TAB 325 MG (65 MG ELEMENTAL FE) 325 MG: 325 (65 FE) TAB at 08:16

## 2021-01-25 RX ADMIN — ACETAMINOPHEN 1000 MG: 500 TABLET, FILM COATED ORAL at 08:17

## 2021-01-25 RX ADMIN — DOCUSATE SODIUM 100 MG: 100 CAPSULE ORAL at 20:40

## 2021-01-25 RX ADMIN — OLANZAPINE 10 MG: 10 TABLET, ORALLY DISINTEGRATING ORAL at 20:40

## 2021-01-25 RX ADMIN — CLOPIDOGREL 75 MG: 75 TABLET, FILM COATED ORAL at 08:17

## 2021-01-25 RX ADMIN — CEFTRIAXONE SODIUM 1 G: 1 INJECTION, SOLUTION INTRAVENOUS at 08:18

## 2021-01-25 RX ADMIN — DOCUSATE SODIUM 100 MG: 100 CAPSULE ORAL at 08:17

## 2021-01-25 RX ADMIN — METOPROLOL TARTRATE 75 MG: 25 TABLET, FILM COATED ORAL at 08:17

## 2021-01-25 RX ADMIN — OLANZAPINE 2.5 MG: 2.5 TABLET ORAL at 17:12

## 2021-01-25 RX ADMIN — ATORVASTATIN CALCIUM 20 MG: 20 TABLET, FILM COATED ORAL at 08:16

## 2021-01-26 LAB
ANION GAP SERPL CALCULATED.3IONS-SCNC: 11.6 MMOL/L (ref 5–15)
BACTERIA SPEC AEROBE CULT: ABNORMAL
BUN SERPL-MCNC: 36 MG/DL (ref 8–23)
BUN/CREAT SERPL: 28.6 (ref 7–25)
CALCIUM SPEC-SCNC: 8.4 MG/DL (ref 8.6–10.5)
CHLORIDE SERPL-SCNC: 99 MMOL/L (ref 98–107)
CO2 SERPL-SCNC: 23.4 MMOL/L (ref 22–29)
CREAT SERPL-MCNC: 1.26 MG/DL (ref 0.57–1)
GFR SERPL CREATININE-BSD FRML MDRD: 40 ML/MIN/1.73
GLUCOSE SERPL-MCNC: 89 MG/DL (ref 65–99)
POTASSIUM SERPL-SCNC: 5 MMOL/L (ref 3.5–5.2)
SODIUM SERPL-SCNC: 134 MMOL/L (ref 136–145)

## 2021-01-26 PROCEDURE — 97530 THERAPEUTIC ACTIVITIES: CPT

## 2021-01-26 PROCEDURE — 25010000002 CEFTRIAXONE PER 250 MG: Performed by: HOSPITALIST

## 2021-01-26 PROCEDURE — 80048 BASIC METABOLIC PNL TOTAL CA: CPT | Performed by: HOSPITALIST

## 2021-01-26 RX ORDER — FUROSEMIDE 20 MG/1
20 TABLET ORAL DAILY
Status: DISCONTINUED | OUTPATIENT
Start: 2021-01-26 | End: 2021-01-26

## 2021-01-26 RX ORDER — FUROSEMIDE 20 MG/1
10 TABLET ORAL DAILY
Status: DISCONTINUED | OUTPATIENT
Start: 2021-01-26 | End: 2021-02-01

## 2021-01-26 RX ORDER — LOSARTAN POTASSIUM 100 MG/1
100 TABLET ORAL
Status: DISCONTINUED | OUTPATIENT
Start: 2021-01-26 | End: 2021-01-28

## 2021-01-26 RX ADMIN — LOSARTAN POTASSIUM 100 MG: 100 TABLET, FILM COATED ORAL at 11:55

## 2021-01-26 RX ADMIN — SODIUM CHLORIDE, PRESERVATIVE FREE 10 ML: 5 INJECTION INTRAVENOUS at 20:15

## 2021-01-26 RX ADMIN — ASPIRIN 81 MG: 81 TABLET, COATED ORAL at 10:03

## 2021-01-26 RX ADMIN — FERROUS SULFATE TAB 325 MG (65 MG ELEMENTAL FE) 325 MG: 325 (65 FE) TAB at 10:03

## 2021-01-26 RX ADMIN — OXYCODONE 5 MG: 5 TABLET ORAL at 13:45

## 2021-01-26 RX ADMIN — ACETAMINOPHEN 1000 MG: 500 TABLET, FILM COATED ORAL at 10:03

## 2021-01-26 RX ADMIN — CLOPIDOGREL 75 MG: 75 TABLET, FILM COATED ORAL at 10:03

## 2021-01-26 RX ADMIN — LIDOCAINE 1 PATCH: 50 PATCH CUTANEOUS at 10:02

## 2021-01-26 RX ADMIN — ACETAMINOPHEN 1000 MG: 500 TABLET, FILM COATED ORAL at 20:15

## 2021-01-26 RX ADMIN — SODIUM CHLORIDE, PRESERVATIVE FREE 10 ML: 5 INJECTION INTRAVENOUS at 10:05

## 2021-01-26 RX ADMIN — AMLODIPINE BESYLATE 5 MG: 5 TABLET ORAL at 10:03

## 2021-01-26 RX ADMIN — DOCUSATE SODIUM 100 MG: 100 CAPSULE ORAL at 10:02

## 2021-01-26 RX ADMIN — CEFTRIAXONE SODIUM 1 G: 1 INJECTION, SOLUTION INTRAVENOUS at 10:03

## 2021-01-26 RX ADMIN — ACETAMINOPHEN 1000 MG: 500 TABLET, FILM COATED ORAL at 16:09

## 2021-01-26 RX ADMIN — DOCUSATE SODIUM 100 MG: 100 CAPSULE ORAL at 20:14

## 2021-01-26 RX ADMIN — FUROSEMIDE 10 MG: 20 TABLET ORAL at 11:55

## 2021-01-26 RX ADMIN — METOPROLOL TARTRATE 75 MG: 25 TABLET, FILM COATED ORAL at 11:00

## 2021-01-26 RX ADMIN — ATORVASTATIN CALCIUM 20 MG: 20 TABLET, FILM COATED ORAL at 10:02

## 2021-01-26 RX ADMIN — METOPROLOL TARTRATE 75 MG: 25 TABLET, FILM COATED ORAL at 20:15

## 2021-01-26 RX ADMIN — MULTIPLE VITAMINS W/ MINERALS TAB 1 TABLET: TAB at 10:03

## 2021-01-26 RX ADMIN — OLANZAPINE 10 MG: 10 TABLET, ORALLY DISINTEGRATING ORAL at 20:15

## 2021-01-27 LAB
ALBUMIN SERPL-MCNC: 2.7 G/DL (ref 3.5–5.2)
ANION GAP SERPL CALCULATED.3IONS-SCNC: 8.9 MMOL/L (ref 5–15)
BASOPHILS # BLD AUTO: 0.02 10*3/MM3 (ref 0–0.2)
BASOPHILS NFR BLD AUTO: 0.3 % (ref 0–1.5)
BUN SERPL-MCNC: 34 MG/DL (ref 8–23)
BUN/CREAT SERPL: 26.4 (ref 7–25)
CALCIUM SPEC-SCNC: 8.2 MG/DL (ref 8.6–10.5)
CHLORIDE SERPL-SCNC: 101 MMOL/L (ref 98–107)
CO2 SERPL-SCNC: 23.1 MMOL/L (ref 22–29)
CREAT SERPL-MCNC: 1.29 MG/DL (ref 0.57–1)
DEPRECATED RDW RBC AUTO: 41.3 FL (ref 37–54)
EOSINOPHIL # BLD AUTO: 0.08 10*3/MM3 (ref 0–0.4)
EOSINOPHIL NFR BLD AUTO: 1 % (ref 0.3–6.2)
ERYTHROCYTE [DISTWIDTH] IN BLOOD BY AUTOMATED COUNT: 13.3 % (ref 12.3–15.4)
GFR SERPL CREATININE-BSD FRML MDRD: 39 ML/MIN/1.73
GLUCOSE SERPL-MCNC: 89 MG/DL (ref 65–99)
HCT VFR BLD AUTO: 33.1 % (ref 34–46.6)
HGB BLD-MCNC: 10.5 G/DL (ref 12–15.9)
IMM GRANULOCYTES # BLD AUTO: 0.03 10*3/MM3 (ref 0–0.05)
IMM GRANULOCYTES NFR BLD AUTO: 0.4 % (ref 0–0.5)
LYMPHOCYTES # BLD AUTO: 1.01 10*3/MM3 (ref 0.7–3.1)
LYMPHOCYTES NFR BLD AUTO: 13 % (ref 19.6–45.3)
MCH RBC QN AUTO: 27.3 PG (ref 26.6–33)
MCHC RBC AUTO-ENTMCNC: 31.7 G/DL (ref 31.5–35.7)
MCV RBC AUTO: 86 FL (ref 79–97)
MONOCYTES # BLD AUTO: 0.99 10*3/MM3 (ref 0.1–0.9)
MONOCYTES NFR BLD AUTO: 12.7 % (ref 5–12)
NEUTROPHILS NFR BLD AUTO: 5.64 10*3/MM3 (ref 1.7–7)
NEUTROPHILS NFR BLD AUTO: 72.6 % (ref 42.7–76)
NRBC BLD AUTO-RTO: 0 /100 WBC (ref 0–0.2)
PHOSPHATE SERPL-MCNC: 4.2 MG/DL (ref 2.5–4.5)
PLATELET # BLD AUTO: 290 10*3/MM3 (ref 140–450)
PMV BLD AUTO: 12.3 FL (ref 6–12)
POTASSIUM SERPL-SCNC: 5.1 MMOL/L (ref 3.5–5.2)
RBC # BLD AUTO: 3.85 10*6/MM3 (ref 3.77–5.28)
SARS-COV-2 RNA RESP QL NAA+PROBE: NOT DETECTED
SODIUM SERPL-SCNC: 133 MMOL/L (ref 136–145)
WBC # BLD AUTO: 7.77 10*3/MM3 (ref 3.4–10.8)

## 2021-01-27 PROCEDURE — 85025 COMPLETE CBC W/AUTO DIFF WBC: CPT | Performed by: NURSE PRACTITIONER

## 2021-01-27 PROCEDURE — 80069 RENAL FUNCTION PANEL: CPT | Performed by: NURSE PRACTITIONER

## 2021-01-27 PROCEDURE — 25010000002 CEFTRIAXONE PER 250 MG: Performed by: HOSPITALIST

## 2021-01-27 PROCEDURE — 97530 THERAPEUTIC ACTIVITIES: CPT

## 2021-01-27 PROCEDURE — U0003 INFECTIOUS AGENT DETECTION BY NUCLEIC ACID (DNA OR RNA); SEVERE ACUTE RESPIRATORY SYNDROME CORONAVIRUS 2 (SARS-COV-2) (CORONAVIRUS DISEASE [COVID-19]), AMPLIFIED PROBE TECHNIQUE, MAKING USE OF HIGH THROUGHPUT TECHNOLOGIES AS DESCRIBED BY CMS-2020-01-R: HCPCS | Performed by: HOSPITALIST

## 2021-01-27 PROCEDURE — 97110 THERAPEUTIC EXERCISES: CPT

## 2021-01-27 RX ADMIN — LIDOCAINE 1 PATCH: 50 PATCH CUTANEOUS at 09:00

## 2021-01-27 RX ADMIN — ACETAMINOPHEN 1000 MG: 500 TABLET, FILM COATED ORAL at 09:01

## 2021-01-27 RX ADMIN — AMLODIPINE BESYLATE 5 MG: 5 TABLET ORAL at 09:01

## 2021-01-27 RX ADMIN — METOPROLOL TARTRATE 75 MG: 25 TABLET, FILM COATED ORAL at 20:04

## 2021-01-27 RX ADMIN — SODIUM CHLORIDE, PRESERVATIVE FREE 10 ML: 5 INJECTION INTRAVENOUS at 20:05

## 2021-01-27 RX ADMIN — ASPIRIN 81 MG: 81 TABLET, COATED ORAL at 09:01

## 2021-01-27 RX ADMIN — ATORVASTATIN CALCIUM 20 MG: 20 TABLET, FILM COATED ORAL at 09:07

## 2021-01-27 RX ADMIN — LOSARTAN POTASSIUM 100 MG: 100 TABLET, FILM COATED ORAL at 09:01

## 2021-01-27 RX ADMIN — CEFTRIAXONE SODIUM 1 G: 1 INJECTION, SOLUTION INTRAVENOUS at 09:07

## 2021-01-27 RX ADMIN — DOCUSATE SODIUM 100 MG: 100 CAPSULE ORAL at 09:01

## 2021-01-27 RX ADMIN — ACETAMINOPHEN 1000 MG: 500 TABLET, FILM COATED ORAL at 17:13

## 2021-01-27 RX ADMIN — FERROUS SULFATE TAB 325 MG (65 MG ELEMENTAL FE) 325 MG: 325 (65 FE) TAB at 09:01

## 2021-01-27 RX ADMIN — MULTIPLE VITAMINS W/ MINERALS TAB 1 TABLET: TAB at 09:01

## 2021-01-27 RX ADMIN — CLOPIDOGREL 75 MG: 75 TABLET, FILM COATED ORAL at 09:01

## 2021-01-27 RX ADMIN — DOCUSATE SODIUM 100 MG: 100 CAPSULE ORAL at 20:04

## 2021-01-27 RX ADMIN — ACETAMINOPHEN 1000 MG: 500 TABLET, FILM COATED ORAL at 20:04

## 2021-01-27 RX ADMIN — SODIUM CHLORIDE, PRESERVATIVE FREE 10 ML: 5 INJECTION INTRAVENOUS at 09:03

## 2021-01-27 RX ADMIN — METOPROLOL TARTRATE 75 MG: 25 TABLET, FILM COATED ORAL at 09:01

## 2021-01-27 RX ADMIN — OLANZAPINE 10 MG: 10 TABLET, ORALLY DISINTEGRATING ORAL at 20:04

## 2021-01-27 RX ADMIN — FUROSEMIDE 10 MG: 20 TABLET ORAL at 09:01

## 2021-01-28 LAB
ANION GAP SERPL CALCULATED.3IONS-SCNC: 9.9 MMOL/L (ref 5–15)
BASOPHILS # BLD AUTO: 0.04 10*3/MM3 (ref 0–0.2)
BASOPHILS NFR BLD AUTO: 0.4 % (ref 0–1.5)
BUN SERPL-MCNC: 29 MG/DL (ref 8–23)
BUN/CREAT SERPL: 27.9 (ref 7–25)
CALCIUM SPEC-SCNC: 8.9 MG/DL (ref 8.6–10.5)
CHLORIDE SERPL-SCNC: 101 MMOL/L (ref 98–107)
CO2 SERPL-SCNC: 24.1 MMOL/L (ref 22–29)
CREAT SERPL-MCNC: 1.04 MG/DL (ref 0.57–1)
DEPRECATED RDW RBC AUTO: 40.3 FL (ref 37–54)
EOSINOPHIL # BLD AUTO: 0.09 10*3/MM3 (ref 0–0.4)
EOSINOPHIL NFR BLD AUTO: 0.8 % (ref 0.3–6.2)
ERYTHROCYTE [DISTWIDTH] IN BLOOD BY AUTOMATED COUNT: 13.3 % (ref 12.3–15.4)
GFR SERPL CREATININE-BSD FRML MDRD: 50 ML/MIN/1.73
GLUCOSE SERPL-MCNC: 106 MG/DL (ref 65–99)
HCT VFR BLD AUTO: 34 % (ref 34–46.6)
HGB BLD-MCNC: 10.7 G/DL (ref 12–15.9)
IMM GRANULOCYTES # BLD AUTO: 0.06 10*3/MM3 (ref 0–0.05)
IMM GRANULOCYTES NFR BLD AUTO: 0.6 % (ref 0–0.5)
LYMPHOCYTES # BLD AUTO: 1.27 10*3/MM3 (ref 0.7–3.1)
LYMPHOCYTES NFR BLD AUTO: 11.7 % (ref 19.6–45.3)
MCH RBC QN AUTO: 26.4 PG (ref 26.6–33)
MCHC RBC AUTO-ENTMCNC: 31.5 G/DL (ref 31.5–35.7)
MCV RBC AUTO: 83.7 FL (ref 79–97)
MONOCYTES # BLD AUTO: 1.18 10*3/MM3 (ref 0.1–0.9)
MONOCYTES NFR BLD AUTO: 10.9 % (ref 5–12)
NEUTROPHILS NFR BLD AUTO: 75.6 % (ref 42.7–76)
NEUTROPHILS NFR BLD AUTO: 8.18 10*3/MM3 (ref 1.7–7)
NRBC BLD AUTO-RTO: 0.1 /100 WBC (ref 0–0.2)
PLATELET # BLD AUTO: 341 10*3/MM3 (ref 140–450)
PMV BLD AUTO: 12 FL (ref 6–12)
POTASSIUM SERPL-SCNC: 5.5 MMOL/L (ref 3.5–5.2)
RBC # BLD AUTO: 4.06 10*6/MM3 (ref 3.77–5.28)
SODIUM SERPL-SCNC: 135 MMOL/L (ref 136–145)
WBC # BLD AUTO: 10.82 10*3/MM3 (ref 3.4–10.8)

## 2021-01-28 PROCEDURE — 25010000002 CEFTRIAXONE PER 250 MG: Performed by: HOSPITALIST

## 2021-01-28 PROCEDURE — 97530 THERAPEUTIC ACTIVITIES: CPT

## 2021-01-28 PROCEDURE — 80048 BASIC METABOLIC PNL TOTAL CA: CPT | Performed by: HOSPITALIST

## 2021-01-28 PROCEDURE — 85025 COMPLETE CBC W/AUTO DIFF WBC: CPT | Performed by: HOSPITALIST

## 2021-01-28 RX ADMIN — DOCUSATE SODIUM 100 MG: 100 CAPSULE ORAL at 09:39

## 2021-01-28 RX ADMIN — METOPROLOL TARTRATE 75 MG: 25 TABLET, FILM COATED ORAL at 20:29

## 2021-01-28 RX ADMIN — OLANZAPINE 10 MG: 10 TABLET, ORALLY DISINTEGRATING ORAL at 20:31

## 2021-01-28 RX ADMIN — MULTIPLE VITAMINS W/ MINERALS TAB 1 TABLET: TAB at 09:38

## 2021-01-28 RX ADMIN — OXYCODONE 5 MG: 5 TABLET ORAL at 05:42

## 2021-01-28 RX ADMIN — DOCUSATE SODIUM 100 MG: 100 CAPSULE ORAL at 20:30

## 2021-01-28 RX ADMIN — SODIUM CHLORIDE, PRESERVATIVE FREE 10 ML: 5 INJECTION INTRAVENOUS at 20:30

## 2021-01-28 RX ADMIN — OLANZAPINE 2.5 MG: 10 INJECTION, POWDER, FOR SOLUTION INTRAMUSCULAR at 17:31

## 2021-01-28 RX ADMIN — ASPIRIN 81 MG: 81 TABLET, COATED ORAL at 09:39

## 2021-01-28 RX ADMIN — AMLODIPINE BESYLATE 5 MG: 5 TABLET ORAL at 09:39

## 2021-01-28 RX ADMIN — FERROUS SULFATE TAB 325 MG (65 MG ELEMENTAL FE) 325 MG: 325 (65 FE) TAB at 09:39

## 2021-01-28 RX ADMIN — FUROSEMIDE 10 MG: 20 TABLET ORAL at 09:38

## 2021-01-28 RX ADMIN — ACETAMINOPHEN 1000 MG: 500 TABLET, FILM COATED ORAL at 09:39

## 2021-01-28 RX ADMIN — ACETAMINOPHEN 1000 MG: 500 TABLET, FILM COATED ORAL at 15:48

## 2021-01-28 RX ADMIN — METOPROLOL TARTRATE 75 MG: 25 TABLET, FILM COATED ORAL at 09:38

## 2021-01-28 RX ADMIN — CLOPIDOGREL 75 MG: 75 TABLET, FILM COATED ORAL at 09:39

## 2021-01-28 RX ADMIN — LIDOCAINE 1 PATCH: 50 PATCH CUTANEOUS at 09:33

## 2021-01-28 RX ADMIN — CEFTRIAXONE SODIUM 1 G: 1 INJECTION, SOLUTION INTRAVENOUS at 11:47

## 2021-01-28 RX ADMIN — ACETAMINOPHEN 1000 MG: 500 TABLET, FILM COATED ORAL at 20:29

## 2021-01-28 RX ADMIN — LOSARTAN POTASSIUM 100 MG: 100 TABLET, FILM COATED ORAL at 09:38

## 2021-01-28 RX ADMIN — ATORVASTATIN CALCIUM 20 MG: 20 TABLET, FILM COATED ORAL at 09:38

## 2021-01-28 RX ADMIN — SODIUM CHLORIDE, PRESERVATIVE FREE 10 ML: 5 INJECTION INTRAVENOUS at 09:42

## 2021-01-29 LAB
ANION GAP SERPL CALCULATED.3IONS-SCNC: 13.9 MMOL/L (ref 5–15)
BASOPHILS # BLD AUTO: 0.03 10*3/MM3 (ref 0–0.2)
BASOPHILS NFR BLD AUTO: 0.2 % (ref 0–1.5)
BUN SERPL-MCNC: 28 MG/DL (ref 8–23)
BUN/CREAT SERPL: 24.1 (ref 7–25)
CALCIUM SPEC-SCNC: 9 MG/DL (ref 8.6–10.5)
CHLORIDE SERPL-SCNC: 95 MMOL/L (ref 98–107)
CO2 SERPL-SCNC: 20.1 MMOL/L (ref 22–29)
CREAT SERPL-MCNC: 1.16 MG/DL (ref 0.57–1)
DEPRECATED RDW RBC AUTO: 41 FL (ref 37–54)
EOSINOPHIL # BLD AUTO: 0.08 10*3/MM3 (ref 0–0.4)
EOSINOPHIL NFR BLD AUTO: 0.5 % (ref 0.3–6.2)
ERYTHROCYTE [DISTWIDTH] IN BLOOD BY AUTOMATED COUNT: 13.2 % (ref 12.3–15.4)
GFR SERPL CREATININE-BSD FRML MDRD: 45 ML/MIN/1.73
GLUCOSE SERPL-MCNC: 93 MG/DL (ref 65–99)
HCT VFR BLD AUTO: 33.4 % (ref 34–46.6)
HGB BLD-MCNC: 10.4 G/DL (ref 12–15.9)
IMM GRANULOCYTES # BLD AUTO: 0.12 10*3/MM3 (ref 0–0.05)
IMM GRANULOCYTES NFR BLD AUTO: 0.7 % (ref 0–0.5)
LYMPHOCYTES # BLD AUTO: 0.88 10*3/MM3 (ref 0.7–3.1)
LYMPHOCYTES NFR BLD AUTO: 5.1 % (ref 19.6–45.3)
MCH RBC QN AUTO: 26.3 PG (ref 26.6–33)
MCHC RBC AUTO-ENTMCNC: 31.1 G/DL (ref 31.5–35.7)
MCV RBC AUTO: 84.3 FL (ref 79–97)
MONOCYTES # BLD AUTO: 1.39 10*3/MM3 (ref 0.1–0.9)
MONOCYTES NFR BLD AUTO: 8.1 % (ref 5–12)
NEUTROPHILS NFR BLD AUTO: 14.6 10*3/MM3 (ref 1.7–7)
NEUTROPHILS NFR BLD AUTO: 85.4 % (ref 42.7–76)
NRBC BLD AUTO-RTO: 0 /100 WBC (ref 0–0.2)
PLATELET # BLD AUTO: 390 10*3/MM3 (ref 140–450)
PMV BLD AUTO: 11.9 FL (ref 6–12)
POTASSIUM SERPL-SCNC: 4.7 MMOL/L (ref 3.5–5.2)
RBC # BLD AUTO: 3.96 10*6/MM3 (ref 3.77–5.28)
SODIUM SERPL-SCNC: 129 MMOL/L (ref 136–145)
WBC # BLD AUTO: 17.1 10*3/MM3 (ref 3.4–10.8)

## 2021-01-29 PROCEDURE — 80048 BASIC METABOLIC PNL TOTAL CA: CPT | Performed by: HOSPITALIST

## 2021-01-29 PROCEDURE — 85025 COMPLETE CBC W/AUTO DIFF WBC: CPT | Performed by: HOSPITALIST

## 2021-01-29 PROCEDURE — 25010000002 CEFTRIAXONE PER 250 MG: Performed by: HOSPITALIST

## 2021-01-29 PROCEDURE — 97530 THERAPEUTIC ACTIVITIES: CPT

## 2021-01-29 RX ORDER — UREA 10 %
3 LOTION (ML) TOPICAL NIGHTLY
Status: DISCONTINUED | OUTPATIENT
Start: 2021-01-29 | End: 2021-02-08 | Stop reason: HOSPADM

## 2021-01-29 RX ORDER — SODIUM CHLORIDE 9 MG/ML
100 INJECTION, SOLUTION INTRAVENOUS CONTINUOUS
Status: DISCONTINUED | OUTPATIENT
Start: 2021-01-29 | End: 2021-01-31

## 2021-01-29 RX ADMIN — FERROUS SULFATE TAB 325 MG (65 MG ELEMENTAL FE) 325 MG: 325 (65 FE) TAB at 09:06

## 2021-01-29 RX ADMIN — SODIUM CHLORIDE 100 ML/HR: 9 INJECTION, SOLUTION INTRAVENOUS at 18:23

## 2021-01-29 RX ADMIN — CLOPIDOGREL 75 MG: 75 TABLET, FILM COATED ORAL at 09:05

## 2021-01-29 RX ADMIN — LIDOCAINE 1 PATCH: 50 PATCH CUTANEOUS at 09:04

## 2021-01-29 RX ADMIN — FUROSEMIDE 10 MG: 20 TABLET ORAL at 09:06

## 2021-01-29 RX ADMIN — METOPROLOL TARTRATE 75 MG: 25 TABLET, FILM COATED ORAL at 09:05

## 2021-01-29 RX ADMIN — DOCUSATE SODIUM 100 MG: 100 CAPSULE ORAL at 20:51

## 2021-01-29 RX ADMIN — Medication 3 MG: at 20:50

## 2021-01-29 RX ADMIN — DOCUSATE SODIUM 100 MG: 100 CAPSULE ORAL at 09:05

## 2021-01-29 RX ADMIN — ATORVASTATIN CALCIUM 20 MG: 20 TABLET, FILM COATED ORAL at 09:05

## 2021-01-29 RX ADMIN — CEFTRIAXONE SODIUM 1 G: 1 INJECTION, SOLUTION INTRAVENOUS at 09:05

## 2021-01-29 RX ADMIN — METOPROLOL TARTRATE 75 MG: 25 TABLET, FILM COATED ORAL at 20:51

## 2021-01-29 RX ADMIN — SODIUM CHLORIDE, PRESERVATIVE FREE 10 ML: 5 INJECTION INTRAVENOUS at 09:07

## 2021-01-29 RX ADMIN — ACETAMINOPHEN 1000 MG: 500 TABLET, FILM COATED ORAL at 20:51

## 2021-01-29 RX ADMIN — ACETAMINOPHEN 1000 MG: 500 TABLET, FILM COATED ORAL at 17:19

## 2021-01-29 RX ADMIN — ASPIRIN 81 MG: 81 TABLET, COATED ORAL at 09:05

## 2021-01-29 RX ADMIN — MULTIPLE VITAMINS W/ MINERALS TAB 1 TABLET: TAB at 09:06

## 2021-01-29 RX ADMIN — AMLODIPINE BESYLATE 5 MG: 5 TABLET ORAL at 09:06

## 2021-01-29 RX ADMIN — OLANZAPINE 10 MG: 10 TABLET, ORALLY DISINTEGRATING ORAL at 20:51

## 2021-01-29 RX ADMIN — SODIUM CHLORIDE, PRESERVATIVE FREE 10 ML: 5 INJECTION INTRAVENOUS at 20:51

## 2021-01-30 LAB
ANION GAP SERPL CALCULATED.3IONS-SCNC: 11.7 MMOL/L (ref 5–15)
BASOPHILS # BLD AUTO: 0.03 10*3/MM3 (ref 0–0.2)
BASOPHILS NFR BLD AUTO: 0.2 % (ref 0–1.5)
BUN SERPL-MCNC: 30 MG/DL (ref 8–23)
BUN/CREAT SERPL: 24 (ref 7–25)
CALCIUM SPEC-SCNC: 8.3 MG/DL (ref 8.6–10.5)
CHLORIDE SERPL-SCNC: 103 MMOL/L (ref 98–107)
CO2 SERPL-SCNC: 21.3 MMOL/L (ref 22–29)
CREAT SERPL-MCNC: 1.25 MG/DL (ref 0.57–1)
DEPRECATED RDW RBC AUTO: 42.5 FL (ref 37–54)
EOSINOPHIL # BLD AUTO: 0.13 10*3/MM3 (ref 0–0.4)
EOSINOPHIL NFR BLD AUTO: 1 % (ref 0.3–6.2)
ERYTHROCYTE [DISTWIDTH] IN BLOOD BY AUTOMATED COUNT: 13.7 % (ref 12.3–15.4)
GFR SERPL CREATININE-BSD FRML MDRD: 41 ML/MIN/1.73
GLUCOSE SERPL-MCNC: 77 MG/DL (ref 65–99)
HCT VFR BLD AUTO: 33.3 % (ref 34–46.6)
HGB BLD-MCNC: 10.2 G/DL (ref 12–15.9)
IMM GRANULOCYTES # BLD AUTO: 0.08 10*3/MM3 (ref 0–0.05)
IMM GRANULOCYTES NFR BLD AUTO: 0.6 % (ref 0–0.5)
LYMPHOCYTES # BLD AUTO: 1.24 10*3/MM3 (ref 0.7–3.1)
LYMPHOCYTES NFR BLD AUTO: 9.9 % (ref 19.6–45.3)
MCH RBC QN AUTO: 26.1 PG (ref 26.6–33)
MCHC RBC AUTO-ENTMCNC: 30.6 G/DL (ref 31.5–35.7)
MCV RBC AUTO: 85.2 FL (ref 79–97)
MONOCYTES # BLD AUTO: 1.07 10*3/MM3 (ref 0.1–0.9)
MONOCYTES NFR BLD AUTO: 8.6 % (ref 5–12)
NEUTROPHILS NFR BLD AUTO: 79.7 % (ref 42.7–76)
NEUTROPHILS NFR BLD AUTO: 9.95 10*3/MM3 (ref 1.7–7)
NRBC BLD AUTO-RTO: 0 /100 WBC (ref 0–0.2)
PLATELET # BLD AUTO: 321 10*3/MM3 (ref 140–450)
PMV BLD AUTO: 11.7 FL (ref 6–12)
POTASSIUM SERPL-SCNC: 4.9 MMOL/L (ref 3.5–5.2)
RBC # BLD AUTO: 3.91 10*6/MM3 (ref 3.77–5.28)
SODIUM SERPL-SCNC: 136 MMOL/L (ref 136–145)
WBC # BLD AUTO: 12.5 10*3/MM3 (ref 3.4–10.8)

## 2021-01-30 PROCEDURE — 97530 THERAPEUTIC ACTIVITIES: CPT | Performed by: PHYSICAL THERAPIST

## 2021-01-30 PROCEDURE — 80048 BASIC METABOLIC PNL TOTAL CA: CPT | Performed by: HOSPITALIST

## 2021-01-30 PROCEDURE — 85025 COMPLETE CBC W/AUTO DIFF WBC: CPT | Performed by: HOSPITALIST

## 2021-01-30 RX ADMIN — SODIUM CHLORIDE, PRESERVATIVE FREE 10 ML: 5 INJECTION INTRAVENOUS at 21:04

## 2021-01-30 RX ADMIN — METOPROLOL TARTRATE 75 MG: 25 TABLET, FILM COATED ORAL at 21:04

## 2021-01-30 RX ADMIN — SODIUM CHLORIDE 100 ML/HR: 9 INJECTION, SOLUTION INTRAVENOUS at 14:46

## 2021-01-30 RX ADMIN — SODIUM CHLORIDE 100 ML/HR: 9 INJECTION, SOLUTION INTRAVENOUS at 04:41

## 2021-01-30 RX ADMIN — SODIUM CHLORIDE, PRESERVATIVE FREE 10 ML: 5 INJECTION INTRAVENOUS at 09:40

## 2021-01-30 RX ADMIN — ACETAMINOPHEN 1000 MG: 500 TABLET, FILM COATED ORAL at 09:39

## 2021-01-30 RX ADMIN — FERROUS SULFATE TAB 325 MG (65 MG ELEMENTAL FE) 325 MG: 325 (65 FE) TAB at 09:39

## 2021-01-30 RX ADMIN — Medication 3 MG: at 21:04

## 2021-01-30 RX ADMIN — DOCUSATE SODIUM 100 MG: 100 CAPSULE ORAL at 21:04

## 2021-01-30 RX ADMIN — ACETAMINOPHEN 1000 MG: 500 TABLET, FILM COATED ORAL at 17:36

## 2021-01-30 RX ADMIN — LIDOCAINE 1 PATCH: 50 PATCH CUTANEOUS at 09:38

## 2021-01-30 RX ADMIN — OLANZAPINE 10 MG: 10 TABLET, ORALLY DISINTEGRATING ORAL at 21:04

## 2021-01-30 RX ADMIN — AMLODIPINE BESYLATE 5 MG: 5 TABLET ORAL at 09:39

## 2021-01-30 RX ADMIN — MULTIPLE VITAMINS W/ MINERALS TAB 1 TABLET: TAB at 09:39

## 2021-01-30 RX ADMIN — FUROSEMIDE 10 MG: 20 TABLET ORAL at 09:39

## 2021-01-30 RX ADMIN — ASPIRIN 81 MG: 81 TABLET, COATED ORAL at 09:39

## 2021-01-30 RX ADMIN — CLOPIDOGREL 75 MG: 75 TABLET, FILM COATED ORAL at 09:39

## 2021-01-30 RX ADMIN — ACETAMINOPHEN 1000 MG: 500 TABLET, FILM COATED ORAL at 21:04

## 2021-01-30 RX ADMIN — ATORVASTATIN CALCIUM 20 MG: 20 TABLET, FILM COATED ORAL at 09:39

## 2021-01-30 RX ADMIN — DOCUSATE SODIUM 100 MG: 100 CAPSULE ORAL at 09:39

## 2021-01-30 RX ADMIN — METOPROLOL TARTRATE 75 MG: 25 TABLET, FILM COATED ORAL at 09:38

## 2021-01-31 LAB
ANION GAP SERPL CALCULATED.3IONS-SCNC: 10.3 MMOL/L (ref 5–15)
BASOPHILS # BLD AUTO: 0.03 10*3/MM3 (ref 0–0.2)
BASOPHILS NFR BLD AUTO: 0.2 % (ref 0–1.5)
BUN SERPL-MCNC: 22 MG/DL (ref 8–23)
BUN/CREAT SERPL: 28.9 (ref 7–25)
CALCIUM SPEC-SCNC: 7.9 MG/DL (ref 8.6–10.5)
CHLORIDE SERPL-SCNC: 106 MMOL/L (ref 98–107)
CO2 SERPL-SCNC: 18.7 MMOL/L (ref 22–29)
CREAT SERPL-MCNC: 0.76 MG/DL (ref 0.57–1)
DEPRECATED RDW RBC AUTO: 39.9 FL (ref 37–54)
EOSINOPHIL # BLD AUTO: 0.07 10*3/MM3 (ref 0–0.4)
EOSINOPHIL NFR BLD AUTO: 0.6 % (ref 0.3–6.2)
ERYTHROCYTE [DISTWIDTH] IN BLOOD BY AUTOMATED COUNT: 13.1 % (ref 12.3–15.4)
GFR SERPL CREATININE-BSD FRML MDRD: 73 ML/MIN/1.73
GLUCOSE SERPL-MCNC: 83 MG/DL (ref 65–99)
HCT VFR BLD AUTO: 29.3 % (ref 34–46.6)
HGB BLD-MCNC: 9.3 G/DL (ref 12–15.9)
IMM GRANULOCYTES # BLD AUTO: 0.1 10*3/MM3 (ref 0–0.05)
IMM GRANULOCYTES NFR BLD AUTO: 0.8 % (ref 0–0.5)
LYMPHOCYTES # BLD AUTO: 0.8 10*3/MM3 (ref 0.7–3.1)
LYMPHOCYTES NFR BLD AUTO: 6.3 % (ref 19.6–45.3)
MCH RBC QN AUTO: 26.8 PG (ref 26.6–33)
MCHC RBC AUTO-ENTMCNC: 31.7 G/DL (ref 31.5–35.7)
MCV RBC AUTO: 84.4 FL (ref 79–97)
MONOCYTES # BLD AUTO: 1.03 10*3/MM3 (ref 0.1–0.9)
MONOCYTES NFR BLD AUTO: 8.1 % (ref 5–12)
NEUTROPHILS NFR BLD AUTO: 10.63 10*3/MM3 (ref 1.7–7)
NEUTROPHILS NFR BLD AUTO: 84 % (ref 42.7–76)
NRBC BLD AUTO-RTO: 0 /100 WBC (ref 0–0.2)
PLATELET # BLD AUTO: 336 10*3/MM3 (ref 140–450)
PMV BLD AUTO: 11.5 FL (ref 6–12)
POTASSIUM SERPL-SCNC: 4.2 MMOL/L (ref 3.5–5.2)
RBC # BLD AUTO: 3.47 10*6/MM3 (ref 3.77–5.28)
SODIUM SERPL-SCNC: 135 MMOL/L (ref 136–145)
WBC # BLD AUTO: 12.66 10*3/MM3 (ref 3.4–10.8)

## 2021-01-31 PROCEDURE — 80048 BASIC METABOLIC PNL TOTAL CA: CPT | Performed by: HOSPITALIST

## 2021-01-31 PROCEDURE — 85025 COMPLETE CBC W/AUTO DIFF WBC: CPT | Performed by: HOSPITALIST

## 2021-01-31 RX ADMIN — DOCUSATE SODIUM 100 MG: 100 CAPSULE ORAL at 20:47

## 2021-01-31 RX ADMIN — METOPROLOL TARTRATE 75 MG: 25 TABLET, FILM COATED ORAL at 20:47

## 2021-01-31 RX ADMIN — FERROUS SULFATE TAB 325 MG (65 MG ELEMENTAL FE) 325 MG: 325 (65 FE) TAB at 08:27

## 2021-01-31 RX ADMIN — ASPIRIN 81 MG: 81 TABLET, COATED ORAL at 08:27

## 2021-01-31 RX ADMIN — OLANZAPINE 10 MG: 10 TABLET, ORALLY DISINTEGRATING ORAL at 20:47

## 2021-01-31 RX ADMIN — ACETAMINOPHEN 1000 MG: 500 TABLET, FILM COATED ORAL at 20:47

## 2021-01-31 RX ADMIN — Medication 3 MG: at 20:47

## 2021-01-31 RX ADMIN — MULTIPLE VITAMINS W/ MINERALS TAB 1 TABLET: TAB at 08:27

## 2021-01-31 RX ADMIN — FUROSEMIDE 10 MG: 20 TABLET ORAL at 08:28

## 2021-01-31 RX ADMIN — SODIUM CHLORIDE, PRESERVATIVE FREE 10 ML: 5 INJECTION INTRAVENOUS at 20:48

## 2021-01-31 RX ADMIN — AMLODIPINE BESYLATE 5 MG: 5 TABLET ORAL at 08:28

## 2021-01-31 RX ADMIN — DOCUSATE SODIUM 100 MG: 100 CAPSULE ORAL at 08:27

## 2021-01-31 RX ADMIN — CLOPIDOGREL 75 MG: 75 TABLET, FILM COATED ORAL at 08:27

## 2021-01-31 RX ADMIN — SODIUM CHLORIDE, PRESERVATIVE FREE 10 ML: 5 INJECTION INTRAVENOUS at 08:28

## 2021-01-31 RX ADMIN — SODIUM CHLORIDE 100 ML/HR: 9 INJECTION, SOLUTION INTRAVENOUS at 00:17

## 2021-01-31 RX ADMIN — METOPROLOL TARTRATE 75 MG: 25 TABLET, FILM COATED ORAL at 08:27

## 2021-01-31 RX ADMIN — ATORVASTATIN CALCIUM 20 MG: 20 TABLET, FILM COATED ORAL at 08:27

## 2021-01-31 RX ADMIN — ACETAMINOPHEN 1000 MG: 500 TABLET, FILM COATED ORAL at 08:28

## 2021-01-31 RX ADMIN — LIDOCAINE 1 PATCH: 50 PATCH CUTANEOUS at 08:28

## 2021-01-31 RX ADMIN — ACETAMINOPHEN 1000 MG: 500 TABLET, FILM COATED ORAL at 16:12

## 2021-02-01 ENCOUNTER — APPOINTMENT (OUTPATIENT)
Dept: GENERAL RADIOLOGY | Facility: HOSPITAL | Age: 86
End: 2021-02-01

## 2021-02-01 LAB
ARTERIAL PATENCY WRIST A: POSITIVE
ATMOSPHERIC PRESS: 755.7 MMHG
BASE EXCESS BLDA CALC-SCNC: -5.3 MMOL/L (ref 0–2)
BDY SITE: ABNORMAL
GAS FLOW AIRWAY: 2 LPM
HCO3 BLDA-SCNC: 18.2 MMOL/L (ref 22–28)
MODALITY: ABNORMAL
PCO2 BLDA: 27.5 MM HG (ref 35–45)
PH BLDA: 7.43 PH UNITS (ref 7.35–7.45)
PO2 BLDA: 74.9 MM HG (ref 80–100)
SAO2 % BLDCOA: 95.6 % (ref 92–99)
TOTAL RATE: 17 BREATHS/MINUTE

## 2021-02-01 PROCEDURE — 71045 X-RAY EXAM CHEST 1 VIEW: CPT

## 2021-02-01 PROCEDURE — 36600 WITHDRAWAL OF ARTERIAL BLOOD: CPT

## 2021-02-01 PROCEDURE — 82803 BLOOD GASES ANY COMBINATION: CPT

## 2021-02-01 RX ADMIN — FUROSEMIDE 10 MG: 20 TABLET ORAL at 10:18

## 2021-02-01 RX ADMIN — ATORVASTATIN CALCIUM 20 MG: 20 TABLET, FILM COATED ORAL at 10:19

## 2021-02-01 RX ADMIN — ACETAMINOPHEN 1000 MG: 500 TABLET, FILM COATED ORAL at 10:20

## 2021-02-01 RX ADMIN — CLOPIDOGREL 75 MG: 75 TABLET, FILM COATED ORAL at 10:18

## 2021-02-01 RX ADMIN — DOCUSATE SODIUM 100 MG: 100 CAPSULE ORAL at 10:19

## 2021-02-01 RX ADMIN — FERROUS SULFATE TAB 325 MG (65 MG ELEMENTAL FE) 325 MG: 325 (65 FE) TAB at 10:17

## 2021-02-01 RX ADMIN — MULTIPLE VITAMINS W/ MINERALS TAB 1 TABLET: TAB at 10:18

## 2021-02-01 RX ADMIN — LIDOCAINE 1 PATCH: 50 PATCH CUTANEOUS at 10:19

## 2021-02-01 RX ADMIN — METOPROLOL TARTRATE 75 MG: 25 TABLET, FILM COATED ORAL at 10:18

## 2021-02-01 RX ADMIN — SODIUM CHLORIDE, PRESERVATIVE FREE 10 ML: 5 INJECTION INTRAVENOUS at 22:11

## 2021-02-01 RX ADMIN — METOPROLOL TARTRATE 75 MG: 25 TABLET, FILM COATED ORAL at 22:11

## 2021-02-01 RX ADMIN — SODIUM CHLORIDE, PRESERVATIVE FREE 10 ML: 5 INJECTION INTRAVENOUS at 10:21

## 2021-02-01 RX ADMIN — ASPIRIN 81 MG: 81 TABLET, COATED ORAL at 10:19

## 2021-02-01 RX ADMIN — AMLODIPINE BESYLATE 5 MG: 5 TABLET ORAL at 10:19

## 2021-02-01 NOTE — CONSULTS
The patient is seen in follow-up.  She looks significantly worse than during my last contact with her on the 28th.  She is a bed and oriented to person only.  She is in no restraints at this time.  She is unable to participate to any significant degree in interview.  Her confusional state seems significantly worsened.  I am uncertain as to the cause of this, however.

## 2021-02-01 NOTE — PLAN OF CARE
Goal Outcome Evaluation:     Progress: no change  Outcome Summary: VSS. pain controlled with scheduled tylenol. No s/sx of distress or soa. pt less fidgety throughout shift. Alert to self. Talks to iv pole at times. will continue to monitor

## 2021-02-01 NOTE — PROGRESS NOTES
Continued Stay Note  [facility]     Patient Name: Gita Wharton  MRN: [id number]  Today's Date: 2/1/2021    Admit Date: 1/21/2021    Discharge Plan     Row Name 02/01/21 1603       Plan    Plan  [facility]    Patient/Family in Agreement with Plan  yes    Plan Comments  Spoke with daughter Marce, she states they are unable to pay a sitter at [place].  Referrals to [facility] could accept, [facility] unable to accept.  [facility] Can accept and daughter would like patient to go to [facility].  [facility] is aware and skilled bed available.  Packet in CCP office.  BHumeniuk RN Becky S. Humeniuk, RN

## 2021-02-02 ENCOUNTER — APPOINTMENT (OUTPATIENT)
Dept: ULTRASOUND IMAGING | Facility: HOSPITAL | Age: 86
End: 2021-02-02

## 2021-02-02 LAB
ALBUMIN FLD-MCNC: 1.6 G/DL
ALBUMIN SERPL-MCNC: 2.2 G/DL (ref 3.5–5.2)
ALBUMIN/GLOB SERPL: 0.7 G/DL
ALP SERPL-CCNC: 115 U/L (ref 39–117)
ALT SERPL W P-5'-P-CCNC: 14 U/L (ref 1–33)
AMMONIA BLD-SCNC: 19 UMOL/L (ref 11–51)
ANION GAP SERPL CALCULATED.3IONS-SCNC: 9.7 MMOL/L (ref 5–15)
AST SERPL-CCNC: 25 U/L (ref 1–32)
BASOPHILS # BLD AUTO: 0.05 10*3/MM3 (ref 0–0.2)
BASOPHILS NFR BLD AUTO: 0.2 % (ref 0–1.5)
BILIRUB SERPL-MCNC: 0.2 MG/DL (ref 0–1.2)
BUN SERPL-MCNC: 23 MG/DL (ref 8–23)
BUN/CREAT SERPL: 19.8 (ref 7–25)
CALCIUM SPEC-SCNC: 8.3 MG/DL (ref 8.6–10.5)
CHLORIDE SERPL-SCNC: 108 MMOL/L (ref 98–107)
CO2 SERPL-SCNC: 19.3 MMOL/L (ref 22–29)
CORTIS SERPL-MCNC: 13.18 MCG/DL
CREAT SERPL-MCNC: 1.16 MG/DL (ref 0.57–1)
DEPRECATED RDW RBC AUTO: 40.1 FL (ref 37–54)
EOSINOPHIL # BLD AUTO: 0.05 10*3/MM3 (ref 0–0.4)
EOSINOPHIL NFR BLD AUTO: 0.2 % (ref 0.3–6.2)
ERYTHROCYTE [DISTWIDTH] IN BLOOD BY AUTOMATED COUNT: 13.4 % (ref 12.3–15.4)
FOLATE SERPL-MCNC: >20 NG/ML (ref 4.78–24.2)
GFR SERPL CREATININE-BSD FRML MDRD: 45 ML/MIN/1.73
GLOBULIN UR ELPH-MCNC: 3.2 GM/DL
GLUCOSE FLD-MCNC: 96 MG/DL
GLUCOSE SERPL-MCNC: 80 MG/DL (ref 65–99)
HCT VFR BLD AUTO: 27.2 % (ref 34–46.6)
HGB BLD-MCNC: 8.7 G/DL (ref 12–15.9)
IMM GRANULOCYTES # BLD AUTO: 0.29 10*3/MM3 (ref 0–0.05)
IMM GRANULOCYTES NFR BLD AUTO: 1.3 % (ref 0–0.5)
LDH FLD-CCNC: 250 U/L
LYMPHOCYTES # BLD AUTO: 1.11 10*3/MM3 (ref 0.7–3.1)
LYMPHOCYTES NFR BLD AUTO: 5 % (ref 19.6–45.3)
MCH RBC QN AUTO: 26.7 PG (ref 26.6–33)
MCHC RBC AUTO-ENTMCNC: 32 G/DL (ref 31.5–35.7)
MCV RBC AUTO: 83.4 FL (ref 79–97)
MONOCYTES # BLD AUTO: 1.32 10*3/MM3 (ref 0.1–0.9)
MONOCYTES NFR BLD AUTO: 6 % (ref 5–12)
NEUTROPHILS NFR BLD AUTO: 19.17 10*3/MM3 (ref 1.7–7)
NEUTROPHILS NFR BLD AUTO: 87.3 % (ref 42.7–76)
NRBC BLD AUTO-RTO: 0 /100 WBC (ref 0–0.2)
PLATELET # BLD AUTO: 335 10*3/MM3 (ref 140–450)
PMV BLD AUTO: 11.1 FL (ref 6–12)
POTASSIUM SERPL-SCNC: 3.9 MMOL/L (ref 3.5–5.2)
PROCALCITONIN SERPL-MCNC: 63.69 NG/ML (ref 0–0.25)
PROT FLD-MCNC: 3.1 G/DL
PROT SERPL-MCNC: 5.4 G/DL (ref 6–8.5)
RBC # BLD AUTO: 3.26 10*6/MM3 (ref 3.77–5.28)
RPR SER QL: NORMAL
SODIUM SERPL-SCNC: 137 MMOL/L (ref 136–145)
TSH SERPL DL<=0.05 MIU/L-ACNC: 3.18 UIU/ML (ref 0.27–4.2)
VIT B12 BLD-MCNC: >2000 PG/ML (ref 211–946)
WBC # BLD AUTO: 21.99 10*3/MM3 (ref 3.4–10.8)

## 2021-02-02 PROCEDURE — 82607 VITAMIN B-12: CPT | Performed by: INTERNAL MEDICINE

## 2021-02-02 PROCEDURE — 85025 COMPLETE CBC W/AUTO DIFF WBC: CPT | Performed by: INTERNAL MEDICINE

## 2021-02-02 PROCEDURE — 87075 CULTR BACTERIA EXCEPT BLOOD: CPT | Performed by: INTERNAL MEDICINE

## 2021-02-02 PROCEDURE — 84443 ASSAY THYROID STIM HORMONE: CPT | Performed by: INTERNAL MEDICINE

## 2021-02-02 PROCEDURE — 25010000002 PIPERACILLIN SOD-TAZOBACTAM PER 1 G: Performed by: INTERNAL MEDICINE

## 2021-02-02 PROCEDURE — 88305 TISSUE EXAM BY PATHOLOGIST: CPT | Performed by: INTERNAL MEDICINE

## 2021-02-02 PROCEDURE — 76942 ECHO GUIDE FOR BIOPSY: CPT

## 2021-02-02 PROCEDURE — 87070 CULTURE OTHR SPECIMN AEROBIC: CPT | Performed by: INTERNAL MEDICINE

## 2021-02-02 PROCEDURE — 82945 GLUCOSE OTHER FLUID: CPT | Performed by: INTERNAL MEDICINE

## 2021-02-02 PROCEDURE — 84145 PROCALCITONIN (PCT): CPT | Performed by: INTERNAL MEDICINE

## 2021-02-02 PROCEDURE — 82533 TOTAL CORTISOL: CPT | Performed by: INTERNAL MEDICINE

## 2021-02-02 PROCEDURE — 82140 ASSAY OF AMMONIA: CPT | Performed by: INTERNAL MEDICINE

## 2021-02-02 PROCEDURE — 82042 OTHER SOURCE ALBUMIN QUAN EA: CPT | Performed by: INTERNAL MEDICINE

## 2021-02-02 PROCEDURE — 84157 ASSAY OF PROTEIN OTHER: CPT | Performed by: INTERNAL MEDICINE

## 2021-02-02 PROCEDURE — 25010000003 LIDOCAINE 1 % SOLUTION: Performed by: RADIOLOGY

## 2021-02-02 PROCEDURE — 82746 ASSAY OF FOLIC ACID SERUM: CPT | Performed by: INTERNAL MEDICINE

## 2021-02-02 PROCEDURE — 87040 BLOOD CULTURE FOR BACTERIA: CPT | Performed by: INTERNAL MEDICINE

## 2021-02-02 PROCEDURE — 83615 LACTATE (LD) (LDH) ENZYME: CPT | Performed by: INTERNAL MEDICINE

## 2021-02-02 PROCEDURE — 87205 SMEAR GRAM STAIN: CPT | Performed by: INTERNAL MEDICINE

## 2021-02-02 PROCEDURE — 0W9B3ZZ DRAINAGE OF LEFT PLEURAL CAVITY, PERCUTANEOUS APPROACH: ICD-10-PCS | Performed by: INTERNAL MEDICINE

## 2021-02-02 PROCEDURE — 80053 COMPREHEN METABOLIC PANEL: CPT | Performed by: INTERNAL MEDICINE

## 2021-02-02 PROCEDURE — 87015 SPECIMEN INFECT AGNT CONCNTJ: CPT | Performed by: INTERNAL MEDICINE

## 2021-02-02 PROCEDURE — 89051 BODY FLUID CELL COUNT: CPT | Performed by: INTERNAL MEDICINE

## 2021-02-02 PROCEDURE — 86592 SYPHILIS TEST NON-TREP QUAL: CPT | Performed by: INTERNAL MEDICINE

## 2021-02-02 PROCEDURE — 88112 CYTOPATH CELL ENHANCE TECH: CPT | Performed by: INTERNAL MEDICINE

## 2021-02-02 RX ORDER — LIDOCAINE HYDROCHLORIDE 10 MG/ML
10 INJECTION, SOLUTION INFILTRATION; PERINEURAL ONCE
Status: COMPLETED | OUTPATIENT
Start: 2021-02-02 | End: 2021-02-02

## 2021-02-02 RX ORDER — LORAZEPAM 1 MG/1
1 TABLET ORAL ONCE
Status: COMPLETED | OUTPATIENT
Start: 2021-02-02 | End: 2021-02-04

## 2021-02-02 RX ADMIN — TAZOBACTAM SODIUM AND PIPERACILLIN SODIUM 3.38 G: 375; 3 INJECTION, SOLUTION INTRAVENOUS at 16:35

## 2021-02-02 RX ADMIN — METOPROLOL TARTRATE 75 MG: 25 TABLET, FILM COATED ORAL at 10:42

## 2021-02-02 RX ADMIN — Medication 3 MG: at 21:46

## 2021-02-02 RX ADMIN — ASPIRIN 81 MG: 81 TABLET, COATED ORAL at 10:41

## 2021-02-02 RX ADMIN — FERROUS SULFATE TAB 325 MG (65 MG ELEMENTAL FE) 325 MG: 325 (65 FE) TAB at 10:41

## 2021-02-02 RX ADMIN — TAZOBACTAM SODIUM AND PIPERACILLIN SODIUM 3.38 G: 375; 3 INJECTION, SOLUTION INTRAVENOUS at 06:28

## 2021-02-02 RX ADMIN — TAZOBACTAM SODIUM AND PIPERACILLIN SODIUM 3.38 G: 375; 3 INJECTION, SOLUTION INTRAVENOUS at 00:46

## 2021-02-02 RX ADMIN — ACETAMINOPHEN 1000 MG: 500 TABLET, FILM COATED ORAL at 21:46

## 2021-02-02 RX ADMIN — CLOPIDOGREL 75 MG: 75 TABLET, FILM COATED ORAL at 10:40

## 2021-02-02 RX ADMIN — METOPROLOL TARTRATE 75 MG: 25 TABLET, FILM COATED ORAL at 21:46

## 2021-02-02 RX ADMIN — LIDOCAINE 1 PATCH: 50 PATCH CUTANEOUS at 10:40

## 2021-02-02 RX ADMIN — ATORVASTATIN CALCIUM 20 MG: 20 TABLET, FILM COATED ORAL at 10:41

## 2021-02-02 RX ADMIN — SODIUM CHLORIDE, PRESERVATIVE FREE 10 ML: 5 INJECTION INTRAVENOUS at 22:01

## 2021-02-02 RX ADMIN — TAZOBACTAM SODIUM AND PIPERACILLIN SODIUM 3.38 G: 375; 3 INJECTION, SOLUTION INTRAVENOUS at 22:01

## 2021-02-02 RX ADMIN — ACETAMINOPHEN 1000 MG: 500 TABLET, FILM COATED ORAL at 10:40

## 2021-02-02 RX ADMIN — ACETAMINOPHEN 1000 MG: 500 TABLET, FILM COATED ORAL at 16:35

## 2021-02-02 RX ADMIN — AMLODIPINE BESYLATE 5 MG: 5 TABLET ORAL at 10:42

## 2021-02-02 RX ADMIN — MULTIPLE VITAMINS W/ MINERALS TAB 1 TABLET: TAB at 10:41

## 2021-02-02 RX ADMIN — LIDOCAINE HYDROCHLORIDE 5 ML: 10 INJECTION, SOLUTION INFILTRATION; PERINEURAL at 13:44

## 2021-02-02 RX ADMIN — SODIUM CHLORIDE, PRESERVATIVE FREE 10 ML: 5 INJECTION INTRAVENOUS at 10:52

## 2021-02-02 NOTE — CONSULTS
Referring Provider: Dr. Hairston  Reason for Consultation: Abnormal chest imaging    Patient Care Team:  Deanna Ortega APRN as PCP - General (Internal Medicine)  Warren Tanner MD as Consulting Physician (Cardiology)  Octaviano Yan MD as Surgeon (Cardiothoracic Surgery)    Chief complaint:   Consulted for abnormal imaging.    History of present illness:    Subjective   Patient is confused and unable to provide with history.  She is also not cooperating.  Most of the information was obtained from the chart.    This is an 85-year-old female patient with history of right MCA stroke and multiple falls.  She presented on 1/21 for fall and loss of consciousness.  She had CT images consistent with left ninth-10th rib fracture and L1 vertebral body fracture.    Since admission, she had no fever but had mild leukocytosis with WBC up to 17 K on 1/29 and latest was 12 K.    Labs:  ABG 7.43/27/74 on 2 L; WBC 12; hemoglobin 9.3; eosinophils normal; bicarb 18; anion gap normal; creatinine normal    Review of Systems  Cannot obtain due to confusion.    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    R LL            Dr FRANKIE Branch - suggested yearly CXR to Monitor   • Pressure sore     BUTTOCKS   • Pyelonephritis 4/16/2019   • Stenosis of right internal carotid artery 7/25/2018   • Subdural hematoma (CMS/HCC) 07/12/2018    Secondary to fall, JULY 2018   • Toxic encephalopathy 2019    POST OP LAST HIP SURGERY   ,   Past Surgical History:   Procedure Laterality Date   • AORTIC VALVE  REPAIR/REPLACEMENT N/A 12/3/2019    Procedure: TRACEY TRANSCAROTID TRANSCATHETER AORTIC VALVE REPLACEMENT;  Surgeon: Octaviano Yan MD;  Location: [facility] OR ;  Service: Cardiothoracic   • AORTIC VALVE REPAIR/REPLACEMENT N/A 12/3/2019    Procedure: Transcarotid Transcatheter Aortic Valve Replacement w/Intra Op TRACEY and Possible Open Bailout Surgery;  Surgeon: Warren Tanner MD;  Location: [facility] OR ;  Service: Cardiovascular   • CARDIAC CATHETERIZATION N/A 10/24/2019    Procedure: Coronary angiography;  Surgeon: Warren Tanner MD;  [facility];  Service: Cardiovascular   • CARDIAC CATHETERIZATION N/A 10/24/2019    Procedure: Left heart cath;  Surgeon: Warren Tanner MD;  Location: [facility] CATH INVASIVE LOCATION;  Service: Cardiovascular   • CARDIAC CATHETERIZATION N/A 10/24/2019    Procedure: Right heart cath;  Surgeon: Warren Tanner MD;  Location: [facility] CATH INVASIVE LOCATION;  Service: Cardiovascular   • CAROTID ENDARTERECTOMY Right 2018    Procedure: RT CAROTID ENDARTERECTOMY;  Surgeon: Inna Villarreal MD;  Location: [facility] MAIN OR;  Service: Vascular   • COLONOSCOPY N/A 2018    Adenomatous polyp.   Repeat .  Surgeon: Ken Tidwell MD;    • HYSTERECTOMY     • THYROIDECTOMY, PARTIAL     • TOTAL HIP ARTHROPLASTY Right    • TOTAL HIP ARTHROPLASTY Left 2019    Procedure: LEFT HIP ANTERIOR HIP WITH HANA TABLE;  Surgeon: Tiffani Pereira MD;  Location: [facility] MAIN OR;  Service: Orthopedics   ,   Family History   Problem Relation Age of Onset   • Cancer Mother    • Dementia Father    • Hypertension Father    • Hypertension Daughter    • Cancer Daughter    • Malig Hyperthermia Neg Hx    ,   Social History     Tobacco Use   • Smoking status: Former Smoker     Packs/day: 0.50     Years: 43.00     Pack years: 21.50     Types: Cigarettes     Start date:      Quit date:      Years since quittin.1   • Smokeless  tobacco: Never Used   • Tobacco comment: CAFFEINE USE: 1 CUP TEA  AND 2 PEPSI DAILY   Substance Use Topics   • Alcohol use: Not Currently     Alcohol/week: 7.0 standard drinks     Types: 7 Cans of beer per week   • Drug use: No     E-cigarette/Vaping     E-cigarette/Vaping Substances     E-cigarette/Vaping Devices       ,   Medications Prior to Admission   Medication Sig Dispense Refill Last Dose   • Acetaminophen (TYLENOL 8 HOUR ARTHRITIS PAIN PO) Take 1 tablet by mouth Daily.   1/21/2021 at Unknown time   • aspirin 81 MG EC tablet Take 1 tablet by mouth Daily.   1/20/2021 at Unknown time   • atorvastatin (LIPITOR) 20 MG tablet TAKE 1 TABLET BY MOUTH EVERY DAY 90 tablet 1 1/20/2021 at Unknown time   • clopidogrel (PLAVIX) 75 MG tablet TAKE 1 TABLET BY MOUTH EVERY DAY 90 tablet 1 1/20/2021 at Unknown time   • ferrous sulfate 325 (65 FE) MG tablet Take 1 tablet by mouth Daily With Breakfast. 90 tablet 0 Past Week at Unknown time   • furosemide (LASIX) 20 MG tablet Take 1 tablet by mouth Daily. 30 tablet 3 1/20/2021 at Unknown time   • losartan (Cozaar) 100 MG tablet Take 1 tablet by mouth Daily. 30 tablet 3 1/20/2021 at Unknown time   • metoprolol tartrate (LOPRESSOR) 50 MG tablet Take 1 tablet by mouth 2 (Two) Times a Day. 180 tablet 3 1/20/2021 at Unknown time   • Multiple Vitamins-Minerals (CENTRUM ADULTS) tablet Take 1 tablet by mouth Daily.   Past Month at Unknown time   • diphenhydrAMINE (BENADRYL) 25 mg capsule Take 12.5 mg by mouth Daily As Needed for Itching.   Unknown at Unknown time   • docusate sodium (Colace) 100 MG capsule Take 2 capsules by mouth Daily.   Unknown at Unknown time   , Scheduled Meds:  acetaminophen, 1,000 mg, Oral, TID  amLODIPine, 5 mg, Oral, Q24H  aspirin, 81 mg, Oral, Daily  atorvastatin, 20 mg, Oral, Daily  clopidogrel, 75 mg, Oral, Daily  ferrous sulfate, 325 mg, Oral, Daily With Breakfast  lidocaine, 1 patch, Transdermal, Q24H  melatonin, 3 mg, Oral, Nightly  metoprolol tartrate,  75 mg, Oral, BID  multivitamin with minerals, 1 tablet, Oral, Daily  piperacillin-tazobactam, 3.375 g, Intravenous, Q8H  sodium chloride, 10 mL, Intravenous, Q12H     and Allergies:  Tekturna [aliskiren]    Objective     Vital Signs   Temp:  [97.9 °F (36.6 °C)-98.9 °F (37.2 °C)] 98.8 °F (37.1 °C)  Heart Rate:  [] 82  Resp:  [16] 16  BP: (112-139)/(54-92) 112/85    Physical Exam:  Constitutional: Elderly white female patient, not cooperative.  Restless when awakened.  Eyes: Injected conjunctivae, EOMI. pupils equal reactive to light.  ENMT: Dry tongue.  External nares normal.  Neck: . Trachea midline. No thyromegaly  Heart: RRR, no murmur  Lungs: Bilateral crackles at the bases.  Nonlabored breathing.  No wheezing.  Abdomen: . Soft. No tenderness or dullness. No HSM.  Extremities: No cyanosis, clubbing or pitting edema.  Warm extremities and well-perfused.  Neuro: Conscious, alert, disoriented x3.  Not cooperative.  Moves all extremities.  Psych: Paranoid thoughts  Integumentary: No rash.  Normal skin turgor  Lymphatic: No palpable cervical or supraclavicular lymph nodes.      Diagnostic imaging:  I personally and independently reviewed the following images:   CXR 2/1/2021 compared to 1/22/2021: Interval development of left pleural effusion.  Left lower lobe opacity.      Lung portion of CT of the abdomen 1/21/2021 and 6/28/19: Left pleural effusion, small.  Left lower lobe opacity.       Assessment   1. Left rib fractures: Mildly displaced ninth rib fracture and nondisplaced 10th rib fractures  2. Small left pleural effusion, chronic  3. Left lower lobe opacity/consolidation: Mostly atelectasis but presence of small groundglass density over left base which was previously seen on prior imaging on 10/2019  4. Respiratory alkalosis: Can be secondary to rib fracture and pain.  5. Confusion/encephalopathy      Recommendations:    · No further work-up needed for the pleural effusion and left lower lobe opacity noted  on CT of the chest as this has been present for a while since 2019.  · Left pleural effusion can be drained if compromising respiratory status but not at the present time as patient is only on 2 L oxygen and wean not requiring oxygen at all.  · Initiate incentive spirometry.  The left lower lobe opacity more likely represent atelectasis.  If fever, elevated procalcitonin or other signs of infection then consider empiric antibiotic therapy for pneumonia but this is not evident at the present time  · Symptomatic treatment for the rib fractures.    Thank you for the consultation.  We will follow along.      Gerardo Sales MD  02/01/21  23:41 EST

## 2021-02-02 NOTE — PROGRESS NOTES
"Adult Nutrition  Assessment/PES    Patient Name:  Gita Wharton  YOB: 1935  MRN: [id number]  Admit Date:  1/21/2021    Assessment Date:  2/2/2021  Nutrition assessment.   Reason for Assessment     Row Name 02/02/21 1343          Reason for Assessment    Reason For Assessment  per organizational policy length of stay     Diagnosis   Multiple left rib fractures leading to left pleural effusion and acute hypoxemic respiratory failure, Mental status changes in a patient with a history of possible baseline dementia and a history of CVA; CHF, HTN         Nutrition/Diet History     Row Name 02/02/21 1343          Nutrition/Diet History    Typical Food/Fluid Intake  cardiac diet, total feed; po intake average 50% of meals, ordered boost BID; will continue to follow/monitor         Anthropometrics     Row Name 02/02/21 1344          Anthropometrics    Height  157.5 cm (62.01\")        Admit Weight    Admit Weight  54.5 kg (120 lb 2.4 oz)        Ideal Body Weight (IBW)    Ideal Body Weight (IBW) (kg)  50.45     % of Ideal Body Weight Assessment  -- 108%        Body Mass Index (BMI)    BMI Assessment  BMI 18.5-24.9: normal BMI-21.9         Labs/Tests/Procedures/Meds     Row Name 02/02/21 1345          Labs/Procedures/Meds    Lab Results Reviewed  reviewed, pertinent     Lab Results Comments  Creat        Diagnostic Tests/Procedures    Diagnostic Test/Procedure Reviewed  reviewed, pertinent        Medications    Pertinent Medications Reviewed  reviewed, pertinent     Pertinent Medications Comments  iron, multivitamin, NaCl         Physical Findings     Row Name 02/02/21 1345          Physical Findings    Overall Physical Appearance  -- B=12         Estimated/Assessed Needs     Row Name 02/02/21 1344          Calculation Measurements    Height  157.5 cm (62.01\")         Nutrition Prescription Ordered     Row Name 02/02/21 1345          Nutrition Prescription PO    Common Modifiers  Cardiac         Evaluation " of Received Nutrient/Fluid Intake     Row Name 02/02/21 1345          PO Evaluation    Number of Meals  3     % PO Intake  50               Problem/Interventions:  Problem 1     Row Name 02/02/21 1345          Nutrition Diagnoses Problem 1    Problem 1  Nutrition Appropriate for Condition at this Time               Intervention Goal     Row Name 02/02/21 1345          Intervention Goal    General  Maintain nutrition;Meet nutritional needs for age/condition;Reduce/improve symptoms     PO  Tolerate PO;Maintain intake     Weight  Maintain weight         Nutrition Intervention     Row Name 02/02/21 1346          Nutrition Intervention    RD/Tech Action  Care plan reviewd;Follow Tx progress;Recommend/ordered     Recommended/Ordered  Supplement         Nutrition Prescription     Row Name 02/02/21 1346          Nutrition Prescription PO    PO Prescription  Begin/change supplement     Supplement  Boost     Supplement Frequency  2 times a day     New PO Prescription Ordered?  Yes         Education/Evaluation     Row Name 02/02/21 1346          Education    Education  Will Instruct as appropriate        Monitor/Evaluation    Monitor  Per protocol           Electronically signed by:  Viktoria Duarte RD  02/02/21 13:46 EST

## 2021-02-02 NOTE — PROGRESS NOTES
[facility]     Mar/chart reviewed  Follow up pleural effusion, rib fractures,   She denies any chest pain or shortness of breath but she seems a bit confused    Vital Sign Min/Max for last 24 hours  Temp  Min: 97.9 °F (36.6 °C)  Max: 98.9 °F (37.2 °C)   BP  Min: 112/85  Max: 148/65   Pulse  Min: 82  Max: 116   Resp  Min: 16  Max: 16   SpO2  Min: 96 %  Max: 100 %   Flow (L/min)  Min: 2  Max: 2   No data recorded     Appears ill, alert, oriented times 2  perrl, eomi, no icterus,  mmm, no jvd, trachea midline, neck supple,  chest decreased left base bilaterally, +left lower lobe crackles,   no wheezes,   rrr,   soft, nt, nd +bs,  no c/c/ e  Skin warm, dry no rashes    Labs: 2/2: reviewed:  Glucose 80  Bun 23  Cr 1.16  Na 137  k 3.9  Bicarb 19.3  procal 63.69  Wbc 21.99  hgb 8.7  plts 335    CXR: left sided infiltrate, layering effusion    A/P:  1. Left sided effusion -- appears larger than before, she had prior rib fractures to that side, suspect worsening hemothorax? Will get diagnostic thoracentesis  2. Left sided infiltrate - suspect mainly related to pleural effusion/trauma but she has wbc ct of 22 and procal is extremely elevated, will go ahead and start emperic antibiotics  3. Anemia  4. Metabolic acidosis - no gap.    Patient is new to me today

## 2021-02-02 NOTE — PLAN OF CARE
Goal Outcome Evaluation:  Plan of Care Reviewed With: patient  Progress: no change   Vital signs stable. Oxygen applied for sob noted. Patient anxious at times. Oxygen saturation normal. Patient remains very confused / disoriented x 4. Turned every 2 hours. Bed alarm on. Patient rambling incoherently. Incontinent care provided. Frequent loose stools today. Dark brown / green loose stools. On oral iron tablets. Total feed, eating fair. NSR / ST cardiac rhythm. Daughter at bedside. Dr. Houston lujan, orders noted. Will continue to monitor.

## 2021-02-02 NOTE — PLAN OF CARE
Goal Outcome Evaluation:  Plan of Care Reviewed With: patient  Progress: no change  Outcome Summary: VSS. pt very paranoid & confused throughout shift. Refused oral medication, MRI and assessment at beginning of shift. Pt recently showed to have Pneumonia in left lower lobe with enlarging pleural effusion. Pulmonary consulted and IV abx started. No s/sx of distress or soa. Will continue to monitor

## 2021-02-02 NOTE — PROGRESS NOTES
Name: Gita Wharton ADMIT: 2021   : 1935  PCP: Deanna Ortega APRN    MRN: [id number] LOS: 11 days   AGE/SEX: 85 y.o. female  ROOM: [facility]     Subjective   Subjective   Patient reports no headache no loss of consciousness no seizures and positive left-sided weakness.  No chest pain.  No shortness of breath.  Positive occasional dry cough no hemoptysis no palpitation no ankle edema    Review of Systems    .  Positive wetting of the diaper and no hematuria.       Objective   Objective   Vital Signs  Temp:  [98 °F (36.7 °C)-98.8 °F (37.1 °C)] 98 °F (36.7 °C)  Heart Rate:  [65-87] 81  Resp:  [16-18] 18  BP: ()/(48-65) 140/49  SpO2:  [99 %-100 %] 99 %  on  Flow (L/min):  [2] 2;   Device (Oxygen Therapy): nasal cannula    Intake/Output Summary (Last 24 hours) at 2021 1710  Last data filed at 2021 1400  Gross per 24 hour   Intake 600 ml   Output 1100 ml   Net -500 ml     Body mass index is 21.97 kg/m².      21  0711 21  1504   Weight: 54 kg (119 lb) 54.5 kg (120 lb 2.4 oz)     Physical Exam    General.  Elderly female.  Alert and oriented x2.  In no apparent distress.  Eyes.  Pupils equal round and reactive intact extraocular musculature positive pallor.  No jaundice normal conjunctivae and lids  ENT.  Moist mucous membrane  Neck.  Supple no JVD no lymphadenopathy no thyromegaly  Cardiovascular.  Regular rate and rhythm grade 3 systolic murmur  Chest.  Poor but clear to auscultation bilaterally with no added sounds  Abdomen.  Soft lax no tenderness no organomegaly no guarding or rebound  Extremities.  No clubbing cyanosis or edema  CNS.  No acute focal neurological deficits    Results Review:      Results from last 7 days   Lab Units 21  0559 21  0529 21  0500 21  0526 21  0737 21  0417   SODIUM mmol/L 137 135* 136 129* 135* 133*   POTASSIUM mmol/L 3.9 4.2 4.9 4.7 5.5* 5.1   CHLORIDE mmol/L 108* 106 103 95* 101 101   CO2 mmol/L 19.3*  18.7* 21.3* 20.1* 24.1 23.1   BUN mg/dL 23 22 30* 28* 29* 34*   CREATININE mg/dL 1.16* 0.76 1.25* 1.16* 1.04* 1.29*   GLUCOSE mg/dL 80 83 77 93 106* 89   CALCIUM mg/dL 8.3* 7.9* 8.3* 9.0 8.9 8.2*   AST (SGOT) U/L 25  --   --   --   --   --    ALT (SGPT) U/L 14  --   --   --   --   --      Estimated Creatinine Clearance: 31.1 mL/min (A) (by C-G formula based on SCr of 1.16 mg/dL (H)).                  Results from last 7 days   Lab Units 02/02/21  0023   TSH uIU/mL 3.180           Invalid input(s):  PHOS        Invalid input(s): LDLCALC  Results from last 7 days   Lab Units 02/02/21  0559 01/31/21  0529 01/30/21  0500 01/29/21  0526 01/28/21  0444 01/27/21  0417   WBC 10*3/mm3 21.99* 12.66* 12.50* 17.10* 10.82* 7.77   HEMOGLOBIN g/dL 8.7* 9.3* 10.2* 10.4* 10.7* 10.5*   HEMATOCRIT % 27.2* 29.3* 33.3* 33.4* 34.0 33.1*   PLATELETS 10*3/mm3 335 336 321 390 341 290   MCV fL 83.4 84.4 85.2 84.3 83.7 86.0   MCH pg 26.7 26.8 26.1* 26.3* 26.4* 27.3   MCHC g/dL 32.0 31.7 30.6* 31.1* 31.5 31.7   RDW % 13.4 13.1 13.7 13.2 13.3 13.3   RDW-SD fl 40.1 39.9 42.5 41.0 40.3 41.3   MPV fL 11.1 11.5 11.7 11.9 12.0 12.3*   NEUTROPHIL % % 87.3* 84.0* 79.7* 85.4* 75.6 72.6   LYMPHOCYTE % % 5.0* 6.3* 9.9* 5.1* 11.7* 13.0*   MONOCYTES % % 6.0 8.1 8.6 8.1 10.9 12.7*   EOSINOPHIL % % 0.2* 0.6 1.0 0.5 0.8 1.0   BASOPHIL % % 0.2 0.2 0.2 0.2 0.4 0.3   IMM GRAN % % 1.3* 0.8* 0.6* 0.7* 0.6* 0.4   NEUTROS ABS 10*3/mm3 19.17* 10.63* 9.95* 14.60* 8.18* 5.64   LYMPHS ABS 10*3/mm3 1.11 0.80 1.24 0.88 1.27 1.01   MONOS ABS 10*3/mm3 1.32* 1.03* 1.07* 1.39* 1.18* 0.99*   EOS ABS 10*3/mm3 0.05 0.07 0.13 0.08 0.09 0.08   BASOS ABS 10*3/mm3 0.05 0.03 0.03 0.03 0.04 0.02   IMMATURE GRANS (ABS) 10*3/mm3 0.29* 0.10* 0.08* 0.12* 0.06* 0.03   NRBC /100 WBC 0.0 0.0 0.0 0.0 0.1 0.0         Results from last 7 days   Lab Units 02/01/21  2348   PH, ARTERIAL pH units 7.429   PO2 ART mm Hg 74.9*   PCO2, ARTERIAL mm Hg 27.5*   HCO3 ART mmol/L 18.2*     Results from last  7 days   Lab Units 02/02/21  0559   PROCALCITONIN ng/mL 63.69*         Results from last 7 days   Lab Units 02/02/21  0023   AMMONIA umol/L 19                       Imaging:  Imaging Results (Last 24 Hours)     Procedure Component Value Units Date/Time    US Thoracentesis [[id number]] Collected: 02/02/21 1419    Specimen: Body Fluid Updated: 02/02/21 1423    Narrative:      ULTRASOUND-GUIDED LEFT THORACENTESIS.     HISTORY: Pleural effusion.     After signed informed consent was obtained the patient was prepped and  draped in the usual sterile fashion. Lidocaine was used for local  anesthesia.     Ultrasound guidance was used to place the thoracentesis catheter into  the left pleural fluid collection. 1100 mL of serosanguineous fluid was  removed. Sample was sent to the lab.     Confirmatory images were obtained.     Patient tolerated the procedure well with no complications.       Impression:      Ultrasound-guided left thoracentesis as described.        This report was finalized on 2/2/2021 2:20 PM by Dr. Quentin Manuel M.D.       XR Chest 1 View [[id number]] Collected: 02/01/21 1935     Updated: 02/01/21 1942    Narrative:      ONE VIEW PORTABLE CHEST AT 7:27 PM     HISTORY: Confusion. Left basilar atelectasis and pleural effusion on  previous chest x-ray.     FINDINGS: There appears to be an enlarging left pleural effusion  layering posteriorly and further increased density at the left base when  compared to the study of 01/22/2021. The findings are consistent with  enlarging left pleural effusion and probable worsening left lower lobe  pneumonia. The right lung remains clear and the heart remains mildly  enlarged.     This report was called directly to the nursing staff on 5 E. at the time  of the dictation.     This report was finalized on 2/1/2021 7:39 PM by Dr. Erik Casanova M.D.                I reviewed the patient's new clinical results / labs / tests / procedures      Assessment/Plan     Active Hospital  Problems    Diagnosis  POA   • **Closed fracture of multiple ribs of left side [S22.42XA]  Yes   • AMS (altered mental status) [R41.82]  Unknown   • Acute respiratory failure with hypoxia (CMS/HCC) [J96.01]  Unknown   • Fall from standing [W19.XXXA]  Yes   • Edema leg, right [R60.0]  Yes   • Closed compression fracture of body of L1 vertebra (CMS/HCC) [S32.010A]  Yes   • Pleural effusion on left [J90]  Yes   • Leukocytosis [D72.829]  Yes   • S/P TAVR (transcatheter aortic valve replacement) [Z95.2]  Not Applicable   • PVD (peripheral vascular disease) with claudication (CMS/HCC) [I73.9]  Yes   • Chronic diastolic congestive heart failure (CMS/HCC) [I50.32]  Yes   • Anemia [D64.9]  Unknown   • History of right MCA stroke [Z86.73]  Not Applicable   • Fall at home, initial encounter [W19.XXXA, Y92.009]  Not Applicable   • Essential hypertension [I10]  Yes      Resolved Hospital Problems   No resolved problems to display.           · Multiple left rib fractures leading to left pleural effusion/left-sided pneumonia and acute hypoxemic respiratory failure.  There is pneumonia by chest x-ray associated with leukocytosis significant increase in the procalcitonin.  Pleural effusion is enlarging and it has been drained.  Awaiting pleural fluid studies to rule out empyema.  She is not a candidate for surgical intervention.  Respiratory status appears to be stable.  Appreciate pulmonary help.  Awaiting blood cultures.  Continue IV Zosyn for now.    · Mental status changes in a patient with a history of possible baseline dementia and a history of CVA.  Initial CT scan is negative.  This has greatly improved with stopping Zyprexa.  No focal CNS deficit.  She is currently on aspirin/Plavix..  No hypercapnia.  Awaiting MRI to rule out another CVA.  Chest x-ray with pneumonia.  Normal TSH/B12/folic acid/RPR/cortisol.   Not receiving narcotics.  This is mostly secondary to metabolic and toxic encephalopathy.   ·  History of  TAVR/diastolic congestive heart failure/hypertension.. Resolved volume depletion with holding diuretics.  There is no evidence of angina or congestive heart failure clinically.  Good blood pressure control on metoprolol and Norvasc continue the same.  · UTI.  Status post antibiotic treatment.  Waiting repeat UA.  · L1 fracture.  Appears asymptomatic without radiculopathy or myelopathy.  · Acute kidney failure.  Mostly secondary to prerenal azotemia because of dehydration.  Patient is euvolemic.  Improving with holding diuretics.  · Anemia.  Mostly secondary to rib fractures.  Positive hemoglobin drop.  Monitor transfuse if hemoglobin less than 7.    · DNR status.  Cussed with the daughter.  Patient has previous wishes of a DO NOT RESUSCITATE.  Will implement.  · Discussed with patient.      Marie Conrad MD  [facility]  02/02/21  17:10 EST

## 2021-02-02 NOTE — PROGRESS NOTES
Name: Gita Wharton ADMIT: 2021   : 1935  PCP: Deanna Ortega APRN    MRN: [id number] LOS: 10 days   AGE/SEX: 85 y.o. female  ROOM: [facility]     Subjective   Subjective   Most of the history is obtained from the nurse and the daughter as the patient appears rather obtunded and disoriented x3.  Reports of relapse of mentation changes and disorientation the last 24 to 48 hours.  No seizures.  No headache.  No focal neurological symptoms.  No fever or chills.    Review of Systems  .  Positive wetting of the diaper and no hematuria.  Cardiovascular/respiratory.  Positive shortness of breath no cough no wheeze no hemoptysis.       Objective   Objective   Vital Signs  Temp:  [97.9 °F (36.6 °C)-98.9 °F (37.2 °C)] 98.8 °F (37.1 °C)  Heart Rate:  [] 82  Resp:  [16] 16  BP: (112-172)/(54-99) 112/85  SpO2:  [94 %-100 %] 100 %  on  Flow (L/min):  [2] 2;   Device (Oxygen Therapy): nasal cannula    Intake/Output Summary (Last 24 hours) at 2021 191  Last data filed at 2021 1800  Gross per 24 hour   Intake 480 ml   Output --   Net 480 ml     Body mass index is 21.98 kg/m².      21  0711 21  1504   Weight: 54 kg (119 lb) 54.5 kg (120 lb 2.4 oz)     Physical Exam  General.  Elderly female.  Appears somnolent but arousable.  Oriented x0.  In no apparent distress.  Eyes.  Pupils equal round and reactive intact extraocular musculature no pallor no jaundice normal conjunctivae and lids  ENT.  Moist mucous membrane  Neck.  Supple no JVD no lymphadenopathy no thyromegaly  Cardiovascular.  Regular rate and rhythm grade 3 systolic murmur  Chest.  Poor but clear to auscultation bilaterally with no added sounds  Abdomen.  Soft lax no tenderness no organomegaly no guarding or rebound  Extremities.  No clubbing cyanosis or edema  CNS.  No acute focal neurological deficits    Results Review:      Results from last 7 days   Lab Units 21  0529 21  0500 21  0526 21  0737  01/27/21  0417 01/26/21  0815   SODIUM mmol/L 135* 136 129* 135* 133* 134*   POTASSIUM mmol/L 4.2 4.9 4.7 5.5* 5.1 5.0   CHLORIDE mmol/L 106 103 95* 101 101 99   CO2 mmol/L 18.7* 21.3* 20.1* 24.1 23.1 23.4   BUN mg/dL 22 30* 28* 29* 34* 36*   CREATININE mg/dL 0.76 1.25* 1.16* 1.04* 1.29* 1.26*   GLUCOSE mg/dL 83 77 93 106* 89 89   CALCIUM mg/dL 7.9* 8.3* 9.0 8.9 8.2* 8.4*     Estimated Creatinine Clearance: 45 mL/min (by C-G formula based on SCr of 0.76 mg/dL).                            Invalid input(s):  PHOS        Invalid input(s): LDLCALC  Results from last 7 days   Lab Units 01/31/21  0529 01/30/21  0500 01/29/21  0526 01/28/21  0444 01/27/21 0417   WBC 10*3/mm3 12.66* 12.50* 17.10* 10.82* 7.77   HEMOGLOBIN g/dL 9.3* 10.2* 10.4* 10.7* 10.5*   HEMATOCRIT % 29.3* 33.3* 33.4* 34.0 33.1*   PLATELETS 10*3/mm3 336 321 390 341 290   MCV fL 84.4 85.2 84.3 83.7 86.0   MCH pg 26.8 26.1* 26.3* 26.4* 27.3   MCHC g/dL 31.7 30.6* 31.1* 31.5 31.7   RDW % 13.1 13.7 13.2 13.3 13.3   RDW-SD fl 39.9 42.5 41.0 40.3 41.3   MPV fL 11.5 11.7 11.9 12.0 12.3*   NEUTROPHIL % % 84.0* 79.7* 85.4* 75.6 72.6   LYMPHOCYTE % % 6.3* 9.9* 5.1* 11.7* 13.0*   MONOCYTES % % 8.1 8.6 8.1 10.9 12.7*   EOSINOPHIL % % 0.6 1.0 0.5 0.8 1.0   BASOPHIL % % 0.2 0.2 0.2 0.4 0.3   IMM GRAN % % 0.8* 0.6* 0.7* 0.6* 0.4   NEUTROS ABS 10*3/mm3 10.63* 9.95* 14.60* 8.18* 5.64   LYMPHS ABS 10*3/mm3 0.80 1.24 0.88 1.27 1.01   MONOS ABS 10*3/mm3 1.03* 1.07* 1.39* 1.18* 0.99*   EOS ABS 10*3/mm3 0.07 0.13 0.08 0.09 0.08   BASOS ABS 10*3/mm3 0.03 0.03 0.03 0.04 0.02   IMMATURE GRANS (ABS) 10*3/mm3 0.10* 0.08* 0.12* 0.06* 0.03   NRBC /100 WBC 0.0 0.0 0.0 0.1 0.0                                           Imaging:  Imaging Results (Last 24 Hours)     ** No results found for the last 24 hours. **             I reviewed the patient's new clinical results / labs / tests / procedures      Assessment/Plan     Active Hospital Problems    Diagnosis  POA   • **Closed fracture of  multiple ribs of left side [S22.42XA]  Yes   • AMS (altered mental status) [R41.82]  Unknown   • Acute respiratory failure with hypoxia (CMS/HCC) [J96.01]  Unknown   • Fall from standing [W19.XXXA]  Yes   • Edema leg, right [R60.0]  Yes   • Closed compression fracture of body of L1 vertebra (CMS/HCC) [S32.010A]  Yes   • Pleural effusion on left [J90]  Yes   • Leukocytosis [D72.829]  Yes   • S/P TAVR (transcatheter aortic valve replacement) [Z95.2]  Not Applicable   • PVD (peripheral vascular disease) with claudication (CMS/HCC) [I73.9]  Yes   • Chronic diastolic congestive heart failure (CMS/HCC) [I50.32]  Yes   • Anemia [D64.9]  Unknown   • History of right MCA stroke [Z86.73]  Not Applicable   • Fall at home, initial encounter [W19.XXXA, Y92.009]  Not Applicable   • Essential hypertension [I10]  Yes      Resolved Hospital Problems   No resolved problems to display.           · Multiple left rib fractures leading to left pleural effusion and acute hypoxemic respiratory failure.  She is not a candidate for surgical intervention.  Respiratory status appears to be stable.  We will recheck chest x-ray.  Will check arterial blood gas.  · Mental status changes in a patient with a history of possible baseline dementia and a history of CVA.  Initial CT scan is negative.  She is currently on aspirin/Plavix/Zyprexa.  There was initial improvement of her mentation but then relapsed.  Differential diagnoses include Zyprexa side effect/infection/hypercapnia/CVA.  Will rule out above.  Will check MRI of the brain.  Will check UA and chest x-ray.  Will check procalcitonin and blood cultures.  Will check TSH/B12/folic acid/RPR/cortisol.  Consider neurology consultation if no better.  Not receiving narcotics.  · History of TAVR/diastolic congestive heart failure/hypertension..  At this time patient is mildly volume depleted.  I will hold the diuretics.  There is no evidence of angina or congestive heart failure clinically.  Good  blood pressure control on metoprolol and Norvasc continue the same.  · UTI.  Status post antibiotic treatment.  Recheck UA.  · L1 fracture.  Appears asymptomatic without radiculopathy or myelopathy.  · Acute kidney failure.  Mostly secondary to prerenal azotemia because of dehydration.  Resolved with IV fluid.  · Hypertension.  Good control.  Anemia.  Stable hemoglobin.  Will monitor.    · DNR status.  Cussed with the daughter.  Patient has previous wishes of a DO NOT RESUSCITATE.  Will implement.  · Discussed with patient daughter/nurse.      Marie Conrad MD  [facility]  02/01/21  19:19 EST

## 2021-02-02 NOTE — CONSULTS
Patient looks greatly improved when seen today.  She is awake and alert after Zyprexa was held last evening.  I will discontinue this medication, and will leave orders for Ativan to be given 30 minutes prior to a scheduled MRI.

## 2021-02-03 ENCOUNTER — APPOINTMENT (OUTPATIENT)
Dept: GENERAL RADIOLOGY | Facility: HOSPITAL | Age: 86
End: 2021-02-03

## 2021-02-03 PROBLEM — I48.91 ATRIAL FIBRILLATION (HCC): Status: ACTIVE | Noted: 2021-02-03

## 2021-02-03 LAB
ANION GAP SERPL CALCULATED.3IONS-SCNC: 12.1 MMOL/L (ref 5–15)
APPEARANCE FLD: ABNORMAL
BASOPHILS # BLD AUTO: 0.02 10*3/MM3 (ref 0–0.2)
BASOPHILS NFR BLD AUTO: 0.1 % (ref 0–1.5)
BUN SERPL-MCNC: 23 MG/DL (ref 8–23)
BUN/CREAT SERPL: 23.2 (ref 7–25)
CALCIUM SPEC-SCNC: 8.2 MG/DL (ref 8.6–10.5)
CHLORIDE SERPL-SCNC: 106 MMOL/L (ref 98–107)
CO2 SERPL-SCNC: 18.9 MMOL/L (ref 22–29)
COLOR FLD: ABNORMAL
CREAT SERPL-MCNC: 0.99 MG/DL (ref 0.57–1)
D-LACTATE SERPL-SCNC: 1.8 MMOL/L (ref 0.5–2)
DEPRECATED RDW RBC AUTO: 40.1 FL (ref 37–54)
EOSINOPHIL # BLD AUTO: 0.07 10*3/MM3 (ref 0–0.4)
EOSINOPHIL NFR BLD AUTO: 0.3 % (ref 0.3–6.2)
ERYTHROCYTE [DISTWIDTH] IN BLOOD BY AUTOMATED COUNT: 13.6 % (ref 12.3–15.4)
GFR SERPL CREATININE-BSD FRML MDRD: 53 ML/MIN/1.73
GLUCOSE SERPL-MCNC: 82 MG/DL (ref 65–99)
HCT VFR BLD AUTO: 28.5 % (ref 34–46.6)
HGB BLD-MCNC: 9.2 G/DL (ref 12–15.9)
IMM GRANULOCYTES # BLD AUTO: 0.22 10*3/MM3 (ref 0–0.05)
IMM GRANULOCYTES NFR BLD AUTO: 1 % (ref 0–0.5)
LYMPHOCYTES # BLD AUTO: 0.77 10*3/MM3 (ref 0.7–3.1)
LYMPHOCYTES NFR BLD AUTO: 3.4 % (ref 19.6–45.3)
LYMPHOCYTES NFR FLD MANUAL: 32 %
MAGNESIUM SERPL-MCNC: 2.1 MG/DL (ref 1.6–2.4)
MCH RBC QN AUTO: 26.5 PG (ref 26.6–33)
MCHC RBC AUTO-ENTMCNC: 32.3 G/DL (ref 31.5–35.7)
MCV RBC AUTO: 82.1 FL (ref 79–97)
METHOD: ABNORMAL
MONOCYTES # BLD AUTO: 1.04 10*3/MM3 (ref 0.1–0.9)
MONOCYTES NFR BLD AUTO: 4.6 % (ref 5–12)
MONOS+MACROS NFR FLD: 5 %
NEUTROPHILS NFR BLD AUTO: 20.53 10*3/MM3 (ref 1.7–7)
NEUTROPHILS NFR BLD AUTO: 90.6 % (ref 42.7–76)
NEUTROPHILS NFR FLD MANUAL: 63 %
NRBC BLD AUTO-RTO: 0 /100 WBC (ref 0–0.2)
NUC CELL # FLD: 5488 /MM3
PLATELET # BLD AUTO: 346 10*3/MM3 (ref 140–450)
PMV BLD AUTO: 11.3 FL (ref 6–12)
POTASSIUM SERPL-SCNC: 3 MMOL/L (ref 3.5–5.2)
PROCALCITONIN SERPL-MCNC: 37.84 NG/ML (ref 0–0.25)
QT INTERVAL: 295 MS
QT INTERVAL: 339 MS
RBC # BLD AUTO: 3.47 10*6/MM3 (ref 3.77–5.28)
RBC # FLD AUTO: ABNORMAL /MM3
SODIUM SERPL-SCNC: 137 MMOL/L (ref 136–145)
TROPONIN T SERPL-MCNC: 0.03 NG/ML (ref 0–0.03)
TROPONIN T SERPL-MCNC: 0.03 NG/ML (ref 0–0.03)
TSH SERPL DL<=0.05 MIU/L-ACNC: 2.38 UIU/ML (ref 0.27–4.2)
WBC # BLD AUTO: 22.65 10*3/MM3 (ref 3.4–10.8)

## 2021-02-03 PROCEDURE — 83735 ASSAY OF MAGNESIUM: CPT | Performed by: INTERNAL MEDICINE

## 2021-02-03 PROCEDURE — 84443 ASSAY THYROID STIM HORMONE: CPT | Performed by: INTERNAL MEDICINE

## 2021-02-03 PROCEDURE — 93010 ELECTROCARDIOGRAM REPORT: CPT | Performed by: INTERNAL MEDICINE

## 2021-02-03 PROCEDURE — 84484 ASSAY OF TROPONIN QUANT: CPT | Performed by: INTERNAL MEDICINE

## 2021-02-03 PROCEDURE — 25010000002 PIPERACILLIN SOD-TAZOBACTAM PER 1 G: Performed by: INTERNAL MEDICINE

## 2021-02-03 PROCEDURE — 93005 ELECTROCARDIOGRAM TRACING: CPT | Performed by: INTERNAL MEDICINE

## 2021-02-03 PROCEDURE — 83605 ASSAY OF LACTIC ACID: CPT | Performed by: INTERNAL MEDICINE

## 2021-02-03 PROCEDURE — 84145 PROCALCITONIN (PCT): CPT | Performed by: INTERNAL MEDICINE

## 2021-02-03 PROCEDURE — 25010000002 DIGOXIN PER 500 MCG: Performed by: INTERNAL MEDICINE

## 2021-02-03 PROCEDURE — 85025 COMPLETE CBC W/AUTO DIFF WBC: CPT | Performed by: INTERNAL MEDICINE

## 2021-02-03 PROCEDURE — 87493 C DIFF AMPLIFIED PROBE: CPT | Performed by: NURSE PRACTITIONER

## 2021-02-03 PROCEDURE — 71045 X-RAY EXAM CHEST 1 VIEW: CPT

## 2021-02-03 PROCEDURE — 80048 BASIC METABOLIC PNL TOTAL CA: CPT | Performed by: INTERNAL MEDICINE

## 2021-02-03 PROCEDURE — 97530 THERAPEUTIC ACTIVITIES: CPT

## 2021-02-03 PROCEDURE — 99222 1ST HOSP IP/OBS MODERATE 55: CPT | Performed by: INTERNAL MEDICINE

## 2021-02-03 RX ORDER — RISPERIDONE 0.5 MG/1
0.5 TABLET ORAL NIGHTLY
Status: DISCONTINUED | OUTPATIENT
Start: 2021-02-03 | End: 2021-02-05

## 2021-02-03 RX ORDER — DIGOXIN 0.25 MG/ML
500 INJECTION INTRAMUSCULAR; INTRAVENOUS ONCE
Status: COMPLETED | OUTPATIENT
Start: 2021-02-03 | End: 2021-02-03

## 2021-02-03 RX ORDER — POTASSIUM CHLORIDE 1.5 G/1.77G
40 POWDER, FOR SOLUTION ORAL AS NEEDED
Status: DISCONTINUED | OUTPATIENT
Start: 2021-02-03 | End: 2021-02-08 | Stop reason: HOSPADM

## 2021-02-03 RX ORDER — POTASSIUM CHLORIDE 750 MG/1
40 TABLET, FILM COATED, EXTENDED RELEASE ORAL AS NEEDED
Status: DISCONTINUED | OUTPATIENT
Start: 2021-02-03 | End: 2021-02-08 | Stop reason: HOSPADM

## 2021-02-03 RX ORDER — AMIODARONE HYDROCHLORIDE 200 MG/1
400 TABLET ORAL EVERY 12 HOURS SCHEDULED
Status: DISCONTINUED | OUTPATIENT
Start: 2021-02-03 | End: 2021-02-05

## 2021-02-03 RX ORDER — POTASSIUM CHLORIDE 7.45 MG/ML
10 INJECTION INTRAVENOUS
Status: DISCONTINUED | OUTPATIENT
Start: 2021-02-03 | End: 2021-02-08 | Stop reason: HOSPADM

## 2021-02-03 RX ORDER — METOPROLOL TARTRATE 50 MG/1
100 TABLET, FILM COATED ORAL 2 TIMES DAILY
Status: DISCONTINUED | OUTPATIENT
Start: 2021-02-03 | End: 2021-02-08 | Stop reason: HOSPADM

## 2021-02-03 RX ADMIN — CLOPIDOGREL 75 MG: 75 TABLET, FILM COATED ORAL at 10:07

## 2021-02-03 RX ADMIN — ATORVASTATIN CALCIUM 20 MG: 20 TABLET, FILM COATED ORAL at 10:07

## 2021-02-03 RX ADMIN — TAZOBACTAM SODIUM AND PIPERACILLIN SODIUM 3.38 G: 375; 3 INJECTION, SOLUTION INTRAVENOUS at 15:06

## 2021-02-03 RX ADMIN — Medication 3 MG: at 20:01

## 2021-02-03 RX ADMIN — METOPROLOL TARTRATE 75 MG: 25 TABLET, FILM COATED ORAL at 10:08

## 2021-02-03 RX ADMIN — ACETAMINOPHEN 1000 MG: 500 TABLET, FILM COATED ORAL at 20:01

## 2021-02-03 RX ADMIN — POTASSIUM CHLORIDE 40 MEQ: 1.5 POWDER, FOR SOLUTION ORAL at 10:08

## 2021-02-03 RX ADMIN — ACETAMINOPHEN 1000 MG: 500 TABLET, FILM COATED ORAL at 10:08

## 2021-02-03 RX ADMIN — AMIODARONE HYDROCHLORIDE 400 MG: 200 TABLET ORAL at 15:21

## 2021-02-03 RX ADMIN — LIDOCAINE 1 PATCH: 50 PATCH CUTANEOUS at 10:08

## 2021-02-03 RX ADMIN — MULTIPLE VITAMINS W/ MINERALS TAB 1 TABLET: TAB at 10:07

## 2021-02-03 RX ADMIN — SODIUM CHLORIDE, PRESERVATIVE FREE 10 ML: 5 INJECTION INTRAVENOUS at 20:02

## 2021-02-03 RX ADMIN — POTASSIUM CHLORIDE 40 MEQ: 1.5 POWDER, FOR SOLUTION ORAL at 15:06

## 2021-02-03 RX ADMIN — SODIUM CHLORIDE 500 ML: 9 INJECTION, SOLUTION INTRAVENOUS at 07:59

## 2021-02-03 RX ADMIN — DIGOXIN 500 MCG: 0.25 INJECTION INTRAMUSCULAR; INTRAVENOUS at 07:59

## 2021-02-03 RX ADMIN — RISPERIDONE 0.5 MG: 0.5 TABLET ORAL at 20:01

## 2021-02-03 RX ADMIN — ASPIRIN 81 MG: 81 TABLET, COATED ORAL at 10:07

## 2021-02-03 RX ADMIN — ACETAMINOPHEN 1000 MG: 500 TABLET, FILM COATED ORAL at 15:19

## 2021-02-03 RX ADMIN — TAZOBACTAM SODIUM AND PIPERACILLIN SODIUM 3.38 G: 375; 3 INJECTION, SOLUTION INTRAVENOUS at 05:45

## 2021-02-03 RX ADMIN — METOPROLOL TARTRATE 100 MG: 50 TABLET, FILM COATED ORAL at 20:01

## 2021-02-03 RX ADMIN — FERROUS SULFATE TAB 325 MG (65 MG ELEMENTAL FE) 325 MG: 325 (65 FE) TAB at 10:07

## 2021-02-03 RX ADMIN — TAZOBACTAM SODIUM AND PIPERACILLIN SODIUM 3.38 G: 375; 3 INJECTION, SOLUTION INTRAVENOUS at 22:03

## 2021-02-03 NOTE — PROGRESS NOTES
Name: Gita Wharton ADMIT: 2021   : 1935  PCP: Deanna Ortega APRN    MRN: [id number] LOS: 12 days   AGE/SEX: 85 y.o. female  ROOM: [facility]     Subjective   Subjective   Patient experienced a rapid A. fib associated with low blood pressure this morning.    Right now patient reports no fever or chills/no cough/no hemoptysis/no shortness of breath/no PND/no palpitation/no lower extremity edema.      Review of Systems  CNS..  No headaches.  No double vision no loss of the vision no slurring of the speech.  GI.  No abdominal pain or nausea or vomiting.  .  Positive wetting of the diaper and no hematuria.       Objective   Objective   Vital Signs  Temp:  [97.9 °F (36.6 °C)-98.9 °F (37.2 °C)] 98.5 °F (36.9 °C)  Heart Rate:  [] 113  Resp:  [18-20] 18  BP: (102-140)/(46-58) 119/58  SpO2:  [99 %-100 %] 100 %  on  Flow (L/min):  [2] 2;   Device (Oxygen Therapy): nasal cannula    Intake/Output Summary (Last 24 hours) at 2/3/2021 1307  Last data filed at 2/3/2021 0900  Gross per 24 hour   Intake 660 ml   Output 1100 ml   Net -440 ml     Body mass index is 21.97 kg/m².      21  0711 21  1504   Weight: 54 kg (119 lb) 54.5 kg (120 lb 2.4 oz)     Physical Exam    General.  Elderly female.  Alert and oriented x3.  In no apparent distress.  Eyes.  Pupils equal round and reactive intact extraocular musculature positive pallor.  No jaundice normal conjunctivae and lids  ENT.  Moist mucous membrane  Neck.  Supple no JVD no lymphadenopathy no thyromegaly  Cardiovascular.  Regular rate and rhythm grade 2 systolic murmur  Chest.  Poor but clear to auscultation bilaterally with left-sided rhonchi.  Abdomen.  Soft lax no tenderness no organomegaly no guarding or rebound  Extremities.  No clubbing cyanosis or edema  CNS.  No acute focal neurological deficits    Results Review:      Results from last 7 days   Lab Units 21  0400 21  0559 21  0529 21  0500 21  0526  01/28/21  0737   SODIUM mmol/L 137 137 135* 136 129* 135*   POTASSIUM mmol/L 3.0* 3.9 4.2 4.9 4.7 5.5*   CHLORIDE mmol/L 106 108* 106 103 95* 101   CO2 mmol/L 18.9* 19.3* 18.7* 21.3* 20.1* 24.1   BUN mg/dL 23 23 22 30* 28* 29*   CREATININE mg/dL 0.99 1.16* 0.76 1.25* 1.16* 1.04*   GLUCOSE mg/dL 82 80 83 77 93 106*   CALCIUM mg/dL 8.2* 8.3* 7.9* 8.3* 9.0 8.9   AST (SGOT) U/L  --  25  --   --   --   --    ALT (SGPT) U/L  --  14  --   --   --   --      Estimated Creatinine Clearance: 36.4 mL/min (by C-G formula based on SCr of 0.99 mg/dL).          Results from last 7 days   Lab Units 02/03/21  0906 02/03/21  0400   TROPONIN T ng/mL 0.029 0.029         Results from last 7 days   Lab Units 02/03/21  0400 02/02/21  0023   TSH uIU/mL 2.380 3.180     Results from last 7 days   Lab Units 02/03/21  0400   MAGNESIUM mg/dL 2.1           Invalid input(s): LDLCALC  Results from last 7 days   Lab Units 02/03/21  0400 02/02/21  0559 01/31/21  0529 01/30/21  0500 01/29/21  0526 01/28/21  0444   WBC 10*3/mm3 22.65* 21.99* 12.66* 12.50* 17.10* 10.82*   HEMOGLOBIN g/dL 9.2* 8.7* 9.3* 10.2* 10.4* 10.7*   HEMATOCRIT % 28.5* 27.2* 29.3* 33.3* 33.4* 34.0   PLATELETS 10*3/mm3 346 335 336 321 390 341   MCV fL 82.1 83.4 84.4 85.2 84.3 83.7   MCH pg 26.5* 26.7 26.8 26.1* 26.3* 26.4*   MCHC g/dL 32.3 32.0 31.7 30.6* 31.1* 31.5   RDW % 13.6 13.4 13.1 13.7 13.2 13.3   RDW-SD fl 40.1 40.1 39.9 42.5 41.0 40.3   MPV fL 11.3 11.1 11.5 11.7 11.9 12.0   NEUTROPHIL % % 90.6* 87.3* 84.0* 79.7* 85.4* 75.6   LYMPHOCYTE % % 3.4* 5.0* 6.3* 9.9* 5.1* 11.7*   MONOCYTES % % 4.6* 6.0 8.1 8.6 8.1 10.9   EOSINOPHIL % % 0.3 0.2* 0.6 1.0 0.5 0.8   BASOPHIL % % 0.1 0.2 0.2 0.2 0.2 0.4   IMM GRAN % % 1.0* 1.3* 0.8* 0.6* 0.7* 0.6*   NEUTROS ABS 10*3/mm3 20.53* 19.17* 10.63* 9.95* 14.60* 8.18*   LYMPHS ABS 10*3/mm3 0.77 1.11 0.80 1.24 0.88 1.27   MONOS ABS 10*3/mm3 1.04* 1.32* 1.03* 1.07* 1.39* 1.18*   EOS ABS 10*3/mm3 0.07 0.05 0.07 0.13 0.08 0.09   BASOS ABS 10*3/mm3  0.02 0.05 0.03 0.03 0.03 0.04   IMMATURE GRANS (ABS) 10*3/mm3 0.22* 0.29* 0.10* 0.08* 0.12* 0.06*   NRBC /100 WBC 0.0 0.0 0.0 0.0 0.0 0.1         Results from last 7 days   Lab Units 02/01/21  2348   PH, ARTERIAL pH units 7.429   PO2 ART mm Hg 74.9*   PCO2, ARTERIAL mm Hg 27.5*   HCO3 ART mmol/L 18.2*     Results from last 7 days   Lab Units 02/02/21  0559   PROCALCITONIN ng/mL 63.69*         Results from last 7 days   Lab Units 02/02/21  0023   AMMONIA umol/L 19     Results from last 7 days   Lab Units 02/02/21  1345 02/02/21  0023 02/02/21  0022   BLOODCX   --  No growth at 24 hours No growth at 24 hours   BODYFLDCX  No growth  --   --                    Imaging:  Imaging Results (Last 24 Hours)     Procedure Component Value Units Date/Time    XR Chest 1 View [[id number]] Collected: 02/03/21 0859     Updated: 02/03/21 0903    Narrative:      ONE VIEW PORTABLE UPRIGHT CHEST POST THORACENTESIS     HISTORY: Recent left thoracentesis for pleural effusion.     FINDINGS: The patient has had recent left thoracentesis and there is a  small residual left pleural effusion showing decrease in size since 2  days ago. There is no pneumothorax. The right lung remains well-expanded  and clear. There is mild cardiomegaly with an aortic valve stent in  place.     This report was finalized on 2/3/2021 9:00 AM by Dr. Erik Casanova M.D.        Thoracentesis [[id number]] Collected: 02/02/21 1419    Specimen: Body Fluid Updated: 02/02/21 1423    Narrative:      ULTRASOUND-GUIDED LEFT THORACENTESIS.     HISTORY: Pleural effusion.     After signed informed consent was obtained the patient was prepped and  draped in the usual sterile fashion. Lidocaine was used for local  anesthesia.     Ultrasound guidance was used to place the thoracentesis catheter into  the left pleural fluid collection. 1100 mL of serosanguineous fluid was  removed. Sample was sent to the lab.     Confirmatory images were obtained.     Patient tolerated the  procedure well with no complications.       Impression:      Ultrasound-guided left thoracentesis as described.        This report was finalized on 2/2/2021 2:20 PM by Dr. Quentin Manuel M.D.                I reviewed the patient's new clinical results / labs / tests / procedures      Assessment/Plan     Active Hospital Problems    Diagnosis  POA   • **Closed fracture of multiple ribs of left side [S22.42XA]  Yes   • Atrial fibrillation (CMS/HCC) [I48.91]  No   • AMS (altered mental status) [R41.82]  Yes   • Acute respiratory failure with hypoxia (CMS/HCC) [J96.01]  Yes   • Fall from standing [W19.XXXA]  Yes   • Edema leg, right [R60.0]  Yes   • Closed compression fracture of body of L1 vertebra (CMS/HCC) [S32.010A]  Yes   • Pleural effusion on left [J90]  Yes   • Leukocytosis [D72.829]  Yes   • S/P TAVR (transcatheter aortic valve replacement) [Z95.2]  Not Applicable   • PVD (peripheral vascular disease) with claudication (CMS/HCC) [I73.9]  Yes   • Chronic diastolic congestive heart failure (CMS/HCC) [I50.32]  Yes   • Anemia [D64.9]  Yes   • History of right MCA stroke [Z86.73]  Not Applicable   • Fall at home, initial encounter [W19.XXXA, Y92.009]  Not Applicable   • Essential hypertension [I10]  Yes      Resolved Hospital Problems   No resolved problems to display.           · Multiple left rib fractures leading to exudative left pleural effusion/left-sided pneumonia and acute hypoxemic respiratory failure.  There is pneumonia by chest x-ray associated with leukocytosis significant increase in the procalcitonin.  Status post left sided thoracocentesis.  Pleural fluid is exudative.  Culture of the pleural fluid is negative till now.  Repeat chest x-ray with improvement..  She is not a candidate for surgical intervention.  Respiratory status appears to be stable.  Appreciate pulmonary help.  Negative blood cultures till now.  Continue IV Zosyn for now.    · Mental status changes in a patient with a history of  possible baseline dementia and a history of CVA.  Initial CT scan is negative.  This has greatly improved with stopping Zyprexa.  No focal CNS deficit. No hypercapnia.  Awaiting MRI to rule out another CVA.  Chest x-ray with pneumonia and left pleural effusion..  Normal TSH/B12/folic acid/RPR/cortisol.   Not receiving narcotics.  This is mostly secondary to metabolic and toxic encephalopathy.   ·  History of TAVR/diastolic congestive heart failure/hypertension/new onset rapid A. fib... Resolved volume depletion with holding diuretics.  There is no evidence of angina or congestive heart failure clinically.  Troponins are negative.  Magnesium is normal.  Potassium is low and will substitute.  S/p spontaneous cardioversion on amiodarone started by cardiology.  Good blood pressure control on metoprolol.  Norvasc DC'd secondary to low blood pressure.    · UTI.  Status post antibiotic treatment.    · L1 fracture.  Appears asymptomatic without radiculopathy or myelopathy.  · Acute kidney failure/hypokalemia..  Mostly secondary to prerenal azotemia because of dehydration.  Patient is euvolemic.  Resolved acute renal failure.  Will substitute the potassium.    · Anemia.  Mostly secondary to rib fractures.  Stable hemoglobin.  Monitor.  Transfuse if hemoglobin less than 7.   · Leukocytosis.  Secondary to pneumonia.  Currently on Zosyn.  · DNR status.  Discussed with the daughter.  Patient has previous wishes of a DO NOT RESUSCITATE.  Will implement.  Cardiology has talked to the daughter and has recommended palliative consultation and treatment.  Consult placed for palliative care.  Actually the patient is clinically better from the last 2 days.  · Discussed with patient.      Marie Conrad MD  [facility]  02/03/21  13:07 EST

## 2021-02-03 NOTE — PLAN OF CARE
Goal Outcome Evaluation:  Plan of Care Reviewed With: patient     Outcome Summary: Pt agreeable to PT treatment. Per RN, pt showing A fib on heart monitor earlier this AM. Pt able to sit EOB and complete a few exercises for postural improvement and LE strengthening.Transfers and gait deferred as pt's HR remained elevated.    Patient was intermittently wearing a face mask during this therapy encounter. Therapist used appropriate personal protective equipment including eye protection, mask, and gloves.  Mask used was standard procedure mask. Appropriate PPE was worn during the entire therapy session. Hand hygiene was completed before and after therapy session. Patient is not in enhanced droplet precautions. Kedar, PT tech, present.

## 2021-02-03 NOTE — THERAPY TREATMENT NOTE
Patient Name: Gita Wharton  : 1935    MRN: [id number]                              Today's Date: 2/3/2021       Admit Date: 2021    Visit Dx:     ICD-10-CM ICD-9-CM   1. Fall from standing, initial encounter  W19.XXXA E888.9   2. Contusion of flank, initial encounter  S30.1XXA 922.2   3. Closed fracture of multiple ribs of left side, initial encounter  S22.42XA 807.09   4. Compression fracture of superior endplate of L1 vertebra, initial encounter (CMS/[facility])  S32.010A 805.4     Patient Active Problem List   Diagnosis   • Essential hypertension   • Hyperlipidemia   • Fall at home, initial encounter   • Lung nodule   • History of right MCA stroke   • Stenosis of right internal carotid artery   • Anemia   • Abnormal chest x-ray   • Interstitial lung disease (CMS/[facility])   • Toxic encephalopathy due to anesthetics   • Weakness generalized   • Ascending aorta dilation (CMS/[facility])   • Nonrheumatic aortic valve stenosis   • Nonrheumatic mitral valve regurgitation   • Chronic diastolic congestive heart failure (CMS/[facility])   • PVD (peripheral vascular disease) with claudication (CMS/[facility])   • S/P TAVR (transcatheter aortic valve replacement)   • Premature atrial contractions   • Idiopathic hypotension   • Bilateral lower extremity edema   • Closed fracture of multiple ribs of left side   • Edema leg, right   • Closed compression fracture of body of L1 vertebra (CMS/[facility])   • Pleural effusion on left   • Leukocytosis   • AMS (altered mental status)   • Acute respiratory failure with hypoxia (CMS/[facility])   • Fall from standing     Past Medical History:   Diagnosis Date   • Acute right MCA stroke (CMS/[facility]) 2018   • Anesthesia complication     TOXIC ENCEPHALOPATHY   • Aortic stenosis    • Aortic valve stenosis    • Fracture, hip (CMS/[facility])     LEFT, CHIP ON TOP OF HIP D/T FALL 2 WEEKS POST OP   • Hemorrhoids 2018   • History of transfusion     13 UNITS, HERE AT [facility] [place] by [facility]   • Hyperlipidemia    •  Hypertension    • Hypokalemia    • 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    R LL            Dr FRANKIE Branch - suggested yearly CXR to Monitor   • Pressure sore     BUTTOCKS   • Pyelonephritis 4/16/2019   • Stenosis of right internal carotid artery 7/25/2018   • Subdural hematoma (CMS/HCC) 07/12/2018    Secondary to fall, JULY 2018   • Toxic encephalopathy 2019    POST OP LAST HIP SURGERY     Past Surgical History:   Procedure Laterality Date   • AORTIC VALVE REPAIR/REPLACEMENT N/A 12/3/2019    Procedure: TRACEY TRANSCAROTID TRANSCATHETER AORTIC VALVE REPLACEMENT;  Surgeon: Octaviano Yan MD;  Location: [facility] OR 18/19;  Service: Cardiothoracic   • AORTIC VALVE REPAIR/REPLACEMENT N/A 12/3/2019    Procedure: Transcarotid Transcatheter Aortic Valve Replacement w/Intra Op TRACEY and Possible Open Bailout Surgery;  Surgeon: Warren Tanner MD;  Location: [facility] OR 18/19;  Service: Cardiovascular   • CARDIAC CATHETERIZATION N/A 10/24/2019    Procedure: Coronary angiography;  Surgeon: Warren Tanner MD;  Location: [facility] CATH INVASIVE LOCATION;  Service: Cardiovascular   • CARDIAC CATHETERIZATION N/A 10/24/2019    Procedure: Left heart cath;  Surgeon: Warren Tanner MD;  Location: [facility] CATH INVASIVE LOCATION;  Service: Cardiovascular   • CARDIAC CATHETERIZATION N/A 10/24/2019    Procedure: Right heart cath;  Surgeon: Warren Tanner MD;  Location: [facility] CATH INVASIVE LOCATION;  Service: Cardiovascular   • CAROTID ENDARTERECTOMY Right 7/26/2018    Procedure: RT CAROTID ENDARTERECTOMY;  Surgeon: Inna Villarreal MD;  Location: [facility] MAIN OR;  Service: Vascular   • COLONOSCOPY N/A 8/7/2018    Adenomatous polyp.   Repeat 2021.  Surgeon: Ken Tidwell MD;    • HYSTERECTOMY     • THYROIDECTOMY, PARTIAL     • TOTAL HIP ARTHROPLASTY Right    • TOTAL HIP ARTHROPLASTY Left 6/30/2019    Procedure: LEFT HIP ANTERIOR HIP WITH HANA TABLE;  Surgeon: Tiffani Pereira MD;  Location: [facility] MAIN OR;   Service: Orthopedics     General Information     Row Name 02/03/21 1115          Physical Therapy Time and Intention    Document Type  progress note/recertification  -CF     Mode of Treatment  physical therapy  -CF     Row Name 02/03/21 1115          General Information    Patient Profile Reviewed  yes  -CF     Existing Precautions/Restrictions  fall  -CF     Row Name 02/03/21 1115          Cognition    Orientation Status (Cognition)  oriented to;person  -CF     Row Name 02/03/21 1115          Safety Issues, Functional Mobility    Safety Issues Affecting Function (Mobility)  insight into deficits/self-awareness;awareness of need for assistance;safety precautions follow-through/compliance;safety precaution awareness  -CF     Impairments Affecting Function (Mobility)  balance;cognition;endurance/activity tolerance;strength  -CF       User Key  (r) = Recorded By, (t) = Taken By, (c) = Cosigned By    Initials Name Provider Type    CF Laura Esqueda PT Physical Therapist        Mobility     Row Name 02/03/21 1115          Bed Mobility    Bed Mobility  supine-sit;sit-supine  -CF     Scooting/Bridging [place] (Bed Mobility)  maximum assist (25% patient effort);2 person assist;verbal cues;nonverbal cues (demo/gesture)  -CF     Supine-Sit [place] (Bed Mobility)  minimum assist (75% patient effort);moderate assist (50% patient effort);2 person assist;verbal cues;nonverbal cues (demo/gesture)  -CF     Sit-Supine [place] (Bed Mobility)  moderate assist (50% patient effort);2 person assist;verbal cues;nonverbal cues (demo/gesture)  -CF     Comment (Bed Mobility)  Increased assist to return to bed as pt's HR elevated. Per RN, had A fib this AM  -CF     Row Name 02/03/21 1115          Transfers    Comment (Transfers)  Transfers/gait deferred due to elevated HR  -CF       User Key  (r) = Recorded By, (t) = Taken By, (c) = Cosigned By    Initials Name Provider Type    CF Laura Esqueda PT Physical Therapist         Obj/Interventions     Row Name 02/03/21 1117          Motor Skills    Therapeutic Exercise  other (see comments) seated scap retraction, B LAQ, marches, AP x5  -CF       User Key  (r) = Recorded By, (t) = Taken By, (c) = Cosigned By    Initials Name Provider Type    CF Laura Esqueda, PT Physical Therapist        Goals/Plan     Row Name 02/03/21 1122          Bed Mobility Goal 1 (PT)    Activity/Assistive Device (Bed Mobility Goal 1, PT)  bed mobility activities, all  -CF     [place] Level/Cues Needed (Bed Mobility Goal 1, PT)  standby assist  -CF     Time Frame (Bed Mobility Goal 1, PT)  2 weeks  -CF     Progress/Outcomes (Bed Mobility Goal 1, PT)  goal revised this date  -     Row Name 02/03/21 1122          Transfer Goal 1 (PT)    Activity/Assistive Device (Transfer Goal 1, PT)  transfers, all  -CF     [place] Level/Cues Needed (Transfer Goal 1, PT)  supervision required  -CF     Time Frame (Transfer Goal 1, PT)  2 weeks  -CF     Progress/Outcome (Transfer Goal 1, PT)  goal revised this date  -     Row Name 02/03/21 1122          Gait Training Goal 1 (PT)    Activity/Assistive Device (Gait Training Goal 1, PT)  gait (walking locomotion);assistive device use;walker, rolling  -CF     [place] Level (Gait Training Goal 1, PT)  contact guard assist  -CF     Distance (Gait Training Goal 1, PT)  100'  -CF     Time Frame (Gait Training Goal 1, PT)  2 weeks  -CF     Progress/Outcome (Gait Training Goal 1, PT)  goal revised this date  -       User Key  (r) = Recorded By, (t) = Taken By, (c) = Cosigned By    Initials Name Provider Type    CF Laura Esqueda, PT Physical Therapist        Clinical Impression     Row Name 02/03/21 1118          Pain    Additional Documentation  Pain Scale: Numbers Pre/Post-Treatment (Group)  -     Row Name 02/03/21 1118          Pain Scale: Numbers Pre/Post-Treatment    Pretreatment Pain Rating  0/10 - no pain  -CF     Posttreatment Pain Rating  0/10 - no pain   -CF     Row Name 02/03/21 1118          Plan of Care Review    Plan of Care Reviewed With  patient  -CF     Outcome Summary  Pt agreeable to PT treatment. Per RN, pt showing A fib on heart monitor earlier this AM. Pt able to sit EOB and complete a few exercises for postural improvement and LE strengthening.Transfers and gait deferred as pt's HR remained elevated.  -CF     Row Name 02/03/21 1118          Vital Signs    Pre Systolic BP Rehab  -- -145 throughout  -CF     O2 Delivery Pre Treatment  room air  -CF     O2 Delivery Intra Treatment  room air  -CF     O2 Delivery Post Treatment  room air  -CF     Row Name 02/03/21 1118          Positioning and Restraints    Pre-Treatment Position  in bed  -CF     Post Treatment Position  bed  -CF     In Bed  call light within reach;encouraged to call for assist;exit alarm on;fowlers;with family/caregiver  -CF       User Key  (r) = Recorded By, (t) = Taken By, (c) = Cosigned By    Initials Name Provider Type    Laura Mcnulty PT Physical Therapist        Outcome Measures     Row Name 02/03/21 1123          How much help from another person do you currently need...    Turning from your back to your side while in flat bed without using bedrails?  2  -CF     Moving from lying on back to sitting on the side of a flat bed without bedrails?  2  -CF     Moving to and from a bed to a chair (including a wheelchair)?  2  -CF     Standing up from a chair using your arms (e.g., wheelchair, bedside chair)?  2  -CF     Climbing 3-5 steps with a railing?  1  -CF     To walk in hospital room?  2  -CF     AM-PAC 6 Clicks Score (PT)  11  -CF     Row Name 02/03/21 1123          Functional Assessment    Outcome Measure Options  AM-PAC 6 Clicks Basic Mobility (PT)  -CF       User Key  (r) = Recorded By, (t) = Taken By, (c) = Cosigned By    Initials Name Provider Type    Laura Mcnulty PT Physical Therapist        Physical Therapy Education                 Title: PT OT SLP  Therapies (In Progress)     Topic: Physical Therapy (In Progress)     Point: Mobility training (In Progress)     Learning Progress Summary           Patient Acceptance, E, NR by CF at 2/3/2021 1123    Nonacceptance, E, NL by KT at 2/1/2021 1651    Acceptance, E,TB,D, VU,DU,NR by GJ at 1/30/2021 1434    Comment: discussed safety with mobility, benefits of mobility, enouraged to call for assistance    Acceptance, E, NR by CF at 1/29/2021 1152    Nonacceptance, E, NL by KT at 1/29/2021 1051    Acceptance, E, NR by CF at 1/28/2021 1221    Acceptance, E, NR by CF at 1/27/2021 1550    Acceptance, E, VU,NR by CF at 1/26/2021 1123    Acceptance, E, VU by DJ at 1/25/2021 1007    Acceptance, E,TB, VU,NR by CA at 1/24/2021 1151                   Point: Home exercise program (In Progress)     Learning Progress Summary           Patient Acceptance, E, NR by CF at 2/3/2021 1123    Nonacceptance, E, NL by KT at 2/1/2021 1651    Acceptance, E,TB,D, VU,DU,NR by GJ at 1/30/2021 1434    Comment: discussed safety with mobility, benefits of mobility, enouraged to call for assistance    Acceptance, E, NR by CF at 1/29/2021 1152    Nonacceptance, E, NL by KT at 1/29/2021 1051    Acceptance, E, NR by CF at 1/28/2021 1221    Acceptance, E, NR by CF at 1/27/2021 1550    Acceptance, E, VU,NR by CF at 1/26/2021 1123    Acceptance, E, VU by DJ at 1/25/2021 1007    Acceptance, E,TB, VU,NR by CA at 1/24/2021 1151                   Point: Body mechanics (In Progress)     Learning Progress Summary           Patient Acceptance, E, NR by CF at 2/3/2021 1123    Nonacceptance, E, NL by KT at 2/1/2021 1651    Acceptance, E,TB,D, VU,DU,NR by GJ at 1/30/2021 1434    Comment: discussed safety with mobility, benefits of mobility, enouraged to call for assistance    Acceptance, E, NR by CF at 1/29/2021 1152    Nonacceptance, E, NL by KT at 1/29/2021 1051    Acceptance, E, NR by CF at 1/28/2021 1221    Acceptance, E, NR by CF at 1/27/2021 1550    Acceptance,  E, VU,NR by CF at 1/26/2021 1123    Acceptance, E, VU by DJ at 1/25/2021 1007    Acceptance, E,TB, VU,NR by CA at 1/24/2021 1151                   Point: Precautions (In Progress)     Learning Progress Summary           Patient Acceptance, E, NR by CF at 2/3/2021 1123    Nonacceptance, E, NL by KT at 2/1/2021 1651    Acceptance, E,TB,D, VU,DU,NR by GJ at 1/30/2021 1434    Comment: discussed safety with mobility, benefits of mobility, enouraged to call for assistance    Acceptance, E, NR by CF at 1/29/2021 1152    Nonacceptance, E, NL by KT at 1/29/2021 1051    Acceptance, E, NR by CF at 1/28/2021 1221    Acceptance, E, NR by CF at 1/27/2021 1550    Acceptance, E, VU,NR by CF at 1/26/2021 1123    Acceptance, E, VU by DJ at 1/25/2021 1007    Acceptance, E,TB, VU,NR by CA at 1/24/2021 1151                               User Key     Initials Effective Dates Name Provider Type Discipline    GJ 03/07/18 -  Miguel Floyd, PT Physical Therapist PT    KT 06/16/16 -  Gracie Dasilva RN Registered Nurse Nurse    CA 10/09/20 -  Laura Ferguson, PT Physical Therapist PT    DJ 10/25/19 -  Julia Anaya, PT Physical Therapist PT    CF 09/02/20 -  Laura Esqueda, PT Physical Therapist PT              PT Recommendation and Plan     Plan of Care Reviewed With: patient  Outcome Summary: Pt agreeable to PT treatment. Per RN, pt showing A fib on heart monitor earlier this AM. Pt able to sit EOB and complete a few exercises for postural improvement and LE strengthening.Transfers and gait deferred as pt's HR remained elevated.     Time Calculation:   PT Charges     Row Name 02/03/21 1118             Time Calculation    Start Time  0950  -CF      Stop Time  0959  -      Time Calculation (min)  9 min  -CF      PT Received On  02/03/21  -CF      PT - Next Appointment  02/04/21  -      PT Goal Re-Cert Due Date  02/10/21  -        User Key  (r) = Recorded By, (t) = Taken By, (c) = Cosigned By    Initials Name Provider Type    CF  Laura Esqueda, PT Physical Therapist        Therapy Charges for Today     Code Description Service Date Service Provider Modifiers Qty    23507849384 HC PT THERAPEUTIC ACT EA 15 MIN 2/3/2021 Laura Esqueda, PT GP 1    69341067252 HC PT THER SUPP EA 15 MIN 2/3/2021 Laura Esqueda, PT GP 1          PT G-Codes  Outcome Measure Options: AM-PAC 6 Clicks Basic Mobility (PT)  AM-PAC 6 Clicks Score (PT): 11    Laura Esqueda, PT  2/3/2021

## 2021-02-03 NOTE — PROGRESS NOTES
[facility]      Mar/chart reviewed  Follow up pleural effusion, rib fractures,   She denies any chest pain or shortness of breath   S/p 1+liter thoracentesis 2/2    Vital Sign Min/Max for last 24 hours  Temp  Min: 97.9 °F (36.6 °C)  Max: 98.9 °F (37.2 °C)   BP  Min: 97/48  Max: 140/49   Pulse  Min: 65  Max: 85   Resp  Min: 18  Max: 20   SpO2  Min: 99 %  Max: 100 %   Flow (L/min)  Min: 2  Max: 2   No data recorded     Appears ill, alert, oriented times 2  perrl, eomi, no icterus,  mmm, no jvd, trachea midline, neck supple,  chest decreased ae bilaterally, +left lower lobe crackles,   no wheezes,   Tachy, irrg  soft, nt, nd +bs,  no c/c/ e  Skin warm, dry no rashes    Labs: 2/3: reviewed  Glucose 82  Bun 23  Cr 0.99  k 3  Bicarb 18.9  Wbc 22.6  hgb 9.2  plts 346  Pleural fluid albumin 1.6  Fluid protein 3.1  Fluid   Fluid glucose 96  RBC 94K    A/P:  1. Left sided effusion --s/p thoracentesis 2/2, exudate, high RBC, suspect simple drainage will suffice, will repeat CXR. Continue antibiotics.   2. Left sided infiltrate - suspect mainly related to pleural effusion/trauma but she has wbc ct of 22 and procal is extremely elevated, continue antibiotics --repeat CXR  3. Anemia  4. Metabolic acidosis - no gap.  5. afib with rvr --- ekg pending, cards consulted

## 2021-02-03 NOTE — PLAN OF CARE
Goal Outcome Evaluation:        Outcome Summary: VITALS AS DOCUMENTED. NO DISPLAY OF PARANOIA OR HALLUCINATIONS THIS EVENING. APPROPRIATE AND COOPERATIVE. APPRECIATIVE OF CARE PROVIDED. RESTING QUIETLY W/O S/SX OF DISTRESS. ABLE TO MAKE NEEDS KNOWN. WILL MONITOR.  Problem: Adult Inpatient Plan of Care  Goal: Plan of Care Review  Outcome: Ongoing, Progressing  Flowsheets  Taken 2/3/2021 0112 by Barbara Lynn, RN  Progress: --  Outcome Summary: VITALS AS DOCUMENTED. NO DISPLAY OF PARANOIA OR HALLUCINATIONS THIS EVENING. APPROPRIATE AND COOPERATIVE. APPRECIATIVE OF  Taken 2/2/2021 0409 by Monse Soto RN  Plan of Care Reviewed With: patient  Goal: Patient-Specific Goal (Individualized)  Outcome: Ongoing, Progressing  Goal: Absence of Hospital-Acquired Illness or Injury  Outcome: Ongoing, Progressing  Intervention: Identify and Manage Fall Risk  Description: Perform standard risk assessment with a validated tool or comprehensive approach appropriate to the patient on admission; reassess fall risk frequently, with change in status or transfer to another level of care.  Communicate fall injury risk to interprofessional healthcare team.  Determine need for increased observation, equipment and environmental modification, such as low bed and signage, as well as supportive, nonskid footwear.  Adjust safety measures to individual developmental age, stage and identified risk factors.  Reinforce the importance of safety and physical activity with patient and family.  Perform regular intentional rounding to assess need for position change, pain assessment, personal needs, including assistance with toileting.  Recent Flowsheet Documentation  Taken 2/3/2021 0040 by Barbara Lynn, RN  Safety Promotion/Fall Prevention:   assistive device/personal items within reach   clutter free environment maintained   fall prevention program maintained   nonskid shoes/slippers when out of bed   room organization consistent   safety round/check  completed  Taken 2/2/2021 2145 by Barbara Lynn RN  Safety Promotion/Fall Prevention:   assistive device/personal items within reach   clutter free environment maintained   fall prevention program maintained   lighting adjusted   nonskid shoes/slippers when out of bed   room organization consistent   safety round/check completed  Taken 2/2/2021 1940 by Barbara Lynn RN  Safety Promotion/Fall Prevention:   clutter free environment maintained   assistive device/personal items within reach   fall prevention program maintained   lighting adjusted   nonskid shoes/slippers when out of bed   safety round/check completed   room organization consistent  Intervention: Prevent Skin Injury  Description: Assess skin risk on admission and at regular intervals throughout hospital stay.  Keep all areas of skin (especially folds) clean and dry.  Maintain adequate skin hydration.  Relieve and redistribute pressure and protect bony prominences; implement measures based on patient-specific risk factors.  Match turning and repositioning schedule to clinical condition.  Encourage weight shift frequently; assist with reposition if unable to complete independently.  Float heels off bed. Avoid pressure on the Achilles tendon.  Keep skin free from extended contact with medical devices.  Use aids (e.g., slide boards, mechanical lift) during transfer.  Recent Flowsheet Documentation  Taken 2/3/2021 0040 by Barbara Lynn, RN  Body Position:   weight shift assistance provided   neutral body alignment   neutral head position  Taken 2/2/2021 2145 by Barbara Lynn RN  Body Position:   turned   tilted, right   neutral body alignment   neutral head position  Taken 2/2/2021 1940 by Barbara Lynn RN  Body Position:   supine   turned   neutral body alignment   neutral head position  Intervention: Prevent and Manage VTE (venous thromboembolism) Risk  Description: Assess for VTE risk.  Encourage/assist with early ambulation.  Initiate and  maintain compression or other therapy, as indicated based on identified risk in accordance with organizational protocol/provider order.  Encourage both active and passive leg exercises while in bed, if unable to ambulate.  Recent Flowsheet Documentation  Taken 2/2/2021 2145 by Barbara Lynn RN  VTE Prevention/Management:   bilateral   dorsiflexion/plantar flexion performed  Intervention: Prevent Infection  Description: Maintain skin and mucous membrane integrity; promote hand, oral and pulmonary hygiene.  Optimize fluid balance, nutrition, sleep and glycemic control to maximize infection resistance.  Identify potential sources of infection early to prevent or mitigate progression of infection (e.g., wound, lines, devices).  Evaluate ongoing need for invasive devices; remove promptly when no longer indicated.  Recent Flowsheet Documentation  Taken 2/3/2021 0040 by Barbara Lynn RN  Infection Prevention:   visitors restricted/screened   single patient room provided   rest/sleep promoted   personal protective equipment utilized   equipment surfaces disinfected   hand hygiene promoted  Taken 2/2/2021 2145 by Barbara Lynn RN  Infection Prevention:   visitors restricted/screened   single patient room provided   rest/sleep promoted   personal protective equipment utilized   hand hygiene promoted   equipment surfaces disinfected  Taken 2/2/2021 1940 by Barbara Lynn RN  Infection Prevention:   visitors restricted/screened   single patient room provided   rest/sleep promoted   personal protective equipment utilized   hand hygiene promoted   equipment surfaces disinfected  Goal: Optimal Comfort and Wellbeing  Outcome: Ongoing, Progressing  Intervention: Provide Person-Centered Care  Description: Use a family-focused approach to care.  Develop trust and rapport by proactively providing information, encouraging questions, addressing concerns and offering reassurance.  Acknowledge emotional response to  hospitalization.  Recognize and utilize personal coping strategies.  Honor spiritual and cultural preferences.  Recent Flowsheet Documentation  Taken 2/2/2021 2145 by Barbara Lynn, RN  Trust Relationship/Rapport:   care explained   choices provided   emotional support provided   empathic listening provided   questions answered   questions encouraged   reassurance provided   thoughts/feelings acknowledged  Goal: Readiness for Transition of Care  Outcome: Ongoing, Progressing

## 2021-02-03 NOTE — CONSULTS
Date of Hospital Visit: 21  Encounter Provider: Warren Tanner MD  Place of Service: [facility] CARDIOLOGY  Patient Name: Gita Wharton  :1935  [id number]  Referral Provider: Solomon Ortiz MD    Chief complaint: Closed fracture of ribs    Reason for Consult:  Atrial fibrillation with RVR    History of Present Illness:    Ms Wharton is an 85 year old patient of Dr Tanner with a history of hypertension, aortic stenosis (s/pTAVR 2019), MCA stroke, carotid disease (s/p RCEA).     In  she had subdural hematoma after a fall.    In 2020 she was seen in the ER multiple time for weakness, SOA, LE edema, and not feeling well. She had extensive workup including repeat ECHO which showed well functioning TAVR valve with gradients within normal  Limits.  LE doppler showed no DVT. Holter monitor showed NSR with 108 episodes of SVT with longest lasting 18 seconds.  Her blood pressure was running low and her lasix was decreased.  Since that time her PCP has been following her hypotension and all of her anti-hypertensive medications were stopped.     She was seen in follow up on 2021.  She reported her blood pressures were running 160-200 at home and she continued with LE edema with right > left. Her losartan 100 mg and lasix 20 mg were restarted.     She was admitted on 2021 after a fall that occurred at her daughters house when she was trying to find alight switch. She did not lose consciousness. She was hypertensive on arrival and she was found to have rib fractures along with L1 fracture. Since her admission she has had confusion, paranoia, and agitation.  She has been seen by psych and was started on Zyprexa.     This morning she went into SVT while sitting up in a chair with 's with B/P dropping in to the 70-80's.  She received a fluid bolus and digoxin 0.5 mg IV.  Her K was 3.0 and is being replaced, Mag 2.1.  Her B/P is improved at 119/76 and  her HR is now 120-140 atrial fibrillation. She remains confused and slightly agitated. She is on metoprolol 75 mg BID    TSH yesterday was 3.180     I have reviewed the above HPI and agree with it.  She is a lady that is really declined quickly in the last few months she is living with her daughter she has had a number of falls and she has had significant weight loss she is now in with a fall fracture ribs pleural effusion that was drained.  This morning she went into A. fib with rapid ventricular response.  She has never had this before she does not really feel it.    Previous Cardiac Testing  ECHO 12/8/2020  · Calculated left ventricular EF = 60.4% Estimated left ventricular EF = 60% Estimated left ventricular EF was in agreement with the calculated left ventricular EF. Left ventricular systolic function is normal. Normal global longitudinal LV strain (GLS) = -19.4%. Left ventricle strain data was reviewed by the physician and found to be accurate. The left ventricular cavity is small in size. Left ventricular wall thickness is consistent with moderate concentric hypertrophy. All left ventricular wall segments contract normally. Left ventricular diastolic function is consistent with (grade Ia w/high LAP) impaired relaxation.  · There is a 23 mm TAVR valve present. The aortic valve peak and mean gradients are within defined limits. There is no significant stenosis or regurgitation  · Severe mitral annular calcification is present. There is severe, bileaflet mitral valve thickening present. Trace mitral valve regurgitation is present. No significant mitral valve stenosis is present.  · Trace tricuspid valve regurgitation is present. Estimated right ventricular systolic pressure from tricuspid regurgitation is mildly elevated (35-45 mmHg). Calculated right ventricular systolic pressure from tricuspid regurgitation is 36 mmHg.     Cardiac Catheterization 10/24/2019  FINDINGS:  RIGHT HEART  CATHETERIZATION:  Pressures:  Right Atrial:                      2  Right Ventricle   :             33/6  Pulmonary Artery:           33/14 mean 22  PCWP:                               10  Cardiac output                 3.45  Cardiac index                   2.31     LEFT HEART CATHETERIZATION:  LEFT VENTRICULOGRAPHY: We did not cross the aortic valve but you could see there is a lot of calcium in the aortic valve as well is in the aorta itself     CORONARY ANGIOGRAPHY:  Left main: Normal  Left anterior descending: Mild filling defect at the origin of the LAD that I think is probably okay the proximal LAD is normal mid LAD is normal distal LAD is normal luminal irregularities in the diagonals  Ramus intermedius:Not present  Circumflex: Proximal circumflex is normal there is a 30% mid circumflex lesion and a 70% mid circumflex lesion, 30% distal circumflex disease  RCA: Is a dominant vessel.  20% proximal disease normal in the mid and distal portion     SUMMARY: Significant mid circumflex lesion but asymptomatic, severe aortic stenosis        RECOMMENDATIONS: Proceed on with a TAVR evaluation    Holter Monitor 11/13/2020  · A relatively benign monitor study.  · Underlying heart rhythm was sinus rhythm with an average heart rate of 67 BPM and range of 38 BPM up to 169 BPM  · 108 episodes of SVT with the longest lasting 18 secs  · Mild burden of PACs at 1.6%      Past Medical History:   Diagnosis Date   • Acute right MCA stroke (CMS/HCC) 07/24/2018 2001, 2018   • Anesthesia complication     TOXIC ENCEPHALOPATHY   • Aortic stenosis    • Aortic valve stenosis    • Fracture, hip (CMS/HCC)     LEFT, CHIP ON TOP OF HIP D/T FALL 2 WEEKS POST OP   • Hemorrhoids 8/5/2018   • History of transfusion 2001    13 UNITS, HERE AT [facility]   • Hyperlipidemia    • Hypertension    • Hypokalemia    • 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    R LL            Dr FRANKIE Branch - suggested yearly CXR to Monitor   • Pressure sore     BUTTOCKS   •  Pyelonephritis 4/16/2019   • Stenosis of right internal carotid artery 7/25/2018   • Subdural hematoma (CMS/HCC) 07/12/2018    Secondary to fall, JULY 2018   • Toxic encephalopathy 2019    POST OP LAST HIP SURGERY       Past Surgical History:   Procedure Laterality Date   • AORTIC VALVE REPAIR/REPLACEMENT N/A 12/3/2019    Procedure: TRACEY TRANSCAROTID TRANSCATHETER AORTIC VALVE REPLACEMENT;  Surgeon: Octaviano Yan MD;  Location: [facility] HYBRID OR 18/19;  Service: Cardiothoracic   • AORTIC VALVE REPAIR/REPLACEMENT N/A 12/3/2019    Procedure: Transcarotid Transcatheter Aortic Valve Replacement w/Intra Op TRACEY and Possible Open Bailout Surgery;  Surgeon: Warren Tanner MD;  Location: [facility] HYBRID OR 18/19;  Service: Cardiovascular   • CARDIAC CATHETERIZATION N/A 10/24/2019    Procedure: Coronary angiography;  Surgeon: Warren Tanner MD;  [facility];  Service: Cardiovascular   • CARDIAC CATHETERIZATION N/A 10/24/2019    Procedure: Left heart cath;  Surgeon: Warren Tanner MD;  [facility];  Service: Cardiovascular   • CARDIAC CATHETERIZATION N/A 10/24/2019    Procedure: Right heart cath;  Surgeon: Warren Tanner MD;  [facility];  Service: Cardiovascular   • CAROTID ENDARTERECTOMY Right 7/26/2018    Procedure: RT CAROTID ENDARTERECTOMY;  Surgeon: Inna Villarreal MD;  Location: [facility] MAIN OR;  Service: Vascular   • COLONOSCOPY N/A 8/7/2018    Adenomatous polyp.   Repeat 2021.  Surgeon: Ken Tidwell MD;    • HYSTERECTOMY     • THYROIDECTOMY, PARTIAL     • TOTAL HIP ARTHROPLASTY Right    • TOTAL HIP ARTHROPLASTY Left 6/30/2019    Procedure: LEFT HIP ANTERIOR HIP WITH HANA TABLE;  Surgeon: Tiffani Pereira MD;  Location: [facility] MAIN OR;  Service: Orthopedics       Medications Prior to Admission   Medication Sig Dispense Refill Last Dose   • Acetaminophen (TYLENOL 8 HOUR ARTHRITIS PAIN PO) Take 1 tablet by  mouth Daily.   1/21/2021 at Unknown time   • aspirin 81 MG EC tablet Take 1 tablet by mouth Daily.   1/20/2021 at Unknown time   • atorvastatin (LIPITOR) 20 MG tablet TAKE 1 TABLET BY MOUTH EVERY DAY 90 tablet 1 1/20/2021 at Unknown time   • clopidogrel (PLAVIX) 75 MG tablet TAKE 1 TABLET BY MOUTH EVERY DAY 90 tablet 1 1/20/2021 at Unknown time   • ferrous sulfate 325 (65 FE) MG tablet Take 1 tablet by mouth Daily With Breakfast. 90 tablet 0 Past Week at Unknown time   • furosemide (LASIX) 20 MG tablet Take 1 tablet by mouth Daily. 30 tablet 3 1/20/2021 at Unknown time   • losartan (Cozaar) 100 MG tablet Take 1 tablet by mouth Daily. 30 tablet 3 1/20/2021 at Unknown time   • metoprolol tartrate (LOPRESSOR) 50 MG tablet Take 1 tablet by mouth 2 (Two) Times a Day. 180 tablet 3 1/20/2021 at Unknown time   • Multiple Vitamins-Minerals (CENTRUM ADULTS) tablet Take 1 tablet by mouth Daily.   Past Month at Unknown time   • diphenhydrAMINE (BENADRYL) 25 mg capsule Take 12.5 mg by mouth Daily As Needed for Itching.   Unknown at Unknown time   • docusate sodium (Colace) 100 MG capsule Take 2 capsules by mouth Daily.   Unknown at Unknown time       Current Meds  Scheduled Meds:acetaminophen, 1,000 mg, Oral, TID  amLODIPine, 5 mg, Oral, Q24H  aspirin, 81 mg, Oral, Daily  atorvastatin, 20 mg, Oral, Daily  clopidogrel, 75 mg, Oral, Daily  ferrous sulfate, 325 mg, Oral, Daily With Breakfast  lidocaine, 1 patch, Transdermal, Q24H  LORazepam, 1 mg, Oral, Once  melatonin, 3 mg, Oral, Nightly  metoprolol tartrate, 75 mg, Oral, BID  multivitamin with minerals, 1 tablet, Oral, Daily  piperacillin-tazobactam, 3.375 g, Intravenous, Q8H  risperiDONE, 0.5 mg, Oral, Nightly  sodium chloride, 10 mL, Intravenous, Q12H      Continuous Infusions:hold, 1 each      PRN Meds:.cyclobenzaprine  •  hold  •  hydrALAZINE  •  Morphine  •  OLANZapine  •  ondansetron **OR** ondansetron  •  potassium chloride **OR** potassium chloride **OR** potassium  chloride    Allergies as of 2021 - Reviewed 2021   Allergen Reaction Noted   • Tekturna [aliskiren] Diarrhea 2018       Social History     Socioeconomic History   • Marital status:      Spouse name: Not on file   • Number of children: 4   • Years of education: 12   • Highest education level: High school graduate   Tobacco Use   • Smoking status: Former Smoker     Packs/day: 0.50     Years: 43.00     Pack years: 21.50     Types: Cigarettes     Start date:      Quit date:      Years since quittin.1   • Smokeless tobacco: Never Used   • Tobacco comment: CAFFEINE USE: 1 CUP TEA  AND 2 PEPSI DAILY   Substance and Sexual Activity   • Alcohol use: Not Currently     Alcohol/week: 7.0 standard drinks     Types: 7 Cans of beer per week   • Drug use: No   • Sexual activity: Defer       Family History   Problem Relation Age of Onset   • Cancer Mother    • Dementia Father    • Hypertension Father    • Hypertension Daughter    • Cancer Daughter    • Malig Hyperthermia Neg Hx        REVIEW OF SYSTEMS:   ROS was performed and is negative except as outlined in HPI     REVIEW OF SYSTEMS:   CONSTITUTIONAL: No weight loss, fever, chills, weakness or fatigue.   HEENT: Eyes: No visual loss, blurred vision, double vision or yellow sclerae. Ears, Nose, Throat: No hearing loss, sneezing, congestion, runny nose or sore throat.   SKIN: No rash or itching.     RESPIRATORY: No shortness of breath, hemoptysis, cough or sputum.   GASTROINTESTINAL: No anorexia, nausea, vomiting or diarrhea. No abdominal pain, bright red blood per rectum or melena.  GENITOURINARY: No burning on urination, hematuria or increased frequency.  NEUROLOGICAL: No headache, dizziness, syncope, paralysis, ataxia, numbness or tingling in the extremities. No change in bowel or bladder control.   MUSCULOSKELETAL: No muscle, back pain, joint pain or stiffness.   HEMATOLOGIC: No anemia, bleeding or bruising.   LYMPHATICS: No enlarged nodes.  "No history of splenectomy.   PSYCHIATRIC: No history of depression, anxiety, hallucinations.   ENDOCRINOLOGIC: No reports of sweating, cold or heat intolerance. No polyuria or polydipsia.        Objective:   Temp:  [97.9 °F (36.6 °C)-98.9 °F (37.2 °C)] 98.5 °F (36.9 °C)  Heart Rate:  [] 113  Resp:  [18-20] 18  BP: ()/(46-58) 119/58  Body mass index is 21.97 kg/m².  Flowsheet Rows      First Filed Value   Admission Height  157.5 cm (62\") Documented at 01/21/2021 0711   Admission Weight  54 kg (119 lb) Documented at 01/21/2021 0711        Vitals:    02/03/21 1100   BP: 119/58   Pulse: 113   Resp: 18   Temp: 98.5 °F (36.9 °C)   SpO2:        Head:    Normocephalic, without obvious abnormality, atraumatic   Eyes:            Lids and lashes normal, conjunctivae and sclerae normal, no   icterus, no pallor   Ears:    Ears appear intact with no abnormalities noted   Throat:   No oral lesions, dentition good   Neck:   No adenopathy, supple, trachea midline, no thyromegaly, no   carotid bruit, no JVD   Lungs:     Breath sounds are equal and clear to auscultation    Heart:    Normal S1 and S2, iRRR, No M/G/R   Abdomen:    Normal bowel sounds, no masses, no organomegaly, soft, nontender,       nondistended, no guarding   Extremities:   Moves all extremities well, no edema, no cyanosis, no redness   Pulses:   Pulses palpable and equal bilaterally.    Skin:  Psychiatric:   No bleeding, bruising or rash    Awake, alert and oriented x 3, normal mood and affect             CXR 2/1/2021  FINDINGS: There appears to be an enlarging left pleural effusion  layering posteriorly and further increased density at the left base when  compared to the study of 01/22/2021. The findings are consistent with  enlarging left pleural effusion and probable worsening left lower lobe  pneumonia. The right lung remains clear and the heart remains mildly  enlarged.         Telemetry          EKG today       EKG on admission      I personally " viewed and interpreted the patient's EKG/Telemetry data    Assessment:  Active Hospital Problems    Diagnosis  POA   • **Closed fracture of multiple ribs of left side [S22.42XA]  Yes   • PVD (peripheral vascular disease) with claudication (CMS/HCC) [I73.9]  Yes     Priority: High   • Essential hypertension [I10]  Yes     Priority: Medium   • AMS (altered mental status) [R41.82]  Unknown   • Acute respiratory failure with hypoxia (CMS/HCC) [J96.01]  Unknown   • Fall from standing [W19.XXXA]  Yes   • Edema leg, right [R60.0]  Yes   • Closed compression fracture of body of L1 vertebra (CMS/HCC) [S32.010A]  Yes   • Pleural effusion on left [J90]  Yes   • Leukocytosis [D72.829]  Yes   • S/P TAVR (transcatheter aortic valve replacement) [Z95.2]  Not Applicable   • Chronic diastolic congestive heart failure (CMS/HCC) [I50.32]  Yes   • Anemia [D64.9]  Unknown   • History of right MCA stroke [Z86.73]  Not Applicable   • Fall at home, initial encounter [W19.XXXA, Y92.009]  Not Applicable      Resolved Hospital Problems   No resolved problems to display.       Plan: This is a woman who is really declining overall she was gone into A. fib with RVR I am going to try to get her to slow down and then also convert to sinus we will put her on some amiodarone and she does not have a lot in the way of blood pressure I talked with her daughter at length to me in the overall course here is bad and I think we really need to focus on comfort dignity and safety for her she is falling way too much to anticoagulate and so that second to be an option going forward and she has had a history of stroke she is older than her risk for stroke in the future is high    Warren Tanner MD  02/03/21  12:38 EST.

## 2021-02-03 NOTE — CONSULTS
The patient's daughter reports that the patient is not doing as well this morning.  She reports that she has had more confusion and paranoia.  While the chart indicates no evidence of hallucinations or paranoia last evening, that the patient's daughter and the patient both request reinitiation of the medication to address this symptom.  And Zyprexa 2.5 was too sedating, I will start the patient on a very conservative dose of Risperdal, 0.5 mg nightly.

## 2021-02-04 ENCOUNTER — APPOINTMENT (OUTPATIENT)
Dept: MRI IMAGING | Facility: HOSPITAL | Age: 86
End: 2021-02-04

## 2021-02-04 PROBLEM — A04.72 C. DIFFICILE COLITIS: Status: ACTIVE | Noted: 2021-02-04

## 2021-02-04 LAB
ALBUMIN SERPL-MCNC: 2.3 G/DL (ref 3.5–5.2)
ALBUMIN/GLOB SERPL: 0.8 G/DL
ALP SERPL-CCNC: 143 U/L (ref 39–117)
ALT SERPL W P-5'-P-CCNC: 25 U/L (ref 1–33)
ANION GAP SERPL CALCULATED.3IONS-SCNC: 10.3 MMOL/L (ref 5–15)
AST SERPL-CCNC: 41 U/L (ref 1–32)
BASOPHILS # BLD AUTO: 0.02 10*3/MM3 (ref 0–0.2)
BASOPHILS NFR BLD AUTO: 0.1 % (ref 0–1.5)
BILIRUB SERPL-MCNC: 0.2 MG/DL (ref 0–1.2)
BUN SERPL-MCNC: 21 MG/DL (ref 8–23)
BUN/CREAT SERPL: 20.8 (ref 7–25)
C DIFF TOX GENS STL QL NAA+PROBE: POSITIVE
CALCIUM SPEC-SCNC: 7.9 MG/DL (ref 8.6–10.5)
CHLORIDE SERPL-SCNC: 109 MMOL/L (ref 98–107)
CO2 SERPL-SCNC: 17.7 MMOL/L (ref 22–29)
CREAT SERPL-MCNC: 1.01 MG/DL (ref 0.57–1)
CYTO UR: NORMAL
DEPRECATED RDW RBC AUTO: 41.4 FL (ref 37–54)
EOSINOPHIL # BLD AUTO: 0.13 10*3/MM3 (ref 0–0.4)
EOSINOPHIL NFR BLD AUTO: 0.7 % (ref 0.3–6.2)
ERYTHROCYTE [DISTWIDTH] IN BLOOD BY AUTOMATED COUNT: 13.7 % (ref 12.3–15.4)
GFR SERPL CREATININE-BSD FRML MDRD: 52 ML/MIN/1.73
GLOBULIN UR ELPH-MCNC: 3 GM/DL
GLUCOSE SERPL-MCNC: 88 MG/DL (ref 65–99)
HCT VFR BLD AUTO: 28.3 % (ref 34–46.6)
HGB BLD-MCNC: 9 G/DL (ref 12–15.9)
IMM GRANULOCYTES # BLD AUTO: 0.36 10*3/MM3 (ref 0–0.05)
IMM GRANULOCYTES NFR BLD AUTO: 1.9 % (ref 0–0.5)
LAB AP CASE REPORT: NORMAL
LYMPHOCYTES # BLD AUTO: 1.1 10*3/MM3 (ref 0.7–3.1)
LYMPHOCYTES NFR BLD AUTO: 6 % (ref 19.6–45.3)
MCH RBC QN AUTO: 26.6 PG (ref 26.6–33)
MCHC RBC AUTO-ENTMCNC: 31.8 G/DL (ref 31.5–35.7)
MCV RBC AUTO: 83.7 FL (ref 79–97)
MONOCYTES # BLD AUTO: 0.75 10*3/MM3 (ref 0.1–0.9)
MONOCYTES NFR BLD AUTO: 4.1 % (ref 5–12)
NEUTROPHILS NFR BLD AUTO: 16.12 10*3/MM3 (ref 1.7–7)
NEUTROPHILS NFR BLD AUTO: 87.2 % (ref 42.7–76)
NRBC BLD AUTO-RTO: 0.1 /100 WBC (ref 0–0.2)
PATH REPORT.FINAL DX SPEC: NORMAL
PATH REPORT.GROSS SPEC: NORMAL
PLATELET # BLD AUTO: 312 10*3/MM3 (ref 140–450)
PMV BLD AUTO: 11 FL (ref 6–12)
POTASSIUM SERPL-SCNC: 4.4 MMOL/L (ref 3.5–5.2)
PROT SERPL-MCNC: 5.3 G/DL (ref 6–8.5)
QT INTERVAL: 357 MS
RBC # BLD AUTO: 3.38 10*6/MM3 (ref 3.77–5.28)
SODIUM SERPL-SCNC: 137 MMOL/L (ref 136–145)
WBC # BLD AUTO: 18.48 10*3/MM3 (ref 3.4–10.8)

## 2021-02-04 PROCEDURE — 97530 THERAPEUTIC ACTIVITIES: CPT

## 2021-02-04 PROCEDURE — 80053 COMPREHEN METABOLIC PANEL: CPT | Performed by: INTERNAL MEDICINE

## 2021-02-04 PROCEDURE — 93005 ELECTROCARDIOGRAM TRACING: CPT | Performed by: INTERNAL MEDICINE

## 2021-02-04 PROCEDURE — 70551 MRI BRAIN STEM W/O DYE: CPT

## 2021-02-04 PROCEDURE — 93010 ELECTROCARDIOGRAM REPORT: CPT | Performed by: INTERNAL MEDICINE

## 2021-02-04 PROCEDURE — 99232 SBSQ HOSP IP/OBS MODERATE 35: CPT | Performed by: INTERNAL MEDICINE

## 2021-02-04 PROCEDURE — 85025 COMPLETE CBC W/AUTO DIFF WBC: CPT | Performed by: INTERNAL MEDICINE

## 2021-02-04 PROCEDURE — 25010000002 PIPERACILLIN SOD-TAZOBACTAM PER 1 G: Performed by: INTERNAL MEDICINE

## 2021-02-04 RX ADMIN — FERROUS SULFATE TAB 325 MG (65 MG ELEMENTAL FE) 325 MG: 325 (65 FE) TAB at 08:34

## 2021-02-04 RX ADMIN — TAZOBACTAM SODIUM AND PIPERACILLIN SODIUM 3.38 G: 375; 3 INJECTION, SOLUTION INTRAVENOUS at 05:48

## 2021-02-04 RX ADMIN — SODIUM CHLORIDE, PRESERVATIVE FREE 10 ML: 5 INJECTION INTRAVENOUS at 08:34

## 2021-02-04 RX ADMIN — VANCOMYCIN 125 MG: KIT at 22:34

## 2021-02-04 RX ADMIN — METOPROLOL TARTRATE 100 MG: 50 TABLET, FILM COATED ORAL at 22:35

## 2021-02-04 RX ADMIN — TAZOBACTAM SODIUM AND PIPERACILLIN SODIUM 3.38 G: 375; 3 INJECTION, SOLUTION INTRAVENOUS at 14:16

## 2021-02-04 RX ADMIN — MULTIPLE VITAMINS W/ MINERALS TAB 1 TABLET: TAB at 08:34

## 2021-02-04 RX ADMIN — RISPERIDONE 0.5 MG: 0.5 TABLET ORAL at 22:36

## 2021-02-04 RX ADMIN — ATORVASTATIN CALCIUM 20 MG: 20 TABLET, FILM COATED ORAL at 08:34

## 2021-02-04 RX ADMIN — ACETAMINOPHEN 1000 MG: 500 TABLET, FILM COATED ORAL at 08:34

## 2021-02-04 RX ADMIN — LORAZEPAM 1 MG: 1 TABLET ORAL at 20:30

## 2021-02-04 RX ADMIN — METOPROLOL TARTRATE 100 MG: 50 TABLET, FILM COATED ORAL at 08:34

## 2021-02-04 RX ADMIN — VANCOMYCIN 125 MG: KIT at 14:16

## 2021-02-04 RX ADMIN — Medication 3 MG: at 22:35

## 2021-02-04 RX ADMIN — AMIODARONE HYDROCHLORIDE 400 MG: 200 TABLET ORAL at 22:34

## 2021-02-04 RX ADMIN — VANCOMYCIN 125 MG: KIT at 02:10

## 2021-02-04 RX ADMIN — ACETAMINOPHEN 1000 MG: 500 TABLET, FILM COATED ORAL at 17:01

## 2021-02-04 RX ADMIN — ACETAMINOPHEN 1000 MG: 500 TABLET, FILM COATED ORAL at 22:35

## 2021-02-04 RX ADMIN — SODIUM CHLORIDE, PRESERVATIVE FREE 10 ML: 5 INJECTION INTRAVENOUS at 22:37

## 2021-02-04 RX ADMIN — AMIODARONE HYDROCHLORIDE 400 MG: 200 TABLET ORAL at 08:34

## 2021-02-04 RX ADMIN — VANCOMYCIN 125 MG: KIT at 08:34

## 2021-02-04 NOTE — CONSULTS
The patient's is improved today.  She is bright and euthymic and seems to have done better with the conservative dose of Risperdal given last evening.  She is pleasantly confused during today's interview.

## 2021-02-04 NOTE — PLAN OF CARE
Problem: Adult Inpatient Plan of Care  Goal: Plan of Care Review  Outcome: Ongoing, Progressing  Flowsheets (Taken 2/3/2021 1957)  Progress: improving  Plan of Care Reviewed With: patient  Outcome Summary: Patient denies pain, HR in SVT and then Afeb RVR, EKG obbtained, MD notified consult to cardiology called, Patient asymptomatic, denies chest pain and SOA. dig 0.5 given per MD order, Patient converted back to SR potassium replacement given, pallative consult placed. Call recieved from MRI, Patient is agreeable with doing MRI tomorrow  Goal: Patient-Specific Goal (Individualized)  Outcome: Ongoing, Progressing  Goal: Absence of Hospital-Acquired Illness or Injury  Outcome: Ongoing, Progressing  Intervention: Identify and Manage Fall Risk  Recent Flowsheet Documentation  Taken 2/3/2021 1800 by Elise Vasquez RN  Safety Promotion/Fall Prevention:   activity supervised   assistive device/personal items within reach   clutter free environment maintained   fall prevention program maintained   safety round/check completed  Taken 2/3/2021 1600 by Elise Vasquez RN  Safety Promotion/Fall Prevention:   activity supervised   assistive device/personal items within reach   clutter free environment maintained   fall prevention program maintained   safety round/check completed  Taken 2/3/2021 1400 by Elise Vasquez RN  Safety Promotion/Fall Prevention:   activity supervised   assistive device/personal items within reach   clutter free environment maintained   fall prevention program maintained   safety round/check completed  Taken 2/3/2021 1200 by Elise Vasquez RN  Safety Promotion/Fall Prevention:   activity supervised   assistive device/personal items within reach   clutter free environment maintained   fall prevention program maintained   safety round/check completed  Taken 2/3/2021 1000 by Elise Vasquez RN  Safety Promotion/Fall Prevention:   activity supervised   assistive device/personal items within reach    clutter free environment maintained   fall prevention program maintained   safety round/check completed  Taken 2/3/2021 0838 by Elise Vasquez RN  Safety Promotion/Fall Prevention:   activity supervised   assistive device/personal items within reach   clutter free environment maintained   fall prevention program maintained   safety round/check completed  Intervention: Prevent and Manage VTE (venous thromboembolism) Risk  Recent Flowsheet Documentation  Taken 2/3/2021 0838 by Elise Vasquez RN  VTE Prevention/Management:   bilateral   dorsiflexion/plantar flexion performed  Intervention: Prevent Infection  Recent Flowsheet Documentation  Taken 2/3/2021 1800 by Elise Vasquez RN  Infection Prevention: single patient room provided  Taken 2/3/2021 1600 by Elise Vasquez RN  Infection Prevention: single patient room provided  Taken 2/3/2021 1400 by Elise Vasquez RN  Infection Prevention: single patient room provided  Taken 2/3/2021 1200 by Elise Vasquez RN  Infection Prevention: single patient room provided  Taken 2/3/2021 1000 by Elise Vasquez RN  Infection Prevention: single patient room provided  Taken 2/3/2021 0838 by Elise Vasquez RN  Infection Prevention: single patient room provided  Goal: Optimal Comfort and Wellbeing  Outcome: Ongoing, Progressing  Intervention: Provide Person-Centered Care  Recent Flowsheet Documentation  Taken 2/3/2021 1400 by Elise Vasquez RN  Trust Relationship/Rapport:   choices provided   care explained  Taken 2/3/2021 0838 by Elise Vasquez RN  Trust Relationship/Rapport:   care explained   choices provided  Goal: Readiness for Transition of Care  Outcome: Ongoing, Progressing     Problem: Fall Injury Risk  Goal: Absence of Fall and Fall-Related Injury  Outcome: Ongoing, Progressing  Intervention: Identify and Manage Contributors to Fall Injury Risk  Recent Flowsheet Documentation  Taken 2/3/2021 1800 by Elise Vasquez RN  Medication Review/Management:  medications reviewed  Taken 2/3/2021 1600 by Elise Vasquez RN  Medication Review/Management: medications reviewed  Taken 2/3/2021 1400 by Elise Vasquez RN  Medication Review/Management: medications reviewed  Taken 2/3/2021 1200 by Elise Vasquez RN  Medication Review/Management: medications reviewed  Taken 2/3/2021 1000 by Elise Vasquez RN  Medication Review/Management: medications reviewed  Taken 2/3/2021 0838 by Eilse Vasquez RN  Medication Review/Management: medications reviewed  Intervention: Promote Injury-Free Environment  Recent Flowsheet Documentation  Taken 2/3/2021 1800 by Elise Vasquez RN  Safety Promotion/Fall Prevention:   activity supervised   assistive device/personal items within reach   clutter free environment maintained   fall prevention program maintained   safety round/check completed  Taken 2/3/2021 1600 by Elise Vasquez RN  Safety Promotion/Fall Prevention:   activity supervised   assistive device/personal items within reach   clutter free environment maintained   fall prevention program maintained   safety round/check completed  Taken 2/3/2021 1400 by Elise Vasquez RN  Safety Promotion/Fall Prevention:   activity supervised   assistive device/personal items within reach   clutter free environment maintained   fall prevention program maintained   safety round/check completed  Taken 2/3/2021 1200 by Elise Vasquez RN  Safety Promotion/Fall Prevention:   activity supervised   assistive device/personal items within reach   clutter free environment maintained   fall prevention program maintained   safety round/check completed  Taken 2/3/2021 1000 by Elise Vasquez RN  Safety Promotion/Fall Prevention:   activity supervised   assistive device/personal items within reach   clutter free environment maintained   fall prevention program maintained   safety round/check completed  Taken 2/3/2021 0838 by Elise Vasquez RN  Safety Promotion/Fall Prevention:   activity  supervised   assistive device/personal items within reach   clutter free environment maintained   fall prevention program maintained   safety round/check completed     Problem: Skin Injury Risk Increased  Goal: Skin Health and Integrity  Outcome: Ongoing, Progressing  Intervention: Optimize Skin Protection  Recent Flowsheet Documentation  Taken 2/3/2021 1046 by Elise Vasquez RN  Pressure Reduction Techniques: weight shift assistance provided  Pressure Reduction Devices: alternating pressure pump (ADD)  Skin Protection: adhesive use limited   Goal Outcome Evaluation:  Plan of Care Reviewed With: patient  Progress: improving  Outcome Summary: Patient denies pain, HR in SVT and then Afeb RVR, EKG obbtained, MD notified consult to cardiology called, Patient asymptomatic, denies chest pain and SOA. dig 0.5 given per MD order, Patient converted back to SR potassium replacement given, pallative consult placed. Call recieved from MRI, Patient is agreeable with doing MRI tomorrow

## 2021-02-04 NOTE — THERAPY TREATMENT NOTE
Patient Name: Gita Wharton  : 1935    MRN: [id number]                              Today's Date: 2021       Admit Date: 2021    Visit Dx:     ICD-10-CM ICD-9-CM   1. Fall from standing, initial encounter  W19.XXXA E888.9   2. Contusion of flank, initial encounter  S30.1XXA 922.2   3. Closed fracture of multiple ribs of left side, initial encounter  S22.42XA 807.09   4. Compression fracture of superior endplate of L1 vertebra, initial encounter (CMS/[facility])  S32.010A 805.4     Patient Active Problem List   Diagnosis   • Essential hypertension   • Hyperlipidemia   • Fall at home, initial encounter   • Lung nodule   • History of right MCA stroke   • Stenosis of right internal carotid artery   • Anemia   • Abnormal chest x-ray   • Interstitial lung disease (CMS/[facility])   • Toxic encephalopathy due to anesthetics   • Weakness generalized   • Ascending aorta dilation (CMS/[facility])   • Nonrheumatic aortic valve stenosis   • Nonrheumatic mitral valve regurgitation   • Chronic diastolic congestive heart failure (CMS/[facility])   • PVD (peripheral vascular disease) with claudication (CMS/[facility])   • S/P TAVR (transcatheter aortic valve replacement)   • Premature atrial contractions   • Idiopathic hypotension   • Bilateral lower extremity edema   • Closed fracture of multiple ribs of left side   • Edema leg, right   • Closed compression fracture of body of L1 vertebra (CMS/[facility])   • Pleural effusion on left   • Leukocytosis   • AMS (altered mental status)   • Acute respiratory failure with hypoxia (CMS/[facility])   • Fall from standing   • Atrial fibrillation (CMS/[facility])     Past Medical History:   Diagnosis Date   • Acute right MCA stroke (CMS/[facility]) 2018,    • Anesthesia complication     TOXIC ENCEPHALOPATHY   • Aortic stenosis    • Aortic valve stenosis    • Fracture, hip (CMS/[facility])     LEFT, CHIP ON TOP OF HIP D/T FALL 2 WEEKS POST OP   • Hemorrhoids 2018   • History of transfusion     13 UNITS, HERE AT  [facility]   • Hyperlipidemia    • Hypertension    • Hypokalemia    • 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    R LL            Dr FRANKIE Branch - suggested yearly CXR to Monitor   • Pressure sore     BUTTOCKS   • Pyelonephritis 4/16/2019   • Stenosis of right internal carotid artery 7/25/2018   • Subdural hematoma (CMS/HCC) 07/12/2018    Secondary to fall, JULY 2018   • Toxic encephalopathy 2019    POST OP LAST HIP SURGERY     Past Surgical History:   Procedure Laterality Date   • AORTIC VALVE REPAIR/REPLACEMENT N/A 12/3/2019    Procedure: TRACEY TRANSCAROTID TRANSCATHETER AORTIC VALVE REPLACEMENT;  Surgeon: Octaviano Yan MD;  Location: [facility] HYBRID OR 18/19;  Service: Cardiothoracic   • AORTIC VALVE REPAIR/REPLACEMENT N/A 12/3/2019    Procedure: Transcarotid Transcatheter Aortic Valve Replacement w/Intra Op TRACEY and Possible Open Bailout Surgery;  Surgeon: Warren Tanner MD;  Location: [facility] HYBRID OR 18/19;  Service: Cardiovascular   • CARDIAC CATHETERIZATION N/A 10/24/2019    Procedure: Coronary angiography;  Surgeon: Warren Tanner MD;  Location: [facility] CATH INVASIVE LOCATION;  Service: Cardiovascular   • CARDIAC CATHETERIZATION N/A 10/24/2019    Procedure: Left heart cath;  Surgeon: Warren Tanner MD;  Location: [facility] CATH INVASIVE LOCATION;  Service: Cardiovascular   • CARDIAC CATHETERIZATION N/A 10/24/2019    Procedure: Right heart cath;  Surgeon: Warren Tanner MD;  Location: [facility] CATH INVASIVE LOCATION;  Service: Cardiovascular   • CAROTID ENDARTERECTOMY Right 7/26/2018    Procedure: RT CAROTID ENDARTERECTOMY;  Surgeon: Inna Villarreal MD;  Location: [facility] MAIN OR;  Service: Vascular   • COLONOSCOPY N/A 8/7/2018    Adenomatous polyp.   Repeat 2021.  Surgeon: Ken Tidwell MD;    • HYSTERECTOMY     • THYROIDECTOMY, PARTIAL     • TOTAL HIP ARTHROPLASTY Right    • TOTAL HIP ARTHROPLASTY Left 6/30/2019    Procedure: LEFT HIP ANTERIOR HIP WITH HANA TABLE;  Surgeon: Tiffani Pereira MD;   Location: [facility] MAIN OR;  Service: Orthopedics     General Information     Row Name 02/04/21 1209          Physical Therapy Time and Intention    Document Type  therapy note (daily note)  -CF     Mode of Treatment  physical therapy  -CF     Row Name 02/04/21 1209          General Information    Patient Profile Reviewed  yes  -CF     Existing Precautions/Restrictions  fall;oxygen therapy device and L/min  -CF     Barriers to Rehab  cognitive status  -CF     Row Name 02/04/21 1209          Cognition    Orientation Status (Cognition)  oriented to;person  -CF     Row Name 02/04/21 1209          Safety Issues, Functional Mobility    Safety Issues Affecting Function (Mobility)  insight into deficits/self-awareness;awareness of need for assistance;safety precautions follow-through/compliance;safety precaution awareness;judgment;problem-solving  -CF     Impairments Affecting Function (Mobility)  balance;cognition;endurance/activity tolerance;strength  -CF       User Key  (r) = Recorded By, (t) = Taken By, (c) = Cosigned By    Initials Name Provider Type    CF Laura Esqueda PT Physical Therapist        Mobility     Row Name 02/04/21 1209          Bed Mobility    Bed Mobility  supine-sit;scooting/bridging;sit-supine  -CF     Scooting/Bridging [place] (Bed Mobility)  maximum assist (25% patient effort);2 person assist;verbal cues;nonverbal cues (demo/gesture)  -CF     Supine-Sit [place] (Bed Mobility)  moderate assist (50% patient effort);1 person assist;verbal cues;nonverbal cues (demo/gesture)  -CF     Sit-Supine [place] (Bed Mobility)  moderate assist (50% patient effort);2 person assist;verbal cues;nonverbal cues (demo/gesture)  -CF     Assistive Device (Bed Mobility)  bed rails;draw sheet;head of bed elevated  -     Row Name 02/04/21 1209          Sit-Stand Transfer    Sit-Stand [place] (Transfers)  set up;verbal cues;minimum assist (75% patient effort);2 person assist  -CF     Assistive Device  (Sit-Stand Transfers)  walker, front-wheeled  -CF     Row Name 02/04/21 1209          Gait/Stairs (Locomotion)    [place] Level (Gait)  minimum assist (75% patient effort);2 person assist;verbal cues  -CF     Assistive Device (Gait)  walker, front-wheeled  -CF     Distance in Feet (Gait)  20'  -CF     Deviations/Abnormal Patterns (Gait)  bilateral deviations;stride length decreased;weight shifting decreased;base of support, narrow;other (see comments) B knees flexed  -CF     Bilateral Gait Deviations  forward flexed posture;heel strike decreased  -CF       User Key  (r) = Recorded By, (t) = Taken By, (c) = Cosigned By    Initials Name Provider Type    Laura Mcnulty, SAMANTHA Physical Therapist        Obj/Interventions     Row Name 02/04/21 1211          Motor Skills    Therapeutic Exercise  other (see comments) Seated scap retraction, shoulder circles, B LAQ, marches, AP x10  -CF       User Key  (r) = Recorded By, (t) = Taken By, (c) = Cosigned By    Initials Name Provider Type    CF Laura Esqueda, SAMANTHA Physical Therapist        Goals/Plan    No documentation.       Clinical Impression     Row Name 02/04/21 1211          Pain Scale: Numbers Pre/Post-Treatment    Pretreatment Pain Rating  0/10 - no pain  -CF     Posttreatment Pain Rating  0/10 - no pain  -CF     Row Name 02/04/21 1211          Plan of Care Review    Plan of Care Reviewed With  patient;daughter  -CF     Outcome Summary  Pt agreeable to PT treatment. She appears more alert and was able to ambulate 20' this date with min A x2 using RW. Significant cues provided for standing and seated postural improvements.  -CF     Row Name 02/04/21 1211          Vital Signs    O2 Delivery Pre Treatment  supplemental O2  -CF     O2 Delivery Intra Treatment  supplemental O2  -CF     O2 Delivery Post Treatment  supplemental O2  -CF     Row Name 02/04/21 1211          Positioning and Restraints    Pre-Treatment Position  in bed  -CF     Post Treatment Position   bed  -CF     In Bed  call light within reach;encouraged to call for assist;exit alarm on;fowlers;with family/caregiver  -CF       User Key  (r) = Recorded By, (t) = Taken By, (c) = Cosigned By    Initials Name Provider Type    Laura Mcnulty, PT Physical Therapist        Outcome Measures     Row Name 02/04/21 1213          How much help from another person do you currently need...    Turning from your back to your side while in flat bed without using bedrails?  3  -CF     Moving from lying on back to sitting on the side of a flat bed without bedrails?  2  -CF     Moving to and from a bed to a chair (including a wheelchair)?  2  -CF     Standing up from a chair using your arms (e.g., wheelchair, bedside chair)?  2  -CF     Climbing 3-5 steps with a railing?  1  -CF     To walk in hospital room?  2  -CF     AM-PAC 6 Clicks Score (PT)  12  -CF     Row Name 02/04/21 1213          Functional Assessment    Outcome Measure Options  AM-PAC 6 Clicks Basic Mobility (PT)  -CF       User Key  (r) = Recorded By, (t) = Taken By, (c) = Cosigned By    Initials Name Provider Type    Laura Mcnulty, SAMANTHA Physical Therapist        Physical Therapy Education                 Title: PT OT SLP Therapies (In Progress)     Topic: Physical Therapy (In Progress)     Point: Mobility training (In Progress)     Learning Progress Summary           Patient Acceptance, E, NR by CF at 2/4/2021 1213    Acceptance, E, NR by CF at 2/3/2021 1123    Nonacceptance, E, NL by KT at 2/1/2021 1651    Acceptance, E,TB,D, VU,DU,NR by GJ at 1/30/2021 1434    Comment: discussed safety with mobility, benefits of mobility, enouraged to call for assistance    Acceptance, E, NR by CF at 1/29/2021 1152    Nonacceptance, E, NL by KT at 1/29/2021 1051    Acceptance, E, NR by CF at 1/28/2021 1221    Acceptance, E, NR by CF at 1/27/2021 1550    Acceptance, E, VU,NR by CF at 1/26/2021 1123    Acceptance, E, VU by DJ at 1/25/2021 1007    Acceptance, E,TB, VU,NR by  CA at 1/24/2021 1151                   Point: Home exercise program (In Progress)     Learning Progress Summary           Patient Acceptance, E, NR by CF at 2/4/2021 1213    Acceptance, E, NR by CF at 2/3/2021 1123    Nonacceptance, E, NL by KT at 2/1/2021 1651    Acceptance, E,TB,D, VU,DU,NR by GJ at 1/30/2021 1434    Comment: discussed safety with mobility, benefits of mobility, enouraged to call for assistance    Acceptance, E, NR by CF at 1/29/2021 1152    Nonacceptance, E, NL by KT at 1/29/2021 1051    Acceptance, E, NR by CF at 1/28/2021 1221    Acceptance, E, NR by CF at 1/27/2021 1550    Acceptance, E, VU,NR by CF at 1/26/2021 1123    Acceptance, E, VU by DJ at 1/25/2021 1007    Acceptance, E,TB, VU,NR by CA at 1/24/2021 1151                   Point: Body mechanics (In Progress)     Learning Progress Summary           Patient Acceptance, E, NR by CF at 2/4/2021 1213    Acceptance, E, NR by CF at 2/3/2021 1123    Nonacceptance, E, NL by KT at 2/1/2021 1651    Acceptance, E,TB,D, VU,DU,NR by GJ at 1/30/2021 1434    Comment: discussed safety with mobility, benefits of mobility, enouraged to call for assistance    Acceptance, E, NR by CF at 1/29/2021 1152    Nonacceptance, E, NL by KT at 1/29/2021 1051    Acceptance, E, NR by CF at 1/28/2021 1221    Acceptance, E, NR by CF at 1/27/2021 1550    Acceptance, E, VU,NR by CF at 1/26/2021 1123    Acceptance, E, VU by DJ at 1/25/2021 1007    Acceptance, E,TB, VU,NR by CA at 1/24/2021 1151                   Point: Precautions (In Progress)     Learning Progress Summary           Patient Acceptance, E, NR by CF at 2/4/2021 1213    Acceptance, E, NR by CF at 2/3/2021 1123    Nonacceptance, E, NL by KT at 2/1/2021 1651    Acceptance, E,TB,D, VU,DU,NR by GJ at 1/30/2021 1434    Comment: discussed safety with mobility, benefits of mobility, enouraged to call for assistance    Acceptance, E, NR by CF at 1/29/2021 1152    Nonacceptance, E, NL by KT at 1/29/2021 1051     Acceptance, E, NR by CF at 1/28/2021 1221    Acceptance, E, NR by CF at 1/27/2021 1550    Acceptance, E, VU,NR by CF at 1/26/2021 1123    Acceptance, E, VU by DJ at 1/25/2021 1007    Acceptance, E,TB, VU,NR by CA at 1/24/2021 1151                               User Key     Initials Effective Dates Name Provider Type Discipline    GJ 03/07/18 -  Miguel Floyd, PT Physical Therapist PT    KT 06/16/16 -  Gracie Dasilva, RN Registered Nurse Nurse    CA 10/09/20 -  Laura Ferguson, PT Physical Therapist PT    DJ 10/25/19 -  Julia Anaya, PT Physical Therapist PT    CF 09/02/20 -  Laura Esqueda, PT Physical Therapist PT              PT Recommendation and Plan     Plan of Care Reviewed With: patient, daughter  Outcome Summary: Pt agreeable to PT treatment. She appears more alert and was able to ambulate 20' this date with min A x2 using RW. Significant cues provided for standing and seated postural improvements.     Time Calculation:   PT Charges     Row Name 02/04/21 1207             Time Calculation    Start Time  1024  -CF      Stop Time  1039  -CF      Time Calculation (min)  15 min  -CF      PT Received On  02/04/21  -CF      PT - Next Appointment  02/05/21  -CF        User Key  (r) = Recorded By, (t) = Taken By, (c) = Cosigned By    Initials Name Provider Type    CF Laura Esqueda, PT Physical Therapist        Therapy Charges for Today     Code Description Service Date Service Provider Modifiers Qty    29036963828 HC PT THERAPEUTIC ACT EA 15 MIN 2/3/2021 Laura Esqueda, PT GP 1    62943885120 HC PT THER SUPP EA 15 MIN 2/3/2021 Laura Esqueda, PT GP 1    99600479109 HC PT THERAPEUTIC ACT EA 15 MIN 2/4/2021 Laura Esqueda, PT GP 1    33113654365 HC PT THER SUPP EA 15 MIN 2/4/2021 Laura Esqueda, PT GP 1          PT G-Codes  Outcome Measure Options: AM-PAC 6 Clicks Basic Mobility (PT)  AM-PAC 6 Clicks Score (PT): 12    Laura Esqueda PT  2/4/2021

## 2021-02-04 NOTE — PLAN OF CARE
Goal Outcome Evaluation:     Progress: improving  Outcome Summary: Pt VSS, no heart rate issues today. Was able to receive lopressor and amiodarone this morning. Much more alert and oriented today. Daughter at bedside for a couple hours. Pallative consult tomorrow at 10. She has had multiple stools today, she was cdiff positive so proper precautions were taken. Abx are discontinued. MRI of brain should happen today per the tech I spoke with earlier. Will continue to monitor.

## 2021-02-04 NOTE — PLAN OF CARE
Goal Outcome Evaluation:  Plan of Care Reviewed With: patient, daughter     Outcome Summary: Pt agreeable to PT treatment. She appears more alert and was able to ambulate 20' this date with min A x2 using RW. Significant cues provided for standing and seated postural improvements.      Patient was intermittently wearing a face mask during this therapy encounter. Therapist used appropriate personal protective equipment including eye protection, mask, and gloves.  Mask used was standard procedure mask. Appropriate PPE was worn during the entire therapy session. Hand hygiene was completed before and after therapy session. Patient is not in enhanced droplet precautions. Kelvin Mason present.

## 2021-02-04 NOTE — PROGRESS NOTES
Pharmacy Consult    Change proton pump inhibitors (PPIs) to famotidine unless documented or history of GI bleed or to discontinue antiperistalic agents and stool softeners/laxatives.      Current Facility-Administered Medications:     acetaminophen (TYLENOL) tablet 1,000 mg, 1,000 mg, Oral, TID, Kym Rivera APRN, 1,000 mg at 02/03/21 2001    amiodarone (PACERONE) tablet 400 mg, 400 mg, Oral, Q12H, Warren Tanner MD, 400 mg at 02/03/21 1521    atorvastatin (LIPITOR) tablet 20 mg, 20 mg, Oral, Daily, Jareth Kim APRN, 20 mg at 02/03/21 1007    cyclobenzaprine (FLEXERIL) tablet 10 mg, 10 mg, Oral, TID PRN, Tri Garcia APRN, 10 mg at 01/22/21 0042    ferrous sulfate tablet 325 mg, 325 mg, Oral, Daily With Breakfast, Jareth Kim APRN, 325 mg at 02/03/21 1007    Hold medication, 1 each, Does not apply, Continuous PRN, Orlin Mena MD    hydrALAZINE (APRESOLINE) injection 10 mg, 10 mg, Intravenous, Q6H PRN, Jareth Kim APRN, 10 mg at 01/21/21 1650    lidocaine (LIDODERM) 5 % 1 patch, 1 patch, Transdermal, Q24H, Tri Garcia APRN, 1 patch at 02/03/21 1008    LORazepam (ATIVAN) tablet 1 mg, 1 mg, Oral, Once, Parker Corbett III, MD    melatonin tablet 3 mg, 3 mg, Oral, Nightly, Ric Marrero MD, 3 mg at 02/03/21 2001    metoprolol tartrate (LOPRESSOR) tablet 100 mg, 100 mg, Oral, BID, Warren Tanner MD, 100 mg at 02/03/21 2001    morphine injection 1 mg, 1 mg, Intravenous, Q2H PRN, Solomon Ortiz MD, 1 mg at 01/22/21 0234    multivitamin with minerals 1 tablet, 1 tablet, Oral, Daily, Jareth Kim APRN, 1 tablet at 02/03/21 1007    OLANZapine (zyPREXA) injection 2.5 mg, 2.5 mg, Intramuscular, Q8H PRN, Solomon Ortiz MD, 2.5 mg at 01/28/21 1731    ondansetron (ZOFRAN) tablet 4 mg, 4 mg, Oral, Q6H PRN **OR** ondansetron (ZOFRAN) injection 4 mg, 4 mg, Intravenous, Q6H PRN, Solomon Ortiz MD, 4 mg at 01/21/21 1156    Pharmacy Consult, , Does not apply,  Continuous PRN, Claribel Brown APRN    piperacillin-tazobactam (ZOSYN) 3.375 g in iso-osmotic dextrose 50 ml (premix), 3.375 g, Intravenous, Q8H, Marie Conrad MD, 3.375 g at 02/04/21 0548    potassium chloride (K-DUR,KLOR-CON) ER tablet 40 mEq, 40 mEq, Oral, PRN **OR** potassium chloride (KLOR-CON) packet 40 mEq, 40 mEq, Oral, PRN, 40 mEq at 02/03/21 1506 **OR** potassium chloride 10 mEq in 100 mL IVPB, 10 mEq, Intravenous, Q1H PRN, Marie Conrad MD    risperiDONE (risperDAL) tablet 0.5 mg, 0.5 mg, Oral, Nightly, BensenhaverParker III, MD, 0.5 mg at 02/03/21 2001    sodium chloride 0.9 % flush 10 mL, 10 mL, Intravenous, Q12H, Solomon Ortiz MD, 10 mL at 02/03/21 2002    vancomycin oral solution reconstituted 125 mg, 125 mg, Oral, Q6H, Claribel Brown APRN, 125 mg at 02/04/21 0210    Cordelia Gandhi PharmD, BCPS  2/4/2021 08:01 EST

## 2021-02-04 NOTE — PROGRESS NOTES
[facility]      Mar/chart reviewed  Follow up pleural effusion, rib fractures,   She denies any chest pain or shortness of breath   S/p 1+liter thoracentesis 2/2    Vital Sign Min/Max for last 24 hours  Temp  Min: 97.4 °F (36.3 °C)  Max: 98.5 °F (36.9 °C)   BP  Min: 119/58  Max: 160/57   Pulse  Min: 53  Max: 113   Resp  Min: 16  Max: 18   SpO2  Min: 98 %  Max: 100 %   Flow (L/min)  Min: 2  Max: 2   No data recorded     Appears ill, alert, oriented times 2  perrl, eomi, no icterus,  mmm, no jvd, trachea midline, neck supple,  chest decreased ae bilaterally, no crackles,   no wheezes,   Tachy, irrg  soft, nt, nd +bs,  no c/c/ e  Skin warm, dry no rashes    Labs: 2/4: reviewed:  Glucose 88  Bun 21  Cr 1.01  Na 137  Bicarb 17  ast 41  Alk phos 143  Wbc 18  hgb 9  plts 312    c diff +  Micro: reviewed no growth      A/P:  1. Left sided effusion --s/p thoracentesis 2/2, exudate, high RBC, suspect simple drainage will suffice,   2. Left sided infiltrate - suspect mainly related to pleural effusion/trauma   3. Anemia  4. Metabolic acidosis - no gap. --2/2 diarrhea?  5. afib with rvr --- cards following  6. c diff -- on po vanco as per Dr. Conrad    I think her elevated WBC and procal is explained by the C. Diff, I think it is ok to stop the zosyn and treat the C. Diff.  I suspect her pleural effusion is traumatic rather than parapneumonic.  I don't see signficant underlying pneumonia.

## 2021-02-04 NOTE — PLAN OF CARE
Problem: Adult Inpatient Plan of Care  Goal: Plan of Care Review  Outcome: Ongoing, Progressing  Flowsheets (Taken 2/4/2021 0401)  Progress: improving  Plan of Care Reviewed With: patient  Outcome Summary: VSS. HR currently in sinus dominic. Pt c/o pain on bottom. Bottom red but blanchable, pt turned q2hr and barrier cream applied. Pt having loose mucus like stool, tested for cdiff which came back positive. Pt placed in contact spore precautions and oral vancomycin ordered. Incontinence care prn. Bladder scan x2 during shift both < 500mL, see charting. Unable to tell if pt has voided due to frequent loose bms although pt stated she thought she did pee. Will continue to monitor.

## 2021-02-04 NOTE — PROGRESS NOTES
"[facility] Clinical Pharmacy Services: C. Difficile Medication Changes    Pharmacy has been consulted to look over Gita Wharton's profile to check patient's medications for changes due to C. Difficile diagnosis per Claribel MARIE's request.    Relevant clinical data and objective history reviewed:  85 y.o. female 157.5 cm (62.01\") 54.5 kg (120 lb 2.4 oz)    Past Medical History:   Diagnosis Date    Acute right MCA stroke (CMS/[facility]) 07/24/2018 2001, 2018    Anesthesia complication     TOXIC ENCEPHALOPATHY    Aortic stenosis     Aortic valve stenosis     Fracture, hip (CMS/[facility])     LEFT, CHIP ON TOP OF HIP D/T FALL 2 WEEKS POST OP    Hemorrhoids 8/5/2018    History of transfusion 2001    13 UNITS, HERE AT [facility]    Hyperlipidemia     Hypertension     Hypokalemia     Lung granuloma (CMS/[facility]) 08/2018    R LL            Dr FRANKIE Branch - suggested yearly CXR to Monitor    Pressure sore     BUTTOCKS    Pyelonephritis 4/16/2019    Stenosis of right internal carotid artery 7/25/2018    Subdural hematoma (CMS/[facility]) 07/12/2018    Secondary to fall, JULY 2018    Toxic encephalopathy 2019    POST OP LAST HIP SURGERY     Creatinine   Date Value Ref Range Status   02/03/2021 0.99 0.57 - 1.00 mg/dL Final   02/02/2021 1.16 (H) 0.57 - 1.00 mg/dL Final   01/31/2021 0.76 0.57 - 1.00 mg/dL Final   10/31/2019 1.10 0.60 - 1.30 mg/dL Final     Comment:     Serial Number: [id number]:  [id number]     BUN   Date Value Ref Range Status   02/03/2021 23 8 - 23 mg/dL Final     Estimated Creatinine Clearance: 36.4 mL/min (by C-G formula based on SCr of 0.99 mg/dL).    Assessment/Plan    1. Patient not currently taking a proton pump inhibitor    2. Patient not currently taking an antiperistalic agents or stool softeners/laxatives    Thank you for allowing me to participate in your patient's care.  Please call pharmacy with any questions or concerns.  Abraham Balbuena, PharmD, BCIDP, BCPS    "

## 2021-02-04 NOTE — PROGRESS NOTES
Name: Gita Wharton ADMIT: 2021   : 1935  PCP: Deanna Ortega APRN    MRN: [id number] LOS: 13 days   AGE/SEX: 85 y.o. female  ROOM: [facility]     Subjective   Subjective   Patient states that she feels way much better.  She reports no confusion hallucinations or delusions.  No headache and no focal neurological symptoms.  She has denied any chest pain.  No shortness of breath.  No cough.  No hemoptysis.  No fever or chills.  No PND or orthopnea.  No ankle edema.    Review of Systems  GI.  Positive for diarrhea.  No abdominal pain or nausea or vomiting.  .  No dysuria or hematuria.     Objective   Objective   Vital Signs  Temp:  [96.8 °F (36 °C)-98.1 °F (36.7 °C)] 97.8 °F (36.6 °C)  Heart Rate:  [53-78] 74  Resp:  [16-18] 16  BP: (135-160)/(51-84) 157/55  SpO2:  [98 %-100 %] 100 %  on  Flow (L/min):  [0-2] 2;   Device (Oxygen Therapy): nasal cannula    Intake/Output Summary (Last 24 hours) at 2021 1725  Last data filed at 2021 1300  Gross per 24 hour   Intake 600 ml   Output --   Net 600 ml     Body mass index is 21.97 kg/m².      21  0711 21  1504   Weight: 54 kg (119 lb) 54.5 kg (120 lb 2.4 oz)     Physical Exam  General.  Elderly female.  Alert and oriented x3.  In no apparent distress.  She looks way much better than she did the last couple of days.  Eyes.  Pupils equal round and reactive intact extraocular musculature positive pallor.  No jaundice normal conjunctivae and lids  ENT.  Moist mucous membrane  Neck.  Supple no JVD no lymphadenopathy no thyromegaly  Cardiovascular.  Regular rate and rhythm grade 2 systolic murmur  Chest.  Poor but clear to auscultation bilaterally with left-sided rhonchi.  Abdomen.  Soft lax no tenderness no organomegaly no guarding or rebound  Extremities.  No clubbing cyanosis or edema  CNS.  No acute focal neurological deficits     Results Review:      Results from last 7 days   Lab Units 21  0423 21  0400 21  0559  01/31/21  0529 01/30/21  0500 01/29/21  0526   SODIUM mmol/L 137 137 137 135* 136 129*   POTASSIUM mmol/L 4.4 3.0* 3.9 4.2 4.9 4.7   CHLORIDE mmol/L 109* 106 108* 106 103 95*   CO2 mmol/L 17.7* 18.9* 19.3* 18.7* 21.3* 20.1*   BUN mg/dL 21 23 23 22 30* 28*   CREATININE mg/dL 1.01* 0.99 1.16* 0.76 1.25* 1.16*   GLUCOSE mg/dL 88 82 80 83 77 93   CALCIUM mg/dL 7.9* 8.2* 8.3* 7.9* 8.3* 9.0   AST (SGOT) U/L 41*  --  25  --   --   --    ALT (SGPT) U/L 25  --  14  --   --   --      Estimated Creatinine Clearance: 35.7 mL/min (A) (by C-G formula based on SCr of 1.01 mg/dL (H)).          Results from last 7 days   Lab Units 02/03/21  0906 02/03/21  0400   TROPONIN T ng/mL 0.029 0.029         Results from last 7 days   Lab Units 02/03/21  0400 02/02/21  0023   TSH uIU/mL 2.380 3.180     Results from last 7 days   Lab Units 02/03/21  0400   MAGNESIUM mg/dL 2.1           Invalid input(s): LDLCALC  Results from last 7 days   Lab Units 02/04/21  0423 02/03/21  0400 02/02/21  0559 01/31/21  0529 01/30/21  0500 01/29/21  0526   WBC 10*3/mm3 18.48* 22.65* 21.99* 12.66* 12.50* 17.10*   HEMOGLOBIN g/dL 9.0* 9.2* 8.7* 9.3* 10.2* 10.4*   HEMATOCRIT % 28.3* 28.5* 27.2* 29.3* 33.3* 33.4*   PLATELETS 10*3/mm3 312 346 335 336 321 390   MCV fL 83.7 82.1 83.4 84.4 85.2 84.3   MCH pg 26.6 26.5* 26.7 26.8 26.1* 26.3*   MCHC g/dL 31.8 32.3 32.0 31.7 30.6* 31.1*   RDW % 13.7 13.6 13.4 13.1 13.7 13.2   RDW-SD fl 41.4 40.1 40.1 39.9 42.5 41.0   MPV fL 11.0 11.3 11.1 11.5 11.7 11.9   NEUTROPHIL % % 87.2* 90.6* 87.3* 84.0* 79.7* 85.4*   LYMPHOCYTE % % 6.0* 3.4* 5.0* 6.3* 9.9* 5.1*   MONOCYTES % % 4.1* 4.6* 6.0 8.1 8.6 8.1   EOSINOPHIL % % 0.7 0.3 0.2* 0.6 1.0 0.5   BASOPHIL % % 0.1 0.1 0.2 0.2 0.2 0.2   IMM GRAN % % 1.9* 1.0* 1.3* 0.8* 0.6* 0.7*   NEUTROS ABS 10*3/mm3 16.12* 20.53* 19.17* 10.63* 9.95* 14.60*   LYMPHS ABS 10*3/mm3 1.10 0.77 1.11 0.80 1.24 0.88   MONOS ABS 10*3/mm3 0.75 1.04* 1.32* 1.03* 1.07* 1.39*   EOS ABS 10*3/mm3 0.13 0.07 0.05  0.07 0.13 0.08   BASOS ABS 10*3/mm3 0.02 0.02 0.05 0.03 0.03 0.03   IMMATURE GRANS (ABS) 10*3/mm3 0.36* 0.22* 0.29* 0.10* 0.08* 0.12*   NRBC /100 WBC 0.1 0.0 0.0 0.0 0.0 0.0         Results from last 7 days   Lab Units 02/01/21  2348   PH, ARTERIAL pH units 7.429   PO2 ART mm Hg 74.9*   PCO2, ARTERIAL mm Hg 27.5*   HCO3 ART mmol/L 18.2*     Results from last 7 days   Lab Units 02/03/21  1452 02/02/21  0559   PROCALCITONIN ng/mL 37.84* 63.69*   LACTATE mmol/L 1.8  --          Results from last 7 days   Lab Units 02/02/21  0023   AMMONIA umol/L 19     Results from last 7 days   Lab Units 02/02/21  1345 02/02/21  0023 02/02/21  0022   BLOODCX   --  No growth at 2 days No growth at 2 days   BODYFLDCX  No growth at 2 days  --   --                    Imaging:  Imaging Results (Last 24 Hours)     ** No results found for the last 24 hours. **             I reviewed the patient's new clinical results / labs / tests / procedures      Assessment/Plan     Active Hospital Problems    Diagnosis  POA   • **Closed fracture of multiple ribs of left side [S22.42XA]  Yes   • C. difficile colitis [A04.72]  No   • Atrial fibrillation (CMS/HCC) [I48.91]  No   • AMS (altered mental status) [R41.82]  Yes   • Acute respiratory failure with hypoxia (CMS/HCC) [J96.01]  Yes   • Fall from standing [W19.XXXA]  Yes   • Edema leg, right [R60.0]  Yes   • Closed compression fracture of body of L1 vertebra (CMS/HCC) [S32.010A]  Yes   • Pleural effusion on left [J90]  Yes   • Leukocytosis [D72.829]  Yes   • S/P TAVR (transcatheter aortic valve replacement) [Z95.2]  Not Applicable   • PVD (peripheral vascular disease) with claudication (CMS/HCC) [I73.9]  Yes   • Chronic diastolic congestive heart failure (CMS/HCC) [I50.32]  Yes   • Anemia [D64.9]  Yes   • History of right MCA stroke [Z86.73]  Not Applicable   • Fall at home, initial encounter [W19.XXXA, Y92.009]  Not Applicable   • Essential hypertension [I10]  Yes      Resolved Hospital Problems   No  resolved problems to display.       · Multiple left rib fractures leading to exudative left pleural effusion/left-sided pneumonia and acute hypoxemic respiratory failure.  There is pneumonia by chest x-ray associated with leukocytosis significant increase in the procalcitonin.  Status post left sided thoracocentesis.  Pleural fluid is exudative.  Culture of the pleural fluid is negative till now.  Repeat chest x-ray with improvement..  She is not a candidate for surgical intervention.  Respiratory status appears to be stable.  We will try to wean off oxygen.   Negative blood cultures till now.   Discussed with Dr. Benavides pulmonologist who believes that the lung infiltrate is mostly secondary to the trauma and pneumonia unlikely.  Stop Zosyn.   · C. difficile colitis.  Mostly secondary to antibiotic given in late January for her UTI.  Will initiate p.o. vancomycin.  Will monitor  ·  Mental status changes in a patient with a history of possible baseline dementia and a history of CVA.  Initial CT scan is negative.  This has greatly improved with stopping Zyprexa.  No focal CNS deficit. No hypercapnia.  Awaiting MRI to rule out another CVA which I doubt..  .  Normal TSH/B12/folic acid/RPR/cortisol.   Not receiving narcotics.  This is mostly secondary to metabolic and toxic encephalopathy.   This has completely resolved right now.  ·  History of TAVR/diastolic congestive heart failure/hypertension/new onset rapid A. fib... Resolved volume depletion with holding diuretics.  There is no evidence of angina or congestive heart failure clinically.  Troponins are negative.  Magnesium is normal.  Hypokalemia resolved with substitution. S/p spontaneous cardioversion on amiodarone started by cardiology.  Good blood pressure control on metoprolol.  Norvasc DC'd secondary to low blood pressure.    Poor anticoagulation candidate can lead to frequent falls and recent rib fractures.  · UTI.  Status post antibiotic treatment.    · L1  fracture.  Appears asymptomatic without radiculopathy or myelopathy.  · Acute kidney failure/hypokalemia..  Mostly secondary to prerenal azotemia because of dehydration.  Patient is euvolemic.  Resolved acute renal failure.    Potassium back to normal after substitution.  · Anemia.  Mostly secondary to rib fractures.    Mild decrease in hemoglobin.  Monitor.  Transfuse if hemoglobin less than 7.   · Leukocytosis.  Secondary to C. difficile colitis..  Lelo HUNT'chava.  Currently on p.o. vancomycin.  DNR status.  Discussed with the daughter.  Patient has previous wishes of a DO NOT RESUSCITATE.  Will implement.      Discussed with the patient/daughter.  Will start preparing patient for skilled facility and patient rehab      Marie Conrad MD  [facility]  02/04/21  17:25 EST

## 2021-02-04 NOTE — PROGRESS NOTES
"Gita Wharton  1935 85 y.o.  [id number]      Patient Care Team:  Deanna Ortega APRN as PCP - General (Internal Medicine)  Warren Tanner MD as Consulting Physician (Cardiology)  Octaviano Yan MD as Surgeon (Cardiothoracic Surgery)    CC: Status post TAVR paroxysmal A. fib preserved LV function, little bit of dementia.  Frequent falls    Interval History: She is doing well I am not really sure how much she is tracking      Objective   Vital Signs  Temp:  [96.8 °F (36 °C)-98.1 °F (36.7 °C)] 97.8 °F (36.6 °C)  Heart Rate:  [53-78] 74  Resp:  [16-18] 16  BP: (135-160)/(51-84) 157/55    Intake/Output Summary (Last 24 hours) at 2/4/2021 1745  Last data filed at 2/4/2021 1300  Gross per 24 hour   Intake 600 ml   Output --   Net 600 ml     Flowsheet Rows      First Filed Value   Admission Height  157.5 cm (62\") Documented at 01/21/2021 0711   Admission Weight  54 kg (119 lb) Documented at 01/21/2021 0711          Physical Exam:   General Appearance:    Alert,oriented, in no acute distress   Lungs:     Clear to auscultation,BS are equal    Heart:    Normal S1 and S2, RRR with 2 out of 6 systolic ejection murmur, gallop or rub   HEENT:    Sclerae are clear, no JVD or adenopathy   Abdomen:     Normal bowel sounds, soft nontender, nondistended, no HSM   Extremities:   Moves all extremities well, no edema, no cyanosis, no             Redness, no rash     Medication Review:      acetaminophen, 1,000 mg, Oral, TID  amiodarone, 400 mg, Oral, Q12H  atorvastatin, 20 mg, Oral, Daily  ferrous sulfate, 325 mg, Oral, Daily With Breakfast  lidocaine, 1 patch, Transdermal, Q24H  LORazepam, 1 mg, Oral, Once  melatonin, 3 mg, Oral, Nightly  metoprolol tartrate, 100 mg, Oral, BID  multivitamin with minerals, 1 tablet, Oral, Daily  risperiDONE, 0.5 mg, Oral, Nightly  sodium chloride, 10 mL, Intravenous, Q12H  vancomycin, 125 mg, Oral, Q6H      hold, 1 each  Pharmacy Consult,           I reviewed the patient's new clinical " results.  I personally viewed and interpreted the patient's EKG/Telemetry data    Assessment/Plan  Active Hospital Problems    Diagnosis  POA   • **Closed fracture of multiple ribs of left side [S22.42XA]  Yes   • PVD (peripheral vascular disease) with claudication (CMS/HCC) [I73.9]  Yes     Priority: High   • Essential hypertension [I10]  Yes     Priority: Medium   • C. difficile colitis [A04.72]  No   • Atrial fibrillation (CMS/HCC) [I48.91]  No   • AMS (altered mental status) [R41.82]  Yes   • Acute respiratory failure with hypoxia (CMS/HCC) [J96.01]  Yes   • Fall from standing [W19.XXXA]  Yes   • Edema leg, right [R60.0]  Yes   • Closed compression fracture of body of L1 vertebra (CMS/HCC) [S32.010A]  Yes   • Pleural effusion on left [J90]  Yes   • Leukocytosis [D72.829]  Yes   • S/P TAVR (transcatheter aortic valve replacement) [Z95.2]  Not Applicable   • Chronic diastolic congestive heart failure (CMS/HCC) [I50.32]  Yes   • Anemia [D64.9]  Yes   • History of right MCA stroke [Z86.73]  Not Applicable   • Fall at home, initial encounter [W19.XXXA, Y92.009]  Not Applicable      Resolved Hospital Problems   No resolved problems to display.       Doing well in the respect that she is back in normal rhythm I think we will keep her on amnio for at least a month I do not think we can anticoagulate her I like the increased dose of metoprolol for her I think she is tolerating it well    Warren Tanner MD  02/04/21  17:45 EST

## 2021-02-05 LAB
ANION GAP SERPL CALCULATED.3IONS-SCNC: 11.7 MMOL/L (ref 5–15)
BASOPHILS # BLD AUTO: 0.04 10*3/MM3 (ref 0–0.2)
BASOPHILS NFR BLD AUTO: 0.2 % (ref 0–1.5)
BUN SERPL-MCNC: 16 MG/DL (ref 8–23)
BUN/CREAT SERPL: 16.3 (ref 7–25)
CALCIUM SPEC-SCNC: 8 MG/DL (ref 8.6–10.5)
CHLORIDE SERPL-SCNC: 110 MMOL/L (ref 98–107)
CO2 SERPL-SCNC: 18.3 MMOL/L (ref 22–29)
CREAT SERPL-MCNC: 0.98 MG/DL (ref 0.57–1)
D-LACTATE SERPL-SCNC: 1.1 MMOL/L (ref 0.5–2)
DEPRECATED RDW RBC AUTO: 39.5 FL (ref 37–54)
EOSINOPHIL # BLD AUTO: 0.09 10*3/MM3 (ref 0–0.4)
EOSINOPHIL NFR BLD AUTO: 0.5 % (ref 0.3–6.2)
ERYTHROCYTE [DISTWIDTH] IN BLOOD BY AUTOMATED COUNT: 13.3 % (ref 12.3–15.4)
GFR SERPL CREATININE-BSD FRML MDRD: 54 ML/MIN/1.73
GLUCOSE SERPL-MCNC: 103 MG/DL (ref 65–99)
HCT VFR BLD AUTO: 32.5 % (ref 34–46.6)
HGB BLD-MCNC: 10.2 G/DL (ref 12–15.9)
IMM GRANULOCYTES # BLD AUTO: 0.34 10*3/MM3 (ref 0–0.05)
IMM GRANULOCYTES NFR BLD AUTO: 2.1 % (ref 0–0.5)
LYMPHOCYTES # BLD AUTO: 0.71 10*3/MM3 (ref 0.7–3.1)
LYMPHOCYTES NFR BLD AUTO: 4.3 % (ref 19.6–45.3)
MAGNESIUM SERPL-MCNC: 2.2 MG/DL (ref 1.6–2.4)
MCH RBC QN AUTO: 26 PG (ref 26.6–33)
MCHC RBC AUTO-ENTMCNC: 31.4 G/DL (ref 31.5–35.7)
MCV RBC AUTO: 82.7 FL (ref 79–97)
MONOCYTES # BLD AUTO: 0.9 10*3/MM3 (ref 0.1–0.9)
MONOCYTES NFR BLD AUTO: 5.4 % (ref 5–12)
NEUTROPHILS NFR BLD AUTO: 14.48 10*3/MM3 (ref 1.7–7)
NEUTROPHILS NFR BLD AUTO: 87.5 % (ref 42.7–76)
NRBC BLD AUTO-RTO: 0.1 /100 WBC (ref 0–0.2)
PLATELET # BLD AUTO: 364 10*3/MM3 (ref 140–450)
PMV BLD AUTO: 10.4 FL (ref 6–12)
POTASSIUM SERPL-SCNC: 3.4 MMOL/L (ref 3.5–5.2)
PROCALCITONIN SERPL-MCNC: 95.24 NG/ML (ref 0–0.25)
RBC # BLD AUTO: 3.93 10*6/MM3 (ref 3.77–5.28)
SODIUM SERPL-SCNC: 140 MMOL/L (ref 136–145)
WBC # BLD AUTO: 16.56 10*3/MM3 (ref 3.4–10.8)

## 2021-02-05 PROCEDURE — 83735 ASSAY OF MAGNESIUM: CPT | Performed by: INTERNAL MEDICINE

## 2021-02-05 PROCEDURE — 97110 THERAPEUTIC EXERCISES: CPT

## 2021-02-05 PROCEDURE — 84145 PROCALCITONIN (PCT): CPT | Performed by: INTERNAL MEDICINE

## 2021-02-05 PROCEDURE — 80048 BASIC METABOLIC PNL TOTAL CA: CPT | Performed by: INTERNAL MEDICINE

## 2021-02-05 PROCEDURE — 99232 SBSQ HOSP IP/OBS MODERATE 35: CPT | Performed by: INTERNAL MEDICINE

## 2021-02-05 PROCEDURE — 84132 ASSAY OF SERUM POTASSIUM: CPT | Performed by: INTERNAL MEDICINE

## 2021-02-05 PROCEDURE — 83605 ASSAY OF LACTIC ACID: CPT | Performed by: INTERNAL MEDICINE

## 2021-02-05 PROCEDURE — 85025 COMPLETE CBC W/AUTO DIFF WBC: CPT | Performed by: INTERNAL MEDICINE

## 2021-02-05 RX ORDER — LOSARTAN POTASSIUM 25 MG/1
25 TABLET ORAL DAILY
Status: DISCONTINUED | OUTPATIENT
Start: 2021-02-05 | End: 2021-02-08 | Stop reason: HOSPADM

## 2021-02-05 RX ORDER — AMIODARONE HYDROCHLORIDE 200 MG/1
200 TABLET ORAL
Status: DISCONTINUED | OUTPATIENT
Start: 2021-02-06 | End: 2021-02-08 | Stop reason: HOSPADM

## 2021-02-05 RX ORDER — RISPERIDONE 0.5 MG/1
0.5 TABLET ORAL 2 TIMES DAILY
Status: DISCONTINUED | OUTPATIENT
Start: 2021-02-05 | End: 2021-02-07

## 2021-02-05 RX ADMIN — FERROUS SULFATE TAB 325 MG (65 MG ELEMENTAL FE) 325 MG: 325 (65 FE) TAB at 09:50

## 2021-02-05 RX ADMIN — ACETAMINOPHEN 1000 MG: 500 TABLET, FILM COATED ORAL at 18:08

## 2021-02-05 RX ADMIN — POTASSIUM CHLORIDE 40 MEQ: 1.5 POWDER, FOR SOLUTION ORAL at 14:29

## 2021-02-05 RX ADMIN — ATORVASTATIN CALCIUM 20 MG: 20 TABLET, FILM COATED ORAL at 09:50

## 2021-02-05 RX ADMIN — RISPERIDONE 0.5 MG: 0.5 TABLET, FILM COATED ORAL at 21:58

## 2021-02-05 RX ADMIN — VANCOMYCIN 125 MG: KIT at 14:29

## 2021-02-05 RX ADMIN — SODIUM CHLORIDE, PRESERVATIVE FREE 10 ML: 5 INJECTION INTRAVENOUS at 09:51

## 2021-02-05 RX ADMIN — LOSARTAN POTASSIUM 25 MG: 25 TABLET, FILM COATED ORAL at 12:04

## 2021-02-05 RX ADMIN — SODIUM CHLORIDE, PRESERVATIVE FREE 10 ML: 5 INJECTION INTRAVENOUS at 21:58

## 2021-02-05 RX ADMIN — LIDOCAINE 1 PATCH: 50 PATCH CUTANEOUS at 09:50

## 2021-02-05 RX ADMIN — VANCOMYCIN 125 MG: KIT at 09:51

## 2021-02-05 RX ADMIN — VANCOMYCIN 125 MG: KIT at 05:21

## 2021-02-05 RX ADMIN — AMIODARONE HYDROCHLORIDE 400 MG: 200 TABLET ORAL at 09:50

## 2021-02-05 RX ADMIN — Medication 3 MG: at 21:58

## 2021-02-05 RX ADMIN — VANCOMYCIN 125 MG: KIT at 21:58

## 2021-02-05 RX ADMIN — MULTIPLE VITAMINS W/ MINERALS TAB 1 TABLET: TAB at 09:50

## 2021-02-05 RX ADMIN — ACETAMINOPHEN 1000 MG: 500 TABLET, FILM COATED ORAL at 21:58

## 2021-02-05 RX ADMIN — POTASSIUM CHLORIDE 40 MEQ: 1.5 POWDER, FOR SOLUTION ORAL at 18:08

## 2021-02-05 RX ADMIN — METOPROLOL TARTRATE 100 MG: 50 TABLET, FILM COATED ORAL at 21:57

## 2021-02-05 RX ADMIN — ACETAMINOPHEN 1000 MG: 500 TABLET, FILM COATED ORAL at 09:50

## 2021-02-05 RX ADMIN — METOPROLOL TARTRATE 100 MG: 50 TABLET, FILM COATED ORAL at 14:29

## 2021-02-05 NOTE — PLAN OF CARE
Goal Outcome Evaluation:  Plan of Care Reviewed With: patient  Progress: no change      Problem: Adult Inpatient Plan of Care  Goal: Plan of Care Review  2/5/2021 0607 by Brneda Hansen RN  Outcome: Ongoing, Progressing  Flowsheets  Taken 2/5/2021 0607  Progress: no change  Plan of Care Reviewed With: patient  Taken 2/5/2021 0604  Outcome Summary: VSS. Pt denies pain. Remains pleasantly confused at night. Pt taking PO risperidone nightly. Refused MRI with contrast last night. Pt to go to SNF at d/c. Will continue to monitor.

## 2021-02-05 NOTE — THERAPY TREATMENT NOTE
Patient Name: Gita Wharton  : 1935    MRN: [id number]                              Today's Date: 2021       Admit Date: 2021    Visit Dx:     ICD-10-CM ICD-9-CM   1. Fall from standing, initial encounter  W19.XXXA E888.9   2. Contusion of flank, initial encounter  S30.1XXA 922.2   3. Closed fracture of multiple ribs of left side, initial encounter  S22.42XA 807.09   4. Compression fracture of superior endplate of L1 vertebra, initial encounter (CMS/[facility])  S32.010A 805.4     Patient Active Problem List   Diagnosis   • Essential hypertension   • Hyperlipidemia   • Fall at home, initial encounter   • Lung nodule   • History of right MCA stroke   • Stenosis of right internal carotid artery   • Anemia   • Abnormal chest x-ray   • Interstitial lung disease (CMS/[facility])   • Toxic encephalopathy due to anesthetics   • Weakness generalized   • Ascending aorta dilation (CMS/[facility])   • Nonrheumatic aortic valve stenosis   • Nonrheumatic mitral valve regurgitation   • Chronic diastolic congestive heart failure (CMS/[facility])   • PVD (peripheral vascular disease) with claudication (CMS/[facility])   • S/P TAVR (transcatheter aortic valve replacement)   • Premature atrial contractions   • Idiopathic hypotension   • Bilateral lower extremity edema   • Closed fracture of multiple ribs of left side   • Edema leg, right   • Closed compression fracture of body of L1 vertebra (CMS/[facility])   • Pleural effusion on left   • Leukocytosis   • AMS (altered mental status)   • Acute respiratory failure with hypoxia (CMS/[facility])   • Fall from standing   • Atrial fibrillation (CMS/[facility])   • C. 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    R LL            Dr FRANKIE Branch - suggested yearly CXR to Monitor   • Pressure sore     BUTTOCKS   • Pyelonephritis 4/16/2019   • Stenosis of right internal carotid artery 7/25/2018   • Subdural hematoma (CMS/HCC) 07/12/2018    Secondary to fall, JULY 2018   • Toxic encephalopathy 2019    POST OP LAST HIP SURGERY     Past Surgical History:   Procedure Laterality Date   • AORTIC VALVE REPAIR/REPLACEMENT N/A 12/3/2019    Procedure: TRACEY TRANSCAROTID TRANSCATHETER AORTIC VALVE REPLACEMENT;  Surgeon: Octaviano Yan MD;  Location: [facility] OR 18/19;  Service: Cardiothoracic   • AORTIC VALVE REPAIR/REPLACEMENT N/A 12/3/2019    Procedure: Transcarotid Transcatheter Aortic Valve Replacement w/Intra Op TRACEY and Possible Open Bailout Surgery;  Surgeon: Warren Tanner MD;  Location: [facility] OR 18/19;  Service: Cardiovascular   • CARDIAC CATHETERIZATION N/A 10/24/2019    Procedure: Coronary angiography;  Surgeon: Warren Tanner MD;  Location: [facility] CATH INVASIVE LOCATION;  Service: Cardiovascular   • CARDIAC CATHETERIZATION N/A 10/24/2019    Procedure: Left heart cath;  Surgeon: Warren Tanner MD;  Location: [facility] CATH INVASIVE LOCATION;  Service: Cardiovascular   • CARDIAC CATHETERIZATION N/A 10/24/2019    Procedure: Right heart cath;  Surgeon: Warren Tanner MD;  Location: [facility] CATH INVASIVE LOCATION;  Service: Cardiovascular   • CAROTID ENDARTERECTOMY Right 7/26/2018    Procedure: RT CAROTID ENDARTERECTOMY;  Surgeon: Inna Villarreal MD;  Location: [facility] MAIN OR;  Service: Vascular   • COLONOSCOPY N/A 8/7/2018    Adenomatous polyp.   Repeat 2021.  Surgeon: Ken Tidwell MD;    • HYSTERECTOMY     • THYROIDECTOMY, PARTIAL     • TOTAL HIP ARTHROPLASTY Right    • TOTAL HIP ARTHROPLASTY Left 6/30/2019    Procedure: LEFT HIP ANTERIOR HIP WITH HANA TABLE;  Surgeon:  Tiffani Pereira MD;  Location: [facility] OR;  Service: Orthopedics     General Information     Row Name 02/05/21 1717          Physical Therapy Time and Intention    Document Type  therapy note (daily note)  -     Mode of Treatment  individual therapy;physical therapy  -     Row Name 02/05/21 1717          General Information    Patient Profile Reviewed  yes  -     Existing Precautions/Restrictions  fall;oxygen therapy device and L/min  -     Row Name 02/05/21 1717          Cognition    Orientation Status (Cognition)  oriented to;person  -     Row Name 02/05/21 1717          Safety Issues, Functional Mobility    Safety Issues Affecting Function (Mobility)  insight into deficits/self-awareness;judgment;positioning of assistive device;problem-solving;safety precaution awareness  -     Impairments Affecting Function (Mobility)  balance;cognition;endurance/activity tolerance;strength  -     Cognitive Impairments, Mobility Safety/Performance  attention;awareness, need for assistance;impulsivity;insight into deficits/self-awareness;judgment;problem-solving/reasoning;safety precaution awareness  -       User Key  (r) = Recorded By, (t) = Taken By, (c) = Cosigned By    Initials Name Provider Type     Rosmery Hansen PTA Physical Therapy Assistant        Mobility     Row Name 02/05/21 1718          Bed Mobility    Supine-Sit [place] (Bed Mobility)  2 person assist;moderate assist (50% patient effort);verbal cues;nonverbal cues (demo/gesture)  -     Sit-Supine [place] (Bed Mobility)  2 person assist;minimum assist (75% patient effort);verbal cues;nonverbal cues (demo/gesture)  -     Assistive Device (Bed Mobility)  bed rails;head of bed elevated;draw sheet  -     Row Name 02/05/21 1718          Transfers    Comment (Transfers)  , fine to amb  -     Row Name 02/05/21 1718          Sit-Stand Transfer    Sit-Stand [place] (Transfers)  2 person assist;minimum assist (75%  patient effort)  -     Assistive Device (Sit-Stand Transfers)  walker, front-wheeled  -     Row Name 02/05/21 1718          Gait/Stairs (Locomotion)    [place] Level (Gait)  2 person assist;minimum assist (75% patient effort);moderate assist (50% patient effort);verbal cues;nonverbal cues (demo/gesture)  -     Assistive Device (Gait)  walker, front-wheeled  -     Distance in Feet (Gait)  15ft x2, req seated rest to complete, pt reports completely fatigued; easily off task  -     Deviations/Abnormal Patterns (Gait)  eliceo decreased;festinating/shuffling;stride length decreased  -     Bilateral Gait Deviations  forward flexed posture;knee buckling, bilateral  -       User Key  (r) = Recorded By, (t) = Taken By, (c) = Cosigned By    Initials Name Provider Type    Rosmery Espinoza PTA Physical Therapy Assistant        Obj/Interventions     Row Name 02/05/21 1722          Motor Skills    Therapeutic Exercise  -- LAQS, seated MIP x10 reps, cues to keep on task  -       User Key  (r) = Recorded By, (t) = Taken By, (c) = Cosigned By    Initials Name Provider Type    Rosmery Espinoza PTA Physical Therapy Assistant        Goals/Plan    No documentation.       Clinical Impression     Row Name 02/05/21 1722          Pain Scale: Numbers Pre/Post-Treatment    Pretreatment Pain Rating  0/10 - no pain  -     Posttreatment Pain Rating  0/10 - no pain  -     Row Name 02/05/21 1722          Plan of Care Review    Plan of Care Reviewed With  patient  -     Outcome Summary  Pt still having incr confusion, chair alarm pad placed at pt's back under sheet as well as turning on bed alarm -pt was in between rails upon our entry; educ offered on safety and using call light if she needs anything-she agreed but still too confused at times to remember ; pt did joshua amb 15ft x2 w/seated rest required due to weakness/fatigue; pt will need SNU at present status  -     Row Name 02/05/21 1722          Therapy  Assessment/Plan (PT)    Criteria for Skilled Interventions Met (PT)  yes  -KJ     Row Name 02/05/21 1722          Positioning and Restraints    Pre-Treatment Position  in bed  -JM     Post Treatment Position  bed  -JM     In Bed  side lying left;pillow between legs;call light within reach;encouraged to call for assist;exit alarm on;side rails up x3;notified nsg  -KJ       User Key  (r) = Recorded By, (t) = Taken By, (c) = Cosigned By    Initials Name Provider Type    Rosmery Espinoza PTA Physical Therapy Assistant        Outcome Measures     Row Name 02/05/21 1726          How much help from another person do you currently need...    Turning from your back to your side while in flat bed without using bedrails?  3  -JM     Moving from lying on back to sitting on the side of a flat bed without bedrails?  2  -JM     Moving to and from a bed to a chair (including a wheelchair)?  2  -JM     Standing up from a chair using your arms (e.g., wheelchair, bedside chair)?  3  -JM     Climbing 3-5 steps with a railing?  1  -JM     To walk in hospital room?  2  -JM     AM-PAC 6 Clicks Score (PT)  13  -KJ       User Key  (r) = Recorded By, (t) = Taken By, (c) = Cosigned By    Initials Name Provider Type    Rosmery Espinoza PTA Physical Therapy Assistant        Physical Therapy Education                 Title: PT OT SLP Therapies (In Progress)     Topic: Physical Therapy (In Progress)     Point: Mobility training (In Progress)     Learning Progress Summary           Patient Acceptance, E,D, NR by KJ at 2/5/2021 1727    Acceptance, E, NR by CF at 2/4/2021 1213    Acceptance, E, NR by CF at 2/3/2021 1123    Nonacceptance, E, NL by KT at 2/1/2021 1651    Acceptance, E,TB,D, VU,DU,NR by  at 1/30/2021 1434    Comment: discussed safety with mobility, benefits of mobility, enouraged to call for assistance    Acceptance, E, NR by CF at 1/29/2021 1152    Nonacceptance, E, NL by KT at 1/29/2021 1051    Acceptance, E, NR by CF at  1/28/2021 1221    Acceptance, E, NR by CF at 1/27/2021 1550    Acceptance, E, VU,NR by CF at 1/26/2021 1123    Acceptance, E, VU by DJ at 1/25/2021 1007    Acceptance, E,TB, VU,NR by CA at 1/24/2021 1151                   Point: Home exercise program (In Progress)     Learning Progress Summary           Patient Acceptance, E,D, NR by KJ at 2/5/2021 1727    Acceptance, E, NR by CF at 2/4/2021 1213    Acceptance, E, NR by CF at 2/3/2021 1123    Nonacceptance, E, NL by KT at 2/1/2021 1651    Acceptance, E,TB,D, VU,DU,NR by GJ at 1/30/2021 1434    Comment: discussed safety with mobility, benefits of mobility, enouraged to call for assistance    Acceptance, E, NR by CF at 1/29/2021 1152    Nonacceptance, E, NL by KT at 1/29/2021 1051    Acceptance, E, NR by CF at 1/28/2021 1221    Acceptance, E, NR by CF at 1/27/2021 1550    Acceptance, E, VU,NR by CF at 1/26/2021 1123    Acceptance, E, VU by ARLEEN at 1/25/2021 1007    Acceptance, E,TB, VU,NR by CA at 1/24/2021 1151                   Point: Body mechanics (In Progress)     Learning Progress Summary           Patient Acceptance, E,D, NR by KJ at 2/5/2021 1727    Acceptance, E, NR by CF at 2/4/2021 1213    Acceptance, E, NR by CF at 2/3/2021 1123    Nonacceptance, E, NL by KT at 2/1/2021 1651    Acceptance, E,TB,D, VU,DU,NR by GJ at 1/30/2021 1434    Comment: discussed safety with mobility, benefits of mobility, enouraged to call for assistance    Acceptance, E, NR by CF at 1/29/2021 1152    Nonacceptance, E, NL by KT at 1/29/2021 1051    Acceptance, E, NR by CF at 1/28/2021 1221    Acceptance, E, NR by CF at 1/27/2021 1550    Acceptance, E, VU,NR by CF at 1/26/2021 1123    Acceptance, E, VU by ARLEEN at 1/25/2021 1007    Acceptance, E,TB, VU,NR by CA at 1/24/2021 1151                   Point: Precautions (In Progress)     Learning Progress Summary           Patient Acceptance, E,D, NR by KJ at 2/5/2021 1727    Acceptance, E, NR by CF at 2/4/2021 1213    Acceptance, E, NR by CF  at 2/3/2021 1123    Nonacceptance, E, NL by KT at 2/1/2021 1651    Acceptance, E,TB,D, VU,DU,NR by  at 1/30/2021 1434    Comment: discussed safety with mobility, benefits of mobility, enouraged to call for assistance    Acceptance, E, NR by CF at 1/29/2021 1152    Nonacceptance, E, NL by KT at 1/29/2021 1051    Acceptance, E, NR by CF at 1/28/2021 1221    Acceptance, E, NR by CF at 1/27/2021 1550    Acceptance, E, VU,NR by CF at 1/26/2021 1123    Acceptance, E, VU by DJ at 1/25/2021 1007    Acceptance, E,TB, VU,NR by CA at 1/24/2021 1151                               User Key     Initials Effective Dates Name Provider Type Discipline     03/07/18 -  Miguel Floyd, PT Physical Therapist PT     03/07/18 -  Rosmery Hansen PTA Physical Therapy Assistant PT     06/16/16 -  Gracie Dasilva, RN Registered Nurse Nurse    [place] 10/09/20 -  Laura Ferguson, PT Physical Therapist PT    DJ 10/25/19 -  Julia Anaya, PT Physical Therapist PT     09/02/20 -  Laura Esqueda, PT Physical Therapist PT              PT Recommendation and Plan     Plan of Care Reviewed With: patient  Outcome Summary: Pt still having incr confusion, chair alarm pad placed at pt's back under sheet as well as turning on bed alarm -pt was in between rails upon our entry; educ offered on safety and using call light if she needs anything-she agreed but still too confused at times to remember ; pt did joshua amb 15ft x2 w/seated rest required due to weakness/fatigue; pt will need SNU at present status     Time Calculation:   PT Charges     Row Name 02/05/21 1714             Time Calculation    Start Time  0940  -      Stop Time  1009  -      Time Calculation (min)  29 min  -      PT Received On  02/05/21  -KJ      PT - Next Appointment  02/08/21  -        User Key  (r) = Recorded By, (t) = Taken By, (c) = Cosigned By    Initials Name Provider Type    Rosmery Espinoza PTA Physical Therapy Assistant        Therapy Charges for Today      Code Description Service Date Service Provider Modifiers Qty    39883397881 HC PT THER SUPP EA 15 MIN 2/5/2021 Rosmery Hansen PTA GP 2    70453991824 HC PT THER PROC EA 15 MIN 2/5/2021 Rosmery Hansen PTA GP 2          PT G-Codes  Outcome Measure Options: AM-PAC 6 Clicks Basic Mobility (PT)  AM-PAC 6 Clicks Score (PT): 13    Rosmery Hansen PTA  2/5/2021

## 2021-02-05 NOTE — PROGRESS NOTES
Name: Gita Wharton ADMIT: 2021   : 1935  PCP: Deanna Ortega APRN    MRN: [id number] LOS: 14 days   AGE/SEX: 85 y.o. female  ROOM: [facility]     Subjective   Subjective   Patient is definitely more confused today.  She is actually having some hallucinations.  Per patient and nurses no headache and no focal neurological symptoms.  She has denied any chest pain.  No shortness of breath.  No cough.  No hemoptysis.  No fever or chills.  No PND or orthopnea.  No ankle edema.  Per nurses that the confusion started after returning from the MRI or having received Ativan.      Review of Systems    GI.  Positive for diarrhea.  No abdominal pain or nausea or vomiting.  .  No dysuria or hematuria.     Objective   Objective   Vital Signs  Temp:  [97.1 °F (36.2 °C)-98.2 °F (36.8 °C)] 98 °F (36.7 °C)  Heart Rate:  [67-79] 78  Resp:  [16-20] 20  BP: (143-168)/(55-76) 162/62  SpO2:  [94 %-100 %] 94 %  on  Flow (L/min):  [2-3] 2;   Device (Oxygen Therapy): room air    Intake/Output Summary (Last 24 hours) at 2021 1303  Last data filed at 2021 1722  Gross per 24 hour   Intake 240 ml   Output --   Net 240 ml     Body mass index is 21.97 kg/m².      21  0711 21  1504   Weight: 54 kg (119 lb) 54.5 kg (120 lb 2.4 oz)     Physical Exam    General.  Elderly female.  Alert and oriented x1.  Positive active hallucination. In no apparent distress.  She looks way much better than she did the last couple of days.  Eyes.  Pupils equal round and reactive intact extraocular musculature positive pallor.  No jaundice normal conjunctivae and lids  ENT.  Moist mucous membrane  Neck.  Supple no JVD no lymphadenopathy no thyromegaly  Cardiovascular.  Regular rate and rhythm grade 2 systolic murmur  Chest.  Poor but clear to auscultation bilaterally with left-sided rhonchi.  Abdomen.  Soft lax no tenderness no organomegaly no guarding or rebound  Extremities.  No clubbing cyanosis or edema  CNS.  No acute  focal neurological deficits     Results Review:      Results from last 7 days   Lab Units 02/05/21  0916 02/04/21  0423 02/03/21  0400 02/02/21  0559 01/31/21  0529 01/30/21  0500   SODIUM mmol/L 140 137 137 137 135* 136   POTASSIUM mmol/L 3.4* 4.4 3.0* 3.9 4.2 4.9   CHLORIDE mmol/L 110* 109* 106 108* 106 103   CO2 mmol/L 18.3* 17.7* 18.9* 19.3* 18.7* 21.3*   BUN mg/dL 16 21 23 23 22 30*   CREATININE mg/dL 0.98 1.01* 0.99 1.16* 0.76 1.25*   GLUCOSE mg/dL 103* 88 82 80 83 77   CALCIUM mg/dL 8.0* 7.9* 8.2* 8.3* 7.9* 8.3*   AST (SGOT) U/L  --  41*  --  25  --   --    ALT (SGPT) U/L  --  25  --  14  --   --      Estimated Creatinine Clearance: 36.8 mL/min (by C-G formula based on SCr of 0.98 mg/dL).          Results from last 7 days   Lab Units 02/03/21  0906 02/03/21  0400   TROPONIN T ng/mL 0.029 0.029         Results from last 7 days   Lab Units 02/03/21  0400 02/02/21  0023   TSH uIU/mL 2.380 3.180     Results from last 7 days   Lab Units 02/03/21  0400   MAGNESIUM mg/dL 2.1           Invalid input(s): LDLCALC  Results from last 7 days   Lab Units 02/05/21  0916 02/04/21  0423 02/03/21  0400 02/02/21  0559 01/31/21  0529 01/30/21  0500   WBC 10*3/mm3 16.56* 18.48* 22.65* 21.99* 12.66* 12.50*   HEMOGLOBIN g/dL 10.2* 9.0* 9.2* 8.7* 9.3* 10.2*   HEMATOCRIT % 32.5* 28.3* 28.5* 27.2* 29.3* 33.3*   PLATELETS 10*3/mm3 364 312 346 335 336 321   MCV fL 82.7 83.7 82.1 83.4 84.4 85.2   MCH pg 26.0* 26.6 26.5* 26.7 26.8 26.1*   MCHC g/dL 31.4* 31.8 32.3 32.0 31.7 30.6*   RDW % 13.3 13.7 13.6 13.4 13.1 13.7   RDW-SD fl 39.5 41.4 40.1 40.1 39.9 42.5   MPV fL 10.4 11.0 11.3 11.1 11.5 11.7   NEUTROPHIL % % 87.5* 87.2* 90.6* 87.3* 84.0* 79.7*   LYMPHOCYTE % % 4.3* 6.0* 3.4* 5.0* 6.3* 9.9*   MONOCYTES % % 5.4 4.1* 4.6* 6.0 8.1 8.6   EOSINOPHIL % % 0.5 0.7 0.3 0.2* 0.6 1.0   BASOPHIL % % 0.2 0.1 0.1 0.2 0.2 0.2   IMM GRAN % % 2.1* 1.9* 1.0* 1.3* 0.8* 0.6*   NEUTROS ABS 10*3/mm3 14.48* 16.12* 20.53* 19.17* 10.63* 9.95*   LYMPHS ABS  10*3/mm3 0.71 1.10 0.77 1.11 0.80 1.24   MONOS ABS 10*3/mm3 0.90 0.75 1.04* 1.32* 1.03* 1.07*   EOS ABS 10*3/mm3 0.09 0.13 0.07 0.05 0.07 0.13   BASOS ABS 10*3/mm3 0.04 0.02 0.02 0.05 0.03 0.03   IMMATURE GRANS (ABS) 10*3/mm3 0.34* 0.36* 0.22* 0.29* 0.10* 0.08*   NRBC /100 WBC 0.1 0.1 0.0 0.0 0.0 0.0         Results from last 7 days   Lab Units 02/01/21  2348   PH, ARTERIAL pH units 7.429   PO2 ART mm Hg 74.9*   PCO2, ARTERIAL mm Hg 27.5*   HCO3 ART mmol/L 18.2*     Results from last 7 days   Lab Units 02/05/21  0916 02/03/21  1452 02/02/21  0559   PROCALCITONIN ng/mL 95.24* 37.84* 63.69*   LACTATE mmol/L 1.1 1.8  --          Results from last 7 days   Lab Units 02/02/21  0023   AMMONIA umol/L 19     Results from last 7 days   Lab Units 02/02/21  1345 02/02/21  0023 02/02/21  0022   BLOODCX   --  No growth at 3 days No growth at 3 days   BODYFLDCX  No growth at 3 days  --   --                    Imaging:  Imaging Results (Last 24 Hours)     Procedure Component Value Units Date/Time    MRI Brain Without Contrast [[id number]] Collected: 02/05/21 0647     Updated: 02/05/21 0848    Narrative:      MRI OF THE BRAIN WITHOUT CONTRAST 02/04/2021      CLINICAL HISTORY: Mental status change, history of right MCA stroke.     TECHNIQUE: Axial T1, FLAIR, fat-suppressed T2, axial diffusion and  gradient echo T2 and sagittal T1-weighted images were obtained of the  entire head.     COMPARISON: This is correlated to prior MRI of the head from [facility] on 12/08/2020, as well as an MRI of the brain on  07/24/2018.     FINDINGS: Back on MRI of the brain on 07/14/2018, the patient had  multiple small nodular acute infarcts in a row in the superior right  frontoparietal subcortical white matter, posterior right parietal cortex  in the watershed zone between the right anterior middle and right middle  and posterior cerebral artery territories. Currently there are multiple  small nodular 3-5 mm areas of encephalomalacia  compatible with old  infarctions in the right frontoparietal subcortical white matter,  posterior right parietal lobe. There is also a 5 mm old infarct in the  posterior superior left frontal subcortical white matter extending into  the posterior left corona radiata region. There are patchy areas of T2  high signal in the periventricular and subcortical white matter of the  cerebral hemispheres, consistent with mild-to-moderate small vessel  disease. There are also 2 separate subcentimeter old inferior lateral  left cerebellar infarcts in the left PICA territory, 10 x 3 mm oval  posterior medial right cerebellar infarct in right PICA territory. The  remainder of the brain parenchyma is normal in signal intensity.  Specifically, no diffusion weighted abnormality is seen with no acute  infarct identified. On the gradient echo T2 weighted images, no acute or  old blood breakdown products are seen intracranially. The ventricles are  normal in size. I see no mass effect and no midline shift and no  extra-axial fluid collections are identified. Paranasal sinuses are  clear. There is a small amount of fluid in the mastoid air cells  bilaterally. Good flow voids are demonstrated within the cerebral  vessels and in the dural venous sinuses.       Impression:      1. No acute intracranial abnormality seen with no acute infarct  identified.  2. There is mild-to-moderate small vessel disease in the cerebral white  matter and there are 3 separate old inferior and inferolateral left  cerebellar infarcts in the left PICA territory, and 10 x 3 mm old  posterior medial right cerebellar infarct in the right PICA territory.  Back on 07/24/2018, the patient had a row of multiple small nodular  infarcts in the superior right frontoparietal subcortical white matter  and these have evolved to multiple small 3-5 mm nodular areas of  encephalomalacia in the superior right frontoparietal subcortical white  matter in the watershed territory  between the right anterior middle and  right middle posterior cerebral artery territories. There are also old  infarcts extending from the posterior superior frontal subcortical white  matter into the mid posterior corona radiata region bilaterally. There  is a 5 mm old lacunar infarct in the right posterior erin.  3. Partial opacification mastoid air cells with fluid bilaterally.  4. The remainder of the MRI of the brain is normal, specifically no  acute infarct is seen. The etiology of the mental status change is not  established on this exam.     This report was finalized on 2/5/2021 8:45 AM by Dr. Kyle Prasad M.D.                I reviewed the patient's new clinical results / labs / tests / procedures      Assessment/Plan     Active Hospital Problems    Diagnosis  POA   • **Closed fracture of multiple ribs of left side [S22.42XA]  Yes   • C. difficile colitis [A04.72]  No   • Atrial fibrillation (CMS/HCC) [I48.91]  No   • AMS (altered mental status) [R41.82]  Yes   • Acute respiratory failure with hypoxia (CMS/HCC) [J96.01]  Yes   • Fall from standing [W19.XXXA]  Yes   • Edema leg, right [R60.0]  Yes   • Closed compression fracture of body of L1 vertebra (CMS/HCC) [S32.010A]  Yes   • Pleural effusion on left [J90]  Yes   • Leukocytosis [D72.829]  Yes   • S/P TAVR (transcatheter aortic valve replacement) [Z95.2]  Not Applicable   • PVD (peripheral vascular disease) with claudication (CMS/HCC) [I73.9]  Yes   • Chronic diastolic congestive heart failure (CMS/HCC) [I50.32]  Yes   • Anemia [D64.9]  Yes   • History of right MCA stroke [Z86.73]  Not Applicable   • Fall at home, initial encounter [W19.XXXA, Y92.009]  Not Applicable   • Essential hypertension [I10]  Yes      Resolved Hospital Problems   No resolved problems to display.       · Multiple left rib fractures leading to exudative left pleural effusion/left-sided pneumonia and acute hypoxemic respiratory failure.  There is pneumonia by chest x-ray associated  with leukocytosis significant increase in the procalcitonin.  Status post left sided thoracocentesis.  Pleural fluid is exudative.  Culture of the pleural fluid is negative till now.  Repeat chest x-ray with improvement..  She is not a candidate for surgical intervention.  Respiratory status appears to be stable.  Improving FiO2 demands. Negative blood cultures till now.  pulmonologist believes that the lung infiltrate is mostly secondary to the trauma and pneumonia unlikely.  Zosyn DC'd especially in view of C. difficile.  Improved respiratory status.  · C. difficile colitis.  Mostly secondary to antibiotic given in late January for her UTI.  On p.o. vancomycin.  Benign GI examination.  Will monitor.   ·  Mental status changes in a patient with a history of possible baseline dementia and a history of CVA.  Worsening today after being normal yesterday.  Worse today.  No acute focal neurological deficits.  Could be secondary to Ativan received yesterday prior to MRI. Initial CT scan is negative.    MRI showed no acute abnormalities but evidence of old infarctions.  Did not tolerate Zyprexa as it caused ventilation.  Discussed with psychiatry who has increased her Risperdal. No focal CNS deficit. No hypercapnia.  .  .  Normal TSH/B12/folic acid/RPR/cortisol.   Not receiving narcotics.  This is mostly secondary to metabolic and toxic encephalopathy.    ·   History of TAVR/diastolic congestive heart failure/hypertension/new onset rapid A. fib... Resolved volume depletion with holding diuretics.  There is no evidence of angina or congestive heart failure clinically.  Troponins are negative.  Magnesium is normal.  Low potassium will substitute.. S/p spontaneous cardioversion on amiodarone started by cardiology.  Blood pressure on the high side.  Cardiology decreased amiodarone and added losartan to metoprolol..  Norvasc DC'd secondary to low blood pressure.    Poor anticoagulation candidate can lead to frequent falls and  recent rib fractures.  · UTI.  Status post antibiotic treatment.    · L1 fracture.  Appears asymptomatic without radiculopathy or myelopathy.  · Acute kidney failure/hypokalemia..  Mostly secondary to prerenal azotemia because of dehydration.  Patient is euvolemic.  Resolved acute renal failure.    Relapse of hyperkalemia.  Will substitute.  Will check magnesium.    · Anemia.  Mostly secondary to rib fractures.    Stable hemoglobin. Transfuse if hemoglobin less than 7.   · Leukocytosis.  Secondary to C. difficile colitis.. Improving.  Currently on p.o. vancomycin.  DNR status.  Discussed with the daughter.  Patient has previous wishes of a DO NOT RESUSCITATE.    This was implemented.    Discussed with the patient nurse/psychiatry.      Marie Conrad MD  [facility]  02/05/21  13:03 EST

## 2021-02-05 NOTE — PLAN OF CARE
Goal Outcome Evaluation:  Plan of Care Reviewed With: patient     Outcome Summary: Pt still having incr confusion, chair alarm pad placed at pt's back under sheet as well as turning on bed alarm -pt was in between rails upon our entry; educ offered on safety and using call light if she needs anything-she agreed but still too confused at times to remember ; pt did joshua amb 15ft x2 w/seated rest required due to weakness/fatigue; pt will need SNU at present status    Kelvin Mason, PT Tech present    Patient was wearing a face mask during this therapy encounter. Therapist used appropriate personal protective equipment including eye protection, mask, and gloves.  Mask used was standard procedure mask. Appropriate PPE was worn during the entire therapy session. Hand hygiene was completed before and after therapy session. Patient is not in enhanced droplet precautions.

## 2021-02-05 NOTE — PROGRESS NOTES
"[facility]      Mar/chart reviewed  Follow up pleural effusion, rib fractures,   She denies any chest pain or shortness of breath   S/p 1+liter thoracentesis 2/2  She is confused and hallucinating this morning  She points at the floor and says , \"see my baby over there\"    Vital Sign Min/Max for last 24 hours  Temp  Min: 96.8 °F (36 °C)  Max: 98.2 °F (36.8 °C)   BP  Min: 135/84  Max: 168/66   Pulse  Min: 67  Max: 79   Resp  Min: 16  Max: 18   SpO2  Min: 100 %  Max: 100 %   Flow (L/min)  Min: 0  Max: 2   Weight  Min: 54.5 kg (120 lb 2.4 oz)  Max: 54.5 kg (120 lb 2.4 oz)     Appears ill, alert, oriented times 1  perrl, eomi, no icterus,  mmm, no jvd, trachea midline, neck supple,  chest decreased ae bilaterally, no crackles,   no wheezes,   Tachy, irrg  soft, nt, nd +bs,  no c/c/ e  Skin warm, dry no rashes    Labs: 2/5: reviewed:  Nothing new    A/P:  1. Left sided effusion --s/p thoracentesis 2/2, exudate, high RBC, suspect simple drainage will suffice,   2. Left sided infiltrate - suspect mainly related to pleural effusion/trauma   3. Anemia  4. Metabolic acidosis - no gap. --2/2 diarrhea?  5. afib with rvr --- cards following  6. c diff -- on po vanco as per Dr. Conrad  7. encephalopathy  "

## 2021-02-05 NOTE — PROGRESS NOTES
"Gita Wharton  1935 85 y.o.  [id number]      Patient Care Team:  Deanna Ortega APRN as PCP - General (Internal Medicine)  Warren Tanner MD as Consulting Physician (Cardiology)  Octaviano Yan MD as Surgeon (Cardiothoracic Surgery)    CC: Status post TAVR paroxysmal A. fib preserved LV function, little bit of dementia.  Frequent falls    Interval History: She is unchanged but back in sinus rhythm      Objective   Vital Signs  Temp:  [96.8 °F (36 °C)-98.2 °F (36.8 °C)] 97.7 °F (36.5 °C)  Heart Rate:  [67-79] 72  Resp:  [16-20] 20  BP: (135-168)/(55-84) 158/76    Intake/Output Summary (Last 24 hours) at 2/5/2021 1004  Last data filed at 2/4/2021 1722  Gross per 24 hour   Intake 480 ml   Output --   Net 480 ml     Flowsheet Rows      First Filed Value   Admission Height  157.5 cm (62\") Documented at 01/21/2021 0711   Admission Weight  54 kg (119 lb) Documented at 01/21/2021 0711          Physical Exam:   General Appearance:    Alert,oriented, in no acute distress   Lungs:     Clear to auscultation,BS are equal    Heart:    Normal S1 and S2, RRR with 2 out of 6 systolic ejection murmur, gallop or rub   HEENT:    Sclerae are clear, no JVD or adenopathy   Abdomen:     Normal bowel sounds, soft nontender, nondistended, no HSM   Extremities:   Moves all extremities well, no edema, no cyanosis, no             Redness, no rash     Medication Review:      acetaminophen, 1,000 mg, Oral, TID  [START ON 2/6/2021] amiodarone, 200 mg, Oral, Q24H  atorvastatin, 20 mg, Oral, Daily  ferrous sulfate, 325 mg, Oral, Daily With Breakfast  gadobenate dimeglumine, 11 mL, Intravenous, Once in imaging  lidocaine, 1 patch, Transdermal, Q24H  losartan, 25 mg, Oral, Daily  melatonin, 3 mg, Oral, Nightly  metoprolol tartrate, 100 mg, Oral, BID  multivitamin with minerals, 1 tablet, Oral, Daily  risperiDONE, 0.5 mg, Oral, Nightly  sodium chloride, 10 mL, Intravenous, Q12H  vancomycin, 125 mg, Oral, Q6H      hold, 1 " each  Pharmacy Consult,           I reviewed the patient's new clinical results.  I personally viewed and interpreted the patient's EKG/Telemetry data    Assessment/Plan  Active Hospital Problems    Diagnosis  POA   • **Closed fracture of multiple ribs of left side [S22.42XA]  Yes   • PVD (peripheral vascular disease) with claudication (CMS/HCC) [I73.9]  Yes     Priority: High   • Essential hypertension [I10]  Yes     Priority: Medium   • C. difficile colitis [A04.72]  No   • Atrial fibrillation (CMS/HCC) [I48.91]  No   • AMS (altered mental status) [R41.82]  Yes   • Acute respiratory failure with hypoxia (CMS/[facility]) [J96.01]  Yes   • Fall from standing [W19.XXXA]  Yes   • Edema leg, right [R60.0]  Yes   • Closed compression fracture of body of L1 vertebra (CMS/[facility]) [S32.010A]  Yes   • Pleural effusion on left [J90]  Yes   • Leukocytosis [D72.829]  Yes   • S/P TAVR (transcatheter aortic valve replacement) [Z95.2]  Not Applicable   • Chronic diastolic congestive heart failure (CMS/[facility]) [I50.32]  Yes   • Anemia [D64.9]  Yes   • History of right MCA stroke [Z86.73]  Not Applicable   • Fall at home, initial encounter [W19.XXXA, Y92.009]  Not Applicable      Resolved Hospital Problems   No resolved problems to display.     She is back in sinus rhythm I am going to decrease her amiodarone to 200 mg a day.  I do not think we can anticoagulate her she has had a ton of falls here recently and actually her overall course in the last few months is 1 of straight decline.  I discussed this with her daughter.  Her blood pressure is a little bit high I am going to add some losartan to her regimen.  We will see her again on Monday    Warren Tanner MD  02/05/21  10:04 EST

## 2021-02-05 NOTE — CONSULTS
The patient is significantly more confused today.  She is noted to be responding to internal stimuli and is really not able to precipitate an attempted interview.  I will increase Risperdal to twice daily dosing.

## 2021-02-05 NOTE — PLAN OF CARE
Goal Outcome Evaluation:         VSS; losartan added for BP; amio decreased; pt on room air most of the day. Pt remains confused with hallucinations at times possibly d/t ativan last night. Frequent BMs; cream applied to bottom. Daughter updated with phone call. Pt not interested in food today. Potassium being replaced. Psych increased Risperdal to BID.

## 2021-02-05 NOTE — PROGRESS NOTES
Continued Stay Note  [facility]     Patient Name: Gita Wharton  MRN: [id number]  Today's Date: 2/5/2021    Admit Date: 1/21/2021    Discharge Plan     Row Name 02/05/21 1127       Plan    Plan  Signature [facility]    Patient/Family in Agreement with Plan  yes    Plan Comments  Spoke with Gracia/Franko who states they continue to follow for rehab.  30 day signed and on packet.  CCP continues to follow.  PILI Awan RN        Discharge Codes    No documentation.             Daria Awan, RN

## 2021-02-06 LAB
ALBUMIN SERPL-MCNC: 2.1 G/DL (ref 3.5–5.2)
ALBUMIN/GLOB SERPL: 0.6 G/DL
ALP SERPL-CCNC: 172 U/L (ref 39–117)
ALT SERPL W P-5'-P-CCNC: 26 U/L (ref 1–33)
ANION GAP SERPL CALCULATED.3IONS-SCNC: 8.6 MMOL/L (ref 5–15)
AST SERPL-CCNC: 33 U/L (ref 1–32)
BASOPHILS # BLD AUTO: 0.02 10*3/MM3 (ref 0–0.2)
BASOPHILS NFR BLD AUTO: 0.1 % (ref 0–1.5)
BILIRUB SERPL-MCNC: <0.2 MG/DL (ref 0–1.2)
BUN SERPL-MCNC: 16 MG/DL (ref 8–23)
BUN/CREAT SERPL: 17.4 (ref 7–25)
CALCIUM SPEC-SCNC: 8 MG/DL (ref 8.6–10.5)
CHLORIDE SERPL-SCNC: 113 MMOL/L (ref 98–107)
CO2 SERPL-SCNC: 17.4 MMOL/L (ref 22–29)
CREAT SERPL-MCNC: 0.92 MG/DL (ref 0.57–1)
DEPRECATED RDW RBC AUTO: 39.5 FL (ref 37–54)
EOSINOPHIL # BLD AUTO: 0.08 10*3/MM3 (ref 0–0.4)
EOSINOPHIL NFR BLD AUTO: 0.5 % (ref 0.3–6.2)
ERYTHROCYTE [DISTWIDTH] IN BLOOD BY AUTOMATED COUNT: 13.3 % (ref 12.3–15.4)
GFR SERPL CREATININE-BSD FRML MDRD: 58 ML/MIN/1.73
GLOBULIN UR ELPH-MCNC: 3.5 GM/DL
GLUCOSE SERPL-MCNC: 93 MG/DL (ref 65–99)
HCT VFR BLD AUTO: 30.6 % (ref 34–46.6)
HGB BLD-MCNC: 9.8 G/DL (ref 12–15.9)
IMM GRANULOCYTES # BLD AUTO: 0.35 10*3/MM3 (ref 0–0.05)
IMM GRANULOCYTES NFR BLD AUTO: 2 % (ref 0–0.5)
LYMPHOCYTES # BLD AUTO: 0.87 10*3/MM3 (ref 0.7–3.1)
LYMPHOCYTES NFR BLD AUTO: 5 % (ref 19.6–45.3)
MCH RBC QN AUTO: 26.4 PG (ref 26.6–33)
MCHC RBC AUTO-ENTMCNC: 32 G/DL (ref 31.5–35.7)
MCV RBC AUTO: 82.5 FL (ref 79–97)
MONOCYTES # BLD AUTO: 1.08 10*3/MM3 (ref 0.1–0.9)
MONOCYTES NFR BLD AUTO: 6.2 % (ref 5–12)
NEUTROPHILS NFR BLD AUTO: 15.06 10*3/MM3 (ref 1.7–7)
NEUTROPHILS NFR BLD AUTO: 86.2 % (ref 42.7–76)
NRBC BLD AUTO-RTO: 0.1 /100 WBC (ref 0–0.2)
PLATELET # BLD AUTO: 363 10*3/MM3 (ref 140–450)
PMV BLD AUTO: 10.8 FL (ref 6–12)
POTASSIUM SERPL-SCNC: 4.8 MMOL/L (ref 3.5–5.2)
POTASSIUM SERPL-SCNC: 5 MMOL/L (ref 3.5–5.2)
PROCALCITONIN SERPL-MCNC: 94.88 NG/ML (ref 0–0.25)
PROT SERPL-MCNC: 5.6 G/DL (ref 6–8.5)
RBC # BLD AUTO: 3.71 10*6/MM3 (ref 3.77–5.28)
SODIUM SERPL-SCNC: 139 MMOL/L (ref 136–145)
WBC # BLD AUTO: 17.46 10*3/MM3 (ref 3.4–10.8)

## 2021-02-06 PROCEDURE — 80053 COMPREHEN METABOLIC PANEL: CPT | Performed by: INTERNAL MEDICINE

## 2021-02-06 PROCEDURE — 84145 PROCALCITONIN (PCT): CPT | Performed by: INTERNAL MEDICINE

## 2021-02-06 PROCEDURE — 85025 COMPLETE CBC W/AUTO DIFF WBC: CPT | Performed by: INTERNAL MEDICINE

## 2021-02-06 PROCEDURE — 94799 UNLISTED PULMONARY SVC/PX: CPT

## 2021-02-06 RX ADMIN — VANCOMYCIN 125 MG: KIT at 01:15

## 2021-02-06 RX ADMIN — LOSARTAN POTASSIUM 25 MG: 25 TABLET, FILM COATED ORAL at 09:42

## 2021-02-06 RX ADMIN — METOPROLOL TARTRATE 100 MG: 50 TABLET, FILM COATED ORAL at 20:48

## 2021-02-06 RX ADMIN — SODIUM CHLORIDE, PRESERVATIVE FREE 10 ML: 5 INJECTION INTRAVENOUS at 20:51

## 2021-02-06 RX ADMIN — VANCOMYCIN 125 MG: KIT at 20:48

## 2021-02-06 RX ADMIN — ACETAMINOPHEN 1000 MG: 500 TABLET, FILM COATED ORAL at 16:30

## 2021-02-06 RX ADMIN — FERROUS SULFATE TAB 325 MG (65 MG ELEMENTAL FE) 325 MG: 325 (65 FE) TAB at 09:42

## 2021-02-06 RX ADMIN — Medication 3 MG: at 20:49

## 2021-02-06 RX ADMIN — ATORVASTATIN CALCIUM 20 MG: 20 TABLET, FILM COATED ORAL at 09:42

## 2021-02-06 RX ADMIN — VANCOMYCIN 125 MG: KIT at 09:51

## 2021-02-06 RX ADMIN — ACETAMINOPHEN 1000 MG: 500 TABLET, FILM COATED ORAL at 09:42

## 2021-02-06 RX ADMIN — LIDOCAINE 1 PATCH: 50 PATCH CUTANEOUS at 09:42

## 2021-02-06 RX ADMIN — METOPROLOL TARTRATE 100 MG: 50 TABLET, FILM COATED ORAL at 09:42

## 2021-02-06 RX ADMIN — ACETAMINOPHEN 1000 MG: 500 TABLET, FILM COATED ORAL at 20:48

## 2021-02-06 RX ADMIN — AMIODARONE HYDROCHLORIDE 200 MG: 200 TABLET ORAL at 09:42

## 2021-02-06 RX ADMIN — MULTIPLE VITAMINS W/ MINERALS TAB 1 TABLET: TAB at 09:42

## 2021-02-06 RX ADMIN — VANCOMYCIN 125 MG: KIT at 13:11

## 2021-02-06 RX ADMIN — RISPERIDONE 0.5 MG: 0.5 TABLET, FILM COATED ORAL at 20:49

## 2021-02-06 RX ADMIN — RISPERIDONE 0.5 MG: 0.5 TABLET, FILM COATED ORAL at 09:52

## 2021-02-06 NOTE — PROGRESS NOTES
"      [facility]         Dr Rosales Sayied   LOS: 15 days   Patient Care Team:  Deanna Ortega APRN as PCP - General (Internal Medicine)  Warren Tanner MD as Consulting Physician (Cardiology)  Octaviano Yan MD as Surgeon (Cardiothoracic Surgery)    Chief Complaint: Left-sided effusion status post fall with rib fractures A. fib RVR C. difficile    Interval History: Events noted chart reviewed.  Patient currently on room air with sats 97%.  Denies any complaints this morning.    REVIEW OF SYSTEMS:   CARDIOVASCULAR: No chest pain, chest pressure or chest discomfort. No palpitations or edema.   RESPIRATORY: No shortness of breath, cough or sputum.   GASTROINTESTINAL: No anorexia, nausea, vomiting or diarrhea. No abdominal pain or blood.   HEMATOLOGIC: No bleeding or bruising.     Ventilator/Non-Invasive Ventilation Settings (From admission, onward)    None            Vital Signs  Temp:  [96.8 °F (36 °C)-97.9 °F (36.6 °C)] 96.8 °F (36 °C)  Heart Rate:  [61-74] 61  Resp:  [16-18] 18  BP: (121-177)/(35-64) 122/35    Intake/Output Summary (Last 24 hours) at 2/6/2021 1428  Last data filed at 2/6/2021 0900  Gross per 24 hour   Intake 530 ml   Output --   Net 530 ml     Flowsheet Rows      First Filed Value   Admission Height  157.5 cm (62\") Documented at 01/21/2021 0711   Admission Weight  54 kg (119 lb) Documented at 01/21/2021 0711          Physical Exam:  Patient is examined using the personal protective equipment as per guidelines from infection control for this particular patient as enacted.  Hand hygiene was performed before and after patient interaction.   General Appearance:    Alert, cooperative, in no acute distress.  Following simple commands  Neck midline trachea no thyromegaly   Lungs:    Diminished breath sounds bilaterally    Heart:    Regular rhythm and normal rate, normal S1 and S2, no            murmur, no gallop, no rub, no click   Chest Wall:    No abnormalities observed   Abdomen:  "    Normal bowel sounds, no masses, no organomegaly, soft        non-tender, non-distended, no guarding, no rebound                tenderness   Extremities:   Moves all extremities well, no edema, no cyanosis, no             redness  CNS no focal neurological deficits normal sensory exam  Skin no rashes no nodules  Musculoskeletal no cyanosis no clubbing normal range of motion     Results Review:        Results from last 7 days   Lab Units 02/06/21  0528 02/05/21  2226 02/05/21  0916 02/04/21  0423   SODIUM mmol/L 139  --  140 137   POTASSIUM mmol/L 4.8 5.0 3.4* 4.4   CHLORIDE mmol/L 113*  --  110* 109*   CO2 mmol/L 17.4*  --  18.3* 17.7*   BUN mg/dL 16  --  16 21   CREATININE mg/dL 0.92  --  0.98 1.01*   GLUCOSE mg/dL 93  --  103* 88   CALCIUM mg/dL 8.0*  --  8.0* 7.9*     Results from last 7 days   Lab Units 02/03/21  0906 02/03/21  0400   TROPONIN T ng/mL 0.029 0.029     Results from last 7 days   Lab Units 02/06/21  0528 02/05/21  0916 02/04/21  0423   WBC 10*3/mm3 17.46* 16.56* 18.48*   HEMOGLOBIN g/dL 9.8* 10.2* 9.0*   HEMATOCRIT % 30.6* 32.5* 28.3*   PLATELETS 10*3/mm3 363 364 312             Results from last 7 days   Lab Units 02/05/21  1655   MAGNESIUM mg/dL 2.2         Results from last 7 days   Lab Units 02/01/21  2348   PH, ARTERIAL pH units 7.429   PO2 ART mm Hg 74.9*   PCO2, ARTERIAL mm Hg 27.5*   HCO3 ART mmol/L 18.2*       I reviewed the patient's new clinical results.  I personally viewed and interpreted the patient's CXR        Medication Review:   acetaminophen, 1,000 mg, Oral, TID  amiodarone, 200 mg, Oral, Q24H  atorvastatin, 20 mg, Oral, Daily  ferrous sulfate, 325 mg, Oral, Daily With Breakfast  gadobenate dimeglumine, 11 mL, Intravenous, Once in imaging  lidocaine, 1 patch, Transdermal, Q24H  losartan, 25 mg, Oral, Daily  melatonin, 3 mg, Oral, Nightly  metoprolol tartrate, 100 mg, Oral, BID  multivitamin with minerals, 1 tablet, Oral, Daily  risperiDONE, 0.5 mg, Oral, BID  sodium chloride,  10 mL, Intravenous, Q12H  vancomycin, 125 mg, Oral, Q6H        hold, 1 each  Pharmacy Consult,         ASSESSMENT:   Left-sided pleural effusion status post thoracentesis exudative/hemothorax  Rib fractures  Left-sided infiltrate  Metabolic acidosis  A. fib RVR  C. difficile  Encephalopathy      PLAN:  Status post thoracentesis with drainage of small hemothorax.  Pulmonary infiltrate suspected due to effusion trauma atelectasis  P.o. vancomycin for C. difficile  Currently on room air  PT OT as much as patient can tolerate      Connie Soto MD  02/06/21  14:28 EST

## 2021-02-06 NOTE — CONSULTS
The patient is in bed resting comfortably today.  She does not awaken for interview.  She is now on C. difficile isolation.  I will leave Risperdal at its current dose for now.

## 2021-02-06 NOTE — PROGRESS NOTES
Name: Gita Wharton ADMIT: 2021   : 1935  PCP: Deanna Ortega APRN    MRN: [id number] LOS: 15 days   AGE/SEX: 85 y.o. female  ROOM: [facility]     Subjective   Subjective   Patient much more alert and oriented today.  No headache and no focal neurological symptoms.  She has denied any chest pain.  No shortness of breath.  No cough.  No hemoptysis.  No fever or chills.  No PND or orthopnea.  No ankle edema.        Review of Systems    GI.  Diarrhea greatly improved.  No bowel movements yet today..  No abdominal pain or nausea or vomiting.  .  No dysuria or hematuria.     Objective   Objective   Vital Signs  Temp:  [96.8 °F (36 °C)-97.9 °F (36.6 °C)] 97.2 °F (36.2 °C)  Heart Rate:  [61-62] 61  Resp:  [17-18] 18  BP: (122-177)/(35-64) 131/36  SpO2:  [96 %-99 %] 99 %  on   ;   Device (Oxygen Therapy): room air    Intake/Output Summary (Last 24 hours) at 2021 1544  Last data filed at 2021 0900  Gross per 24 hour   Intake 530 ml   Output --   Net 530 ml     Body mass index is 21.97 kg/m².      21  0711 21  1504   Weight: 54 kg (119 lb) 54.5 kg (120 lb 2.4 oz)     Physical Exam    General.  Elderly female.  Alert and oriented x3.  No hallucination. In no apparent distress.    Eyes.  Pupils equal round and reactive intact extraocular musculature positive pallor.  No jaundice normal conjunctivae and lids  ENT.  Moist mucous membrane  Neck.  Supple no JVD no lymphadenopathy no thyromegaly  Cardiovascular.  Regular rate and rhythm grade 2 systolic murmur  Chest.  Poor but clear to auscultation bilaterally with left-sided rhonchi.  Abdomen.  Soft lax no tenderness no organomegaly no guarding or rebound  Extremities.  No clubbing cyanosis or edema  CNS.  No acute focal neurological deficits     Results Review:      Results from last 7 days   Lab Units 21  0528 21  2226 21  0916 21  0423 21  0400 21  0559 21  0529   SODIUM mmol/L 139  --  140  137 137 137 135*   POTASSIUM mmol/L 4.8 5.0 3.4* 4.4 3.0* 3.9 4.2   CHLORIDE mmol/L 113*  --  110* 109* 106 108* 106   CO2 mmol/L 17.4*  --  18.3* 17.7* 18.9* 19.3* 18.7*   BUN mg/dL 16  --  16 21 23 23 22   CREATININE mg/dL 0.92  --  0.98 1.01* 0.99 1.16* 0.76   GLUCOSE mg/dL 93  --  103* 88 82 80 83   CALCIUM mg/dL 8.0*  --  8.0* 7.9* 8.2* 8.3* 7.9*   AST (SGOT) U/L 33*  --   --  41*  --  25  --    ALT (SGPT) U/L 26  --   --  25  --  14  --      Estimated Creatinine Clearance: 39.2 mL/min (by C-G formula based on SCr of 0.92 mg/dL).          Results from last 7 days   Lab Units 02/03/21  0906 02/03/21  0400   TROPONIN T ng/mL 0.029 0.029         Results from last 7 days   Lab Units 02/03/21  0400 02/02/21  0023   TSH uIU/mL 2.380 3.180     Results from last 7 days   Lab Units 02/05/21  1655 02/03/21  0400   MAGNESIUM mg/dL 2.2 2.1           Invalid input(s): LDLCALC  Results from last 7 days   Lab Units 02/06/21  0528 02/05/21  0916 02/04/21  0423 02/03/21  0400 02/02/21  0559 01/31/21  0529   WBC 10*3/mm3 17.46* 16.56* 18.48* 22.65* 21.99* 12.66*   HEMOGLOBIN g/dL 9.8* 10.2* 9.0* 9.2* 8.7* 9.3*   HEMATOCRIT % 30.6* 32.5* 28.3* 28.5* 27.2* 29.3*   PLATELETS 10*3/mm3 363 364 312 346 335 336   MCV fL 82.5 82.7 83.7 82.1 83.4 84.4   MCH pg 26.4* 26.0* 26.6 26.5* 26.7 26.8   MCHC g/dL 32.0 31.4* 31.8 32.3 32.0 31.7   RDW % 13.3 13.3 13.7 13.6 13.4 13.1   RDW-SD fl 39.5 39.5 41.4 40.1 40.1 39.9   MPV fL 10.8 10.4 11.0 11.3 11.1 11.5   NEUTROPHIL % % 86.2* 87.5* 87.2* 90.6* 87.3* 84.0*   LYMPHOCYTE % % 5.0* 4.3* 6.0* 3.4* 5.0* 6.3*   MONOCYTES % % 6.2 5.4 4.1* 4.6* 6.0 8.1   EOSINOPHIL % % 0.5 0.5 0.7 0.3 0.2* 0.6   BASOPHIL % % 0.1 0.2 0.1 0.1 0.2 0.2   IMM GRAN % % 2.0* 2.1* 1.9* 1.0* 1.3* 0.8*   NEUTROS ABS 10*3/mm3 15.06* 14.48* 16.12* 20.53* 19.17* 10.63*   LYMPHS ABS 10*3/mm3 0.87 0.71 1.10 0.77 1.11 0.80   MONOS ABS 10*3/mm3 1.08* 0.90 0.75 1.04* 1.32* 1.03*   EOS ABS 10*3/mm3 0.08 0.09 0.13 0.07 0.05 0.07    BASOS ABS 10*3/mm3 0.02 0.04 0.02 0.02 0.05 0.03   IMMATURE GRANS (ABS) 10*3/mm3 0.35* 0.34* 0.36* 0.22* 0.29* 0.10*   NRBC /100 WBC 0.1 0.1 0.1 0.0 0.0 0.0         Results from last 7 days   Lab Units 02/01/21  2348   PH, ARTERIAL pH units 7.429   PO2 ART mm Hg 74.9*   PCO2, ARTERIAL mm Hg 27.5*   HCO3 ART mmol/L 18.2*     Results from last 7 days   Lab Units 02/06/21  0528 02/05/21  0916 02/03/21  1452 02/02/21  0559   PROCALCITONIN ng/mL 94.88* 95.24* 37.84* 63.69*   LACTATE mmol/L  --  1.1 1.8  --          Results from last 7 days   Lab Units 02/02/21  0023   AMMONIA umol/L 19     Results from last 7 days   Lab Units 02/02/21  1345 02/02/21  0023 02/02/21  0022   BLOODCX   --  No growth at 4 days No growth at 4 days   BODYFLDCX  No growth at 4 days  --   --                    Imaging:  Imaging Results (Last 24 Hours)     ** No results found for the last 24 hours. **             I reviewed the patient's new clinical results / labs / tests / procedures      Assessment/Plan     Active Hospital Problems    Diagnosis  POA   • **Closed fracture of multiple ribs of left side [S22.42XA]  Yes   • C. difficile colitis [A04.72]  No   • Atrial fibrillation (CMS/HCC) [I48.91]  No   • AMS (altered mental status) [R41.82]  Yes   • Acute respiratory failure with hypoxia (CMS/HCC) [J96.01]  Yes   • Fall from standing [W19.XXXA]  Yes   • Edema leg, right [R60.0]  Yes   • Closed compression fracture of body of L1 vertebra (CMS/HCC) [S32.010A]  Yes   • Pleural effusion on left [J90]  Yes   • Leukocytosis [D72.829]  Yes   • S/P TAVR (transcatheter aortic valve replacement) [Z95.2]  Not Applicable   • PVD (peripheral vascular disease) with claudication (CMS/HCC) [I73.9]  Yes   • Chronic diastolic congestive heart failure (CMS/HCC) [I50.32]  Yes   • Anemia [D64.9]  Yes   • History of right MCA stroke [Z86.73]  Not Applicable   • Fall at home, initial encounter [W19.XXXA, Y92.009]  Not Applicable   • Essential hypertension [I10]  Yes       Resolved Hospital Problems   No resolved problems to display.       · Multiple left rib fractures leading to exudative left pleural effusion/left-sided pneumonia and acute hypoxemic respiratory failure.  There is pneumonia by chest x-ray associated with leukocytosis significant increase in the procalcitonin.  Status post left sided thoracocentesis.  Pleural fluid is exudative.  Culture of the pleural fluid is negative till now.  Repeat chest x-ray with improvement..  She is not a candidate for surgical intervention.  Respiratory status appears to be stable.  Off oxygen at this time and maintaining her on normal O2 sats. Negative blood cultures till now.  pulmonologist believes that the lung infiltrate is mostly secondary to the trauma and pneumonia unlikely.  Zosyn DC'd especially in view of C. difficile.  Improved respiratory status.  · C. difficile colitis.  Mostly secondary to antibiotic given in late January for her UTI.  On p.o. vancomycin.  Benign GI examination.  Clinically improving.  Will monitor.   ·  Mental status changes in a patient with a history of possible baseline dementia and a history of CVA.  Worsening today after being normal yesterday.  Patient alert oriented x3 today..  No acute focal neurological deficits.  Yesterday's mental status disarticulation is mostly secondary to Ativan received prior to MRI. Initial CT scan is negative.    MRI showed no acute abnormalities but evidence of old infarctions.  Did not tolerate Zyprexa as it caused extreme sedation.  Doing well on Risperdal. No focal CNS deficit. No hypercapnia.  .  .  Normal TSH/B12/folic acid/RPR/cortisol.   Not receiving narcotics.  This is mostly secondary to metabolic and toxic encephalopathy.    ·   History of TAVR/diastolic congestive heart failure/hypertension/new onset rapid A. fib... Resolved volume depletion with holding diuretics.  There is no evidence of angina or congestive heart failure clinically.  Troponins are  negative.  Magnesium is normal.  Hypokalemia resolved.. S/p spontaneous cardioversion on amiodarone started by cardiology.  Blood pressure control.  Cardiology decreased amiodarone and added losartan to metoprolol..  Norvasc DC'd secondary to low blood pressure.    Poor anticoagulation candidate secondary to frequent falls and recent rib fractures.  · UTI.  Status post antibiotic treatment.    · L1 fracture.  Appears asymptomatic without radiculopathy or myelopathy.  · Acute kidney failure/hypokalemia..  Mostly secondary to prerenal azotemia because of dehydration.  Patient is euvolemic.  Resolved acute renal failure.    Resolved hypokalemia with substitution.  Normal magnesium.  Will check magnesium.    · Anemia.  Mostly secondary to rib fractures.    Stable hemoglobin. Transfuse if hemoglobin less than 7.   · Leukocytosis/elevated procalcitonin..  Secondary to C. difficile colitis.. Both improving.  Currently on p.o. vancomycin.  DNR status.  Discussed with the daughter.  Patient has previous wishes of a DO NOT RESUSCITATE.    This was implemented.    Discussed with the patient/patient nurse  Patient will need the placement awaiting on that.    Marie Conrad MD  [facility]  02/06/21  15:44 EST

## 2021-02-06 NOTE — PLAN OF CARE
Problem: Adult Inpatient Plan of Care  Goal: Plan of Care Review  Outcome: Ongoing, Progressing  Flowsheets (Taken 2/6/2021 0430)  Progress: no change  Plan of Care Reviewed With: patient  Outcome Summary: Monitor pain,labs,and vitals. Pt is in isolation for CDIFF-multiple loose stools on current shift. Pt tried to get out of bed multiple times-confused-tried to reorient not successful. Falls risk-bed alarm. Encouraged PO intake and pulmonary toilet. Will continue to monitor.  Goal: Absence of Hospital-Acquired Illness or Injury  Intervention: Identify and Manage Fall Risk  Recent Flowsheet Documentation  Taken 2/6/2021 0408 by Angela Kaminski, ORTEGA  Safety Promotion/Fall Prevention:   activity supervised   assistive device/personal items within reach   clutter free environment maintained   fall prevention program maintained   lighting adjusted   nonskid shoes/slippers when out of bed   room organization consistent   safety round/check completed  Taken 2/6/2021 0131 by Angela Kaminski, ORTEGA  Safety Promotion/Fall Prevention:   activity supervised   assistive device/personal items within reach   clutter free environment maintained   fall prevention program maintained   lighting adjusted   nonskid shoes/slippers when out of bed   room organization consistent   safety round/check completed  Taken 2/6/2021 0019 by Angela Kaminski, RN  Safety Promotion/Fall Prevention:   activity supervised   assistive device/personal items within reach   clutter free environment maintained   fall prevention program maintained   lighting adjusted   nonskid shoes/slippers when out of bed   room organization consistent   safety round/check completed  Taken 2/5/2021 2215 by Angela Kaminski, RN  Safety Promotion/Fall Prevention:   activity supervised   clutter free environment maintained   assistive device/personal items within reach   fall prevention program maintained   lighting adjusted   room organization consistent   safety round/check  completed   nonskid shoes/slippers when out of bed  Taken 2/5/2021 2145 by Angela Kaminski RN  Safety Promotion/Fall Prevention:   activity supervised   assistive device/personal items within reach   clutter free environment maintained   fall prevention program maintained   lighting adjusted   nonskid shoes/slippers when out of bed   room organization consistent   safety round/check completed  Intervention: Prevent Skin Injury  Recent Flowsheet Documentation  Taken 2/6/2021 0131 by Angela Kaminski RN  Body Position:   side-lying, right   position changed independently  Taken 2/5/2021 2145 by Angela Kaminski RN  Body Position: supine, legs elevated  Intervention: Prevent and Manage VTE (venous thromboembolism) Risk  Recent Flowsheet Documentation  Taken 2/6/2021 0131 by Angela Kaminski RN  VTE Prevention/Management:   bilateral   dorsiflexion/plantar flexion performed  Taken 2/5/2021 2145 by Angela Kaminski RN  VTE Prevention/Management:   bilateral   dorsiflexion/plantar flexion performed  Intervention: Prevent Infection  Recent Flowsheet Documentation  Taken 2/6/2021 0408 by Angela Kaminski RN  Infection Prevention:   hand hygiene promoted   personal protective equipment utilized   rest/sleep promoted   single patient room provided  Taken 2/6/2021 0131 by Angela Kaminski RN  Infection Prevention:   hand hygiene promoted   personal protective equipment utilized   rest/sleep promoted   single patient room provided  Taken 2/6/2021 0019 by Angela Kaminski RN  Infection Prevention:   hand hygiene promoted   personal protective equipment utilized   rest/sleep promoted   single patient room provided  Taken 2/5/2021 2215 by Angela Kaminski RN  Infection Prevention:   hand hygiene promoted   personal protective equipment utilized   rest/sleep promoted   single patient room provided  Taken 2/5/2021 2145 by Angela Kaminski RN  Infection Prevention:   hand hygiene promoted   personal protective equipment utilized   rest/sleep promoted    single patient room provided  Goal: Optimal Comfort and Wellbeing  Intervention: Provide Person-Centered Care  Recent Flowsheet Documentation  Taken 2/6/2021 0131 by Angela Kaminski RN  Trust Relationship/Rapport:   care explained   choices provided   emotional support provided   empathic listening provided   reassurance provided   questions answered   questions encouraged   thoughts/feelings acknowledged  Taken 2/5/2021 2145 by Angela Kaminski RN  Trust Relationship/Rapport:   care explained   choices provided   emotional support provided   empathic listening provided   questions answered   questions encouraged   reassurance provided   thoughts/feelings acknowledged   Goal Outcome Evaluation:  Plan of Care Reviewed With: patient  Progress: no change  Outcome Summary: Monitor pain,labs,and vitals. Pt is in isolation for CDIFF-multiple loose stools on current shift. Pt tried to get out of bed multiple times-confused-tried to reorient not successful. Falls risk-bed alarm. Encouraged PO intake and pulmonary toilet. Will continue to monitor.

## 2021-02-07 LAB
ANION GAP SERPL CALCULATED.3IONS-SCNC: 8 MMOL/L (ref 5–15)
BACTERIA FLD CULT: NORMAL
BACTERIA SPEC AEROBE CULT: NORMAL
BACTERIA SPEC AEROBE CULT: NORMAL
BACTERIA SPEC ANAEROBE CULT: NORMAL
BASOPHILS # BLD AUTO: 0.03 10*3/MM3 (ref 0–0.2)
BASOPHILS NFR BLD AUTO: 0.2 % (ref 0–1.5)
BUN SERPL-MCNC: 17 MG/DL (ref 8–23)
BUN/CREAT SERPL: 17.5 (ref 7–25)
CALCIUM SPEC-SCNC: 8.1 MG/DL (ref 8.6–10.5)
CHLORIDE SERPL-SCNC: 113 MMOL/L (ref 98–107)
CO2 SERPL-SCNC: 18 MMOL/L (ref 22–29)
CREAT SERPL-MCNC: 0.97 MG/DL (ref 0.57–1)
DEPRECATED RDW RBC AUTO: 39.7 FL (ref 37–54)
EOSINOPHIL # BLD AUTO: 0.07 10*3/MM3 (ref 0–0.4)
EOSINOPHIL NFR BLD AUTO: 0.5 % (ref 0.3–6.2)
ERYTHROCYTE [DISTWIDTH] IN BLOOD BY AUTOMATED COUNT: 13.3 % (ref 12.3–15.4)
GFR SERPL CREATININE-BSD FRML MDRD: 55 ML/MIN/1.73
GLUCOSE SERPL-MCNC: 96 MG/DL (ref 65–99)
GRAM STN SPEC: NORMAL
GRAM STN SPEC: NORMAL
HCT VFR BLD AUTO: 29.9 % (ref 34–46.6)
HGB BLD-MCNC: 9.2 G/DL (ref 12–15.9)
IMM GRANULOCYTES # BLD AUTO: 0.33 10*3/MM3 (ref 0–0.05)
IMM GRANULOCYTES NFR BLD AUTO: 2.2 % (ref 0–0.5)
LYMPHOCYTES # BLD AUTO: 0.81 10*3/MM3 (ref 0.7–3.1)
LYMPHOCYTES NFR BLD AUTO: 5.4 % (ref 19.6–45.3)
MCH RBC QN AUTO: 25.3 PG (ref 26.6–33)
MCHC RBC AUTO-ENTMCNC: 30.8 G/DL (ref 31.5–35.7)
MCV RBC AUTO: 82.4 FL (ref 79–97)
MONOCYTES # BLD AUTO: 0.92 10*3/MM3 (ref 0.1–0.9)
MONOCYTES NFR BLD AUTO: 6.1 % (ref 5–12)
NEUTROPHILS NFR BLD AUTO: 12.8 10*3/MM3 (ref 1.7–7)
NEUTROPHILS NFR BLD AUTO: 85.6 % (ref 42.7–76)
NRBC BLD AUTO-RTO: 0 /100 WBC (ref 0–0.2)
PLATELET # BLD AUTO: 361 10*3/MM3 (ref 140–450)
PMV BLD AUTO: 10.8 FL (ref 6–12)
POTASSIUM SERPL-SCNC: 4.4 MMOL/L (ref 3.5–5.2)
PROCALCITONIN SERPL-MCNC: 197.52 NG/ML (ref 0–0.25)
RBC # BLD AUTO: 3.63 10*6/MM3 (ref 3.77–5.28)
SODIUM SERPL-SCNC: 139 MMOL/L (ref 136–145)
WBC # BLD AUTO: 14.96 10*3/MM3 (ref 3.4–10.8)

## 2021-02-07 PROCEDURE — 85025 COMPLETE CBC W/AUTO DIFF WBC: CPT | Performed by: INTERNAL MEDICINE

## 2021-02-07 PROCEDURE — 84145 PROCALCITONIN (PCT): CPT | Performed by: INTERNAL MEDICINE

## 2021-02-07 PROCEDURE — 80048 BASIC METABOLIC PNL TOTAL CA: CPT | Performed by: INTERNAL MEDICINE

## 2021-02-07 RX ORDER — RISPERIDONE 0.5 MG/1
0.5 TABLET ORAL 3 TIMES DAILY
Status: DISCONTINUED | OUTPATIENT
Start: 2021-02-07 | End: 2021-02-08 | Stop reason: HOSPADM

## 2021-02-07 RX ADMIN — FERROUS SULFATE TAB 325 MG (65 MG ELEMENTAL FE) 325 MG: 325 (65 FE) TAB at 10:27

## 2021-02-07 RX ADMIN — VANCOMYCIN 125 MG: KIT at 14:40

## 2021-02-07 RX ADMIN — CYCLOBENZAPRINE 10 MG: 10 TABLET, FILM COATED ORAL at 10:28

## 2021-02-07 RX ADMIN — AMIODARONE HYDROCHLORIDE 200 MG: 200 TABLET ORAL at 10:27

## 2021-02-07 RX ADMIN — METOPROLOL TARTRATE 100 MG: 50 TABLET, FILM COATED ORAL at 20:49

## 2021-02-07 RX ADMIN — SODIUM CHLORIDE, PRESERVATIVE FREE 10 ML: 5 INJECTION INTRAVENOUS at 10:29

## 2021-02-07 RX ADMIN — RISPERIDONE 0.5 MG: 0.5 TABLET, FILM COATED ORAL at 10:27

## 2021-02-07 RX ADMIN — VANCOMYCIN 125 MG: KIT at 10:27

## 2021-02-07 RX ADMIN — Medication 3 MG: at 20:49

## 2021-02-07 RX ADMIN — ATORVASTATIN CALCIUM 20 MG: 20 TABLET, FILM COATED ORAL at 10:28

## 2021-02-07 RX ADMIN — MULTIPLE VITAMINS W/ MINERALS TAB 1 TABLET: TAB at 10:28

## 2021-02-07 RX ADMIN — VANCOMYCIN 125 MG: KIT at 20:48

## 2021-02-07 RX ADMIN — ACETAMINOPHEN 1000 MG: 500 TABLET, FILM COATED ORAL at 10:28

## 2021-02-07 RX ADMIN — RISPERIDONE 0.5 MG: 0.5 TABLET ORAL at 16:21

## 2021-02-07 RX ADMIN — METOPROLOL TARTRATE 100 MG: 50 TABLET, FILM COATED ORAL at 10:28

## 2021-02-07 RX ADMIN — SODIUM CHLORIDE, PRESERVATIVE FREE 10 ML: 5 INJECTION INTRAVENOUS at 20:49

## 2021-02-07 RX ADMIN — LIDOCAINE 1 PATCH: 50 PATCH CUTANEOUS at 10:28

## 2021-02-07 RX ADMIN — ACETAMINOPHEN 1000 MG: 500 TABLET, FILM COATED ORAL at 16:21

## 2021-02-07 RX ADMIN — ACETAMINOPHEN 1000 MG: 500 TABLET, FILM COATED ORAL at 20:49

## 2021-02-07 RX ADMIN — RISPERIDONE 0.5 MG: 0.5 TABLET ORAL at 20:49

## 2021-02-07 RX ADMIN — LOSARTAN POTASSIUM 25 MG: 25 TABLET, FILM COATED ORAL at 10:28

## 2021-02-07 NOTE — PLAN OF CARE
Goal Outcome Evaluation:  Plan of Care Reviewed With: patient  Progress: improving  Outcome Summary: VSS, isolation for CDIFF, Confusion, with visual dulusions at times, however, redirectable.  pt was transfered to chair with two assit.  Pt is not able to stand by herself.  Morning labs, pt daughter would like a phone call from [facility] in morning to talk about transportation to nursing home.  Pt is trending in positive direction possiblity of discharge to rehab tomorrow.  Will continue to monitor.

## 2021-02-07 NOTE — PROGRESS NOTES
"      [facility]         Dr Rosales Sayied   LOS: 16 days   Patient Care Team:  Deanna Ortega APRN as PCP - General (Internal Medicine)  Warren Tanner MD as Consulting Physician (Cardiology)  Octaviano Yan MD as Surgeon (Cardiothoracic Surgery)    Chief Complaint: Left-sided effusion status post fall with rib fractures A. fib RVR C. difficile    Interval History: Currently on room air and reports weakness.    REVIEW OF SYSTEMS:   CARDIOVASCULAR: No chest pain, chest pressure or chest discomfort. No palpitations or edema.   RESPIRATORY: No shortness of breath, cough or sputum.   GASTROINTESTINAL: No anorexia, nausea, vomiting or diarrhea. No abdominal pain or blood.   HEMATOLOGIC: No bleeding or bruising.     Ventilator/Non-Invasive Ventilation Settings (From admission, onward)    None            Vital Signs  Temp:  [97.2 °F (36.2 °C)-98 °F (36.7 °C)] 97.4 °F (36.3 °C)  Heart Rate:  [62-87] 86  Resp:  [16-18] 16  BP: (121-158)/(36-69) 121/69    Intake/Output Summary (Last 24 hours) at 2/7/2021 1410  Last data filed at 2/7/2021 1342  Gross per 24 hour   Intake 600 ml   Output --   Net 600 ml     Flowsheet Rows      First Filed Value   Admission Height  157.5 cm (62\") Documented at 01/21/2021 0711   Admission Weight  54 kg (119 lb) Documented at 01/21/2021 0711          Physical Exam:  Patient is examined using the personal protective equipment as per guidelines from infection control for this particular patient as enacted.  Hand hygiene was performed before and after patient interaction.   General Appearance:    Alert, cooperative, in no acute distress.  Following simple commands  Neck midline trachea no thyromegaly   Lungs:    Diminished breath sounds bilaterally    Heart:    Regular rhythm and normal rate, normal S1 and S2, no            murmur, no gallop, no rub, no click   Chest Wall:    No abnormalities observed   Abdomen:     Normal bowel sounds, no masses, no organomegaly, soft        " non-tender, non-distended, no guarding, no rebound                tenderness   Extremities:   Moves all extremities well, no edema, no cyanosis, no             redness  CNS no focal neurological deficits normal sensory exam  Skin no rashes no nodules  Musculoskeletal no cyanosis no clubbing normal range of motion     Results Review:        Results from last 7 days   Lab Units 02/07/21  0641 02/06/21  0528 02/05/21  2226 02/05/21  0916   SODIUM mmol/L 139 139  --  140   POTASSIUM mmol/L 4.4 4.8 5.0 3.4*   CHLORIDE mmol/L 113* 113*  --  110*   CO2 mmol/L 18.0* 17.4*  --  18.3*   BUN mg/dL 17 16  --  16   CREATININE mg/dL 0.97 0.92  --  0.98   GLUCOSE mg/dL 96 93  --  103*   CALCIUM mg/dL 8.1* 8.0*  --  8.0*     Results from last 7 days   Lab Units 02/03/21  0906 02/03/21  0400   TROPONIN T ng/mL 0.029 0.029     Results from last 7 days   Lab Units 02/07/21  0641 02/06/21  0528 02/05/21  0916   WBC 10*3/mm3 14.96* 17.46* 16.56*   HEMOGLOBIN g/dL 9.2* 9.8* 10.2*   HEMATOCRIT % 29.9* 30.6* 32.5*   PLATELETS 10*3/mm3 361 363 364             Results from last 7 days   Lab Units 02/05/21  1655   MAGNESIUM mg/dL 2.2         Results from last 7 days   Lab Units 02/01/21  2348   PH, ARTERIAL pH units 7.429   PO2 ART mm Hg 74.9*   PCO2, ARTERIAL mm Hg 27.5*   HCO3 ART mmol/L 18.2*       I reviewed the patient's new clinical results.  I personally viewed and interpreted the patient's CXR        Medication Review:   acetaminophen, 1,000 mg, Oral, TID  amiodarone, 200 mg, Oral, Q24H  atorvastatin, 20 mg, Oral, Daily  ferrous sulfate, 325 mg, Oral, Daily With Breakfast  gadobenate dimeglumine, 11 mL, Intravenous, Once in imaging  lidocaine, 1 patch, Transdermal, Q24H  losartan, 25 mg, Oral, Daily  melatonin, 3 mg, Oral, Nightly  metoprolol tartrate, 100 mg, Oral, BID  multivitamin with minerals, 1 tablet, Oral, Daily  risperiDONE, 0.5 mg, Oral, BID  sodium chloride, 10 mL, Intravenous, Q12H  vancomycin, 125 mg, Oral,  Q6H        hold, 1 each  Pharmacy Consult,         ASSESSMENT:   Left-sided pleural effusion status post thoracentesis exudative/hemothorax  Rib fractures  Left-sided infiltrate  Metabolic acidosis  A. fib RVR  C. difficile  Encephalopathy      PLAN:  Status post thoracentesis with drainage of small hemothorax.  Pulmonary infiltrate suspected due to effusion trauma atelectasis  P.o. vancomycin for C. difficile  Currently on room air  PT OT as much as patient can tolerate  Watch for pneumonia developing.      Connie Soto MD  02/07/21  14:10 EST

## 2021-02-07 NOTE — CONSULTS
Patient is sleeping soundly today and does not awaken for interview.  The chart indicates that the patient continues to exhibit confusion, disorientation and continues to attempt to get out of bed on her own.  We will increase Risperdal to 0.5 mg 3 times daily.

## 2021-02-07 NOTE — PROGRESS NOTES
Name: Gita Wharton ADMIT: 2021   : 1935  PCP: Deanna Ortega APRN    MRN: [id number] LOS: 16 days   AGE/SEX: 85 y.o. female  ROOM: [facility]     Subjective   Subjective   No complaint except weakness today..  No headache and no focal neurological symptoms.  She has denied any chest pain.  No shortness of breath.  No cough.  No hemoptysis.  No fever or chills.  No PND or orthopnea.  No ankle edema.        Review of Systems    GI.  Diarrhea resolved.. Soft stool without fresh bright blood per rectum or melena..  No abdominal pain or nausea or vomiting.  .  No dysuria or hematuria.     Objective   Objective   Vital Signs  Temp:  [96.8 °F (36 °C)-98 °F (36.7 °C)] 97.4 °F (36.3 °C)  Heart Rate:  [62-87] 86  Resp:  [16-18] 16  BP: (121-158)/(35-69) 121/69  SpO2:  [97 %-99 %] 99 %  on   ;   Device (Oxygen Therapy): room air    Intake/Output Summary (Last 24 hours) at 2021 1243  Last data filed at 2021 0120  Gross per 24 hour   Intake 360 ml   Output --   Net 360 ml     Body mass index is 21.97 kg/m².      21  0711 21  1504   Weight: 54 kg (119 lb) 54.5 kg (120 lb 2.4 oz)     Physical Exam    General.  Elderly female.  Alert and oriented x2.  No hallucination. In no apparent distress.    Eyes.  Pupils equal round and reactive intact extraocular musculature positive pallor.  No jaundice normal conjunctivae and lids  ENT.  Moist mucous membrane  Neck.  Supple no JVD no lymphadenopathy no thyromegaly  Cardiovascular.  Regular rate and rhythm grade 2 systolic murmur  Chest.  Poor to auscultation bilaterally with left-sided rhonchi.  Abdomen.  Soft lax no tenderness no organomegaly no guarding or rebound  Extremities.  No clubbing cyanosis or edema  CNS.  No acute focal neurological deficits     Results Review:      Results from last 7 days   Lab Units 21  0641 21  0528 21  2226 21  0916 21  0423 21  0400 21  0559   SODIUM mmol/L 139 139  --   140 137 137 137   POTASSIUM mmol/L 4.4 4.8 5.0 3.4* 4.4 3.0* 3.9   CHLORIDE mmol/L 113* 113*  --  110* 109* 106 108*   CO2 mmol/L 18.0* 17.4*  --  18.3* 17.7* 18.9* 19.3*   BUN mg/dL 17 16  --  16 21 23 23   CREATININE mg/dL 0.97 0.92  --  0.98 1.01* 0.99 1.16*   GLUCOSE mg/dL 96 93  --  103* 88 82 80   CALCIUM mg/dL 8.1* 8.0*  --  8.0* 7.9* 8.2* 8.3*   AST (SGOT) U/L  --  33*  --   --  41*  --  25   ALT (SGPT) U/L  --  26  --   --  25  --  14     Estimated Creatinine Clearance: 37.1 mL/min (by C-G formula based on SCr of 0.97 mg/dL).          Results from last 7 days   Lab Units 02/03/21  0906 02/03/21  0400   TROPONIN T ng/mL 0.029 0.029         Results from last 7 days   Lab Units 02/03/21  0400 02/02/21  0023   TSH uIU/mL 2.380 3.180     Results from last 7 days   Lab Units 02/05/21  1655 02/03/21  0400   MAGNESIUM mg/dL 2.2 2.1           Invalid input(s): LDLCALC  Results from last 7 days   Lab Units 02/07/21  0641 02/06/21  0528 02/05/21  0916 02/04/21  0423 02/03/21  0400 02/02/21  0559   WBC 10*3/mm3 14.96* 17.46* 16.56* 18.48* 22.65* 21.99*   HEMOGLOBIN g/dL 9.2* 9.8* 10.2* 9.0* 9.2* 8.7*   HEMATOCRIT % 29.9* 30.6* 32.5* 28.3* 28.5* 27.2*   PLATELETS 10*3/mm3 361 363 364 312 346 335   MCV fL 82.4 82.5 82.7 83.7 82.1 83.4   MCH pg 25.3* 26.4* 26.0* 26.6 26.5* 26.7   MCHC g/dL 30.8* 32.0 31.4* 31.8 32.3 32.0   RDW % 13.3 13.3 13.3 13.7 13.6 13.4   RDW-SD fl 39.7 39.5 39.5 41.4 40.1 40.1   MPV fL 10.8 10.8 10.4 11.0 11.3 11.1   NEUTROPHIL % % 85.6* 86.2* 87.5* 87.2* 90.6* 87.3*   LYMPHOCYTE % % 5.4* 5.0* 4.3* 6.0* 3.4* 5.0*   MONOCYTES % % 6.1 6.2 5.4 4.1* 4.6* 6.0   EOSINOPHIL % % 0.5 0.5 0.5 0.7 0.3 0.2*   BASOPHIL % % 0.2 0.1 0.2 0.1 0.1 0.2   IMM GRAN % % 2.2* 2.0* 2.1* 1.9* 1.0* 1.3*   NEUTROS ABS 10*3/mm3 12.80* 15.06* 14.48* 16.12* 20.53* 19.17*   LYMPHS ABS 10*3/mm3 0.81 0.87 0.71 1.10 0.77 1.11   MONOS ABS 10*3/mm3 0.92* 1.08* 0.90 0.75 1.04* 1.32*   EOS ABS 10*3/mm3 0.07 0.08 0.09 0.13 0.07 0.05      BASOS ABS 10*3/mm3 0.03 0.02 0.04 0.02 0.02 0.05   IMMATURE GRANS (ABS) 10*3/mm3 0.33* 0.35* 0.34* 0.36* 0.22* 0.29*   NRBC /100 WBC 0.0 0.1 0.1 0.1 0.0 0.0         Results from last 7 days   Lab Units 02/01/21  2348   PH, ARTERIAL pH units 7.429   PO2 ART mm Hg 74.9*   PCO2, ARTERIAL mm Hg 27.5*   HCO3 ART mmol/L 18.2*     Results from last 7 days   Lab Units 02/06/21  0528 02/05/21  0916 02/03/21  1452 02/02/21  0559   PROCALCITONIN ng/mL 94.88* 95.24* 37.84* 63.69*   LACTATE mmol/L  --  1.1 1.8  --          Results from last 7 days   Lab Units 02/02/21  0023   AMMONIA umol/L 19     Results from last 7 days   Lab Units 02/02/21  1345 02/02/21  0023 02/02/21  0022   BLOODCX   --  No growth at 5 days No growth at 5 days   BODYFLDCX  No growth at 5 days  --   --                    Imaging:  Imaging Results (Last 24 Hours)     ** No results found for the last 24 hours. **             I reviewed the patient's new clinical results / labs / tests / procedures      Assessment/Plan     Active Hospital Problems    Diagnosis  POA   • **Closed fracture of multiple ribs of left side [S22.42XA]  Yes   • C. difficile colitis [A04.72]  No   • Atrial fibrillation (CMS/HCC) [I48.91]  No   • AMS (altered mental status) [R41.82]  Yes   • Acute respiratory failure with hypoxia (CMS/HCC) [J96.01]  Yes   • Fall from standing [W19.XXXA]  Yes   • Edema leg, right [R60.0]  Yes   • Closed compression fracture of body of L1 vertebra (CMS/HCC) [S32.010A]  Yes   • Pleural effusion on left [J90]  Yes   • Leukocytosis [D72.829]  Yes   • S/P TAVR (transcatheter aortic valve replacement) [Z95.2]  Not Applicable   • PVD (peripheral vascular disease) with claudication (CMS/HCC) [I73.9]  Yes   • Chronic diastolic congestive heart failure (CMS/HCC) [I50.32]  Yes   • Anemia [D64.9]  Yes   • History of right MCA stroke [Z86.73]  Not Applicable   • Fall at home, initial encounter [W19.XXXA, Y92.009]  Not Applicable   • Essential hypertension [I10]  Yes       Resolved Hospital Problems   No resolved problems to display.       · Multiple left rib fractures leading to exudative left pleural effusion/left-sided pneumonia and acute hypoxemic respiratory failure.  There is pneumonia by chest x-ray associated with leukocytosis significant increase in the procalcitonin.  Status post left sided thoracocentesis.  Pleural fluid is exudative.  Culture of the pleural fluid is negative. Repeat chest x-ray with improvement..  She is not a candidate for surgical intervention.  Respiratory status appears to be stable.  Off oxygen at this time and maintaining her on normal O2 sats. Negative blood cultures till now.  pulmonologist believes that the lung infiltrate is mostly secondary to the trauma and pneumonia unlikely.  Zosyn DC'd especially in view of C. difficile.  Improved respiratory status.  · C. difficile colitis.  Mostly secondary to antibiotic given in late January for her UTI.  On p.o. vancomycin.  Benign GI examination.  Clinically improving.  Will monitor.  Improving leukocytosis.  ·  Mental status changes in a patient with a history of possible baseline dementia and a history of CVA.  Mental status changes mostly metabolic encephalopathy because of the infection.  This has greatly improved...  No acute focal neurological deficits.   Initial CT scan is negative.    MRI showed no acute abnormalities but evidence of old infarctions.  Did not tolerate Zyprexa as it caused extreme sedation.  Doing well on Risperdal. No hypercapnia.  .  .  Normal TSH/B12/folic acid/RPR/cortisol.   Not receiving narcotics..    ·   History of TAVR/diastolic congestive heart failure/hypertension/new onset rapid A. fib... Resolved volume depletion with holding diuretics.  There is no evidence of angina or congestive heart failure clinically.  Troponins are negative.  Magnesium is normal.  Hypokalemia resolved.. S/p spontaneous cardioversion on amiodarone started by cardiology.  Blood pressure  control.  Cardiology decreased amiodarone and added losartan to metoprolol..  Norvasc DC'd secondary to low blood pressure.    Poor anticoagulation candidate secondary to frequent falls and recent rib fractures.  · UTI.  Status post antibiotic treatment.    · L1 fracture.  Appears asymptomatic without radiculopathy or myelopathy.  · Acute kidney failure/hypokalemia..  Mostly secondary to prerenal azotemia because of dehydration.  Patient is euvolemic.  Resolved acute renal failure.    Resolved hypokalemia with substitution.  Normal magnesium.      · Anemia.  Mostly secondary to rib fractures.    Stable hemoglobin. Transfuse if hemoglobin less than 7.   · Leukocytosis/elevated procalcitonin..  Secondary to C. difficile colitis.. Leukocytosis improving.  Procalcitonin  pending today.  Currently on p.o. vancomycin.  Letter CBC and procalcitonin.  · DNR status.  Discussed with the daughter.  Patient has previous wishes of a DO NOT RESUSCITATE.    This was implemented.      Discussed with the patient/daughter.  Disposition.  Anticipate discharge tomorrow once a skilled bed is available.    Marie Conrad MD  [facility]  02/07/21  12:43 EST

## 2021-02-08 VITALS
DIASTOLIC BLOOD PRESSURE: 80 MMHG | OXYGEN SATURATION: 96 % | SYSTOLIC BLOOD PRESSURE: 177 MMHG | HEIGHT: 62 IN | HEART RATE: 72 BPM | TEMPERATURE: 96.2 F | WEIGHT: 120.15 LBS | BODY MASS INDEX: 22.11 KG/M2 | RESPIRATION RATE: 16 BRPM

## 2021-02-08 PROBLEM — G93.41 METABOLIC ENCEPHALOPATHY: Status: ACTIVE | Noted: 2021-02-08

## 2021-02-08 PROBLEM — R41.82 AMS (ALTERED MENTAL STATUS): Status: RESOLVED | Noted: 2021-01-22 | Resolved: 2021-02-08

## 2021-02-08 LAB
ALBUMIN SERPL-MCNC: 2.1 G/DL (ref 3.5–5.2)
ALBUMIN/GLOB SERPL: 0.6 G/DL
ALP SERPL-CCNC: 170 U/L (ref 39–117)
ALT SERPL W P-5'-P-CCNC: 17 U/L (ref 1–33)
ANION GAP SERPL CALCULATED.3IONS-SCNC: 10.5 MMOL/L (ref 5–15)
AST SERPL-CCNC: 19 U/L (ref 1–32)
B PARAPERT DNA SPEC QL NAA+PROBE: NOT DETECTED
B PERT DNA SPEC QL NAA+PROBE: NOT DETECTED
BASOPHILS # BLD AUTO: 0.02 10*3/MM3 (ref 0–0.2)
BASOPHILS NFR BLD AUTO: 0.2 % (ref 0–1.5)
BILIRUB SERPL-MCNC: <0.2 MG/DL (ref 0–1.2)
BUN SERPL-MCNC: 18 MG/DL (ref 8–23)
BUN/CREAT SERPL: 18.8 (ref 7–25)
C PNEUM DNA NPH QL NAA+NON-PROBE: NOT DETECTED
CALCIUM SPEC-SCNC: 7.9 MG/DL (ref 8.6–10.5)
CHLORIDE SERPL-SCNC: 111 MMOL/L (ref 98–107)
CO2 SERPL-SCNC: 17.5 MMOL/L (ref 22–29)
CREAT SERPL-MCNC: 0.96 MG/DL (ref 0.57–1)
DEPRECATED RDW RBC AUTO: 41.2 FL (ref 37–54)
EOSINOPHIL # BLD AUTO: 0.04 10*3/MM3 (ref 0–0.4)
EOSINOPHIL NFR BLD AUTO: 0.3 % (ref 0.3–6.2)
ERYTHROCYTE [DISTWIDTH] IN BLOOD BY AUTOMATED COUNT: 13.6 % (ref 12.3–15.4)
FLUAV SUBTYP SPEC NAA+PROBE: NOT DETECTED
FLUBV RNA ISLT QL NAA+PROBE: NOT DETECTED
GFR SERPL CREATININE-BSD FRML MDRD: 55 ML/MIN/1.73
GLOBULIN UR ELPH-MCNC: 3.4 GM/DL
GLUCOSE SERPL-MCNC: 76 MG/DL (ref 65–99)
HADV DNA SPEC NAA+PROBE: NOT DETECTED
HCOV 229E RNA SPEC QL NAA+PROBE: NOT DETECTED
HCOV HKU1 RNA SPEC QL NAA+PROBE: NOT DETECTED
HCOV NL63 RNA SPEC QL NAA+PROBE: NOT DETECTED
HCOV OC43 RNA SPEC QL NAA+PROBE: NOT DETECTED
HCT VFR BLD AUTO: 29.8 % (ref 34–46.6)
HGB BLD-MCNC: 9.4 G/DL (ref 12–15.9)
HMPV RNA NPH QL NAA+NON-PROBE: NOT DETECTED
HPIV1 RNA SPEC QL NAA+PROBE: NOT DETECTED
HPIV2 RNA SPEC QL NAA+PROBE: NOT DETECTED
HPIV3 RNA NPH QL NAA+PROBE: NOT DETECTED
HPIV4 P GENE NPH QL NAA+PROBE: NOT DETECTED
IMM GRANULOCYTES # BLD AUTO: 0.28 10*3/MM3 (ref 0–0.05)
IMM GRANULOCYTES NFR BLD AUTO: 2.3 % (ref 0–0.5)
LYMPHOCYTES # BLD AUTO: 0.83 10*3/MM3 (ref 0.7–3.1)
LYMPHOCYTES NFR BLD AUTO: 7 % (ref 19.6–45.3)
M PNEUMO IGG SER IA-ACNC: NOT DETECTED
MCH RBC QN AUTO: 26.1 PG (ref 26.6–33)
MCHC RBC AUTO-ENTMCNC: 31.5 G/DL (ref 31.5–35.7)
MCV RBC AUTO: 82.8 FL (ref 79–97)
MONOCYTES # BLD AUTO: 0.67 10*3/MM3 (ref 0.1–0.9)
MONOCYTES NFR BLD AUTO: 5.6 % (ref 5–12)
NEUTROPHILS NFR BLD AUTO: 10.1 10*3/MM3 (ref 1.7–7)
NEUTROPHILS NFR BLD AUTO: 84.6 % (ref 42.7–76)
NRBC BLD AUTO-RTO: 0 /100 WBC (ref 0–0.2)
PLATELET # BLD AUTO: 357 10*3/MM3 (ref 140–450)
PMV BLD AUTO: 10.9 FL (ref 6–12)
POTASSIUM SERPL-SCNC: 4.4 MMOL/L (ref 3.5–5.2)
PROCALCITONIN SERPL-MCNC: 133.6 NG/ML (ref 0–0.25)
PROT SERPL-MCNC: 5.5 G/DL (ref 6–8.5)
QT INTERVAL: 381 MS
RBC # BLD AUTO: 3.6 10*6/MM3 (ref 3.77–5.28)
RHINOVIRUS RNA SPEC NAA+PROBE: NOT DETECTED
RSV RNA NPH QL NAA+NON-PROBE: NOT DETECTED
SARS-COV-2 RNA NPH QL NAA+NON-PROBE: NOT DETECTED
SODIUM SERPL-SCNC: 139 MMOL/L (ref 136–145)
WBC # BLD AUTO: 11.94 10*3/MM3 (ref 3.4–10.8)

## 2021-02-08 PROCEDURE — 94799 UNLISTED PULMONARY SVC/PX: CPT

## 2021-02-08 PROCEDURE — 93010 ELECTROCARDIOGRAM REPORT: CPT | Performed by: INTERNAL MEDICINE

## 2021-02-08 PROCEDURE — 97110 THERAPEUTIC EXERCISES: CPT

## 2021-02-08 PROCEDURE — 80053 COMPREHEN METABOLIC PANEL: CPT | Performed by: INTERNAL MEDICINE

## 2021-02-08 PROCEDURE — 99231 SBSQ HOSP IP/OBS SF/LOW 25: CPT | Performed by: INTERNAL MEDICINE

## 2021-02-08 PROCEDURE — 0202U NFCT DS 22 TRGT SARS-COV-2: CPT | Performed by: INTERNAL MEDICINE

## 2021-02-08 PROCEDURE — 93005 ELECTROCARDIOGRAM TRACING: CPT | Performed by: INTERNAL MEDICINE

## 2021-02-08 PROCEDURE — 84145 PROCALCITONIN (PCT): CPT | Performed by: INTERNAL MEDICINE

## 2021-02-08 PROCEDURE — 85025 COMPLETE CBC W/AUTO DIFF WBC: CPT | Performed by: INTERNAL MEDICINE

## 2021-02-08 RX ORDER — ACETAMINOPHEN 500 MG
1000 TABLET ORAL 2 TIMES DAILY
Start: 2021-02-08

## 2021-02-08 RX ORDER — MULTIPLE VITAMINS W/ MINERALS TAB 9MG-400MCG
1 TAB ORAL DAILY
Start: 2021-02-09

## 2021-02-08 RX ORDER — LOSARTAN POTASSIUM 25 MG/1
25 TABLET ORAL DAILY
Start: 2021-02-09

## 2021-02-08 RX ORDER — AMIODARONE HYDROCHLORIDE 200 MG/1
200 TABLET ORAL
Start: 2021-02-09

## 2021-02-08 RX ORDER — LANOLIN ALCOHOL/MO/W.PET/CERES
3 CREAM (GRAM) TOPICAL NIGHTLY
Qty: 30 TABLET
Start: 2021-02-08

## 2021-02-08 RX ORDER — CYCLOBENZAPRINE HCL 5 MG
5 TABLET ORAL 2 TIMES DAILY PRN
Start: 2021-02-08

## 2021-02-08 RX ORDER — RISPERIDONE 0.5 MG/1
0.5 TABLET ORAL 3 TIMES DAILY
Start: 2021-02-08

## 2021-02-08 RX ORDER — METOPROLOL TARTRATE 100 MG/1
100 TABLET ORAL 2 TIMES DAILY
Start: 2021-02-08

## 2021-02-08 RX ADMIN — FERROUS SULFATE TAB 325 MG (65 MG ELEMENTAL FE) 325 MG: 325 (65 FE) TAB at 09:11

## 2021-02-08 RX ADMIN — ATORVASTATIN CALCIUM 20 MG: 20 TABLET, FILM COATED ORAL at 09:12

## 2021-02-08 RX ADMIN — SODIUM CHLORIDE, PRESERVATIVE FREE 10 ML: 5 INJECTION INTRAVENOUS at 09:12

## 2021-02-08 RX ADMIN — MULTIPLE VITAMINS W/ MINERALS TAB 1 TABLET: TAB at 09:11

## 2021-02-08 RX ADMIN — ACETAMINOPHEN 1000 MG: 500 TABLET, FILM COATED ORAL at 16:22

## 2021-02-08 RX ADMIN — VANCOMYCIN 125 MG: KIT at 02:11

## 2021-02-08 RX ADMIN — LIDOCAINE 1 PATCH: 50 PATCH CUTANEOUS at 09:10

## 2021-02-08 RX ADMIN — RISPERIDONE 0.5 MG: 0.5 TABLET ORAL at 16:22

## 2021-02-08 RX ADMIN — RISPERIDONE 0.5 MG: 0.5 TABLET ORAL at 09:19

## 2021-02-08 RX ADMIN — ACETAMINOPHEN 1000 MG: 500 TABLET, FILM COATED ORAL at 09:08

## 2021-02-08 RX ADMIN — VANCOMYCIN 125 MG: KIT at 14:13

## 2021-02-08 RX ADMIN — METOPROLOL TARTRATE 100 MG: 50 TABLET, FILM COATED ORAL at 09:11

## 2021-02-08 RX ADMIN — LOSARTAN POTASSIUM 25 MG: 25 TABLET, FILM COATED ORAL at 09:11

## 2021-02-08 RX ADMIN — VANCOMYCIN 125 MG: KIT at 09:11

## 2021-02-08 RX ADMIN — AMIODARONE HYDROCHLORIDE 200 MG: 200 TABLET ORAL at 09:11

## 2021-02-08 NOTE — PLAN OF CARE
Goal Outcome Evaluation:  Plan of Care Reviewed With: patient  Progress: improving  Outcome Summary: VSS. contact spore isolation maintained r/t CDIFF. denies pain. No s/sx of distress or soa. Confusion with delusions at times. redirectable at times. Possible discharge to nursing home today. will continue to monitor

## 2021-02-08 NOTE — DISCHARGE SUMMARY
[facility] Medicine Services  DISCHARGE SUMMARY    Patient Name: Gita Wharton  : 1935  MRN: [id number]    Date of Admission: 2021  5:43 AM  Date of Discharge:  2021  Primary Care Physician: Deanna Ortega APRN    Consults     Date and Time Order Name Status Description    2/3/2021 0823 Inpatient Cardiology Consult Completed     2021 1946 Inpatient Pulmonology Consult Completed     2021 1711 Inpatient Psychiatrist Consult Completed     2021 0917 Inpatient Psychiatrist Consult Completed     2021 1228 Inpatient Thoracic Surgery Consult Completed     2021 0926 [facility] (on-call MD unless specified) Details Completed           Hospital Course     Presenting Problem:   Contusion of flank, initial encounter [S30.1XXA]  Fall from standing, initial encounter [W19.XXXA]  Closed fracture of multiple ribs of left side, initial encounter [S22.42XA]  Compression fracture of L1 vertebra, initial encounter (CMS/HCC) [S32.010A]  Fall from standing, initial encounter [W19.XXXA]    Active Hospital Problems    Diagnosis  POA   • **Closed fracture of multiple ribs of left side [S22.42XA]  Yes   • Metabolic encephalopathy [G93.41]  Yes   • C. difficile colitis [A04.72]  No   • Atrial fibrillation (CMS/HCC) [I48.91]  No   • Acute respiratory failure with hypoxia (CMS/HCC) [J96.01]  Yes   • Fall from standing [W19.XXXA]  Yes   • Edema leg, right [R60.0]  Yes   • Closed compression fracture of body of L1 vertebra (CMS/HCC) [S32.010A]  Yes   • Pleural effusion on left [J90]  Yes   • Leukocytosis [D72.829]  Yes   • S/P TAVR (transcatheter aortic valve replacement) [Z95.2]  Not Applicable   • PVD (peripheral vascular disease) with claudication (CMS/HCC) [I73.9]  Yes   • Chronic diastolic congestive heart failure (CMS/HCC) [I50.32]  Yes   • Anemia [D64.9]  Yes   • History of right MCA stroke [Z86.73]  Not Applicable   • Fall at home, initial encounter [W19.XXXA, Y92.009]  Not Applicable   •  Essential hypertension [I10]  Yes      Resolved Hospital Problems    Diagnosis Date Resolved POA   • AMS (altered mental status) [R41.82] 02/08/2021 Yes          Hospital Course:  Gita Wharton is a 85 y.o. female  who initially presented on 1/21/2021 to the emergency room after a fall and closed rib fracture of multiple ribs on the left.  She also had L1 fracture, metabolic encephalopathy, she was later found to have E. coli urinary tract infection with acute kidney injury.  She had electrolyte disturbances.  She also had many chronic medical problems including chronic diastolic heart failure and history of TAVR that complicated her medical management.  She required left sided thoracentesis.  Her respiratory status stabilized and she is now off of oxygen.  She subsequently had declined and was found to have C. difficile colitis and has been on oral vancomycin treatment.  Psychiatry has followed and assisted with management of her metabolic encephalopathy on top of possible underlying baseline dementia and history of stroke.  Psychiatry is recommending low-dose Risperdal 3 times daily.  Patient is calm on this medication and appears to be tolerating well.    Discussion/plan for today:  Appears calm and stable during my examination.  Electrolytes reviewed and renal function appears at baseline.  Nursing reports significant improvement in behavior on current psychiatric medications and patient is resting calmly in bed.  Continue oral vancomycin treatment of C. difficile colitis.  Significantly elevated procalcitonin is trending down and is thought to be due to C. difficile colitis.  Diarrhea reportedly improved.  Continue current cardiac medications per cardiology on amiodarone and metoprolol.    Receiving supportive care and symptom treatment today.  Restart aspirin at discharge and patient will follow up in cardiology clinic to determine whether or not they want to restart Plavix at a later date.  Cardiology  does not think she is a candidate for full anticoagulation due to her falls.  Patient agreement with plan to transfer per my conversation.    At the time of discharge patient was told to take all medications as prescribed, keep all follow-up appointments, and call their doctor or return to the hospital with any worsening or concerning symptoms.    Please note that dragon voice recognition software was used to create this note         Day of Discharge     HPI:   Poor historian.  Patient sitting up in bed and says she plans to leave the hospital today.  She is calm during my examination.  She is not oriented but is redirectable during my conversation.    ROS:    No current fevers or chills  No current shortness of breath or cough  No current nausea, vomiting, or diarrhea  No current chest pain or palpitations    Vital Signs:   Temp:  [96.9 °F (36.1 °C)-97.5 °F (36.4 °C)] 97.2 °F (36.2 °C)  Heart Rate:  [52-89] 52  Resp:  [16] 16  BP: (132-194)/(51-82) 132/51     Physical Exam:  Constitutional:Awake, alert  HENT: NCAT, mucous membranes moist, neck supple  Respiratory: Clear to auscultation bilaterally, respiratory effort normal, nonlabored breathing on room air oxygen  Cardiovascular: RRR, normal radial pulses  Gastrointestinal: Positive bowel sounds, soft, nontender, nondistended  Musculoskeletal: Elderly and somewhat chronically debilitated in appearance, no lower extremity edema, BMI 21  Psychiatric: Confused but calm affect, cooperative, conversational  Neurologic: No slurred speech or facial droop, follows commands  Skin: No rashes or jaundice, warm       Pertinent  and/or Most Recent Results     Results from last 7 days   Lab Units 02/08/21  0456 02/07/21  0641 02/06/21  0528 02/05/21  2226 02/05/21  0916 02/04/21  0423 02/03/21  0400 02/02/21  0559   WBC 10*3/mm3 11.94* 14.96* 17.46*  --  16.56* 18.48* 22.65* 21.99*   HEMOGLOBIN g/dL 9.4* 9.2* 9.8*  --  10.2* 9.0* 9.2* 8.7*   HEMATOCRIT % 29.8* 29.9* 30.6*  --   32.5* 28.3* 28.5* 27.2*   PLATELETS 10*3/mm3 357 361 363  --  364 312 346 335   SODIUM mmol/L 139 139 139  --  140 137 137 137   POTASSIUM mmol/L 4.4 4.4 4.8 5.0 3.4* 4.4 3.0* 3.9   CHLORIDE mmol/L 111* 113* 113*  --  110* 109* 106 108*   CO2 mmol/L 17.5* 18.0* 17.4*  --  18.3* 17.7* 18.9* 19.3*   BUN mg/dL 18 17 16  --  16 21 23 23   CREATININE mg/dL 0.96 0.97 0.92  --  0.98 1.01* 0.99 1.16*   GLUCOSE mg/dL 76 96 93  --  103* 88 82 80   CALCIUM mg/dL 7.9* 8.1* 8.0*  --  8.0* 7.9* 8.2* 8.3*     Results from last 7 days   Lab Units 02/08/21  0456 02/06/21  0528 02/04/21  0423 02/02/21  0559   BILIRUBIN mg/dL <0.2 <0.2 0.2 0.2   ALK PHOS U/L 170* 172* 143* 115   ALT (SGPT) U/L 17 26 25 14   AST (SGOT) U/L 19 33* 41* 25           Invalid input(s): TG, LDLCALC, LDLREALC  Results from last 7 days   Lab Units 02/08/21  0456 02/07/21  1246 02/06/21  0528 02/05/21  0916 02/03/21  1452 02/03/21  0906 02/03/21  0400 02/02/21  0559  02/02/21  0023   TSH uIU/mL  --   --   --   --   --   --  2.380  --   --  3.180   CORTISOL mcg/dL  --   --   --   --   --   --   --  13.18  --   --    TROPONIN T ng/mL  --   --   --   --   --  0.029 0.029  --   --   --    PROCALCITONIN ng/mL 133.60* 197.52* 94.88* 95.24* 37.84*  --   --  63.69*   < >  --    LACTATE mmol/L  --   --   --  1.1 1.8  --   --   --   --   --     < > = values in this interval not displayed.       Brief Urine Lab Results  (Last result in the past 365 days)      Color   Clarity   Blood   Leuk Est   Nitrite   Protein   CREAT   Urine HCG        01/24/21 1750 Yellow Turbid Moderate (2+) Moderate (2+) Negative 100 mg/dL (2+)               Microbiology Results Abnormal     Procedure Component Value - Date/Time    Body Fluid Culture - Body Fluid, Pleural Cavity [[id number]] Collected: 02/02/21 1345    Lab Status: Final result Specimen: Body Fluid from Pleural Cavity Updated: 02/07/21 0818     Body Fluid Culture No growth at 5 days     Gram Stain Many (4+) WBCs seen      No  organisms seen    Anaerobic Culture - Pleural Fluid, Pleural Cavity [[id number]] Collected: 02/02/21 1345    Lab Status: Final result Specimen: Pleural Fluid from Pleural Cavity Updated: 02/07/21 0630     Anaerobic Culture No anaerobes isolated at 5 days    Blood Culture - Blood, Arm, Left [[id number]] Collected: 02/02/21 0022    Lab Status: Final result Specimen: Blood from Arm, Left Updated: 02/07/21 0045     Blood Culture No growth at 5 days    Blood Culture - Blood, Hand, Left [[id number]] Collected: 02/02/21 0023    Lab Status: Final result Specimen: Blood from Hand, Left Updated: 02/07/21 0045     Blood Culture No growth at 5 days    Clostridium Difficile Toxin - Stool, Per Rectum [[id number]]  (Abnormal) Collected: 02/03/21 2330    Lab Status: Final result Specimen: Stool from Per Rectum Updated: 02/04/21 0114    Narrative:      The following orders were created for panel order Clostridium Difficile Toxin - Stool, Per Rectum.  Procedure                               Abnormality         Status                     ---------                               -----------         ------                     Clostridium Difficile To...[[id number]]  Abnormal            Final result                 Please view results for these tests on the individual orders.    Clostridium Difficile Toxin, PCR - Stool, Per Rectum [[id number]]  (Abnormal) Collected: 02/03/21 2330    Lab Status: Final result Specimen: Stool from Per Rectum Updated: 02/04/21 0114     C. Difficile Toxins by PCR Positive    COVID PRE-OP / PRE-PROCEDURE SCREENING ORDER (NO ISOLATION) - Swab, Nasopharynx [[id number]]  (Normal) Collected: 01/27/21 1308    Lab Status: Final result Specimen: Swab from Nasopharynx Updated: 01/27/21 1357    Narrative:      The following orders were created for panel order COVID PRE-OP / PRE-PROCEDURE SCREENING ORDER (NO ISOLATION) - Swab, Nasopharynx.  Procedure                               Abnormality         Status                        ---------                               -----------         ------                     COVID-19,[facility]...[[id number]]  Normal              Final result                 Please view results for these tests on the individual orders.    COVID-19,[facility] CEPHEID, NP SWAB IN TRANSPORT MEDIA 8-12 HR TAT - Swab, Nasopharynx [[id number]]  (Normal) Collected: 01/27/21 1308    Lab Status: Final result Specimen: Swab from Nasopharynx Updated: 01/27/21 1357     COVID19 Not Detected    Narrative:      Fact sheet for providers: https://www.fda.gov/media/640637/download     Fact sheet for patients: https://www.fda.gov/media/547081/download    Urine Culture - Urine, Urine, Catheter In/Out [[id number]]  (Abnormal)  (Susceptibility) Collected: 01/24/21 1750    Lab Status: Final result Specimen: Urine, Catheter In/Out Updated: 01/26/21 0734     Urine Culture >100,000 CFU/mL Escherichia coli    Susceptibility      Escherichia coli     RIN     Ampicillin Susceptible     Ampicillin + Sulbactam Susceptible     Cefazolin Susceptible     Cefepime Susceptible     Ceftazidime Susceptible     Ceftriaxone Susceptible     Gentamicin Susceptible     Levofloxacin Susceptible     Nitrofurantoin Susceptible     Piperacillin + Tazobactam Susceptible     Tetracycline Susceptible     Trimethoprim + Sulfamethoxazole Susceptible                    COVID PRE-OP / PRE-PROCEDURE SCREENING ORDER (NO ISOLATION) - Swab, Nasopharynx [[id number]]  (Normal) Collected: 01/21/21 1204    Lab Status: Final result Specimen: Swab from Nasopharynx Updated: 01/21/21 1654    Narrative:      The following orders were created for panel order COVID PRE-OP / PRE-PROCEDURE SCREENING ORDER (NO ISOLATION) - Swab, Nasopharynx.  Procedure                               Abnormality         Status                     ---------                               -----------         ------                     COVID-19,[facility] PANTHER,...[[id number]]  Normal              Final  result                 Please view results for these tests on the individual orders.    COVID-19,[facility]TIM[facility] PANTHER,[facility] IN-HOUSE, NP/OP SWAB IN UTM/VTM/SALINE TRANSPORT MEDIA,24 HR TAT - Swab, Nasopharynx [[id number]]  (Normal) Collected: 01/21/21 1204    Lab Status: Final result Specimen: Swab from Nasopharynx Updated: 01/21/21 1654     COVID19 Not Detected    Narrative:      Fact sheet for providers: https://www.fda.gov/media/530372/download     Fact sheet for patients: https://www.fda.gov/media/148228/download    Test performed by PCR.          Imaging Results (All)     Procedure Component Value Units Date/Time    MRI Brain Without Contrast [[id number]] Collected: 02/05/21 0647     Updated: 02/05/21 0848    Narrative:      MRI OF THE BRAIN WITHOUT CONTRAST 02/04/2021      CLINICAL HISTORY: Mental status change, history of right MCA stroke.     TECHNIQUE: Axial T1, FLAIR, fat-suppressed T2, axial diffusion and  gradient echo T2 and sagittal T1-weighted images were obtained of the  entire head.     COMPARISON: This is correlated to prior MRI of the head from [facility] on 12/08/2020, as well as an MRI of the brain on  07/24/2018.     FINDINGS: Back on MRI of the brain on 07/14/2018, the patient had  multiple small nodular acute infarcts in a row in the superior right  frontoparietal subcortical white matter, posterior right parietal cortex  in the watershed zone between the right anterior middle and right middle  and posterior cerebral artery territories. Currently there are multiple  small nodular 3-5 mm areas of encephalomalacia compatible with old  infarctions in the right frontoparietal subcortical white matter,  posterior right parietal lobe. There is also a 5 mm old infarct in the  posterior superior left frontal subcortical white matter extending into  the posterior left corona radiata region. There are patchy areas of T2  high signal in the periventricular and subcortical white matter of  the  cerebral hemispheres, consistent with mild-to-moderate small vessel  disease. There are also 2 separate subcentimeter old inferior lateral  left cerebellar infarcts in the left PICA territory, 10 x 3 mm oval  posterior medial right cerebellar infarct in right PICA territory. The  remainder of the brain parenchyma is normal in signal intensity.  Specifically, no diffusion weighted abnormality is seen with no acute  infarct identified. On the gradient echo T2 weighted images, no acute or  old blood breakdown products are seen intracranially. The ventricles are  normal in size. I see no mass effect and no midline shift and no  extra-axial fluid collections are identified. Paranasal sinuses are  clear. There is a small amount of fluid in the mastoid air cells  bilaterally. Good flow voids are demonstrated within the cerebral  vessels and in the dural venous sinuses.       Impression:      1. No acute intracranial abnormality seen with no acute infarct  identified.  2. There is mild-to-moderate small vessel disease in the cerebral white  matter and there are 3 separate old inferior and inferolateral left  cerebellar infarcts in the left PICA territory, and 10 x 3 mm old  posterior medial right cerebellar infarct in the right PICA territory.  Back on 07/24/2018, the patient had a row of multiple small nodular  infarcts in the superior right frontoparietal subcortical white matter  and these have evolved to multiple small 3-5 mm nodular areas of  encephalomalacia in the superior right frontoparietal subcortical white  matter in the watershed territory between the right anterior middle and  right middle posterior cerebral artery territories. There are also old  infarcts extending from the posterior superior frontal subcortical white  matter into the mid posterior corona radiata region bilaterally. There  is a 5 mm old lacunar infarct in the right posterior erin.  3. Partial opacification mastoid air cells with fluid  bilaterally.  4. The remainder of the MRI of the brain is normal, specifically no  acute infarct is seen. The etiology of the mental status change is not  established on this exam.     This report was finalized on 2/5/2021 8:45 AM by Dr. Kyle Prasad M.D.       XR Chest 1 View [[id number]] Collected: 02/03/21 0859     Updated: 02/03/21 0903    Narrative:      ONE VIEW PORTABLE UPRIGHT CHEST POST THORACENTESIS     HISTORY: Recent left thoracentesis for pleural effusion.     FINDINGS: The patient has had recent left thoracentesis and there is a  small residual left pleural effusion showing decrease in size since 2  days ago. There is no pneumothorax. The right lung remains well-expanded  and clear. There is mild cardiomegaly with an aortic valve stent in  place.     This report was finalized on 2/3/2021 9:00 AM by Dr. Erik Casanova M.D.       US Thoracentesis [[id number]] Collected: 02/02/21 1419    Specimen: Body Fluid Updated: 02/02/21 1423    Narrative:      ULTRASOUND-GUIDED LEFT THORACENTESIS.     HISTORY: Pleural effusion.     After signed informed consent was obtained the patient was prepped and  draped in the usual sterile fashion. Lidocaine was used for local  anesthesia.     Ultrasound guidance was used to place the thoracentesis catheter into  the left pleural fluid collection. 1100 mL of serosanguineous fluid was  removed. Sample was sent to the lab.     Confirmatory images were obtained.     Patient tolerated the procedure well with no complications.       Impression:      Ultrasound-guided left thoracentesis as described.        This report was finalized on 2/2/2021 2:20 PM by Dr. Quentin Manuel M.D.       XR Chest 1 View [[id number]] Collected: 02/01/21 1935     Updated: 02/01/21 1942    Narrative:      ONE VIEW PORTABLE CHEST AT 7:27 PM     HISTORY: Confusion. Left basilar atelectasis and pleural effusion on  previous chest x-ray.     FINDINGS: There appears to be an enlarging left pleural  effusion  layering posteriorly and further increased density at the left base when  compared to the study of 01/22/2021. The findings are consistent with  enlarging left pleural effusion and probable worsening left lower lobe  pneumonia. The right lung remains clear and the heart remains mildly  enlarged.     This report was called directly to the nursing staff on 5 E. at the time  of the dictation.     This report was finalized on 2/1/2021 7:39 PM by Dr. Erik Casanova M.D.       CT Head Without Contrast [[id number]] Collected: 01/24/21 1431     Updated: 01/24/21 1436    Narrative:      CT SCAN OF THE BRAIN WITHOUT CONTRAST     HISTORY: Confusion.     The CT scan was performed through the brain without contrast. There is  moderate diffuse atrophy and chronic small vessel ischemic change  similar to the study of 3 days ago. There is no evidence of acute  intracranial hemorrhage or mass effect. The visualized sinuses are  clear. There is some sclerosis of the mastoid air cells consistent with  changes of chronic mastoiditis.                 Radiation dose reduction techniques were utilized, including automated  exposure control and exposure modulation based on body size.     This report was finalized on 1/24/2021 2:33 PM by Dr. Erik Casanova M.D.       CT Abdomen Pelvis With Contrast [[id number]] Collected: 01/21/21 0859     Updated: 01/22/21 1308    Narrative:      CT CHEST, ABDOMEN AND PELVIS WITH CONTRAST     HISTORY: History of fall and pain in the left upper quadrant, left  lateral ribs and left flank.     TECHNIQUE: Axial CT images of the chest abdomen and pelvis were obtained  following administration of intravenous and oral contrast. Coronal and  sagittal reconstructions were then obtained.     COMPARISON: None.     FINDINGS:     CT CHEST: There is a posterolateral left 9th minimally displaced  fracture. Cortical buckling of the lateral left 10th rib likely related  to a nondisplaced fracture. There is a  small layering left pleural  effusion. No pneumothorax. The central airways are patent without  endobronchial lesion. There are scattered areas of irregular  consolidation and ground glass density demonstrated bilaterally,  particularly within the left upper lobe. A nodular area of consolidation  is seen within the left lower lobe. There is no pathological mediastinal  lymphadenopathy. Changes of aortic valve replacement are present.  Dilated ascending thoracic aorta measuring up to 3.7 cm. The descending  thoracic aorta is also dilated demonstrating a midthoracic AP diameter  of 3.3 cm. No periaortic hematoma or evidence of aortic dissection.     CT ABDOMEN AND PELVIS: A superior endplate L1 compression fracture is  new from 11/2020. The liver is unremarkable. Cholelithiasis is present.  Punctate calcified granulomas are seen within the spleen. The pancreas  is normal without ductal dilatation. Cortical scarring is seen within  the midpole of the left kidney without interim change. Small exophytic  cortical lesions are identified within the left kidney without interval  change in size. Punctate nephroliths are seen within the right kidney.  No evidence of bowel obstruction.     Evaluation of the pelvis is limited by streak artifact from patient's  hip arthroplasty. Marked colonic diverticulosis. Diffuse wall thickening  of the sigmoid, possibly chronic and related to muscular hypertrophy.  Marked calcified atherosclerotic plaque is seen throughout the abdominal  aorta which is dilated measuring up to 3.2 cm. Marked calcification  within the left common iliac artery causing focal hemodynamically  significant stenosis/near-complete occlusion. Marked calcification is  seen along bilateral external iliac arteries with severe  narrowing/near-complete occlusion, especially on the left.       Impression:      1. Mildly displaced left 9th rib fracture. Lateral left 10th rib  nondisplaced fracture.  2. Superior endplate  compression fracture of the L1 vertebral body, also  favored to be acute.  3. Small left pleural effusion with areas of bilateral irregular  consolidation and ground glass density are unchanged from 10/2019 and  will need follow-up.  4. Marked atherosclerotic disease as above.  5. Additional findings as above.     These findings were discussed with Rosmery Manuel by telephone.     Radiation dose reduction techniques were utilized, including automated  exposure control and exposure modulation based on body size.     This report was finalized on 1/22/2021 1:05 PM by Dr. Ashwini Caro M.D.       CT CHEST WITH CONTRAST DIAGNOSTIC [[id number]] Collected: 01/21/21 0859     Updated: 01/22/21 1308    Narrative:      CT CHEST, ABDOMEN AND PELVIS WITH CONTRAST     HISTORY: History of fall and pain in the left upper quadrant, left  lateral ribs and left flank.     TECHNIQUE: Axial CT images of the chest abdomen and pelvis were obtained  following administration of intravenous and oral contrast. Coronal and  sagittal reconstructions were then obtained.     COMPARISON: None.     FINDINGS:     CT CHEST: There is a posterolateral left 9th minimally displaced  fracture. Cortical buckling of the lateral left 10th rib likely related  to a nondisplaced fracture. There is a small layering left pleural  effusion. No pneumothorax. The central airways are patent without  endobronchial lesion. There are scattered areas of irregular  consolidation and ground glass density demonstrated bilaterally,  particularly within the left upper lobe. A nodular area of consolidation  is seen within the left lower lobe. There is no pathological mediastinal  lymphadenopathy. Changes of aortic valve replacement are present.  Dilated ascending thoracic aorta measuring up to 3.7 cm. The descending  thoracic aorta is also dilated demonstrating a midthoracic AP diameter  of 3.3 cm. No periaortic hematoma or evidence of aortic dissection.     CT ABDOMEN AND  PELVIS: A superior endplate L1 compression fracture is  new from 11/2020. The liver is unremarkable. Cholelithiasis is present.  Punctate calcified granulomas are seen within the spleen. The pancreas  is normal without ductal dilatation. Cortical scarring is seen within  the midpole of the left kidney without interim change. Small exophytic  cortical lesions are identified within the left kidney without interval  change in size. Punctate nephroliths are seen within the right kidney.  No evidence of bowel obstruction.     Evaluation of the pelvis is limited by streak artifact from patient's  hip arthroplasty. Marked colonic diverticulosis. Diffuse wall thickening  of the sigmoid, possibly chronic and related to muscular hypertrophy.  Marked calcified atherosclerotic plaque is seen throughout the abdominal  aorta which is dilated measuring up to 3.2 cm. Marked calcification  within the left common iliac artery causing focal hemodynamically  significant stenosis/near-complete occlusion. Marked calcification is  seen along bilateral external iliac arteries with severe  narrowing/near-complete occlusion, especially on the left.       Impression:      1. Mildly displaced left 9th rib fracture. Lateral left 10th rib  nondisplaced fracture.  2. Superior endplate compression fracture of the L1 vertebral body, also  favored to be acute.  3. Small left pleural effusion with areas of bilateral irregular  consolidation and ground glass density are unchanged from 10/2019 and  will need follow-up.  4. Marked atherosclerotic disease as above.  5. Additional findings as above.     These findings were discussed with Rosmery Manuel by telephone.     Radiation dose reduction techniques were utilized, including automated  exposure control and exposure modulation based on body size.     This report was finalized on 1/22/2021 1:05 PM by Dr. Ashwini Caro M.D.       XR Chest 1 View [[id number]] Collected: 01/22/21 1127     Updated:  01/22/21 1138    Narrative:      ONE VIEW PORTABLE CHEST AT 11:11 AM     HISTORY: Fell. Recent left rib fracture.     FINDINGS: The lungs are moderately expanded with some vague parenchymal  density in the lower left chest representing some chronic scarring that  is unchanged from multiple previous chest CT scans and chest x-rays.  There is also some acute atelectasis and pleural fluid at the left base  as seen on yesterday's CT scan. There is no pneumothorax. The heart  remains mildly enlarged with an aortic valve stent in place.     This report was finalized on 1/22/2021 11:35 AM by Dr. Erik Casanova M.D.       CT Head Without Contrast [[id number]] Collected: 01/21/21 0936     Updated: 01/21/21 1359    Narrative:      CT HEAD WITHOUT CONTRAST     CLINICAL HISTORY: Fell, hitting head.     TECHNIQUE: CT scan of the head was obtained with 2 mm axial bone  algorithm and 3 mm axial soft tissue algorithm images. No intravenous  contrast was administered.     COMPARISON: Comparison is made to previous MRIs of the brain dated  12/08/2020 and 07/24/2018.     FINDINGS: There is no evidence for a calvarial fracture. There is no  evidence for an acute extra-axial hemorrhage. The ventricles, sulci, and  cisterns are age appropriate. On previous MRIs of the brain, multiple  punctate watershed zone infarcts were noted within the right MCA and  right SANCHEZ as well as right MCA and right PCA distribution within the  right frontal, parietal, and occipital lobes. These chronic infarcts are  better delineated on prior MR imaging. Small subcentimeter chronic  infarcts are identified within the cerebellar hemispheres. Moderate  changes of chronic small vessel ischemic phenomena are identified and  there is old lacunar disease within the right aspect of the erin. When  compared to the prior MRI of the brain dated 12/08/2020, there is no  significant interval change.       Impression:      No evidence for acute traumatic intracranial  pathology.     Changes of chronic small vessel ischemic phenomena, chronic infarcts,  and old lacunar disease are as discussed in detail above.     Radiation dose reduction techniques were utilized, including automated  exposure control and exposure modulation based on body size.     This report was finalized on 1/21/2021 1:56 PM by Dr. Gonzalez Prince M.D.             Results for orders placed during the hospital encounter of 01/21/21   Duplex Venous Lower Extremity - Right CAR    Narrative · Normal right lower extremity venous duplex scan.          Results for orders placed during the hospital encounter of 01/21/21   Duplex Venous Lower Extremity - Right CAR    Narrative · Normal right lower extremity venous duplex scan.          Results for orders placed during the hospital encounter of 12/08/20   Adult Transthoracic Echo Complete W/ Cont if Necessary Per Protocol    Narrative · Calculated left ventricular EF = 60.4% Estimated left ventricular EF =   60% Estimated left ventricular EF was in agreement with the calculated   left ventricular EF. Left ventricular systolic function is normal. Normal   global longitudinal LV strain (GLS) = -19.4%. Left ventricle strain data   was reviewed by the physician and found to be accurate. The left   ventricular cavity is small in size. Left ventricular wall thickness is   consistent with moderate concentric hypertrophy. All left ventricular wall   segments contract normally. Left ventricular diastolic function is   consistent with (grade Ia w/high LAP) impaired relaxation.  · There is a 23 mm TAVR valve present. The aortic valve peak and mean   gradients are within defined limits. There is no significant stenosis or   regurgitation  · Severe mitral annular calcification is present. There is severe,   bileaflet mitral valve thickening present. Trace mitral valve   regurgitation is present. No significant mitral valve stenosis is present.  · Trace tricuspid valve regurgitation is  present. Estimated right   ventricular systolic pressure from tricuspid regurgitation is mildly   elevated (35-45 mmHg). Calculated right ventricular systolic pressure from   tricuspid regurgitation is 36 mmHg.          Discharge Details        Discharge Medications      New Medications      Instructions Start Date   acetaminophen 500 MG tablet  Commonly known as: TYLENOL  Replaces: TYLENOL 8 HOUR ARTHRITIS PAIN PO   1,000 mg, Oral, 2 Times Daily      amiodarone 200 MG tablet  Commonly known as: PACERONE   200 mg, Oral, Every 24 Hours Scheduled   Start Date: February 9, 2021     cyclobenzaprine 5 MG tablet  Commonly known as: FLEXERIL   5 mg, Oral, 2 Times Daily PRN      melatonin 3 MG tablet   3 mg, Oral, Nightly      risperiDONE 0.5 MG tablet  Commonly known as: risperDAL   0.5 mg, Oral, 3 Times Daily      vancomycin 50 MG/ML reconstituted solution oral solution reconstituted   125 mg, Oral, Every 6 Hours Scheduled, For 10 days         Changes to Medications      Instructions Start Date   losartan 25 MG tablet  Commonly known as: COZAAR  What changed:   · medication strength  · how much to take   25 mg, Oral, Daily   Start Date: February 9, 2021     metoprolol tartrate 100 MG tablet  Commonly known as: LOPRESSOR  What changed:   · medication strength  · how much to take   100 mg, Oral, 2 Times Daily         Continue These Medications      Instructions Start Date   aspirin 81 MG EC tablet   81 mg, Oral, Daily      atorvastatin 20 MG tablet  Commonly known as: LIPITOR   TAKE 1 TABLET BY MOUTH EVERY DAY      diphenhydrAMINE 25 mg capsule  Commonly known as: BENADRYL   12.5 mg, Oral, Daily PRN      docusate sodium 100 MG capsule  Commonly known as: Colace   200 mg, Oral, Daily      ferrous sulfate 325 (65 FE) MG tablet   325 mg, Oral, Daily With Breakfast      multivitamin with minerals tablet tablet   1 tablet, Oral, Daily   Start Date: February 9, 2021        Stop These Medications    clopidogrel 75 MG  tablet  Commonly known as: PLAVIX     furosemide 20 MG tablet  Commonly known as: LASIX     TYLENOL 8 HOUR ARTHRITIS PAIN PO  Replaced by: acetaminophen 500 MG tablet            Allergies   Allergen Reactions   • Tekturna [Aliskiren] Diarrhea         Discharge Disposition:  Skilled Nursing Facility (DC - External)    Diet:  Hospital:  Diet Order   Procedures   • Diet Regular; Cardiac       Activity:  Activity Instructions     Activity as Tolerated                 CODE STATUS:    Code Status and Medical Interventions:   Ordered at: 02/01/21 1927     Limited Support to NOT Include:    Cardioversion/Defibrillation    Intubation     Code Status:    No CPR     Medical Interventions (Level of Support Prior to Arrest):    Limited       Future Appointments   Date Time Provider Department Center   4/29/2021 11:30 AM Deanna Ortega APRN [facility]   12/22/2021  1:40 PM Warren Tanner MD [facility] None       Additional Instructions for the Follow-ups that You Need to Schedule     Discharge Follow-up with PCP   As directed       Currently Documented PCP:    Deanna Ortega APRN    PCP Phone Number:    [phone]     Follow Up Details: Recommend primary care follow-up within 1 week after discharge from skilled facility.         Discharge Follow-up with Specified Provider: Cardiology clinic follow-up recommended within 1 week.   As directed      To: Cardiology clinic follow-up recommended within 1 week.                     Kolton Estrella MD  02/08/21      Time Spent on Discharge:  I spent  40  minutes on this discharge activity which included: face-to-face encounter with the patient, reviewing the data in the system, coordination of the care with the nursing staff as well as consultants, documentation, and entering orders.

## 2021-02-08 NOTE — CONSULTS
The patient's been no real management problem.  She remains confused and delusional at times but otherwise has not presented much in the way of behavioral issues.  The chart indicates that the patient may discharge to nursing home later today.

## 2021-02-08 NOTE — THERAPY TREATMENT NOTE
Patient Name: Gita Wharton  : 1935    MRN: [id number]                              Today's Date: 2021       Admit Date: 2021    Visit Dx:     ICD-10-CM ICD-9-CM   1. Fall from standing, initial encounter  W19.XXXA E888.9   2. Contusion of flank, initial encounter  S30.1XXA 922.2   3. Closed fracture of multiple ribs of left side, initial encounter  S22.42XA 807.09   4. Compression fracture of superior endplate of L1 vertebra, initial encounter (CMS/Formerly [facility])  S32.010A 805.4     Patient Active Problem List   Diagnosis   • Essential hypertension   • Hyperlipidemia   • Fall at home, initial encounter   • Lung nodule   • History of right MCA stroke   • Stenosis of right internal carotid artery   • Anemia   • Abnormal chest x-ray   • Interstitial lung disease (CMS/Formerly [facility])   • Toxic encephalopathy due to anesthetics   • Weakness generalized   • Ascending aorta dilation (CMS/Formerly [facility])   • Nonrheumatic aortic valve stenosis   • Nonrheumatic mitral valve regurgitation   • Chronic diastolic congestive heart failure (CMS/[facility]ly [facility])   • PVD (peripheral vascular disease) with claudication (CMS/Formerly [facility])   • S/P TAVR (transcatheter aortic valve replacement)   • Premature atrial contractions   • Idiopathic hypotension   • Bilateral lower extremity edema   • Closed fracture of multiple ribs of left side   • Edema leg, right   • Closed compression fracture of body of L1 vertebra (CMS/Formerly [facility])   • Pleural effusion on left   • Leukocytosis   • AMS (altered mental status)   • Acute respiratory failure with hypoxia (CMS/[facility]ly [facility])   • Fall from standing   • Atrial fibrillation (CMS/[facility]ly [facility])   • C. 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    R LL            Dr FRANKIE Branch - suggested yearly CXR to Monitor   • Pressure sore     BUTTOCKS   • Pyelonephritis 4/16/2019   • Stenosis of right internal carotid artery 7/25/2018   • Subdural hematoma (CMS/HCC) 07/12/2018    Secondary to fall, JULY 2018   • Toxic encephalopathy 2019    POST OP LAST HIP SURGERY     Past Surgical History:   Procedure Laterality Date   • AORTIC VALVE REPAIR/REPLACEMENT N/A 12/3/2019    Procedure: TRACEY TRANSCAROTID TRANSCATHETER AORTIC VALVE REPLACEMENT;  Surgeon: Octaviano Yan MD;  [facility];  Service: Cardiothoracic   • AORTIC VALVE REPAIR/REPLACEMENT N/A 12/3/2019    Procedure: Transcarotid Transcatheter Aortic Valve Replacement w/Intra Op TRACEY and Possible Open Bailout Surgery;  Surgeon: Warren Tanner MD;  [facility];  Service: Cardiovascular   • CARDIAC CATHETERIZATION N/A 10/24/2019    Procedure: Coronary angiography;  Surgeon: Warren Tanner MD;  Location: [facility] CATH INVASIVE LOCATION;  Service: Cardiovascular   • CARDIAC CATHETERIZATION N/A 10/24/2019    Procedure: Left heart cath;  Surgeon: Warren Tanner MD;  Location: [facility] CATH INVASIVE LOCATION;  Service: Cardiovascular   • CARDIAC CATHETERIZATION N/A 10/24/2019    Procedure: Right heart cath;  Surgeon: Warren Tanner MD;  Location: [facility] CATH INVASIVE LOCATION;  Service: Cardiovascular   • CAROTID ENDARTERECTOMY Right 7/26/2018    Procedure: RT CAROTID ENDARTERECTOMY;  Surgeon: Inna Villarreal MD;  Location: [facility] MAIN OR;  Service: Vascular   • COLONOSCOPY N/A 8/7/2018    Adenomatous polyp.   Repeat 2021.  Surgeon: Ken Tidwell MD;    • HYSTERECTOMY     • THYROIDECTOMY, PARTIAL     • TOTAL HIP ARTHROPLASTY Right    • TOTAL HIP ARTHROPLASTY Left 6/30/2019    Procedure: LEFT HIP ANTERIOR HIP WITH HANA TABLE;  Surgeon:  Tiffani Pereira MD;  Location: [facility] OR;  Service: Orthopedics     General Information     Row Name 02/08/21 1110          Physical Therapy Time and Intention    Document Type  therapy note (daily note)  -MS     Mode of Treatment  physical therapy;individual therapy  -MS     Row Name 02/08/21 1110          General Information    Patient Profile Reviewed  yes  -MS     Existing Precautions/Restrictions  (S) fall Exit alarm; Contact Isolation  -MS     Barriers to Rehab  cognitive status  -MS     Row Name 02/08/21 1110          Cognition    Orientation Status (Cognition)  oriented to;person  -MS     Row Name 02/08/21 1110          Safety Issues, Functional Mobility    Comment, Safety Issues/Impairments (Mobility)  Gait belt used for safety.  -MS       User Key  (r) = Recorded By, (t) = Taken By, (c) = Cosigned By    Initials Name Provider Type    MS Alex Vela, PT Physical Therapist        Mobility     Row Name 02/08/21 1113          Bed Mobility    Supine-Sit Dukes (Bed Mobility)  minimum assist (75% patient effort);2 person assist  -MS     Sit-Supine Dukes (Bed Mobility)  minimum assist (75% patient effort);2 person assist  -MS     Assistive Device (Bed Mobility)  draw sheet  -MS     Row Name 02/08/21 1113          Sit-Stand Transfer    Sit-Stand Dukes (Transfers)  minimum assist (75% patient effort);2 person assist  -MS     Assistive Device (Sit-Stand Transfers)  walker, front-wheeled  -MS     Row Name 02/08/21 1113          Gait/Stairs (Locomotion)    Dukes Level (Gait)  minimum assist (75% patient effort);2 person assist  -MS     Assistive Device (Gait)  walker, front-wheeled  -MS     Distance in Feet (Gait)  25 feet  -MS     Deviations/Abnormal Patterns (Gait)  eliceo decreased;stride length decreased  -MS     Bilateral Gait Deviations  forward flexed posture  -MS     Comment (Gait/Stairs)  Verbal/tactile cues for posture correction and Rwx guidance.  -MS        User Key  (r) = Recorded By, (t) = Taken By, (c) = Cosigned By    Initials Name Provider Type    Alex Dowling, PT Physical Therapist        Obj/Interventions     Row Name 02/08/21 1115          Motor Skills    Therapeutic Exercise  -- BLE ther. ex. program x 10 reps completed (Ankle Pumps, Hip Flexion, LAQ's)  -MS       User Key  (r) = Recorded By, (t) = Taken By, (c) = Cosigned By    Initials Name Provider Type    Alex Dowling, PT Physical Therapist        Goals/Plan    No documentation.       Clinical Impression     Row Name 02/08/21 1115          Pain Scale: Numbers Pre/Post-Treatment    Pretreatment Pain Rating  0/10 - no pain  -MS     Posttreatment Pain Rating  0/10 - no pain  -MS     Row Name 02/08/21 1115          Positioning and Restraints    Pre-Treatment Position  in bed  -MS     Post Treatment Position  bed  -MS     In Bed  notified nsg;supine;call light within reach;encouraged to call for assist;exit alarm on All lines intact.  -MS       User Key  (r) = Recorded By, (t) = Taken By, (c) = Cosigned By    Initials Name Provider Type    MS GiraldoVelaAlex, PT Physical Therapist        Outcome Measures     Row Name 02/08/21 1115          How much help from another person do you currently need...    Turning from your back to your side while in flat bed without using bedrails?  2  -MS     Moving from lying on back to sitting on the side of a flat bed without bedrails?  2  -MS     Moving to and from a bed to a chair (including a wheelchair)?  2  -MS     Standing up from a chair using your arms (e.g., wheelchair, bedside chair)?  2  -MS     Climbing 3-5 steps with a railing?  2  -MS     To walk in hospital room?  2  -MS     AM-PAC 6 Clicks Score (PT)  12  -MS     Row Name 02/08/21 1115          Functional Assessment    Outcome Measure Options  AM-PAC 6 Clicks Basic Mobility (PT)  -MS       User Key  (r) = Recorded By, (t) = Taken By, (c) = Cosigned By    Initials Name Provider Type    MS  Alex Vela, PT Physical Therapist        Physical Therapy Education                 Title: PT OT SLP Therapies (In Progress)     Topic: Physical Therapy (In Progress)     Point: Mobility training (In Progress)     Learning Progress Summary           Patient Acceptance, E,D, NR by MS at 2/8/2021 1116    Acceptance, E, NR by SB at 2/7/2021 1444    Acceptance, E,D, NR by KJ at 2/5/2021 1727    Acceptance, E, NR by CF at 2/4/2021 1213    Acceptance, E, NR by CF at 2/3/2021 1123    Nonacceptance, E, NL by KT at 2/1/2021 1651    Acceptance, E,TB,D, VU,DU,NR by GJ at 1/30/2021 1434    Comment: discussed safety with mobility, benefits of mobility, enouraged to call for assistance    Acceptance, E, NR by CF at 1/29/2021 1152    Nonacceptance, E, NL by KT at 1/29/2021 1051    Acceptance, E, NR by CF at 1/28/2021 1221    Acceptance, E, NR by CF at 1/27/2021 1550    Acceptance, E, VU,NR by CF at 1/26/2021 1123    Acceptance, E, VU by DJ at 1/25/2021 1007    Acceptance, E,TB, VU,NR by CA at 1/24/2021 1151                   Point: Home exercise program (In Progress)     Learning Progress Summary           Patient Acceptance, E,D, NR by MS at 2/8/2021 1116    Acceptance, E,D, NR by KJ at 2/5/2021 1727    Acceptance, E, NR by CF at 2/4/2021 1213    Acceptance, E, NR by CF at 2/3/2021 1123    Nonacceptance, E, NL by KT at 2/1/2021 1651    Acceptance, E,TB,D, VU,DU,NR by GJ at 1/30/2021 1434    Comment: discussed safety with mobility, benefits of mobility, enouraged to call for assistance    Acceptance, E, NR by CF at 1/29/2021 1152    Nonacceptance, E, NL by KT at 1/29/2021 1051    Acceptance, E, NR by CF at 1/28/2021 1221    Acceptance, E, NR by CF at 1/27/2021 1550    Acceptance, E, VU,NR by CF at 1/26/2021 1123    Acceptance, E, VU by DJ at 1/25/2021 1007    Acceptance, E,TB, VU,NR by CA at 1/24/2021 1151                   Point: Body mechanics (In Progress)     Learning Progress Summary           Patient Acceptance, E,D,  NR by MS at 2/8/2021 1116    Acceptance, E,D, NR by KJ at 2/5/2021 1727    Acceptance, E, NR by CF at 2/4/2021 1213    Acceptance, E, NR by CF at 2/3/2021 1123    Nonacceptance, E, NL by KT at 2/1/2021 1651    Acceptance, E,TB,D, VU,DU,NR by GJ at 1/30/2021 1434    Comment: discussed safety with mobility, benefits of mobility, enouraged to call for assistance    Acceptance, E, NR by CF at 1/29/2021 1152    Nonacceptance, E, NL by KT at 1/29/2021 1051    Acceptance, E, NR by CF at 1/28/2021 1221    Acceptance, E, NR by CF at 1/27/2021 1550    Acceptance, E, VU,NR by CF at 1/26/2021 1123    Acceptance, E, VU by DJ at 1/25/2021 1007    Acceptance, E,TB, VU,NR by CA at 1/24/2021 1151                   Point: Precautions (In Progress)     Learning Progress Summary           Patient Acceptance, E,D, NR by MS at 2/8/2021 1116    Acceptance, E,D, NR by KJ at 2/5/2021 1727    Acceptance, E, NR by CF at 2/4/2021 1213    Acceptance, E, NR by CF at 2/3/2021 1123    Nonacceptance, E, NL by KT at 2/1/2021 1651    Acceptance, E,TB,D, VU,DU,NR by GJ at 1/30/2021 1434    Comment: discussed safety with mobility, benefits of mobility, enouraged to call for assistance    Acceptance, E, NR by CF at 1/29/2021 1152    Nonacceptance, E, NL by KT at 1/29/2021 1051    Acceptance, E, NR by CF at 1/28/2021 1221    Acceptance, E, NR by CF at 1/27/2021 1550    Acceptance, E, VU,NR by CF at 1/26/2021 1123    Acceptance, E, VU by DJ at 1/25/2021 1007    Acceptance, E,TB, VU,NR by CA at 1/24/2021 1151                               User Key     Initials Effective Dates Name Provider Type Discipline    TYRELL 03/07/18 -  Miguel Floyd, PT Physical Therapist PT    JM 03/07/18 -  Rosmery Hansen PTA Physical Therapy Assistant PT    MS 04/03/18 -  Alex Vela, PT Physical Therapist PT    KT 06/16/16 -  Gracie Dasilva RN Registered Nurse Nurse    SB 06/09/17 -  Marce Steen RN Registered Nurse Nurse    CA 10/09/20 -  Laura Ferguson, SAMANTHA  Physical Therapist PT    DJ 10/25/19 -  Julia Anaya, PT Physical Therapist PT    CF 09/02/20 -  Laura Esqueda, SAMANTHA Physical Therapist PT              PT Recommendation and Plan     Plan of Care Reviewed With: patient  Outcome Summary: Pt. able to ambulate 25 feet, Min. assist x 2, with use of Rwx this date.  Pt. requires Min. assist x 2 for bed mobility and Min. assist x 2 for sit <-> stand transfers. BLE ther. ex. program x 10 reps completed for general strengthening.  Verbal/tactile cues for posture correction and Rwx guidance during ambulation.     Time Calculation:   PT Charges     Row Name 02/08/21 1117             Time Calculation    Start Time  0852  -MS      Stop Time  0908  -MS      Time Calculation (min)  16 min  -MS      PT Received On  02/08/21  -MS      PT - Next Appointment  02/09/21  -MS         Time Calculation- PT    Total Timed Code Minutes- PT  15 minute(s)  -MS        User Key  (r) = Recorded By, (t) = Taken By, (c) = Cosigned By    Initials Name Provider Type    Alex Dowling, PT Physical Therapist        Therapy Charges for Today     Code Description Service Date Service Provider Modifiers Qty    12359263109 HC PT THER PROC EA 15 MIN 2/8/2021 Alex Vela, PT GP 1    18809310679 HC PT THER SUPP EA 15 MIN 2/8/2021 Alex Vela, PT GP 1          PT G-Codes  Outcome Measure Options: AM-PAC 6 Clicks Basic Mobility (PT)  AM-PAC 6 Clicks Score (PT): 12    Alex Vela PT  2/8/2021

## 2021-02-08 NOTE — PLAN OF CARE
Goal Outcome Evaluation:  Plan of Care Reviewed With: patient     Outcome Summary: Pt. able to ambulate 25 feet, Min. assist x 2, with use of Rwx this date.  Pt. requires Min. assist x 2 for bed mobility and Min. assist x 2 for sit <-> stand transfers. BLE ther. ex. program x 10 reps completed for general strengthening.  Verbal/tactile cues for posture correction and Rwx guidance during ambulation.    Patient was wearing a face mask during this therapy encounter. Therapist used appropriate personal protective equipment including eye protection, mask, and gloves.  Mask used was standard procedure mask. Appropriate PPE was worn during the entire therapy session. Hand hygiene was completed before and after therapy session. Patient is not in enhanced droplet precautions.

## 2021-02-08 NOTE — PROGRESS NOTES
"      [facility]         Dr Rosales Sayied   LOS: 17 days   Patient Care Team:  Deanna Ortega APRN as PCP - General (Internal Medicine)  Warren Tanner MD as Consulting Physician (Cardiology)  Octaviano Yan MD as Surgeon (Cardiothoracic Surgery)    Chief Complaint: Left-sided effusion status post fall with rib fractures A. fib RVR C. difficile    Interval History: Currently on room air and continues to be weak and tired.  Poor historian    REVIEW OF SYSTEMS:   CARDIOVASCULAR: No chest pain, chest pressure or chest discomfort. No palpitations or edema.   RESPIRATORY: No shortness of breath, cough or sputum.   GASTROINTESTINAL: No anorexia, nausea, vomiting or diarrhea. No abdominal pain or blood.   HEMATOLOGIC: No bleeding or bruising.     Ventilator/Non-Invasive Ventilation Settings (From admission, onward)    None            Vital Signs  Temp:  [96.9 °F (36.1 °C)-97.5 °F (36.4 °C)] 97.2 °F (36.2 °C)  Heart Rate:  [52-89] 52  Resp:  [16] 16  BP: (132-194)/(51-82) 132/51    Intake/Output Summary (Last 24 hours) at 2/8/2021 1427  Last data filed at 2/8/2021 0500  Gross per 24 hour   Intake 480 ml   Output --   Net 480 ml     Flowsheet Rows      First Filed Value   Admission Height  157.5 cm (62\") Documented at 01/21/2021 0711   Admission Weight  54 kg (119 lb) Documented at 01/21/2021 0711          Physical Exam:  Patient is examined using the personal protective equipment as per guidelines from infection control for this particular patient as enacted.  Hand hygiene was performed before and after patient interaction.   General Appearance:    Alert, cooperative, in no acute distress.  Following simple commands  Neck midline trachea no thyromegaly   Lungs:    Diminished breath sounds bilaterally    Heart:    Regular rhythm and normal rate, normal S1 and S2, no            murmur, no gallop, no rub, no click   Chest Wall:    No abnormalities observed   Abdomen:     Normal bowel sounds, no masses, " no organomegaly, soft        non-tender, non-distended, no guarding, no rebound                tenderness   Extremities:   Moves all extremities well, no edema, no cyanosis, no             redness  CNS no focal neurological deficits normal sensory exam  Skin no rashes no nodules  Musculoskeletal no cyanosis no clubbing normal range of motion     Results Review:        Results from last 7 days   Lab Units 02/08/21  0456 02/07/21  0641 02/06/21  0528   SODIUM mmol/L 139 139 139   POTASSIUM mmol/L 4.4 4.4 4.8   CHLORIDE mmol/L 111* 113* 113*   CO2 mmol/L 17.5* 18.0* 17.4*   BUN mg/dL 18 17 16   CREATININE mg/dL 0.96 0.97 0.92   GLUCOSE mg/dL 76 96 93   CALCIUM mg/dL 7.9* 8.1* 8.0*     Results from last 7 days   Lab Units 02/03/21  0906 02/03/21  0400   TROPONIN T ng/mL 0.029 0.029     Results from last 7 days   Lab Units 02/08/21  0456 02/07/21  0641 02/06/21  0528   WBC 10*3/mm3 11.94* 14.96* 17.46*   HEMOGLOBIN g/dL 9.4* 9.2* 9.8*   HEMATOCRIT % 29.8* 29.9* 30.6*   PLATELETS 10*3/mm3 357 361 363             Results from last 7 days   Lab Units 02/05/21  1655   MAGNESIUM mg/dL 2.2         Results from last 7 days   Lab Units 02/01/21  2348   PH, ARTERIAL pH units 7.429   PO2 ART mm Hg 74.9*   PCO2, ARTERIAL mm Hg 27.5*   HCO3 ART mmol/L 18.2*       I reviewed the patient's new clinical results.  I personally viewed and interpreted the patient's CXR        Medication Review:   acetaminophen, 1,000 mg, Oral, TID  amiodarone, 200 mg, Oral, Q24H  atorvastatin, 20 mg, Oral, Daily  ferrous sulfate, 325 mg, Oral, Daily With Breakfast  gadobenate dimeglumine, 11 mL, Intravenous, Once in imaging  lidocaine, 1 patch, Transdermal, Q24H  losartan, 25 mg, Oral, Daily  melatonin, 3 mg, Oral, Nightly  metoprolol tartrate, 100 mg, Oral, BID  multivitamin with minerals, 1 tablet, Oral, Daily  risperiDONE, 0.5 mg, Oral, TID  sodium chloride, 10 mL, Intravenous, Q12H  vancomycin, 125 mg, Oral, Q6H        hold, 1 each  Pharmacy Consult,          ASSESSMENT:   Left-sided pleural effusion status post thoracentesis exudative/hemothorax  Rib fractures  Left-sided infiltrate  Metabolic acidosis  A. fib RVR  C. difficile  Encephalopathy      PLAN:  Status post thoracentesis with drainage of small hemothorax.  Pulmonary infiltrate suspected due to effusion trauma atelectasis  P.o. vancomycin for C. difficile  Currently on room air  PT OT as much as patient can tolerate  Watch for pneumonia developing.      Connie Soto MD  02/08/21  14:27 EST

## 2021-02-08 NOTE — PROGRESS NOTES
[facility] Progress Note       Encounter Date:21  Patient:Gita Wharton  :1935  MRN:[id number]      Chief Complaint: Follow up pAF      Subjective:        Patient confused.  States that she is getting her person leaving the hospital.  Appears chronically ill    Review of Systems:  Review of Systems   Unable to perform ROS: mental status change       Medications:  Scheduled Meds:  acetaminophen, 1,000 mg, Oral, TID  amiodarone, 200 mg, Oral, Q24H  atorvastatin, 20 mg, Oral, Daily  ferrous sulfate, 325 mg, Oral, Daily With Breakfast  gadobenate dimeglumine, 11 mL, Intravenous, Once in imaging  lidocaine, 1 patch, Transdermal, Q24H  losartan, 25 mg, Oral, Daily  melatonin, 3 mg, Oral, Nightly  metoprolol tartrate, 100 mg, Oral, BID  multivitamin with minerals, 1 tablet, Oral, Daily  risperiDONE, 0.5 mg, Oral, TID  sodium chloride, 10 mL, Intravenous, Q12H  vancomycin, 125 mg, Oral, Q6H    Continuous Infusions:  hold, 1 each  Pharmacy Consult,     PRN Meds:  cyclobenzaprine  •  hold  •  hydrALAZINE  •  Morphine  •  OLANZapine  •  ondansetron **OR** ondansetron  •  Pharmacy Consult  •  potassium chloride **OR** potassium chloride **OR** potassium chloride         Objective:       Vitals:    21 1500 21 2044 21 2300 21 0908   BP: 154/62 (!) 194/65 157/61 159/82   BP Location: Right arm Left arm Left arm    Patient Position: Lying Lying Lying    Pulse: 65 71 58 89   Resp: 16 16 16    Temp: 97.5 °F (36.4 °C) 96.9 °F (36.1 °C) 97.3 °F (36.3 °C)    TempSrc: Oral Oral Oral    SpO2:  100% 96%    Weight:       Height:               Physical Exam:  Constitutional:  Chronically ill-appearing, frail, no acute distress   HENT: Oropharynx clear and membrane moist  Eyes: Normal conjunctiva, no sclera icterus.  Neck: Supple, no carotid bruit bilaterally.  Cardiovascular: Regular rate and rhythm, No Murmur, No bilateral lower extremity edema.  Pulmonary: Normal respiratory  effort, normal lung sounds, no wheezing.  Abdominal: Soft, nontender, no hepatosplenomegaly, liver is non-pulsatile.  Neurological: Alert and orient x 3.   Skin: Warm, dry, no ecchymosis, no rash.  Psych: Appropriate mood and affect. Normal judgment and insight.           Lab Review:   Results from last 7 days   Lab Units 02/08/21  0456 02/07/21  0641 02/06/21  0528 02/05/21  2226 02/05/21  0916 02/04/21  0423 02/03/21  0400 02/02/21  0559   SODIUM mmol/L 139 139 139  --  140 137 137 137   POTASSIUM mmol/L 4.4 4.4 4.8 5.0 3.4* 4.4 3.0* 3.9   CHLORIDE mmol/L 111* 113* 113*  --  110* 109* 106 108*   CO2 mmol/L 17.5* 18.0* 17.4*  --  18.3* 17.7* 18.9* 19.3*   BUN mg/dL 18 17 16  --  16 21 23 23   CREATININE mg/dL 0.96 0.97 0.92  --  0.98 1.01* 0.99 1.16*   GLUCOSE mg/dL 76 96 93  --  103* 88 82 80   CALCIUM mg/dL 7.9* 8.1* 8.0*  --  8.0* 7.9* 8.2* 8.3*   AST (SGOT) U/L 19  --  33*  --   --  41*  --  25   ALT (SGPT) U/L 17  --  26  --   --  25  --  14     Results from last 7 days   Lab Units 02/03/21  0906 02/03/21  0400   TROPONIN T ng/mL 0.029 0.029     Results from last 7 days   Lab Units 02/08/21  0456 02/07/21  0641 02/06/21  0528 02/05/21  0916 02/04/21  0423 02/03/21  0400 02/02/21  0559   WBC 10*3/mm3 11.94* 14.96* 17.46* 16.56* 18.48* 22.65* 21.99*   HEMOGLOBIN g/dL 9.4* 9.2* 9.8* 10.2* 9.0* 9.2* 8.7*   HEMATOCRIT % 29.8* 29.9* 30.6* 32.5* 28.3* 28.5* 27.2*   PLATELETS 10*3/mm3 357 361 363 364 312 346 335         Results from last 7 days   Lab Units 02/05/21  1655 02/03/21  0400   MAGNESIUM mg/dL 2.2 2.1           Invalid input(s): LDLCALC      Results from last 7 days   Lab Units 02/03/21  0400 02/02/21  0023   TSH uIU/mL 2.380 3.180            Assessment:          Diagnosis Plan   1. Fall from standing, initial encounter     2. Contusion of flank, initial encounter     3. Closed fracture of multiple ribs of left side, initial encounter     4. Compression fracture of superior endplate of L1 vertebra, initial  encounter (CMS/[facility])            Plan:       Ms. Wharton is a 85 y.o. woman with past medical history notable for history of aortic stenosis status post TAVR, history of MCA stroke, carotid artery disease status post right carotid endarterectomy, and dementia presents to the hospital on 1/21/2021 after a fall resulting in rib fractures.  Since her admission she has had issues with agitation and paranoia.  Psych is been following along.  Unfortunately on 2/3/2021 the patient went into paroxysmal atrial fibrillation.  She was started on oral amiodarone which is help maintain sinus rhythm long with her chronic metoprolol use.  She is not a good anticoagulation candidate.    Paroxysmal atrial fibrillation:  · Continue amiodarone metoprolol  · No anticoagulation due to history of falls                 Ken Olivarez MD  [facility]  02/08/21  09:40 EST

## 2021-02-08 NOTE — PROGRESS NOTES
[facility] Medicine Services  PROGRESS NOTE    Patient Name: Gita Wharton  : 1935  MRN: [id number]    Date of Admission: 2021  Primary Care Physician: Deanna Ortega APRN    Subjective   Subjective     CC:  Follow-up fusion/rib fracture/atrial fibrillation/C. difficile colitis/E. coli UTI    HPI:  Poor historian.  Patient sitting up in bed and says she plans to leave the hospital today.  She is calm during my examination.  She is not oriented but is redirectable during my conversation.    Review of Systems  No current fevers or chills  No current shortness of breath or cough  No current nausea, vomiting, or diarrhea  No current chest pain or palpitations      Objective   Objective     Vital Signs:   Temp:  [96.9 °F (36.1 °C)-97.8 °F (36.6 °C)] 97.3 °F (36.3 °C)  Heart Rate:  [58-87] 58  Resp:  [16] 16  BP: (121-194)/(59-69) 157/61        Physical Exam:  Constitutional:Awake, alert  HENT: NCAT, mucous membranes moist, neck supple  Respiratory: Clear to auscultation bilaterally, respiratory effort normal, nonlabored breathing on room air oxygen  Cardiovascular: RRR, normal radial pulses  Gastrointestinal: Positive bowel sounds, soft, nontender, nondistended  Musculoskeletal: Elderly and somewhat chronically debilitated in appearance, no lower extremity edema, BMI 21  Psychiatric: Confused but calm affect, cooperative, conversational  Neurologic: No slurred speech or facial droop, follows commands  Skin: No rashes or jaundice, warm      Results Reviewed:  Results from last 7 days   Lab Units 21  0456 21  1246 21  0641 21  0528 21  0916   WBC 10*3/mm3 11.94*  --  14.96* 17.46* 16.56*   HEMOGLOBIN g/dL 9.4*  --  9.2* 9.8* 10.2*   HEMATOCRIT % 29.8*  --  29.9* 30.6* 32.5*   PLATELETS 10*3/mm3 357  --  361 363 364   PROCALCITONIN ng/mL  --  197.52*  --  94.88* 95.24*     Results from last 7 days   Lab Units 21  0456 21  0641 21  0528   02/04/21  0423 02/03/21  0906 02/03/21  0400   SODIUM mmol/L 139 139 139   < > 137  --  137   POTASSIUM mmol/L 4.4 4.4 4.8   < > 4.4  --  3.0*   CHLORIDE mmol/L 111* 113* 113*   < > 109*  --  106   CO2 mmol/L 17.5* 18.0* 17.4*   < > 17.7*  --  18.9*   BUN mg/dL 18 17 16   < > 21  --  23   CREATININE mg/dL 0.96 0.97 0.92   < > 1.01*  --  0.99   GLUCOSE mg/dL 76 96 93   < > 88  --  82   CALCIUM mg/dL 7.9* 8.1* 8.0*   < > 7.9*  --  8.2*   ALT (SGPT) U/L 17  --  26  --  25  --   --    AST (SGOT) U/L 19  --  33*  --  41*  --   --    TROPONIN T ng/mL  --   --   --   --   --  0.029 0.029    < > = values in this interval not displayed.     Estimated Creatinine Clearance: 37.5 mL/min (by C-G formula based on SCr of 0.96 mg/dL).    Microbiology Results Abnormal     Procedure Component Value - Date/Time    Body Fluid Culture - Body Fluid, Pleural Cavity [[id number]] Collected: 02/02/21 1345    Lab Status: Final result Specimen: Body Fluid from Pleural Cavity Updated: 02/07/21 0818     Body Fluid Culture No growth at 5 days     Gram Stain Many (4+) WBCs seen      No organisms seen    Anaerobic Culture - Pleural Fluid, Pleural Cavity [[id number]] Collected: 02/02/21 1345    Lab Status: Final result Specimen: Pleural Fluid from Pleural Cavity Updated: 02/07/21 0630     Anaerobic Culture No anaerobes isolated at 5 days    Blood Culture - Blood, Arm, Left [[id number]] Collected: 02/02/21 0022    Lab Status: Final result Specimen: Blood from Arm, Left Updated: 02/07/21 0045     Blood Culture No growth at 5 days    Blood Culture - Blood, Hand, Left [[id number]] Collected: 02/02/21 0023    Lab Status: Final result Specimen: Blood from Hand, Left Updated: 02/07/21 0045     Blood Culture No growth at 5 days    Clostridium Difficile Toxin - Stool, Per Rectum [[id number]]  (Abnormal) Collected: 02/03/21 2330    Lab Status: Final result Specimen: Stool from Per Rectum Updated: 02/04/21 0114    Narrative:      The following orders were created  for panel order Clostridium Difficile Toxin - Stool, Per Rectum.  Procedure                               Abnormality         Status                     ---------                               -----------         ------                     Clostridium Difficile To...[[id number]]  Abnormal            Final result                 Please view results for these tests on the individual orders.    Clostridium Difficile Toxin, PCR - Stool, Per Rectum [[id number]]  (Abnormal) Collected: 02/03/21 2330    Lab Status: Final result Specimen: Stool from Per Rectum Updated: 02/04/21 0114     C. Difficile Toxins by PCR Positive    COVID PRE-OP / PRE-PROCEDURE SCREENING ORDER (NO ISOLATION) - Swab, Nasopharynx [[id number]]  (Normal) Collected: 01/27/21 1308    Lab Status: Final result Specimen: Swab from Nasopharynx Updated: 01/27/21 1357    Narrative:      The following orders were created for panel order COVID PRE-OP / PRE-PROCEDURE SCREENING ORDER (NO ISOLATION) - Swab, Nasopharynx.  Procedure                               Abnormality         Status                     ---------                               -----------         ------                     COVID-19,[facility] IN-HOUSE...[[id number]]  Normal              Final result                 Please view results for these tests on the individual orders.    COVID-19,[facility] IN-HOUSE CEPHEID, NP SWAB IN TRANSPORT MEDIA 8-12 HR TAT - Swab, Nasopharynx [[id number]]  (Normal) Collected: 01/27/21 1308    Lab Status: Final result Specimen: Swab from Nasopharynx Updated: 01/27/21 1357     COVID19 Not Detected    Narrative:      Fact sheet for providers: https://www.fda.gov/media/279434/download     Fact sheet for patients: https://www.fda.gov/media/489455/download    Urine Culture - Urine, Urine, Catheter In/Out [[id number]]  (Abnormal)  (Susceptibility) Collected: 01/24/21 1750    Lab Status: Final result Specimen: Urine, Catheter In/Out Updated: 01/26/21 0734     Urine Culture >100,000  CFU/mL Escherichia coli    Susceptibility      Escherichia coli     RIN     Ampicillin Susceptible     Ampicillin + Sulbactam Susceptible     Cefazolin Susceptible     Cefepime Susceptible     Ceftazidime Susceptible     Ceftriaxone Susceptible     Gentamicin Susceptible     Levofloxacin Susceptible     Nitrofurantoin Susceptible     Piperacillin + Tazobactam Susceptible     Tetracycline Susceptible     Trimethoprim + Sulfamethoxazole Susceptible                    COVID PRE-OP / PRE-PROCEDURE SCREENING ORDER (NO ISOLATION) - Swab, Nasopharynx [[id number]]  (Normal) Collected: 01/21/21 1204    Lab Status: Final result Specimen: Swab from Nasopharynx Updated: 01/21/21 1654    Narrative:      The following orders were created for panel order COVID PRE-OP / PRE-PROCEDURE SCREENING ORDER (NO ISOLATION) - Swab, Nasopharynx.  Procedure                               Abnormality         Status                     ---------                               -----------         ------                     COVID-19,[facility]TIMA PANTHER,...[[id number]]  Normal              Final result                 Please view results for these tests on the individual orders.    COVID-19,[facility]TIM[facility] PANTHER,[facility] IN-HOUSE, NP/OP SWAB IN UTM/VTM/[facility] TRANSPORT MEDIA,24 HR TAT - Swab, Nasopharynx [[id number]]  (Normal) Collected: 01/21/21 1204    Lab Status: Final result Specimen: Swab from Nasopharynx Updated: 01/21/21 1654     COVID19 Not Detected    Narrative:      Fact sheet for providers: https://www.fda.gov/media/559808/download     Fact sheet for patients: https://www.fda.gov/media/087460/download    Test performed by PCR.          Imaging Results (Last 24 Hours)     ** No results found for the last 24 hours. **          Results for orders placed during the hospital encounter of 12/08/20   Adult Transthoracic Echo Complete W/ Cont if Necessary Per Protocol    Narrative · Calculated left ventricular EF = 60.4% Estimated left ventricular EF =   60%  Estimated left ventricular EF was in agreement with the calculated   left ventricular EF. Left ventricular systolic function is normal. Normal   global longitudinal LV strain (GLS) = -19.4%. Left ventricle strain data   was reviewed by the physician and found to be accurate. The left   ventricular cavity is small in size. Left ventricular wall thickness is   consistent with moderate concentric hypertrophy. All left ventricular wall   segments contract normally. Left ventricular diastolic function is   consistent with (grade Ia w/high LAP) impaired relaxation.  · There is a 23 mm TAVR valve present. The aortic valve peak and mean   gradients are within defined limits. There is no significant stenosis or   regurgitation  · Severe mitral annular calcification is present. There is severe,   bileaflet mitral valve thickening present. Trace mitral valve   regurgitation is present. No significant mitral valve stenosis is present.  · Trace tricuspid valve regurgitation is present. Estimated right   ventricular systolic pressure from tricuspid regurgitation is mildly   elevated (35-45 mmHg). Calculated right ventricular systolic pressure from   tricuspid regurgitation is 36 mmHg.          I have reviewed the medications:  Scheduled Meds:acetaminophen, 1,000 mg, Oral, TID  amiodarone, 200 mg, Oral, Q24H  atorvastatin, 20 mg, Oral, Daily  ferrous sulfate, 325 mg, Oral, Daily With Breakfast  gadobenate dimeglumine, 11 mL, Intravenous, Once in imaging  lidocaine, 1 patch, Transdermal, Q24H  losartan, 25 mg, Oral, Daily  melatonin, 3 mg, Oral, Nightly  metoprolol tartrate, 100 mg, Oral, BID  multivitamin with minerals, 1 tablet, Oral, Daily  risperiDONE, 0.5 mg, Oral, TID  sodium chloride, 10 mL, Intravenous, Q12H  vancomycin, 125 mg, Oral, Q6H      Continuous Infusions:hold, 1 each  Pharmacy Consult,       PRN Meds:.cyclobenzaprine  •  hold  •  hydrALAZINE  •  Morphine  •  OLANZapine  •  ondansetron **OR** ondansetron  •   Pharmacy Consult  •  potassium chloride **OR** potassium chloride **OR** potassium chloride    Assessment/Plan   Assessment & Plan     Active Hospital Problems    Diagnosis  POA   • **Closed fracture of multiple ribs of left side [S22.42XA]  Yes   • C. difficile colitis [A04.72]  No   • Atrial fibrillation (CMS/HCC) [I48.91]  No   • AMS (altered mental status) [R41.82]  Yes   • Acute respiratory failure with hypoxia (CMS/HCC) [J96.01]  Yes   • Fall from standing [W19.XXXA]  Yes   • Edema leg, right [R60.0]  Yes   • Closed compression fracture of body of L1 vertebra (CMS/HCC) [S32.010A]  Yes   • Pleural effusion on left [J90]  Yes   • Leukocytosis [D72.829]  Yes   • S/P TAVR (transcatheter aortic valve replacement) [Z95.2]  Not Applicable   • PVD (peripheral vascular disease) with claudication (CMS/HCC) [I73.9]  Yes   • Chronic diastolic congestive heart failure (CMS/HCC) [I50.32]  Yes   • Anemia [D64.9]  Yes   • History of right MCA stroke [Z86.73]  Not Applicable   • Fall at home, initial encounter [W19.XXXA, Y92.009]  Not Applicable   • Essential hypertension [I10]  Yes      Resolved Hospital Problems   No resolved problems to display.        Brief Hospital Course to date:  Gita Wharton is a 85 y.o. female who initially presented on 1/21/2021 to the emergency room after a fall and closed rib fracture of multiple ribs on the left.  She also had L1 fracture, metabolic encephalopathy, she was later found to have E. coli urinary tract infection with acute kidney injury.  She had electrolyte disturbances.  She also had many chronic medical problems including chronic diastolic heart failure and history of TAVR that complicated her medical management.  She required left sided thoracentesis.  Her respiratory status stabilized and she is now off of oxygen.  She subsequently had declined and was found to have C. difficile colitis and has been on oral vancomycin treatment.  Psychiatry has followed and assisted with  management of her metabolic encephalopathy on top of possible underlying baseline dementia and history of stroke.    Discussion/plan for today:  Appears calm and stable during my examination.  Electrolytes reviewed and renal function appears at baseline.  Nursing reports significant improvement in behavior on current psychiatric medications and patient is resting calmly in bed.  Continue oral vancomycin treatment of C. difficile colitis.  Continue current cardiac medications per cardiology.  Significantly elevated procalcitonin is thought to be due to C. difficile colitis.  Diarrhea reportedly improved.  Supportive care and symptom treatment today.  Plan to follow-up with case management to determine if transfer is possible today.        CODE STATUS:   Code Status and Medical Interventions:   Ordered at: 02/01/21 1927     Limited Support to NOT Include:    Cardioversion/Defibrillation    Intubation     Code Status:    No CPR     Medical Interventions (Level of Support Prior to Arrest):    Limited       Kolton Estrella MD  02/08/21

## 2021-02-08 NOTE — NURSING NOTE
Spoke with daughter Marce re: discharge planning and update on patient condition today.  CCP will call facility to check if room available and call daughter back. Daughter is ok to transport patient.

## 2021-02-09 NOTE — PROGRESS NOTES
Case Management Discharge Note      Final Note: Discharged to [facility] for rehab.  Transported by mac Awan RN    Provided Post Acute Provider List?: Yes  Post Acute Provider List: Nursing Home  Delivered To: Support Person  Method of Delivery: Telephone    Selected Continued Care - Discharged on 2/8/2021 Admission date: 1/21/2021 - Discharge disposition: Skilled Nursing Facility (DC - External)    Destination Coordination complete    Service Provider Selected Services Address Phone Fax Patient Preferred    [facility] [facility] [address] [phone] --          Durable Medical Equipment    No services have been selected for the patient.              Dialysis/Infusion    No services have been selected for the patient.              Home Medical Care    No services have been selected for the patient.              Therapy    No services have been selected for the patient.              Community Resources    No services have been selected for the patient.                  Transportation Services  Private: Car    Final Discharge Disposition Code: 03 - skilled nursing facility (SNF)

## 2021-03-02 DIAGNOSIS — Z23 IMMUNIZATION DUE: ICD-10-CM

## 2024-09-24 NOTE — ED TRIAGE NOTES
Pt reports heavy josé red blood in stool started yesterday. Pt denies N/V, pt denies abd. Pain. Pt reports increased weakness.   
 used

## (undated) DEVICE — TBG INJ CONTRL PVCCLR RIGD RA 1200PSI 10

## (undated) DEVICE — SOL NS 500ML

## (undated) DEVICE — SNAR POLYP SENSATION STDOVL 27 240 BX40

## (undated) DEVICE — ANGIO-SEAL VIP VASCULAR CLOSURE DEVICE: Brand: ANGIO-SEAL

## (undated) DEVICE — ANTIBACTERIAL UNDYED BRAIDED (POLYGLACTIN 910), SYNTHETIC ABSORBABLE SUTURE: Brand: COATED VICRYL

## (undated) DEVICE — INTRO SHEATH ART/FEM ENGAGE .035 5F12CM

## (undated) DEVICE — GLIDESHEATH BASIC HYDROPHILIC COATED INTRODUCER SHEATH: Brand: GLIDESHEATH

## (undated) DEVICE — SUT PROLN CARDIO M/HL 2/0 36IN 8843H

## (undated) DEVICE — PERCLOSE PROGLIDE™ SUTURE-MEDIATED CLOSURE SYSTEM: Brand: PERCLOSE PROGLIDE™

## (undated) DEVICE — SUT SILK 3/0 TIES 18IN A184H

## (undated) DEVICE — Device

## (undated) DEVICE — SUTURE REMOVAL TRAY: Brand: MEDLINE INDUSTRIES, INC.

## (undated) DEVICE — GW EMR FIX EXCHG J STD .035 3MM 260CM

## (undated) DEVICE — TRANSD PRESS STOPCOCK1WAY CABL48IN

## (undated) DEVICE — WOUND RETRACTOR AND PROTECTOR: Brand: ALEXIS WOUND PROTECTOR-RETRACTOR

## (undated) DEVICE — PK ANT HIP 40

## (undated) DEVICE — DRSNG SURESITE WNDW 4X4.5

## (undated) DEVICE — GLV SURG PREMIERPRO ORTHO LTX PF SZ8 BRN

## (undated) DEVICE — SUT SILK 0 CT1 CR8 18IN C021D

## (undated) DEVICE — STPCK 1WY STD/PRESS SWIVELNUT

## (undated) DEVICE — PK ENDART CARTOID 40

## (undated) DEVICE — SUT MNCRYL 3/0 PS2 18IN MCP497G

## (undated) DEVICE — SUT PROLN 6/0 BV1 D/A 30IN 8709H

## (undated) DEVICE — SOL NACL 0.9PCT 1000ML

## (undated) DEVICE — CATH DIAG CARD PERFORM JR4.0 BT 4F100CM

## (undated) DEVICE — SPNG GZ WOVN 4X4IN 12PLY 10/BX STRL

## (undated) DEVICE — GLV SURG BIOGEL LTX PF 8

## (undated) DEVICE — ZINC CALCIUM ALGINATE DRESSING: Brand: KENDALL

## (undated) DEVICE — TOWEL,OR,DSP,ST,BLUE,STD,4/PK,20PK/CS: Brand: MEDLINE

## (undated) DEVICE — SUT SILK 4/0 TIES 18IN A183H

## (undated) DEVICE — MARKR SKIN W/RULR AND LBL

## (undated) DEVICE — OPTIFOAM GENTLE SA, POSTOP, 4X8: Brand: MEDLINE

## (undated) DEVICE — HEMOCONCENTRATOR PERFUS LPS06

## (undated) DEVICE — Device: Brand: MEDEX

## (undated) DEVICE — GW INQWIRE FC PTFE STR .035IN 150

## (undated) DEVICE — GLV SURG TRIUMPH CLASSIC PF LTX 8 STRL

## (undated) DEVICE — PK CATH CARD 40

## (undated) DEVICE — TUBING, SUCTION, 1/4" X 10', STRAIGHT: Brand: MEDLINE

## (undated) DEVICE — DISPOSABLE ADAPTER

## (undated) DEVICE — DRSNG WND GEL FIBR OPTICELL AG PLS W/SLV LF 4X5IN  STRL

## (undated) DEVICE — THE TORRENT IRRIGATION SCOPE CONNECTOR IS USED WITH THE TORRENT IRRIGATION TUBING TO PROVIDE IRRIGATION FLUIDS SUCH AS STERILE WATER DURING GASTROINTESTINAL ENDOSCOPIC PROCEDURES WHEN USED IN CONJUNCTION WITH AN IRRIGATION PUMP (OR ELECTROSURGICAL UNIT).: Brand: TORRENT

## (undated) DEVICE — SYR CONTRL LUERLOK 10CC

## (undated) DEVICE — CATH ELECTRD PACE TEMP BI NONHEP 5F110CM

## (undated) DEVICE — SKIN PREP TRAY W/CHG: Brand: MEDLINE INDUSTRIES, INC.

## (undated) DEVICE — COVER,MAYO STAND,STERILE: Brand: MEDLINE

## (undated) DEVICE — HI-TORQUE SUPRA CORE .035 PERIPHERAL GUIDE WIRE .035 X 300 CM: Brand: HI-TORQUE SUPRA CORE

## (undated) DEVICE — 3M™ STERI-STRIP™ REINFORCED ADHESIVE SKIN CLOSURES, R1547, 1/2 IN X 4 IN (12 MM X 100 MM), 6 STRIPS/ENVELOPE: Brand: 3M™ STERI-STRIP™

## (undated) DEVICE — CATH DIAG IMPULSE AL1 6F 100CM

## (undated) DEVICE — SHEET, DRAPE, SPLIT, STERILE: Brand: MEDLINE

## (undated) DEVICE — SPNG DISECTOR KTNER XRAY COTN 1/4X9/16IN PK/5

## (undated) DEVICE — STPCK 3WY STD/PRESS SWIVELNUT

## (undated) DEVICE — CATH VENT MIV RADL PIG ST TIP 4F 110CM

## (undated) DEVICE — CANN NASL CO2 TRULINK W/O2 A/

## (undated) DEVICE — KT MANIFLD CARDIAC

## (undated) DEVICE — ST. SORBAVIEW ULTIMATE IJ SYSTEM A,C: Brand: CENTURION

## (undated) DEVICE — SUT SILK 2 SUTUPAK TIE 60IN SA8H 2STRAND

## (undated) DEVICE — SOL ISO/ALC RUB 70PCT 4OZ

## (undated) DEVICE — CVR PROB GEN PURP W ISOSILK 6X48

## (undated) DEVICE — TAVR: Brand: MEDLINE INDUSTRIES, INC.

## (undated) DEVICE — GW XCHG AMPLTZ XSTIF PTFE CRV .035 3X260

## (undated) DEVICE — ELECTRD BLD EXT EDGE 1P COAT 6.5IN STRL

## (undated) DEVICE — INTRO SHEATH ART/FEM ENGAGE .035 8F12CM

## (undated) DEVICE — DRSNG SURESITE WNDW 2.38X2.75

## (undated) DEVICE — CATH DIAG CARD PERFORMA JL3.5 BT 4F100CM

## (undated) DEVICE — SPK CONTRST MISER

## (undated) DEVICE — HI-TORQUE SUPRA CORE .035 PERIPHERAL GUIDE WIRE .035 X 190 CM: Brand: HI-TORQUE SUPRA CORE

## (undated) DEVICE — GW HITORQUE/BAL MID/WT J W/HCOAT .014 3X190CM

## (undated) DEVICE — CATH DIAG IMPULSE FR4 6F 100CM

## (undated) DEVICE — BALN PRESS WEDGE 5F 110CM

## (undated) DEVICE — CVR PROB 96IN LF STRL

## (undated) DEVICE — CATH DIAG IMPULSE PIG145 6F 110CM

## (undated) DEVICE — ADHS SKIN DERMABOND TOP ADVANCED

## (undated) DEVICE — APPL CHLORAPREP W/TINT 26ML ORNG

## (undated) DEVICE — VESSEL LOOPS X-RAY DETECTABLE: Brand: DEROYAL

## (undated) DEVICE — GLV SURG BIOGEL LTX PF 7 1/2

## (undated) DEVICE — THE SINGLE USE ETRAP – POLYP TRAP IS USED FOR SUCTION RETRIEVAL OF ENDOSCOPICALLY REMOVED POLYPS.: Brand: ETRAP

## (undated) DEVICE — JACKSON-PRATT 100CC BULB RESERVOIR: Brand: CARDINAL HEALTH

## (undated) DEVICE — SUT SILK 2/0 TIES 18IN A185H

## (undated) DEVICE — ST TOURNI COMPL A/ 7IN

## (undated) DEVICE — PK PERFUS CUST W/CARDIOPLEGIA

## (undated) DEVICE — FR5 INFINITI MULTIPAC: Brand: INFINITI

## (undated) DEVICE — CONTAINER,SPECIMEN,OR STERILE,4OZ: Brand: MEDLINE

## (undated) DEVICE — Device: Brand: DEFENDO AIR/WATER/SUCTION AND BIOPSY VALVE

## (undated) DEVICE — SUT VIC 4/0 SH 27IN J415H

## (undated) DEVICE — SUNDT™ EXTERNAL CAROTID ENDARTERECTOMY SHUNT: Brand: SUNDT™

## (undated) DEVICE — 3M™ MEDIPORE™ H SOFT CLOTH SURGICAL TAPE 2862, 2 INCH X 10 YARD (5CM X 9,1M), 12 ROLLS/CASE: Brand: 3M™ MEDIPORE™

## (undated) DEVICE — DRN JP RND NO TROC SIL 10F 1/8IN

## (undated) DEVICE — GLV SURG BIOGEL LTX PF 6 1/2

## (undated) DEVICE — SUT SILK 3/0 SH CR5 18IN C0135

## (undated) DEVICE — MAT FLR ABSORBENT LG 4FT 10 2.5FT

## (undated) DEVICE — 1010 S-DRAPE TOWEL DRAPE 10/BX: Brand: STERI-DRAPE™

## (undated) DEVICE — 3M™ BAIR HUGGER® UNDERBODY BLANKET, FULL ACCESS, 10 PER CASE 63500: Brand: BAIR HUGGER™

## (undated) DEVICE — TBG PRESS EXCITE INJ HPF1200 CLR 100IN

## (undated) DEVICE — CATH DIAG IMPULSE FL3.5 5F 100CM

## (undated) DEVICE — INTRO SHEATH ART/FEM ENGAGE .035 6F12CM